# Patient Record
Sex: FEMALE | Race: WHITE | NOT HISPANIC OR LATINO | Employment: UNEMPLOYED | URBAN - METROPOLITAN AREA
[De-identification: names, ages, dates, MRNs, and addresses within clinical notes are randomized per-mention and may not be internally consistent; named-entity substitution may affect disease eponyms.]

---

## 2017-01-11 ENCOUNTER — TRANSCRIBE ORDERS (OUTPATIENT)
Dept: ADMINISTRATIVE | Facility: HOSPITAL | Age: 62
End: 2017-01-11

## 2017-01-11 ENCOUNTER — HOSPITAL ENCOUNTER (OUTPATIENT)
Dept: GENERAL RADIOLOGY | Facility: HOSPITAL | Age: 62
Discharge: HOME | End: 2017-01-11
Admitting: PHYSICIAN ASSISTANT

## 2017-01-11 DIAGNOSIS — R05.9 COUGH: Primary | ICD-10-CM

## 2017-01-11 PROCEDURE — 71020 HC CHEST PA AND LATERAL: CPT

## 2017-06-23 ENCOUNTER — TRANSCRIBE ORDERS (OUTPATIENT)
Dept: ADMINISTRATIVE | Facility: HOSPITAL | Age: 62
End: 2017-06-23

## 2017-06-23 DIAGNOSIS — Z12.31 VISIT FOR SCREENING MAMMOGRAM: Primary | ICD-10-CM

## 2017-08-07 ENCOUNTER — HOSPITAL ENCOUNTER (OUTPATIENT)
Dept: MAMMOGRAPHY | Facility: HOSPITAL | Age: 62
Discharge: HOME | End: 2017-08-07
Attending: OBSTETRICS & GYNECOLOGY | Admitting: OBSTETRICS & GYNECOLOGY

## 2017-08-07 DIAGNOSIS — Z12.31 VISIT FOR SCREENING MAMMOGRAM: ICD-10-CM

## 2017-08-07 PROCEDURE — 77067 SCR MAMMO BI INCL CAD: CPT | Performed by: RADIOLOGY

## 2017-08-07 PROCEDURE — 77063 BREAST TOMOSYNTHESIS BI: CPT | Performed by: RADIOLOGY

## 2017-08-07 PROCEDURE — 77063 BREAST TOMOSYNTHESIS BI: CPT

## 2017-08-07 PROCEDURE — G0202 SCR MAMMO BI INCL CAD: HCPCS

## 2017-09-11 ENCOUNTER — TRANSCRIBE ORDERS (OUTPATIENT)
Dept: ADMINISTRATIVE | Facility: HOSPITAL | Age: 62
End: 2017-09-11

## 2017-09-11 DIAGNOSIS — M81.0 OSTEOPOROSIS: Primary | ICD-10-CM

## 2017-09-21 ENCOUNTER — HOSPITAL ENCOUNTER (OUTPATIENT)
Dept: BONE DENSITY | Facility: HOSPITAL | Age: 62
Discharge: HOME | End: 2017-09-21
Attending: OBSTETRICS & GYNECOLOGY | Admitting: OBSTETRICS & GYNECOLOGY

## 2017-09-21 DIAGNOSIS — M81.0 OSTEOPOROSIS: ICD-10-CM

## 2017-09-21 PROCEDURE — 77080 DXA BONE DENSITY AXIAL: CPT

## 2017-11-13 ENCOUNTER — OFFICE VISIT (OUTPATIENT)
Dept: ORTHOPEDIC SURGERY | Facility: CLINIC | Age: 62
End: 2017-11-13

## 2017-11-13 VITALS
HEART RATE: 68 BPM | SYSTOLIC BLOOD PRESSURE: 127 MMHG | HEIGHT: 63 IN | WEIGHT: 147 LBS | BODY MASS INDEX: 26.05 KG/M2 | DIASTOLIC BLOOD PRESSURE: 76 MMHG

## 2017-11-13 DIAGNOSIS — M17.0 BILATERAL PRIMARY OSTEOARTHRITIS OF KNEE: Primary | ICD-10-CM

## 2017-11-13 PROCEDURE — 99213 OFFICE O/P EST LOW 20 MIN: CPT | Performed by: ORTHOPAEDIC SURGERY

## 2017-11-13 RX ORDER — PRAMIPEXOLE DIHYDROCHLORIDE 0.5 MG/1
TABLET ORAL
COMMUNITY
Start: 2017-10-30 | End: 2019-05-10 | Stop reason: SDUPTHER

## 2017-11-13 RX ORDER — ZOSTER VACCINE LIVE 19400 [PFU]/.65ML
INJECTION, POWDER, LYOPHILIZED, FOR SUSPENSION SUBCUTANEOUS
COMMUNITY
Start: 2017-10-24 | End: 2019-02-28

## 2017-11-13 RX ORDER — ESTRADIOL 0.5 MG/1
0.5 TABLET ORAL DAILY
COMMUNITY
Start: 2017-10-30

## 2017-11-13 RX ORDER — INFLUENZA A VIRUS A/MICHIGAN/45/2015 X-275 (H1N1) ANTIGEN (FORMALDEHYDE INACTIVATED), INFLUENZA A VIRUS A/SINGAPORE/INFIMH-16-0019/2016 IVR-186 (H3N2) ANTIGEN (FORMALDEHYDE INACTIVATED), INFLUENZA B VIRUS B/PHUKET/3073/2013 ANTIGEN (FORMALDEHYDE INACTIVATED), AND INFLUENZA B VIRUS B/MARYLAND/15/2016 BX-69A ANTIGEN (FORMALDEHYDE INACTIVATED) 15; 15; 15; 15 UG/.5ML; UG/.5ML; UG/.5ML; UG/.5ML
INJECTION, SUSPENSION INTRAMUSCULAR
COMMUNITY
Start: 2017-10-24 | End: 2019-02-28

## 2017-11-13 NOTE — PROGRESS NOTES
[facility] Note    Subjective     Chief Complaint   Patient presents with   • Follow-up     3 month follow up: Bilateral knee osteoarthritis        HPI    Suze Baeza is a 62 y.o. female. She presents today for evaluation of both her knees.  The pain is improved.  She has only mild pain today.  She had good relief with the Visco supplementation injections.  No giving way.  No history of injury.  Overall the pain is decreasing.      There is no problem list on file for this patient.    Past Medical History:   Diagnosis Date   • Degenerative tear of medial meniscus, right    • Low back pain    • Neuropathy, lumbosacral (radicular)    • Primary osteoarthritis of both knees    • Spondylolisthesis, lumbar region       Past Surgical History:   Procedure Laterality Date   • BLADDER SURGERY     • KNEE ARTHROSCOPY Right       Family History   Problem Relation Age of Onset   • Deep vein thrombosis Mother    • Stroke Mother    • Heart disease Mother    • Heart attack Mother    • Osteoarthritis Mother    • Heart disease Father    • Heart attack Father    • Stroke Other      Social History     Social History   • Marital status:      Spouse name: N/A   • Number of children: N/A   • Years of education: N/A     Occupational History   • Not on file.     Social History Main Topics   • Smoking status: Never Smoker   • Smokeless tobacco: Not on file   • Alcohol use No   • Drug use: No   • Sexual activity: Not on file     Other Topics Concern   • Not on file     Social History Narrative          Current Outpatient Prescriptions on File Prior to Visit   Medication Sig Dispense Refill   • atorvastatin (LIPITOR) 10 MG tablet Take 10 mg by mouth Daily.     • calcium acetate (PHOSLO) 667 MG capsule Take 1,334 mg by mouth 3 (Three) Times a Day With Meals.     • Cholecalciferol (VITAMIN D-3) 1000 units capsule Take  by mouth Take As Directed.     • Cyanocobalamin (VITAMIN B-12 ER PO) Take  by mouth  Take As Directed.     • FLUoxetine (PROzac) 10 MG capsule Take 10 mg by mouth Daily.     • glucosamine sulfate 500 MG capsule capsule Take  by mouth 3 (Three) Times a Day With Meals.     • Ibuprofen (ADVIL) 200 MG capsule Take  by mouth Take As Directed.     • Meloxicam (MOBIC PO) Take  by mouth Take As Directed.     • Omega-3 Fatty Acids (FISH OIL) 1000 MG capsule capsule Take  by mouth Daily With Breakfast.     • Red Yeast Rice Extract (RED YEAST RICE PO) Take  by mouth Take As Directed.     • vitamin C (ASCORBIC ACID) 250 MG tablet Take 250 mg by mouth Daily.     • [DISCONTINUED] Zinc Sulfate (ZINC 15 PO) Take  by mouth Take As Directed.     • Sulfamethoxazole-Trimethoprim (BACTRIM PO) Take  by mouth Take As Directed.     • [DISCONTINUED] Pramipexole Dihydrochloride (MIRAPEX PO) Take  by mouth.       No current facility-administered medications on file prior to visit.       No Known Allergies     Review of Systems   Constitutional: Negative for activity change, appetite change, chills, diaphoresis, fatigue, fever and unexpected weight change.   HENT: Negative for congestion, dental problem, drooling, ear discharge, ear pain, facial swelling, hearing loss, mouth sores, nosebleeds, postnasal drip, rhinorrhea, sinus pressure, sneezing, sore throat, tinnitus, trouble swallowing and voice change.    Eyes: Negative for photophobia, pain, discharge, redness, itching and visual disturbance.   Respiratory: Negative for apnea, cough, choking, chest tightness, shortness of breath, wheezing and stridor.    Cardiovascular: Negative for chest pain, palpitations and leg swelling.   Gastrointestinal: Negative for abdominal distention, abdominal pain, anal bleeding, blood in stool, constipation, diarrhea, nausea, rectal pain and vomiting.   Endocrine: Negative for cold intolerance, heat intolerance, polydipsia, polyphagia and polyuria.   Genitourinary: Negative for decreased urine volume, difficulty urinating, dysuria, enuresis,  "flank pain, frequency, genital sores, hematuria and urgency.   Musculoskeletal: Positive for arthralgias. Negative for back pain, gait problem, joint swelling, myalgias, neck pain and neck stiffness.   Skin: Negative for color change, pallor, rash and wound.   Allergic/Immunologic: Negative for environmental allergies, food allergies and immunocompromised state.   Neurological: Negative for dizziness, tremors, seizures, syncope, facial asymmetry, speech difficulty, weakness, light-headedness, numbness and headaches.   Hematological: Negative for adenopathy. Does not bruise/bleed easily.   Psychiatric/Behavioral: Negative for agitation, behavioral problems, confusion, decreased concentration, dysphoric mood, hallucinations, self-injury, sleep disturbance and suicidal ideas. The patient is not nervous/anxious and is not hyperactive.         Objective      Physical Exam  /76  Pulse 68  Ht 63.39\" (161 cm)  Wt 147 lb (66.7 kg)  BMI 25.72 kg/m2    Body mass index is 25.72 kg/(m^2).    General:   Mental Status:  Alert   Appearance: Cooperative, in no acute distress   Build and Nutrition: Well-nourished and well developed female   Orientation: Alert and oriented to person, place and time   Posture: Normal   Gait: Normal    Integument:   Right knee: no skin lesions, no rash, no ecchymosis   Left knee: no skin lesions, no rash, no ecchymosis    Lower Extremities:   Right Knee:    Tenderness:  No medial or lateral joint line tenderness    Effusion:  None    Swelling:  None    Crepitus:  Positive    Atrophy:  None    Range of motion:  Extension: 0°       Flexion: 120°  Instability:  No varus laxity, no valgus laxity, negative anterior drawer  Deformities:  None   Left Knee:    Tenderness:  Mild lateral tenderness    Effusion:  None    Swelling:  None    Crepitus: Positive    Atrophy: None    Range of motion:  Extension: 0°       Flexion: 120°  Instability:  No varus laxity, no valgus laxity, negative anterior " drawer  Deformities:  None      Imaging/Studies  Imaging Results (last 24 hours)     Procedure Component Value Units Date/Time    XR Knee 4+ View Bilateral [[id number]] Resulted:  11/13/17 1130     Updated:  11/13/17 1131    Narrative:       RIght Knee Radiographs  Indication: right knee pain  Views: Standing AP's and skiers of both knees, with lateral and sunrise   views of the right knee    Comparison: no prior studies available    Findings:   Advanced arthritic changes are seen, with bone-on-bone contact in the   medial compartment, with tricompartmental osteophytes.    Left Knee Radiographs  Indication: left knee pain  Views: Standing AP's and skiers of both knees, with lateral and sunrise   views of the left knee    Comparison: no prior studies available    Findings:   Arthritic changes are seen, with medial joint space narrowing, and   subchondral sclerosis medially, with patellofemoral osteophytes.            Assessment and Plan     Suze was seen today for follow-up.    Diagnoses and all orders for this visit:    Bilateral primary osteoarthritis of knee  -     XR Knee 4+ View Bilateral        I reviewed my findings with patient today.  She has arthritis in both her knees, right greater than left.  Currently her symptoms are mild.  I will see her back in 3 months, but sooner for any problems.  She may be a candidate for repeat viscous supplementation injections in the future.    Return in about 3 months (around 2/13/2018).      Medical Decision Making    Data/Risk: radiology tests and independent visualization of imaging, lab tests, or EMG/NCV      Heraclio Mari MD  11/13/17  11:39 AM

## 2017-12-01 ENCOUNTER — TELEPHONE (OUTPATIENT)
Dept: ORTHOPEDIC SURGERY | Facility: CLINIC | Age: 62
End: 2017-12-01

## 2018-08-08 ENCOUNTER — OFFICE VISIT (OUTPATIENT)
Dept: ORTHOPEDIC SURGERY | Facility: CLINIC | Age: 63
End: 2018-08-08

## 2018-08-08 VITALS — BODY MASS INDEX: 24.77 KG/M2 | WEIGHT: 139.77 LBS | OXYGEN SATURATION: 98 % | HEART RATE: 87 BPM | HEIGHT: 63 IN

## 2018-08-08 DIAGNOSIS — M17.12 PRIMARY OSTEOARTHRITIS OF LEFT KNEE: Primary | ICD-10-CM

## 2018-08-08 PROCEDURE — 99212 OFFICE O/P EST SF 10 MIN: CPT | Performed by: ORTHOPAEDIC SURGERY

## 2018-08-08 RX ORDER — NORTRIPTYLINE HYDROCHLORIDE 10 MG/1
10 CAPSULE ORAL AS NEEDED
COMMUNITY
Start: 2018-07-03 | End: 2019-12-18

## 2018-08-08 NOTE — PROGRESS NOTES
[facility]    Subjective     Chief Complaint   Patient presents with   • Left Knee - Follow-up        HPI    Suze Baeza is a 63 y.o. female.  She presents today for evaluation of her left knee.  She has a known history of arthritis in the knee, and has responded well to Visco supplementation injections in the past.  The pain is mild-to-moderate, associated with swelling.  Painful with certain motions.      There is no problem list on file for this patient.    Past Medical History:   Diagnosis Date   • Degenerative tear of medial meniscus, right    • Low back pain    • Neuropathy, lumbosacral (radicular)    • Primary osteoarthritis of both knees    • Spondylolisthesis, lumbar region       Past Surgical History:   Procedure Laterality Date   • BLADDER SURGERY     • KNEE ARTHROSCOPY Right       Family History   Problem Relation Age of Onset   • Deep vein thrombosis Mother    • Stroke Mother    • Heart disease Mother    • Heart attack Mother    • Osteoarthritis Mother    • Heart disease Father    • Heart attack Father    • Stroke Other      Social History     Social History   • Marital status:      Spouse name: N/A   • Number of children: N/A   • Years of education: N/A     Occupational History   • Not on file.     Social History Main Topics   • Smoking status: Never Smoker   • Smokeless tobacco: Not on file   • Alcohol use No   • Drug use: No   • Sexual activity: Not on file     Other Topics Concern   • Not on file     Social History Narrative          Current Outpatient Prescriptions on File Prior to Visit   Medication Sig Dispense Refill   • atorvastatin (LIPITOR) 10 MG tablet Take 10 mg by mouth Daily.     • calcium acetate (PHOSLO) 667 MG capsule Take 1,334 mg by mouth 3 (Three) Times a Day With Meals.     • Cholecalciferol (VITAMIN D-3) 1000 units capsule Take  by mouth Take As Directed.     • Cyanocobalamin (VITAMIN B-12 ER PO) Take  by mouth Take As Directed.      • estradiol (ESTRACE) 0.5 MG tablet      • FLUoxetine (PROzac) 10 MG capsule Take 10 mg by mouth Daily.     • FLUZONE QUADRIVALENT 0.5 ML suspension prefilled syringe injection      • glucosamine sulfate 500 MG capsule capsule Take  by mouth 3 (Three) Times a Day With Meals.     • Ibuprofen (ADVIL) 200 MG capsule Take  by mouth Take As Directed.     • Meloxicam (MOBIC PO) Take  by mouth Take As Directed.     • Omega-3 Fatty Acids (FISH OIL) 1000 MG capsule capsule Take  by mouth Daily With Breakfast.     • pramipexole (MIRAPEX) 0.5 MG tablet      • Red Yeast Rice Extract (RED YEAST RICE PO) Take  by mouth Take As Directed.     • Sulfamethoxazole-Trimethoprim (BACTRIM PO) Take  by mouth Take As Directed.     • vitamin C (ASCORBIC ACID) 250 MG tablet Take 250 mg by mouth Daily.     • ZOSTAVAX 58482 UNT/0.65ML reconstituted suspension        No current facility-administered medications on file prior to visit.       No Known Allergies     Review of Systems   Constitutional: Negative for activity change, appetite change, chills, diaphoresis, fatigue, fever and unexpected weight change.   HENT: Negative for congestion, dental problem, drooling, ear discharge, ear pain, facial swelling, hearing loss, mouth sores, nosebleeds, postnasal drip, rhinorrhea, sinus pressure, sneezing, sore throat, tinnitus, trouble swallowing and voice change.    Eyes: Negative for photophobia, pain, discharge, redness, itching and visual disturbance.   Respiratory: Negative for apnea, cough, choking, chest tightness, shortness of breath, wheezing and stridor.    Cardiovascular: Positive for leg swelling. Negative for chest pain and palpitations.   Gastrointestinal: Negative for abdominal distention, abdominal pain, anal bleeding, blood in stool, constipation, diarrhea, nausea, rectal pain and vomiting.   Endocrine: Negative for cold intolerance, heat intolerance, polydipsia, polyphagia and polyuria.   Genitourinary: Negative for decreased  "urine volume, difficulty urinating, dysuria, enuresis, flank pain, frequency, genital sores, hematuria and urgency.   Musculoskeletal: Positive for joint swelling. Negative for arthralgias, back pain, gait problem, myalgias, neck pain and neck stiffness.        Joint Pain    Skin: Negative for color change, pallor, rash and wound.   Allergic/Immunologic: Negative for environmental allergies, food allergies and immunocompromised state.   Neurological: Negative for dizziness, tremors, seizures, syncope, facial asymmetry, speech difficulty, weakness, light-headedness, numbness and headaches.   Hematological: Negative for adenopathy. Does not bruise/bleed easily.   Psychiatric/Behavioral: Negative for agitation, behavioral problems, confusion, decreased concentration, dysphoric mood, hallucinations, self-injury, sleep disturbance and suicidal ideas. The patient is not nervous/anxious and is not hyperactive.         Objective      Physical Exam  Pulse 87   Ht 161 cm (63.39\")   Wt 63.4 kg (139 lb 12.4 oz)   SpO2 98%   BMI 24.46 kg/m²     Body mass index is 24.46 kg/m².    General:   Mental Status:  Alert   Appearance: Cooperative, in no acute distress   Build and Nutrition: Well-nourished and well developed female   Orientation: Alert and oriented to person, place and time   Posture: Normal   Gait: Normal    Integument:   Left knee: No skin lesions, no rash, no ecchymosis    Lower Extremities:   Left Knee:    Tenderness:  Medial joint line tenderness    Effusion:  None    Swelling:  None    Crepitus: Positive    Range of motion:  Extension: 0°       Flexion: 120°  Instability: No varus laxity, no valgus laxity, negative anterior drawer  Deformities:  Varus          Assessment and Plan     Suze was seen today for follow-up.    Diagnoses and all orders for this visit:    Primary osteoarthritis of left knee        I reviewed my findings with patient today.  Left knee is bothering her, and she has responded well to " Visco supplementation injections in the past.  We will submit for approval, and I will see her back once they're ready for administration.    Return for After approval of the visco injection.      Medical Decision Making  Management Options : prescription/IM medicine      Heraclio Mari MD  08/08/18  3:09 PM

## 2018-08-21 DIAGNOSIS — M17.12 PRIMARY OSTEOARTHRITIS OF LEFT KNEE: Primary | ICD-10-CM

## 2018-09-06 ENCOUNTER — TRANSCRIBE ORDERS (OUTPATIENT)
Dept: ADMINISTRATIVE | Facility: HOSPITAL | Age: 63
End: 2018-09-06

## 2018-09-06 DIAGNOSIS — Z12.31 VISIT FOR SCREENING MAMMOGRAM: Primary | ICD-10-CM

## 2018-09-17 ENCOUNTER — HOSPITAL ENCOUNTER (OUTPATIENT)
Dept: MAMMOGRAPHY | Facility: HOSPITAL | Age: 63
Discharge: HOME | End: 2018-09-17
Attending: OBSTETRICS & GYNECOLOGY | Admitting: OBSTETRICS & GYNECOLOGY

## 2018-09-17 DIAGNOSIS — Z12.31 VISIT FOR SCREENING MAMMOGRAM: ICD-10-CM

## 2018-09-17 PROCEDURE — 77063 BREAST TOMOSYNTHESIS BI: CPT

## 2018-09-17 PROCEDURE — 77063 BREAST TOMOSYNTHESIS BI: CPT | Performed by: RADIOLOGY

## 2018-09-17 PROCEDURE — 77067 SCR MAMMO BI INCL CAD: CPT | Performed by: RADIOLOGY

## 2018-09-17 PROCEDURE — 77067 SCR MAMMO BI INCL CAD: CPT

## 2018-10-01 ENCOUNTER — CLINICAL SUPPORT (OUTPATIENT)
Dept: ORTHOPEDIC SURGERY | Facility: CLINIC | Age: 63
End: 2018-10-01

## 2018-10-01 DIAGNOSIS — M17.12 PRIMARY OSTEOARTHRITIS OF LEFT KNEE: Primary | ICD-10-CM

## 2018-10-01 PROCEDURE — 20610 DRAIN/INJ JOINT/BURSA W/O US: CPT | Performed by: ORTHOPAEDIC SURGERY

## 2018-10-01 NOTE — PROGRESS NOTES
Procedure   Large Joint Arthrocentesis  Date/Time: 10/1/2018 10:14 AM  Consent given by: patient  Site marked: site marked  Timeout: Immediately prior to procedure a time out was called to verify the correct patient, procedure, equipment, support staff and site/side marked as required   Supporting Documentation  Indications: pain   Procedure Details  Location: knee - L knee  Preparation: Patient was prepped and draped in the usual sterile fashion  Needle size: 22 G  Approach: anterolateral  Medications administered: 30 mg Hyaluronan 30 MG/2ML  Patient tolerance: patient tolerated the procedure well with no immediate complications

## 2018-10-01 NOTE — PROGRESS NOTES
[facility] [place] [facility] Note    Subjective     Chief Complaint   Patient presents with   • Left Knee - Follow-up     Orthovisc injection #1        HPI    Suze Baeza is a 63 y.o. female who presents for the first Orthovisc injection into the left knee.    There is no problem list on file for this patient.    Past Medical History:   Diagnosis Date   • Degenerative tear of medial meniscus, right    • Low back pain    • Neuropathy, lumbosacral (radicular)    • Primary osteoarthritis of both knees    • Spondylolisthesis, lumbar region       Past Surgical History:   Procedure Laterality Date   • BLADDER SURGERY     • HYSTERECTOMY  07/10/2012   • KNEE ARTHROSCOPY Right    • OOPHORECTOMY Bilateral 07/10/2012      Family History   Problem Relation Age of Onset   • Deep vein thrombosis Mother    • Stroke Mother    • Heart disease Mother    • Heart attack Mother    • Osteoarthritis Mother    • Heart disease Father    • Heart attack Father    • Stroke Other    • Breast cancer Neg Hx    • Ovarian cancer Neg Hx      Social History     Social History   • Marital status:      Spouse name: N/A   • Number of children: N/A   • Years of education: N/A     Occupational History   • Not on file.     Social History Main Topics   • Smoking status: Never Smoker   • Smokeless tobacco: Not on file   • Alcohol use No   • Drug use: No   • Sexual activity: Not on file     Other Topics Concern   • Not on file     Social History Narrative          Current Outpatient Prescriptions on File Prior to Visit   Medication Sig Dispense Refill   • atorvastatin (LIPITOR) 10 MG tablet Take 10 mg by mouth Daily.     • calcium acetate (PHOSLO) 667 MG capsule Take 1,334 mg by mouth 3 (Three) Times a Day With Meals.     • Cholecalciferol (VITAMIN D-3) 1000 units capsule Take  by mouth Take As Directed.     • Cyanocobalamin (VITAMIN B-12 ER PO) Take  by mouth Take As Directed.     • estradiol (ESTRACE) 0.5 MG tablet      • FLUoxetine  (PROzac) 10 MG capsule Take 10 mg by mouth Daily.     • FLUZONE QUADRIVALENT 0.5 ML suspension prefilled syringe injection      • glucosamine sulfate 500 MG capsule capsule Take  by mouth 3 (Three) Times a Day With Meals.     • Ibuprofen (ADVIL) 200 MG capsule Take  by mouth Take As Directed.     • Meloxicam (MOBIC PO) Take  by mouth Take As Directed.     • nortriptyline (PAMELOR) 10 MG capsule      • Omega-3 Fatty Acids (FISH OIL) 1000 MG capsule capsule Take  by mouth Daily With Breakfast.     • pramipexole (MIRAPEX) 0.5 MG tablet      • Red Yeast Rice Extract (RED YEAST RICE PO) Take  by mouth Take As Directed.     • Sulfamethoxazole-Trimethoprim (BACTRIM PO) Take  by mouth Take As Directed.     • vitamin C (ASCORBIC ACID) 250 MG tablet Take 250 mg by mouth Daily.     • ZOSTAVAX [id number] UNT/0.65ML reconstituted suspension        No current facility-administered medications on file prior to visit.       No Known Allergies     Review of Systems   Constitutional: Negative for activity change, appetite change, chills, diaphoresis, fatigue, fever and unexpected weight change.   HENT: Negative for congestion, dental problem, drooling, ear discharge, ear pain, facial swelling, hearing loss, mouth sores, nosebleeds, postnasal drip, rhinorrhea, sinus pressure, sneezing, sore throat, tinnitus, trouble swallowing and voice change.    Eyes: Negative for photophobia, pain, discharge, redness, itching and visual disturbance.   Respiratory: Negative for apnea, cough, choking, chest tightness, shortness of breath, wheezing and stridor.    Cardiovascular: Negative for chest pain, palpitations and leg swelling.   Gastrointestinal: Negative for abdominal distention, abdominal pain, anal bleeding, blood in stool, constipation, diarrhea, nausea, rectal pain and vomiting.   Endocrine: Negative for cold intolerance, heat intolerance, polydipsia, polyphagia and polyuria.   Genitourinary: Negative for decreased urine volume, difficulty  urinating, dysuria, enuresis, flank pain, frequency, genital sores, hematuria and urgency.   Musculoskeletal: Positive for arthralgias. Negative for back pain, gait problem, joint swelling, myalgias, neck pain and neck stiffness.   Skin: Negative for color change, pallor, rash and wound.   Allergic/Immunologic: Negative for environmental allergies, food allergies and immunocompromised state.   Neurological: Negative for dizziness, tremors, seizures, syncope, facial asymmetry, speech difficulty, weakness, light-headedness, numbness and headaches.   Hematological: Negative for adenopathy. Does not bruise/bleed easily.   Psychiatric/Behavioral: Negative for agitation, behavioral problems, confusion, decreased concentration, dysphoric mood, hallucinations, self-injury, sleep disturbance and suicidal ideas. The patient is not nervous/anxious and is not hyperactive.         Objective      Physical Exam  There were no vitals taken for this visit.    There is no height or weight on file to calculate BMI.    General:   Mental Status:  Alert   Appearance: Cooperative, in no acute distress   Posture: Normal    Assessment and Plan     Suze was seen today for follow-up.    Diagnoses and all orders for this visit:    Primary osteoarthritis of left knee        Administration of viscosupplementation today.  Please see my procedure note for details.    Return in about 1 week (around 10/8/2018) for Injection.    Heraclio Mari MD  10/01/18  12:30 PM

## 2018-10-08 ENCOUNTER — CLINICAL SUPPORT (OUTPATIENT)
Dept: ORTHOPEDIC SURGERY | Facility: CLINIC | Age: 63
End: 2018-10-08

## 2018-10-08 DIAGNOSIS — M17.12 PRIMARY OSTEOARTHRITIS OF LEFT KNEE: Primary | ICD-10-CM

## 2018-10-08 PROCEDURE — 20610 DRAIN/INJ JOINT/BURSA W/O US: CPT | Performed by: ORTHOPAEDIC SURGERY

## 2018-10-08 NOTE — PROGRESS NOTES
Procedure   Large Joint Arthrocentesis  Date/Time: 10/8/2018 10:14 AM  Consent given by: patient  Site marked: site marked  Timeout: Immediately prior to procedure a time out was called to verify the correct patient, procedure, equipment, support staff and site/side marked as required   Supporting Documentation  Indications: pain   Procedure Details  Location: knee - L knee  Preparation: Patient was prepped and draped in the usual sterile fashion  Needle size: 22 G  Approach: anterolateral  Medications administered: 30 mg Hyaluronan 30 MG/2ML  Patient tolerance: patient tolerated the procedure well with no immediate complications

## 2018-10-08 NOTE — PROGRESS NOTES
[facility] [place] [facility] Note    Subjective     Chief Complaint   Patient presents with   • Injections     Left Knee Orthovisc Injection #2        HPI    Suze Baeza is a 63 y.o. female who presents for the second Orthovisc injection in the left knee.  Of note, she has seen some relief so far.    There is no problem list on file for this patient.    Past Medical History:   Diagnosis Date   • Degenerative tear of medial meniscus, right    • Low back pain    • Neuropathy, lumbosacral (radicular)    • Primary osteoarthritis of both knees    • Spondylolisthesis, lumbar region       Past Surgical History:   Procedure Laterality Date   • BLADDER SURGERY     • HYSTERECTOMY  07/10/2012   • KNEE ARTHROSCOPY Right    • OOPHORECTOMY Bilateral 07/10/2012      Family History   Problem Relation Age of Onset   • Deep vein thrombosis Mother    • Stroke Mother    • Heart disease Mother    • Heart attack Mother    • Osteoarthritis Mother    • Heart disease Father    • Heart attack Father    • Stroke Other    • Breast cancer Neg Hx    • Ovarian cancer Neg Hx      Social History     Social History   • Marital status:      Spouse name: N/A   • Number of children: N/A   • Years of education: N/A     Occupational History   • Not on file.     Social History Main Topics   • Smoking status: Never Smoker   • Smokeless tobacco: Not on file   • Alcohol use No   • Drug use: No   • Sexual activity: Not on file     Other Topics Concern   • Not on file     Social History Narrative          Current Outpatient Prescriptions on File Prior to Visit   Medication Sig Dispense Refill   • atorvastatin (LIPITOR) 10 MG tablet Take 10 mg by mouth Daily.     • calcium acetate (PHOSLO) 667 MG capsule Take 1,334 mg by mouth 3 (Three) Times a Day With Meals.     • Cholecalciferol (VITAMIN D-3) 1000 units capsule Take  by mouth Take As Directed.     • Cyanocobalamin (VITAMIN B-12 ER PO) Take  by mouth Take As Directed.     • estradiol  (ESTRACE) 0.5 MG tablet      • FLUoxetine (PROzac) 10 MG capsule Take 10 mg by mouth Daily.     • FLUZONE QUADRIVALENT 0.5 ML suspension prefilled syringe injection      • glucosamine sulfate 500 MG capsule capsule Take  by mouth 3 (Three) Times a Day With Meals.     • Ibuprofen (ADVIL) 200 MG capsule Take  by mouth Take As Directed.     • Meloxicam (MOBIC PO) Take  by mouth Take As Directed.     • nortriptyline (PAMELOR) 10 MG capsule      • Omega-3 Fatty Acids (FISH OIL) 1000 MG capsule capsule Take  by mouth Daily With Breakfast.     • pramipexole (MIRAPEX) 0.5 MG tablet      • Red Yeast Rice Extract (RED YEAST RICE PO) Take  by mouth Take As Directed.     • Sulfamethoxazole-Trimethoprim (BACTRIM PO) Take  by mouth Take As Directed.     • vitamin C (ASCORBIC ACID) 250 MG tablet Take 250 mg by mouth Daily.     • ZOSTAVAX 18409 UNT/0.65ML reconstituted suspension        No current facility-administered medications on file prior to visit.       No Known Allergies     Review of Systems   Constitutional: Negative for activity change, appetite change, chills, diaphoresis, fatigue, fever and unexpected weight change.   HENT: Negative for congestion, dental problem, drooling, ear discharge, ear pain, facial swelling, hearing loss, mouth sores, nosebleeds, postnasal drip, rhinorrhea, sinus pressure, sneezing, sore throat, tinnitus, trouble swallowing and voice change.    Eyes: Negative for photophobia, pain, discharge, redness, itching and visual disturbance.   Respiratory: Negative for apnea, cough, choking, chest tightness, shortness of breath, wheezing and stridor.    Cardiovascular: Negative for chest pain, palpitations and leg swelling.   Gastrointestinal: Negative for abdominal distention, abdominal pain, anal bleeding, blood in stool, constipation, diarrhea, nausea, rectal pain and vomiting.   Endocrine: Negative for cold intolerance, heat intolerance, polydipsia, polyphagia and polyuria.   Genitourinary: Negative  for decreased urine volume, difficulty urinating, dysuria, enuresis, flank pain, frequency, genital sores, hematuria and urgency.   Musculoskeletal: Positive for joint swelling. Negative for arthralgias, back pain, gait problem, myalgias, neck pain and neck stiffness.   Skin: Negative for color change, pallor, rash and wound.   Allergic/Immunologic: Negative for environmental allergies, food allergies and immunocompromised state.   Neurological: Negative for dizziness, tremors, seizures, syncope, facial asymmetry, speech difficulty, weakness, light-headedness, numbness and headaches.   Hematological: Negative for adenopathy. Does not bruise/bleed easily.   Psychiatric/Behavioral: Negative for agitation, behavioral problems, confusion, decreased concentration, dysphoric mood, hallucinations, self-injury, sleep disturbance and suicidal ideas. The patient is not nervous/anxious and is not hyperactive.         Objective      Physical Exam  There were no vitals taken for this visit.    There is no height or weight on file to calculate BMI.    General:   Mental Status:  Alert   Appearance: Cooperative, in no acute distress   Posture: Normal    Assessment and Plan     Suze was seen today for injections.    Diagnoses and all orders for this visit:    Primary osteoarthritis of left knee  -     Large Joint Arthrocentesis        Administration of viscosupplementation today.  Please see my procedure note for details.    Return in about 1 week (around 10/15/2018) for Injection.    Heraclio Mari MD  10/08/18  10:36 AM

## 2018-10-15 ENCOUNTER — TELEPHONE (OUTPATIENT)
Dept: ORTHOPEDIC SURGERY | Facility: CLINIC | Age: 63
End: 2018-10-15

## 2018-10-15 ENCOUNTER — CLINICAL SUPPORT (OUTPATIENT)
Dept: ORTHOPEDIC SURGERY | Facility: CLINIC | Age: 63
End: 2018-10-15

## 2018-10-15 DIAGNOSIS — M17.12 PRIMARY OSTEOARTHRITIS OF LEFT KNEE: Primary | ICD-10-CM

## 2018-10-15 DIAGNOSIS — M17.11 PRIMARY OSTEOARTHRITIS OF RIGHT KNEE: Primary | ICD-10-CM

## 2018-10-15 PROCEDURE — 20610 DRAIN/INJ JOINT/BURSA W/O US: CPT | Performed by: ORTHOPAEDIC SURGERY

## 2018-10-15 NOTE — PROGRESS NOTES
Procedure   Large Joint Arthrocentesis  Date/Time: 10/15/2018 10:10 AM  Consent given by: patient  Site marked: site marked  Timeout: Immediately prior to procedure a time out was called to verify the correct patient, procedure, equipment, support staff and site/side marked as required   Supporting Documentation  Indications: pain   Procedure Details  Location: knee - L knee  Preparation: Patient was prepped and draped in the usual sterile fashion  Needle size: 22 G  Approach: anterolateral  Medications administered: 30 mg Hyaluronan 30 MG/2ML  Patient tolerance: patient tolerated the procedure well with no immediate complications

## 2018-10-15 NOTE — PROGRESS NOTES
[facility] [place] [facility] [place]    Subjective     Chief Complaint   Patient presents with   • Left Knee - Follow-up     Orthovisc injection #3        HPI    Suze Baeza is a 63 y.o. female who presents for the third and final Orthovisc injection into the knee joint.  No significant improvement up to this point.    There is no problem list on file for this patient.    Past Medical History:   Diagnosis Date   • Degenerative tear of medial meniscus, right    • Low back pain    • Neuropathy, lumbosacral (radicular)    • Primary osteoarthritis of both knees    • Spondylolisthesis, lumbar region       Past Surgical History:   Procedure Laterality Date   • BLADDER SURGERY     • HYSTERECTOMY  07/10/2012   • KNEE ARTHROSCOPY Right    • OOPHORECTOMY Bilateral 07/10/2012      Family History   Problem Relation Age of Onset   • Deep vein thrombosis Mother    • Stroke Mother    • Heart disease Mother    • Heart attack Mother    • Osteoarthritis Mother    • Heart disease Father    • Heart attack Father    • Stroke Other    • Breast cancer Neg Hx    • Ovarian cancer Neg Hx      Social History     Social History   • Marital status:      Spouse name: N/A   • Number of children: N/A   • Years of education: N/A     Occupational History   • Not on file.     Social History Main Topics   • Smoking status: Never Smoker   • Smokeless tobacco: Not on file   • Alcohol use No   • Drug use: No   • Sexual activity: Not on file     Other Topics Concern   • Not on file     Social History Narrative          Current Outpatient Prescriptions on File Prior to Visit   Medication Sig Dispense Refill   • atorvastatin (LIPITOR) 10 MG tablet Take 10 mg by mouth Daily.     • calcium acetate (PHOSLO) 667 MG capsule Take 1,334 mg by mouth 3 (Three) Times a Day With Meals.     • Cholecalciferol (VITAMIN D-3) 1000 units capsule Take  by mouth Take As Directed.     • Cyanocobalamin (VITAMIN B-12 ER PO) Take  by mouth Take As Directed.      • estradiol (ESTRACE) 0.5 MG tablet      • FLUoxetine (PROzac) 10 MG capsule Take 10 mg by mouth Daily.     • FLUZONE QUADRIVALENT 0.5 ML suspension prefilled syringe injection      • glucosamine sulfate 500 MG capsule capsule Take  by mouth 3 (Three) Times a Day With Meals.     • Ibuprofen (ADVIL) 200 MG capsule Take  by mouth Take As Directed.     • Meloxicam (MOBIC PO) Take  by mouth Take As Directed.     • nortriptyline (PAMELOR) 10 MG capsule      • Omega-3 Fatty Acids (FISH OIL) 1000 MG capsule capsule Take  by mouth Daily With Breakfast.     • pramipexole (MIRAPEX) 0.5 MG tablet      • Red Yeast Rice Extract (RED YEAST RICE PO) Take  by mouth Take As Directed.     • Sulfamethoxazole-Trimethoprim (BACTRIM PO) Take  by mouth Take As Directed.     • vitamin C (ASCORBIC ACID) 250 MG tablet Take 250 mg by mouth Daily.     • ZOSTAVAX 82252 UNT/0.65ML reconstituted suspension        No current facility-administered medications on file prior to visit.       No Known Allergies     Review of Systems     Objective      Physical Exam  There were no vitals taken for this visit.    There is no height or weight on file to calculate BMI.    General:   Mental Status:  Alert   Appearance: Cooperative, in no acute distress   Posture: Normal    Assessment and Plan     Suze was seen today for follow-up.    Diagnoses and all orders for this visit:    Primary osteoarthritis of left knee  -     Large Joint Arthrocentesis        Administration of viscosupplementation today.  Please see my procedure note for details.    Return in about 6 months (around 4/15/2019).    Heraclio Mari MD  10/15/18  10:42 AM

## 2018-10-16 PROBLEM — M17.11 PRIMARY OSTEOARTHRITIS OF RIGHT KNEE: Status: ACTIVE | Noted: 2018-10-16

## 2019-01-03 PROBLEM — F51.01 PRIMARY INSOMNIA: Status: ACTIVE | Noted: 2017-05-23

## 2019-01-14 PROBLEM — F32.0 MILD MAJOR DEPRESSION, SINGLE EPISODE (HCC): Status: ACTIVE | Noted: 2019-01-14

## 2019-01-14 PROBLEM — G25.81 RESTLESS LEG SYNDROME: Status: ACTIVE | Noted: 2019-01-14

## 2019-01-31 ENCOUNTER — LAB REQUISITION (OUTPATIENT)
Dept: LAB | Facility: HOSPITAL | Age: 64
End: 2019-01-31

## 2019-01-31 DIAGNOSIS — R22.32 LOCALIZED SWELLING, MASS AND LUMP, LEFT UPPER LIMB: ICD-10-CM

## 2019-01-31 PROCEDURE — 88305 TISSUE EXAM BY PATHOLOGIST: CPT | Performed by: PLASTIC SURGERY

## 2019-02-01 LAB
CYTO UR: NORMAL
LAB AP CASE REPORT: NORMAL
LAB AP CLINICAL INFORMATION: NORMAL
PATH REPORT.FINAL DX SPEC: NORMAL
PATH REPORT.GROSS SPEC: NORMAL

## 2019-02-04 RX ORDER — ATORVASTATIN CALCIUM 10 MG/1
TABLET, FILM COATED ORAL
Qty: 30 TABLET | Refills: 4 | Status: SHIPPED | OUTPATIENT
Start: 2019-02-04 | End: 2019-07-25 | Stop reason: SDUPTHER

## 2019-02-25 PROBLEM — M15.9 PRIMARY OSTEOARTHRITIS INVOLVING MULTIPLE JOINTS: Status: ACTIVE | Noted: 2019-02-25

## 2019-02-25 PROBLEM — M54.50 CHRONIC BILATERAL LOW BACK PAIN: Status: ACTIVE | Noted: 2019-02-25

## 2019-02-25 PROBLEM — M25.562 CHRONIC PAIN OF BOTH KNEES: Status: ACTIVE | Noted: 2019-02-25

## 2019-02-25 PROBLEM — M25.561 CHRONIC PAIN OF BOTH KNEES: Status: ACTIVE | Noted: 2019-02-25

## 2019-02-25 PROBLEM — R53.82 CHRONIC FATIGUE: Status: ACTIVE | Noted: 2019-02-25

## 2019-02-25 PROBLEM — G89.29 CHRONIC BILATERAL LOW BACK PAIN: Status: ACTIVE | Noted: 2019-02-25

## 2019-02-25 PROBLEM — M54.2 CERVICALGIA: Status: ACTIVE | Noted: 2019-02-25

## 2019-02-25 PROBLEM — B07.9 VIRAL WARTS: Status: ACTIVE | Noted: 2019-02-25

## 2019-02-25 PROBLEM — R51.9 BILATERAL HEADACHES: Status: ACTIVE | Noted: 2019-02-25

## 2019-02-25 PROBLEM — M71.21 BAKER CYST, RIGHT: Status: ACTIVE | Noted: 2019-02-25

## 2019-02-25 PROBLEM — N95.1 MENOPAUSAL SYMPTOMS: Status: ACTIVE | Noted: 2019-02-25

## 2019-02-25 PROBLEM — F43.29 STRESS AND ADJUSTMENT REACTION: Status: ACTIVE | Noted: 2019-02-25

## 2019-02-25 PROBLEM — M15.0 PRIMARY OSTEOARTHRITIS INVOLVING MULTIPLE JOINTS: Status: ACTIVE | Noted: 2019-02-25

## 2019-02-25 PROBLEM — G89.29 CHRONIC PAIN OF BOTH KNEES: Status: ACTIVE | Noted: 2019-02-25

## 2019-02-27 PROBLEM — M17.0 PRIMARY OSTEOARTHRITIS OF BOTH KNEES: Status: ACTIVE | Noted: 2019-02-27

## 2019-02-27 PROBLEM — G93.32 CHRONIC FATIGUE SYNDROME: Status: ACTIVE | Noted: 2019-02-27

## 2019-02-27 PROBLEM — M71.20 SYNOVIAL CYST OF POPLITEAL SPACE: Status: ACTIVE | Noted: 2019-02-27

## 2019-02-28 ENCOUNTER — OFFICE VISIT (OUTPATIENT)
Dept: INTERNAL MEDICINE | Facility: CLINIC | Age: 64
End: 2019-02-28

## 2019-02-28 ENCOUNTER — CLINICAL SUPPORT (OUTPATIENT)
Dept: INTERNAL MEDICINE | Facility: CLINIC | Age: 64
End: 2019-02-28

## 2019-02-28 ENCOUNTER — TELEPHONE (OUTPATIENT)
Dept: INTERNAL MEDICINE | Facility: CLINIC | Age: 64
End: 2019-02-28

## 2019-02-28 VITALS
HEIGHT: 63 IN | WEIGHT: 150 LBS | DIASTOLIC BLOOD PRESSURE: 64 MMHG | SYSTOLIC BLOOD PRESSURE: 106 MMHG | BODY MASS INDEX: 26.58 KG/M2 | HEART RATE: 64 BPM

## 2019-02-28 DIAGNOSIS — M54.50 CHRONIC BILATERAL LOW BACK PAIN WITHOUT SCIATICA: ICD-10-CM

## 2019-02-28 DIAGNOSIS — F32.0 MILD MAJOR DEPRESSION, SINGLE EPISODE (HCC): ICD-10-CM

## 2019-02-28 DIAGNOSIS — R14.0 ABDOMINAL BLOATING: ICD-10-CM

## 2019-02-28 DIAGNOSIS — E78.00 HYPERCHOLESTEROLEMIA: ICD-10-CM

## 2019-02-28 DIAGNOSIS — E55.9 VITAMIN D DEFICIENCY: ICD-10-CM

## 2019-02-28 DIAGNOSIS — Z00.00 ANNUAL PHYSICAL EXAM: Primary | ICD-10-CM

## 2019-02-28 DIAGNOSIS — F51.01 PRIMARY INSOMNIA: ICD-10-CM

## 2019-02-28 DIAGNOSIS — G89.29 CHRONIC BILATERAL LOW BACK PAIN WITHOUT SCIATICA: ICD-10-CM

## 2019-02-28 DIAGNOSIS — G25.81 RESTLESS LEG SYNDROME: ICD-10-CM

## 2019-02-28 DIAGNOSIS — M17.0 PRIMARY OSTEOARTHRITIS OF BOTH KNEES: ICD-10-CM

## 2019-02-28 DIAGNOSIS — K30 ACID INDIGESTION: ICD-10-CM

## 2019-02-28 DIAGNOSIS — R53.82 CHRONIC FATIGUE: ICD-10-CM

## 2019-02-28 PROBLEM — B07.9 VIRAL WARTS: Status: RESOLVED | Noted: 2019-02-25 | Resolved: 2019-02-28

## 2019-02-28 LAB
25(OH)D3 SERPL-MCNC: 47.5 NG/ML
ALBUMIN SERPL-MCNC: 4.33 G/DL (ref 3.2–4.8)
ALBUMIN/GLOB SERPL: 1.9 G/DL (ref 1.5–2.5)
ALP SERPL-CCNC: 75 U/L (ref 25–100)
ALT SERPL W P-5'-P-CCNC: 25 U/L (ref 7–40)
ANION GAP SERPL CALCULATED.3IONS-SCNC: 7 MMOL/L (ref 3–11)
AST SERPL-CCNC: 25 U/L (ref 0–33)
BACTERIA UR QL AUTO: ABNORMAL /HPF
BASOPHILS # BLD AUTO: 0.03 10*3/MM3 (ref 0–0.2)
BASOPHILS NFR BLD AUTO: 0.5 % (ref 0–1)
BILIRUB SERPL-MCNC: 0.6 MG/DL (ref 0.3–1.2)
BILIRUB UR QL STRIP: NEGATIVE
BUN BLD-MCNC: 18 MG/DL (ref 9–23)
BUN/CREAT SERPL: 24.7 (ref 7–25)
CALCIUM SPEC-SCNC: 9.1 MG/DL (ref 8.7–10.4)
CHLORIDE SERPL-SCNC: 104 MMOL/L (ref 99–109)
CHOLEST SERPL-MCNC: 156 MG/DL (ref 0–200)
CLARITY UR: CLEAR
CO2 SERPL-SCNC: 28 MMOL/L (ref 20–31)
COLOR UR: YELLOW
CREAT BLD-MCNC: 0.73 MG/DL (ref 0.6–1.3)
DEPRECATED RDW RBC AUTO: 42.7 FL (ref 37–54)
EOSINOPHIL # BLD AUTO: 0.08 10*3/MM3 (ref 0–0.3)
EOSINOPHIL NFR BLD AUTO: 1.3 % (ref 0–3)
ERYTHROCYTE [DISTWIDTH] IN BLOOD BY AUTOMATED COUNT: 13.9 % (ref 11.3–14.5)
GFR SERPL CREATININE-BSD FRML MDRD: 81 ML/MIN/1.73
GLOBULIN UR ELPH-MCNC: 2.3 GM/DL
GLUCOSE BLD-MCNC: 82 MG/DL (ref 70–100)
GLUCOSE UR STRIP-MCNC: NEGATIVE MG/DL
HCT VFR BLD AUTO: 44.3 % (ref 34.5–44)
HDLC SERPL-MCNC: 44 MG/DL (ref 40–60)
HGB BLD-MCNC: 14.2 G/DL (ref 11.5–15.5)
HGB UR QL STRIP.AUTO: NEGATIVE
HYALINE CASTS UR QL AUTO: ABNORMAL /LPF
IMM GRANULOCYTES # BLD AUTO: 0.01 10*3/MM3 (ref 0–0.05)
IMM GRANULOCYTES NFR BLD AUTO: 0.2 % (ref 0–0.6)
KETONES UR QL STRIP: NEGATIVE
LDLC SERPL CALC-MCNC: 89 MG/DL (ref 0–100)
LDLC/HDLC SERPL: 2.02 {RATIO}
LEUKOCYTE ESTERASE UR QL STRIP.AUTO: ABNORMAL
LYMPHOCYTES # BLD AUTO: 1.9 10*3/MM3 (ref 0.6–4.8)
LYMPHOCYTES NFR BLD AUTO: 29.8 % (ref 24–44)
MCH RBC QN AUTO: 27.3 PG (ref 27–31)
MCHC RBC AUTO-ENTMCNC: 32.1 G/DL (ref 32–36)
MCV RBC AUTO: 85 FL (ref 80–99)
MONOCYTES # BLD AUTO: 0.47 10*3/MM3 (ref 0–1)
MONOCYTES NFR BLD AUTO: 7.4 % (ref 0–12)
NEUTROPHILS # BLD AUTO: 3.89 10*3/MM3 (ref 1.5–8.3)
NEUTROPHILS NFR BLD AUTO: 61 % (ref 41–71)
NITRITE UR QL STRIP: NEGATIVE
PH UR STRIP.AUTO: 7 [PH] (ref 5–8)
PLATELET # BLD AUTO: 351 10*3/MM3 (ref 150–450)
PMV BLD AUTO: 9.4 FL (ref 6–12)
POTASSIUM BLD-SCNC: 4.4 MMOL/L (ref 3.5–5.5)
PROT SERPL-MCNC: 6.6 G/DL (ref 5.7–8.2)
PROT UR QL STRIP: NEGATIVE
RBC # BLD AUTO: 5.21 10*6/MM3 (ref 3.89–5.14)
RBC # UR: ABNORMAL /HPF
REF LAB TEST METHOD: ABNORMAL
SODIUM BLD-SCNC: 139 MMOL/L (ref 132–146)
SP GR UR STRIP: 1.02 (ref 1–1.03)
SQUAMOUS #/AREA URNS HPF: ABNORMAL /HPF
TRIGL SERPL-MCNC: 115 MG/DL (ref 0–150)
TSH SERPL DL<=0.05 MIU/L-ACNC: 1.59 MIU/ML (ref 0.35–5.35)
UROBILINOGEN UR QL STRIP: ABNORMAL
VLDLC SERPL-MCNC: 23 MG/DL
WBC NRBC COR # BLD: 6.37 10*3/MM3 (ref 3.5–10.8)
WBC UR QL AUTO: ABNORMAL /HPF

## 2019-02-28 PROCEDURE — 93000 ELECTROCARDIOGRAM COMPLETE: CPT | Performed by: INTERNAL MEDICINE

## 2019-02-28 PROCEDURE — 82306 VITAMIN D 25 HYDROXY: CPT | Performed by: INTERNAL MEDICINE

## 2019-02-28 PROCEDURE — 80053 COMPREHEN METABOLIC PANEL: CPT | Performed by: INTERNAL MEDICINE

## 2019-02-28 PROCEDURE — 85025 COMPLETE CBC W/AUTO DIFF WBC: CPT | Performed by: INTERNAL MEDICINE

## 2019-02-28 PROCEDURE — 99396 PREV VISIT EST AGE 40-64: CPT | Performed by: INTERNAL MEDICINE

## 2019-02-28 PROCEDURE — 80061 LIPID PANEL: CPT | Performed by: INTERNAL MEDICINE

## 2019-02-28 PROCEDURE — 84443 ASSAY THYROID STIM HORMONE: CPT | Performed by: INTERNAL MEDICINE

## 2019-02-28 PROCEDURE — 99213 OFFICE O/P EST LOW 20 MIN: CPT | Performed by: INTERNAL MEDICINE

## 2019-02-28 PROCEDURE — 81001 URINALYSIS AUTO W/SCOPE: CPT | Performed by: INTERNAL MEDICINE

## 2019-02-28 RX ORDER — FAMOTIDINE 20 MG/1
20 TABLET, FILM COATED ORAL 2 TIMES DAILY
Qty: 60 TABLET | Refills: 5 | Status: SHIPPED | OUTPATIENT
Start: 2019-02-28 | End: 2019-09-07

## 2019-02-28 NOTE — PROGRESS NOTES
QUICK REFERENCE INFORMATION:  The ABCs of the Annual Wellness Visit    Annual Wellness visit    HEALTH RISK ASSESSMENT    1955    Recent History  No hospitalization(s) within the last year..        Current Medical Providers:  Patient Care Team:  Parul Lomas MD as PCP - General (Internal Medicine)        Smoking Status:  Social History     Tobacco Use   Smoking Status Never Smoker   Smokeless Tobacco Never Used       Alcohol Consumption:  Social History     Substance and Sexual Activity   Alcohol Use No       Depression Screen:   PHQ-2/PHQ-9 Depression Screening 2/28/2019   Little interest or pleasure in doing things 0   Feeling down, depressed, or hopeless 0   Total Score 0       Health Habits:  Not exercising lately.  Eats a low fat diet. Healthy diet.              Recent Lab Results:  CMP:  Lab Results   Component Value Date    BUN 18 02/28/2019    CREATININE 0.73 02/28/2019    EGFRIFNONA 81 02/28/2019    BCR 24.7 02/28/2019     02/28/2019    K 4.4 02/28/2019    CO2 28.0 02/28/2019    CALCIUM 9.1 02/28/2019    ALBUMIN 4.33 02/28/2019    BILITOT 0.6 02/28/2019    ALKPHOS 75 02/28/2019    AST 25 02/28/2019    ALT 25 02/28/2019     Lipid Panel:  Lab Results   Component Value Date    CHOL 156 02/28/2019    TRIG 115 02/28/2019    HDL 44 02/28/2019    VLDL 23 02/28/2019    LDLHDL 2.02 02/28/2019     HbA1c:           Age-appropriate Screening Schedule:  Refer to the list below for future screening recommendations based on patient's age, sex and/or medical conditions. Orders for these recommended tests are listed in the plan section. The patient has been provided with a written plan.      64 y.o.  Age appropriate preventive counseling done including age appropriate vaccines,regular  Mammogram and self breast exam, pap smear, colonoscopy, regular dental visits, mental health, injury prevention such as wearing seat belt and preventing falls, healthy  nutrition, healthy weight, regular physical exercise.  Alcohol use-none.  Tobacco history-none. Drug use-none.  STD's-not at risk.      Health Maintenance   Topic Date Due   • ZOSTER VACCINE (1 of 2) 01/26/2007   • PAP SMEAR  10/25/2017   • INFLUENZA VACCINE  08/01/2018   • LIPID PANEL  01/14/2019   • MAMMOGRAM  09/17/2020   • COLONOSCOPY  06/29/2023   • TDAP/TD VACCINES (2 - Td) 06/24/2025        Subjective   History of Present Illness    Suze Baeza is a 64 y.o. female who presents for an Annual Wellness Visit and abdominal bloating and burping and chronic conditions including hypercholesterolemia, osteoarthritis of knees, vitamin D deficiency, mild depression, insomnia.    HPI  Bloating after meals,accompanied by burping and acid reflux.For a few weeks.Aggravated by eating, no certain foods. Nothing helps. No abdominal pain,nausea,fever/chills,abdominal tenderness.    CHRONIC CONDITIONS:    Eats a low fat diet and exercises regularly.  Takes atorvastatin every evening.    She sees Dr. Mari for osteoarthritis of the knees.  She knows that the left knee will need replacement at some point.  He has done injections including the Orthovisc.  The Supartz was the one that helped the most.  She uses Tylenol and ibuprofen occasionally they do help.  She tries to stay active.  She asked about getting some more limbrel.  She has taking it in the past and it did help her pain.      She takes her vitamin D supplement regularly.    Mild depression is well controlled on 10 mg of fluoxetine daily.  She gets out and exercises and sees friends and family and feels very well.    Restless leg syndrome has not been much better lately.  She has been only taking the Mirapex as needed.  It does help.    Sleep has been doing pretty well lately.  Nortriptyline does help.    She still has quite a bit of fatigue that it has not gotten any worse.  She does get at least 7 hours of sleep at night.  She states that resting in the afternoon does help her fatigue.      The following portions of  the patient's history were reviewed and updated as appropriate: allergies, current medications, past family history, past medical history, past social history, past surgical history and problem list.    Outpatient Medications Prior to Visit   Medication Sig Dispense Refill   • atorvastatin (LIPITOR) 10 MG tablet TAKE ONE TABLET BY MOUTH EVERY NIGHT AT BEDTIME 30 tablet 4   • calcium acetate (PHOSLO) 667 MG capsule Take 1,334 mg by mouth 3 (Three) Times a Day With Meals.     • Cholecalciferol (VITAMIN D-3) 1000 units capsule Take  by mouth Take As Directed.     • Cinnamon 500 MG tablet Take 1,000 mg by mouth.     • Cyanocobalamin (VITAMIN B-12 ER PO) Take  by mouth Take As Directed.     • estradiol (ESTRACE) 0.5 MG tablet      • FLUoxetine (PROzac) 10 MG capsule Take 10 mg by mouth Daily.     • glucosamine sulfate 500 MG capsule capsule Take  by mouth 3 (Three) Times a Day With Meals.     • Ibuprofen (ADVIL) 200 MG capsule Take  by mouth Take As Directed.     • nortriptyline (PAMELOR) 10 MG capsule      • Omega-3 Fatty Acids (FISH OIL) 1000 MG capsule capsule Take  by mouth Daily With Breakfast.     • pramipexole (MIRAPEX) 0.5 MG tablet      • vitamin C (ASCORBIC ACID) 250 MG tablet Take 250 mg by mouth Daily.     • Meloxicam (MOBIC PO) Take  by mouth Take As Directed.     • FLUZONE QUADRIVALENT 0.5 ML suspension prefilled syringe injection      • Red Yeast Rice Extract (RED YEAST RICE PO) Take  by mouth Take As Directed.     • Sulfamethoxazole-Trimethoprim (BACTRIM PO) Take  by mouth Take As Directed.     • ZOSTAVAX 42609 UNT/0.65ML reconstituted suspension        No facility-administered medications prior to visit.        Patient Active Problem List   Diagnosis   • Hypercholesterolemia   • Vitamin D deficiency   • Primary insomnia   • Restless leg syndrome   • Mild major depression, single episode (CMS/HCC)   • Chronic pain of both knees   • Chronic bilateral low back pain   • Bilateral headaches   • Stress and  adjustment reaction   • Primary osteoarthritis involving multiple joints   • Cervicalgia   • Baker cyst, right   • Chronic fatigue   • Menopausal symptoms   • Primary osteoarthritis of both knees   • Synovial cyst of popliteal space       Advance Care Planning:  not discussed today    Identification of Risk Factors:  Risk factors include: weight , inactivity and knee arthritis.    Review of Systems   Constitutional: Positive for fatigue. Negative for chills and fever.   HENT: Negative for congestion, ear pain, sinus pressure and sore throat.    Respiratory: Negative for cough, chest tightness, shortness of breath and wheezing.    Cardiovascular: Negative for chest pain and palpitations.   Gastrointestinal: Negative for abdominal pain, blood in stool, constipation, diarrhea and nausea.        Bloating and burping.   Endocrine: Negative for cold intolerance, heat intolerance and polydipsia.   Genitourinary: Negative for dysuria, frequency, hematuria and urgency.   Musculoskeletal: Positive for arthralgias and back pain. Negative for gait problem.   Skin: Negative for color change and rash.   Allergic/Immunologic: Negative for environmental allergies, food allergies and immunocompromised state.   Neurological: Negative for dizziness, speech difficulty and headaches.   Hematological: Negative for adenopathy. Does not bruise/bleed easily.   Psychiatric/Behavioral: Negative for confusion, dysphoric mood and sleep disturbance. The patient is not nervous/anxious.        Compared to one year ago, the patient feels her physical health is the same.  Compared to one year ago, the patient feels her mental health is the same.    Objective     Physical Exam   Constitutional: She is oriented to person, place, and time. She appears well-developed and well-nourished.   HENT:   Head: Normocephalic.   Eyes: Conjunctivae and EOM are normal. Pupils are equal, round, and reactive to light.   Neck: Normal range of motion. Neck supple. No  thyromegaly present.   Cardiovascular: Normal rate, regular rhythm, normal heart sounds and intact distal pulses.   Pulmonary/Chest: Effort normal and breath sounds normal. She has no wheezes. Right breast exhibits no inverted nipple, no mass, no nipple discharge, no skin change and no tenderness. Left breast exhibits no inverted nipple, no mass, no nipple discharge, no skin change and no tenderness.   Abdominal: Soft. Bowel sounds are normal. There is no tenderness.   Musculoskeletal: She exhibits no edema or tenderness.        Right knee: She exhibits decreased range of motion and deformity. She exhibits no swelling. No tenderness found.        Left knee: She exhibits decreased range of motion and deformity. She exhibits no effusion. No tenderness found.        Right hand: She exhibits deformity. She exhibits no tenderness.        Left hand: She exhibits deformity. She exhibits no tenderness.   Lymphadenopathy:     She has no cervical adenopathy.   Neurological: She is alert and oriented to person, place, and time. She has normal strength. No cranial nerve deficit or sensory deficit. Coordination and gait normal.   Skin: Skin is warm and dry. No rash noted.   Psychiatric: She has a normal mood and affect. Her speech is normal and behavior is normal. Judgment and thought content normal. Cognition and memory are normal.   Nursing note and vitals reviewed.      ECG 12 Lead  Date/Time: 2/28/2019 4:13 PM  Performed by: Parul Lomas MD  Authorized by: Parul Lomas MD   Comparison: compared with previous ECG from 2/28/2018  Similar to previous ECG  Rhythm: sinus rhythm  Rate: normal  BPM: 72  Conduction: conduction normal  ST Segments: ST segments normal  T Waves: T waves normal  QRS axis: normal    Clinical impression: normal ECG            Vitals:    02/28/19 1112   BP: 106/64   BP Location: Left arm   Patient Position: Sitting   Cuff Size: Adult   Pulse: 64   Weight: 68 kg (150 lb)   Height: 161 cm  "(63.39\")       Patient's Body mass index is 26.25 kg/m². BMI is above normal parameters. Recommendations include: educational material, exercise counseling and nutrition counseling.      CMP:  Lab Results   Component Value Date    BUN 18 02/28/2019    CREATININE 0.73 02/28/2019    EGFRIFNONA 81 02/28/2019    BCR 24.7 02/28/2019     02/28/2019    K 4.4 02/28/2019    CO2 28.0 02/28/2019    CALCIUM 9.1 02/28/2019    ALBUMIN 4.33 02/28/2019    BILITOT 0.6 02/28/2019    ALKPHOS 75 02/28/2019    AST 25 02/28/2019    ALT 25 02/28/2019     HbA1c:  No results found for: HGBA1C  Microalbumin:  No results found for: MICROALBUR, POCMALB, POCCREAT  Lipid Panel  Lab Results   Component Value Date    CHOL 156 02/28/2019    TRIG 115 02/28/2019    HDL 44 02/28/2019    LDL 89 02/28/2019    AST 25 02/28/2019    ALT 25 02/28/2019       Assessment/Plan   Patient Self-Management and Personalized Health Advice  The patient has been provided with information about: diet, exercise and weight management and preventive services including:   · Advance directive, Exercise counseling provided.    Visit Diagnoses:    ICD-10-CM ICD-9-CM   1. Annual physical exam Z00.00 V70.0   2. Hypercholesterolemia E78.00 272.0   3. Acid indigestion K30 536.8   4. Abdominal bloating R14.0 787.3   5. Primary osteoarthritis of both knees M17.0 715.16   6. Vitamin D deficiency E55.9 268.9   7. Mild major depression, single episode (CMS/HCC) F32.0 296.21   8. Restless leg syndrome G25.81 333.94   9. Primary insomnia F51.01 307.42   10. Chronic bilateral low back pain without sciatica M54.5 724.2    G89.29 338.29   11. Chronic fatigue R53.82 780.79       Patient Instructions   For hypercholesterolemia, continue atorvastatin every evening.  Continue regular exercise and low-fat diet.    For acid indigestion and abdominal bloating, start taking famotidine (Pepcid) 20 mg twice a day.  Take it for at least a month then it may be decreased to as needed.  Also avoid " eating close to bedtime.  Avoid large meals.  Avoid acidic foods and caffeine and chocolate.    For osteoarthritis of both knees, continue taking Tylenol or ibuprofen as needed for pain.  Continue Osteo Bi-Flex triple strength twice a day.  Continue turmeric.  Follow-up with Dr. Mari as planned.  Using the cold pack on the knees after exercise or being up on your feet all day helps decrease pain and inflammation and swelling.  Limbrel twice a day was ordered today to decrease inflammation, but the pharmacy states that they are unable to get it.    Continue current vitamin D replacement.  We are checking levels today.    For depression, continue small dose of fluoxetine daily.  Continue exercising.  Continue getting out and seeing friends and family.    For restless leg syndrome continue using the pramipexole as needed.  For insomnia, continue nortriptyline every evening.  Continue reading and relaxing before bedtime and avoiding the TV, computer, phone.    For prevention of flareups of bilateral low back pain, try to do a few back stretches every day and walk frequently.      For fatigue, continue trying to get a good night sleep and getting some physical activity every day.        Serving Sizes  A serving size is a measured amount of food or drink, such as one slice of bread, that has an associated nutrient content. Knowing the serving size of a food or drink can help you determine how much of that food you should consume.  What is the size of one serving?  The size of one healthy serving depends on the food or drink. To determine a serving size, read the food label. If the food or drink does not have a food label, try to find serving size information online. Or, use the following to estimate the size of one adult serving:  Grain  1 slice bread. ½ bagel. ½ cup pasta.  Vegetable  ½ cup cooked or canned vegetables. 1 cup raw, leafy greens.  Fruit  ½ cup canned fruit. 1 medium fruit. ¼ cup dried fruit.  Meat and Other  Protein Sources  1 oz meat, poultry, or fish. ¼ cup cooked beans. 1 egg. ¼ cup nuts or seeds. 1 Tbsp nut butter. ¼ cup tofu or tempeh. 2 Tbsp hummus.  Dairy  An individual container of yogurt (6-8 oz). 1 piece of cheese the size of your thumb (1 oz). 1 cup (8 oz) milk or milk alternative.  Fat  A piece the size of one dice. 1 tsp soft margarine. 1 Tbsp mayonnaise. 1 tsp vegetable oil. 1 Tbsp regular salad dressing. 2 Tbsp low-fat salad dressing.  How many servings should I eat from each food group each day?  The following are the suggested number of servings to try and have every day from each food group. You can also look at your eating throughout the week and aim for meeting these requirements on most days for overall healthy eating.  Grain  6-8 servings. Try to have half of your grains from whole grains, such as whole wheat bread, corn tortillas, oatmeal, brown rice, whole wheat pasta, and bulgur.  Vegetable  At least 2½-3 servings.  Fruit  2 servings.  Meat and Other Protein Foods  5-6 servings. Aim to have lean proteins, such as chicken, turkey, fish, beans, or tofu.  Dairy  3 servings. Choose low-fat or nonfat if you are trying to control your weight.  Fat  2-3 servings.  Is a serving the same thing as a portion?  No. A portion is the actual amount you eat, which may be more than one serving. Knowing the specific serving size of a food and the nutritional information that goes with it can help you make a healthy decision on what size portion to eat.  What are some tips to help me learn healthy serving sizes?  · Check food labels for serving sizes. Many foods that come as a single portion actually contain multiple servings.  · Determine the serving size of foods you commonly eat and figure out how large a portion you usually eat.  · Measure the number of servings that can be held by the bowls, glasses, cups, and plates you typically use. For example, pour your breakfast cereal into your regular bowl and then  pour it into a measuring cup.  · For 1-2 days, measure the serving sizes of all the foods you eat.  · Practice estimating serving sizes and determining how big your portions should be.  This information is not intended to replace advice given to you by your health care provider. Make sure you discuss any questions you have with your health care provider.  Document Released: 09/15/2004 Document Revised: 08/12/2017 Document Reviewed: 03/17/2015  [employer] Interactive Patient Education © 2018 [employer]    Exercising to Stay Healthy  Exercising regularly is important. It has many health benefits, such as:  · Improving your overall fitness, flexibility, and endurance.  · Increasing your bone density.  · Helping with weight control.  · Decreasing your body fat.  · Increasing your muscle strength.  · Reducing stress and tension.  · Improving your overall health.    In order to become healthy and stay healthy, it is recommended that you do moderate-intensity and vigorous-intensity exercise. You can tell that you are exercising at a moderate intensity if you have a higher heart rate and faster breathing, but you are still able to hold a conversation. You can tell that you are exercising at a vigorous intensity if you are breathing much harder and faster and cannot hold a conversation while exercising.  How often should I exercise?  Choose an activity that you enjoy and set realistic goals. Your health care provider can help you to make an activity plan that works for you. Exercise regularly as directed by your health care provider. This may include:  · Doing resistance training twice each week, such as:  ? Push-ups.  ? Sit-ups.  ? Lifting weights.  ? Using resistance bands.  · Doing a given intensity of exercise for a given amount of time. Choose from these options:  ? 150 minutes of moderate-intensity exercise every week.  ? 75 minutes of vigorous-intensity exercise every week.  ? A mix of moderate-intensity and  vigorous-intensity exercise every week.    Children, pregnant women, people who are out of shape, people who are overweight, and older adults may need to consult a health care provider for individual recommendations. If you have any sort of medical condition, be sure to consult your health care provider before starting a new exercise program.  What are some exercise ideas?  Some moderate-intensity exercise ideas include:  · Walking at a rate of 1 mile in 15 minutes.  · Biking.  · Hiking.  · Golfing.  · Dancing.    Some vigorous-intensity exercise ideas include:  · Walking at a rate of at least 4.5 miles per hour.  · Jogging or running at a rate of 5 miles per hour.  · Biking at a rate of at least 10 miles per hour.  · Lap swimming.  · Roller-skating or in-line skating.  · Cross-country skiing.  · Vigorous competitive sports, such as football, basketball, and soccer.  · Jumping rope.  · Aerobic dancing.    What are some everyday activities that can help me to get exercise?  · Yard work, such as:  ? Pushing a .  ? Raking and bagging leaves.  · Washing and waxing your car.  · Pushing a stroller.  · Shoveling snow.  · Gardening.  · Washing windows or floors.  How can I be more active in my day-to-day activities?  · Use the stairs instead of the elevator.  · Take a walk during your lunch break.  · If you drive, park your car farther away from work or school.  · If you take public transportation, get off one stop early and walk the rest of the way.  · Make all of your phone calls while standing up and walking around.  · Get up, stretch, and walk around every 30 minutes throughout the day.  What guidelines should I follow while exercising?  · Do not exercise so much that you hurt yourself, feel dizzy, or get very short of breath.  · Consult your health care provider before starting a new exercise program.  · Wear comfortable clothes and shoes with good support.  · Drink plenty of water while you exercise to  prevent dehydration or heat stroke. Body water is lost during exercise and must be replaced.  · Work out until you breathe faster and your heart beats faster.  This information is not intended to replace advice given to you by your health care provider. Make sure you discuss any questions you have with your health care provider.  Document Released: 01/20/2012 Document Revised: 05/25/2017 Document Reviewed: 05/21/2015  [employer] Interactive Patient Education © 2018 [employer]    Food Choices for Gastroesophageal Reflux Disease, Adult  When you have gastroesophageal reflux disease (GERD), the foods you eat and your eating habits are very important. Choosing the right foods can help ease the discomfort of GERD. Consider working with a diet and nutrition specialist (dietitian) to help you make healthy food choices.  What general guidelines should I follow?  Eating plan  · Choose healthy foods low in fat, such as fruits, vegetables, whole grains, low-fat dairy products, and lean meat, fish, and poultry.  · Eat frequent, small meals instead of three large meals each day. Eat your meals slowly, in a relaxed setting. Avoid bending over or lying down until 2-3 hours after eating.  · Limit high-fat foods such as fatty meats or fried foods.  · Limit your intake of oils, butter, and shortening to less than 8 teaspoons each day.  · Avoid the following:  ? Foods that cause symptoms. These may be different for different people. Keep a food diary to keep track of foods that cause symptoms.  ? Alcohol.  ? Drinking large amounts of liquid with meals.  ? Eating meals during the 2-3 hours before bed.  · Cook foods using methods other than frying. This may include baking, grilling, or broiling.  Lifestyle    · Maintain a healthy weight. Ask your health care provider what weight is healthy for you. If you need to lose weight, work with your health care provider to do so safely.  · Exercise for at least 30 minutes on 5 or more days each  week, or as told by your health care provider.  · Avoid wearing clothes that fit tightly around your waist and chest.  · Do not use any products that contain nicotine or tobacco, such as cigarettes and e-cigarettes. If you need help quitting, ask your health care provider.  · Sleep with the head of your bed raised. Use a wedge under the mattress or blocks under the bed frame to raise the head of the bed.  What foods are not recommended?  The items listed may not be a complete list. Talk with your dietitian about what dietary choices are best for you.  Grains  Pastries or quick breads with added fat. French toast.  Vegetables  Deep fried vegetables. French fries. Any vegetables prepared with added fat. Any vegetables that cause symptoms. For some people this may include tomatoes and tomato products, chili peppers, onions and garlic, and horseradish.  Fruits  Any fruits prepared with added fat. Any fruits that cause symptoms. For some people this may include citrus fruits, such as oranges, grapefruit, pineapple, and ny.  Meats and other protein foods  High-fat meats, such as fatty beef or pork, hot dogs, ribs, ham, sausage, salami and garduno. Fried meat or protein, including fried fish and fried chicken. Nuts and nut butters.  Dairy  Whole milk and chocolate milk. Sour cream. Cream. Ice cream. Cream cheese. Milk shakes.  Beverages  Coffee and tea, with or without caffeine. Carbonated beverages. Sodas. Energy drinks. Fruit juice made with acidic fruits (such as orange or grapefruit). Tomato juice. Alcoholic drinks.  Fats and oils  Butter. Margarine. Shortening. Ghee.  Sweets and desserts  Chocolate and cocoa. Donuts.  Seasoning and other foods  Pepper. Peppermint and spearmint. Any condiments, herbs, or seasonings that cause symptoms. For some people, this may include arredondo, hot sauce, or vinegar-based salad dressings.  Summary  · When you have gastroesophageal reflux disease (GERD), food and lifestyle choices are  very important to help ease the discomfort of GERD.  · Eat frequent, small meals instead of three large meals each day. Eat your meals slowly, in a relaxed setting. Avoid bending over or lying down until 2-3 hours after eating.  · Limit high-fat foods such as fatty meat or fried foods.  This information is not intended to replace advice given to you by your health care provider. Make sure you discuss any questions you have with your health care provider.  Document Released: 12/18/2006 Document Revised: 12/19/2017 Document Reviewed: 12/19/2017  [employer] Interactive Patient Education © 2018 [employer]        No orders of the defined types were placed in this encounter.      Outpatient Encounter Medications as of 2/28/2019   Medication Sig Dispense Refill   • atorvastatin (LIPITOR) 10 MG tablet TAKE ONE TABLET BY MOUTH EVERY NIGHT AT BEDTIME 30 tablet 4   • calcium acetate (PHOSLO) 667 MG capsule Take 1,334 mg by mouth 3 (Three) Times a Day With Meals.     • Cholecalciferol (VITAMIN D-3) 1000 units capsule Take  by mouth Take As Directed.     • Cinnamon 500 MG tablet Take 1,000 mg by mouth.     • Cyanocobalamin (VITAMIN B-12 ER PO) Take  by mouth Take As Directed.     • estradiol (ESTRACE) 0.5 MG tablet      • FLUoxetine (PROzac) 10 MG capsule Take 10 mg by mouth Daily.     • glucosamine sulfate 500 MG capsule capsule Take  by mouth 3 (Three) Times a Day With Meals.     • Ibuprofen (ADVIL) 200 MG capsule Take  by mouth Take As Directed.     • nortriptyline (PAMELOR) 10 MG capsule      • Omega-3 Fatty Acids (FISH OIL) 1000 MG capsule capsule Take  by mouth Daily With Breakfast.     • pramipexole (MIRAPEX) 0.5 MG tablet      • vitamin C (ASCORBIC ACID) 250 MG tablet Take 250 mg by mouth Daily.     • [DISCONTINUED] Meloxicam (MOBIC PO) Take  by mouth Take As Directed.     • famotidine (PEPCID) 20 MG tablet Take 1 tablet by mouth 2 (Two) Times a Day. 60 tablet 5   • Flavocoxid (LIMBREL) 500 MG capsule Take 1 tablet by  mouth 2 (Two) Times a Day. 60 each 5   • [DISCONTINUED] atorvastatin (LIPITOR) 10 MG tablet Take 10 mg by mouth Daily.     • [DISCONTINUED] FLUZONE QUADRIVALENT 0.5 ML suspension prefilled syringe injection      • [DISCONTINUED] Red Yeast Rice Extract (RED YEAST RICE PO) Take  by mouth Take As Directed.     • [DISCONTINUED] Sulfamethoxazole-Trimethoprim (BACTRIM PO) Take  by mouth Take As Directed.     • [DISCONTINUED] ZOSTAVAX 53234 UNT/0.65ML reconstituted suspension        No facility-administered encounter medications on file as of 2/28/2019.        Reviewed use of high risk medication in the elderly: not applicable  Reviewed for potential of harmful drug interactions in the elderly: not applicable    Follow Up:  Return for Recheck fasting, labs today.     An After Visit Summary and PPPS with all of these plans were given to the patient.           Note: Speech recognition transcription software was used to dictate portions of this document.  An attempt at proofreading has been made though minor errors in transcription may still be present.  Please do not hesitate to call our office with any questions.

## 2019-02-28 NOTE — PATIENT INSTRUCTIONS
For hypercholesterolemia, continue atorvastatin every evening.  Continue regular exercise and low-fat diet.    For acid indigestion and abdominal bloating, start taking famotidine (Pepcid) 20 mg twice a day.  Take it for at least a month then it may be decreased to as needed.  Also avoid eating close to bedtime.  Avoid large meals.  Avoid acidic foods and caffeine and chocolate.    For osteoarthritis of both knees, continue taking Tylenol or ibuprofen as needed for pain.  Continue Osteo Bi-Flex triple strength twice a day.  Continue turmeric.  Follow-up with Dr. Mari as planned.  Using the cold pack on the knees after exercise or being up on your feet all day helps decrease pain and inflammation and swelling.  Limbrel twice a day was ordered today to decrease inflammation, but the pharmacy states that they are unable to get it.    Continue current vitamin D replacement.  We are checking levels today.    For depression, continue small dose of fluoxetine daily.  Continue exercising.  Continue getting out and seeing friends and family.    For restless leg syndrome continue using the pramipexole as needed.  For insomnia, continue nortriptyline every evening.  Continue reading and relaxing before bedtime and avoiding the TV, computer, phone.    For prevention of flareups of bilateral low back pain, try to do a few back stretches every day and walk frequently.      For fatigue, continue trying to get a good night sleep and getting some physical activity every day.        Serving Sizes  A serving size is a measured amount of food or drink, such as one slice of bread, that has an associated nutrient content. Knowing the serving size of a food or drink can help you determine how much of that food you should consume.  What is the size of one serving?  The size of one healthy serving depends on the food or drink. To determine a serving size, read the food label. If the food or drink does not have a food label, try to find  serving size information online. Or, use the following to estimate the size of one adult serving:  Grain  1 slice bread. ½ bagel. ½ cup pasta.  Vegetable  ½ cup cooked or canned vegetables. 1 cup raw, leafy greens.  Fruit  ½ cup canned fruit. 1 medium fruit. ¼ cup dried fruit.  Meat and Other Protein Sources  1 oz meat, poultry, or fish. ¼ cup cooked beans. 1 egg. ¼ cup nuts or seeds. 1 Tbsp nut butter. ¼ cup tofu or tempeh. 2 Tbsp hummus.  Dairy  An individual container of yogurt (6-8 oz). 1 piece of cheese the size of your thumb (1 oz). 1 cup (8 oz) milk or milk alternative.  Fat  A piece the size of one dice. 1 tsp soft margarine. 1 Tbsp mayonnaise. 1 tsp vegetable oil. 1 Tbsp regular salad dressing. 2 Tbsp low-fat salad dressing.  How many servings should I eat from each food group each day?  The following are the suggested number of servings to try and have every day from each food group. You can also look at your eating throughout the week and aim for meeting these requirements on most days for overall healthy eating.  Grain  6-8 servings. Try to have half of your grains from whole grains, such as whole wheat bread, corn tortillas, oatmeal, brown rice, whole wheat pasta, and bulgur.  Vegetable  At least 2½-3 servings.  Fruit  2 servings.  Meat and Other Protein Foods  5-6 servings. Aim to have lean proteins, such as chicken, turkey, fish, beans, or tofu.  Dairy  3 servings. Choose low-fat or nonfat if you are trying to control your weight.  Fat  2-3 servings.  Is a serving the same thing as a portion?  No. A portion is the actual amount you eat, which may be more than one serving. Knowing the specific serving size of a food and the nutritional information that goes with it can help you make a healthy decision on what size portion to eat.  What are some tips to help me learn healthy serving sizes?  · Check food labels for serving sizes. Many foods that come as a single portion actually contain multiple  servings.  · Determine the serving size of foods you commonly eat and figure out how large a portion you usually eat.  · Measure the number of servings that can be held by the bowls, glasses, cups, and plates you typically use. For example, pour your breakfast cereal into your regular bowl and then pour it into a measuring cup.  · For 1-2 days, measure the serving sizes of all the foods you eat.  · Practice estimating serving sizes and determining how big your portions should be.  This information is not intended to replace advice given to you by your health care provider. Make sure you discuss any questions you have with your health care provider.  Document Released: 09/15/2004 Document Revised: 08/12/2017 Document Reviewed: 03/17/2015  [employer] Interactive Patient Education © 2018 [employer]    Exercising to Stay Healthy  Exercising regularly is important. It has many health benefits, such as:  · Improving your overall fitness, flexibility, and endurance.  · Increasing your bone density.  · Helping with weight control.  · Decreasing your body fat.  · Increasing your muscle strength.  · Reducing stress and tension.  · Improving your overall health.    In order to become healthy and stay healthy, it is recommended that you do moderate-intensity and vigorous-intensity exercise. You can tell that you are exercising at a moderate intensity if you have a higher heart rate and faster breathing, but you are still able to hold a conversation. You can tell that you are exercising at a vigorous intensity if you are breathing much harder and faster and cannot hold a conversation while exercising.  How often should I exercise?  Choose an activity that you enjoy and set realistic goals. Your health care provider can help you to make an activity plan that works for you. Exercise regularly as directed by your health care provider. This may include:  · Doing resistance training twice each week, such  as:  ? Push-ups.  ? Sit-ups.  ? Lifting weights.  ? Using resistance bands.  · Doing a given intensity of exercise for a given amount of time. Choose from these options:  ? 150 minutes of moderate-intensity exercise every week.  ? 75 minutes of vigorous-intensity exercise every week.  ? A mix of moderate-intensity and vigorous-intensity exercise every week.    Children, pregnant women, people who are out of shape, people who are overweight, and older adults may need to consult a health care provider for individual recommendations. If you have any sort of medical condition, be sure to consult your health care provider before starting a new exercise program.  What are some exercise ideas?  Some moderate-intensity exercise ideas include:  · Walking at a rate of 1 mile in 15 minutes.  · Biking.  · Hiking.  · Golfing.  · Dancing.    Some vigorous-intensity exercise ideas include:  · Walking at a rate of at least 4.5 miles per hour.  · Jogging or running at a rate of 5 miles per hour.  · Biking at a rate of at least 10 miles per hour.  · Lap swimming.  · Roller-skating or in-line skating.  · Cross-country skiing.  · Vigorous competitive sports, such as football, basketball, and soccer.  · Jumping rope.  · Aerobic dancing.    What are some everyday activities that can help me to get exercise?  · Yard work, such as:  ? Pushing a .  ? Raking and bagging leaves.  · Washing and waxing your car.  · Pushing a stroller.  · Shoveling snow.  · Gardening.  · Washing windows or floors.  How can I be more active in my day-to-day activities?  · Use the stairs instead of the elevator.  · Take a walk during your lunch break.  · If you drive, park your car farther away from work or school.  · If you take public transportation, get off one stop early and walk the rest of the way.  · Make all of your phone calls while standing up and walking around.  · Get up, stretch, and walk around every 30 minutes throughout the day.  What  guidelines should I follow while exercising?  · Do not exercise so much that you hurt yourself, feel dizzy, or get very short of breath.  · Consult your health care provider before starting a new exercise program.  · Wear comfortable clothes and shoes with good support.  · Drink plenty of water while you exercise to prevent dehydration or heat stroke. Body water is lost during exercise and must be replaced.  · Work out until you breathe faster and your heart beats faster.  This information is not intended to replace advice given to you by your health care provider. Make sure you discuss any questions you have with your health care provider.  Document Released: 01/20/2012 Document Revised: 05/25/2017 Document Reviewed: 05/21/2015  [employer] Interactive Patient Education © 2018 [employer]    Food Choices for Gastroesophageal Reflux Disease, Adult  When you have gastroesophageal reflux disease (GERD), the foods you eat and your eating habits are very important. Choosing the right foods can help ease the discomfort of GERD. Consider working with a diet and nutrition specialist (dietitian) to help you make healthy food choices.  What general guidelines should I follow?  Eating plan  · Choose healthy foods low in fat, such as fruits, vegetables, whole grains, low-fat dairy products, and lean meat, fish, and poultry.  · Eat frequent, small meals instead of three large meals each day. Eat your meals slowly, in a relaxed setting. Avoid bending over or lying down until 2-3 hours after eating.  · Limit high-fat foods such as fatty meats or fried foods.  · Limit your intake of oils, butter, and shortening to less than 8 teaspoons each day.  · Avoid the following:  ? Foods that cause symptoms. These may be different for different people. Keep a food diary to keep track of foods that cause symptoms.  ? Alcohol.  ? Drinking large amounts of liquid with meals.  ? Eating meals during the 2-3 hours before bed.  · Cook foods using  methods other than frying. This may include baking, grilling, or broiling.  Lifestyle    · Maintain a healthy weight. Ask your health care provider what weight is healthy for you. If you need to lose weight, work with your health care provider to do so safely.  · Exercise for at least 30 minutes on 5 or more days each week, or as told by your health care provider.  · Avoid wearing clothes that fit tightly around your waist and chest.  · Do not use any products that contain nicotine or tobacco, such as cigarettes and e-cigarettes. If you need help quitting, ask your health care provider.  · Sleep with the head of your bed raised. Use a wedge under the mattress or blocks under the bed frame to raise the head of the bed.  What foods are not recommended?  The items listed may not be a complete list. Talk with your dietitian about what dietary choices are best for you.  Grains  Pastries or quick breads with added fat. French toast.  Vegetables  Deep fried vegetables. French fries. Any vegetables prepared with added fat. Any vegetables that cause symptoms. For some people this may include tomatoes and tomato products, chili peppers, onions and garlic, and horseradish.  Fruits  Any fruits prepared with added fat. Any fruits that cause symptoms. For some people this may include citrus fruits, such as oranges, grapefruit, pineapple, and ny.  Meats and other protein foods  High-fat meats, such as fatty beef or pork, hot dogs, ribs, ham, sausage, salami and garduno. Fried meat or protein, including fried fish and fried chicken. Nuts and nut butters.  Dairy  Whole milk and chocolate milk. Sour cream. Cream. Ice cream. Cream cheese. Milk shakes.  Beverages  Coffee and tea, with or without caffeine. Carbonated beverages. Sodas. Energy drinks. Fruit juice made with acidic fruits (such as orange or grapefruit). Tomato juice. Alcoholic drinks.  Fats and oils  Butter. Margarine. Shortening. Ghee.  Sweets and desserts  Chocolate and  cocoa. Donuts.  Seasoning and other foods  Pepper. Peppermint and spearmint. Any condiments, herbs, or seasonings that cause symptoms. For some people, this may include arredondo, hot sauce, or vinegar-based salad dressings.  Summary  · When you have gastroesophageal reflux disease (GERD), food and lifestyle choices are very important to help ease the discomfort of GERD.  · Eat frequent, small meals instead of three large meals each day. Eat your meals slowly, in a relaxed setting. Avoid bending over or lying down until 2-3 hours after eating.  · Limit high-fat foods such as fatty meat or fried foods.  This information is not intended to replace advice given to you by your health care provider. Make sure you discuss any questions you have with your health care provider.  Document Released: 12/18/2006 Document Revised: 12/19/2017 Document Reviewed: 12/19/2017  [employer] Interactive Patient Education © 2018 [employer]

## 2019-02-28 NOTE — TELEPHONE ENCOUNTER
OK we will not try to replace order with anything.  We just wanted to try again since it help her pain in the past.

## 2019-02-28 NOTE — TELEPHONE ENCOUNTER
CALLED ABOUT A PRESCRIPTION THAT WAS SENT IN FOR   FLAZOCOXID  SHE STATES SHE'S NEVER HEARD OF IT AND THEY DON'T HAVE IT IN THEIR SYSTEM   WAS WANTING TO KNOW WHAT TO DO  OR IF IT WAS SENT BY [facility] AND IT NEEDED TO GO TO COMPOUNDING OR SOMETHING  PLEASE ADVISE   Parul Lomas, MDPhysicianSigned 3:12 PM               That is the generic name of limbrel.  Our system sends it with the generic name.                Spoke with pharmacy and they cannot order Limbrel and haven't been able to since last year. Please advise.

## 2019-05-13 RX ORDER — PRAMIPEXOLE DIHYDROCHLORIDE 0.5 MG/1
TABLET ORAL
Qty: 75 TABLET | Refills: 5 | Status: SHIPPED | OUTPATIENT
Start: 2019-05-13 | End: 2020-09-10 | Stop reason: SDUPTHER

## 2019-05-28 ENCOUNTER — OFFICE VISIT (OUTPATIENT)
Dept: INTERNAL MEDICINE | Facility: CLINIC | Age: 64
End: 2019-05-28

## 2019-05-28 VITALS
BODY MASS INDEX: 26.58 KG/M2 | HEART RATE: 84 BPM | SYSTOLIC BLOOD PRESSURE: 120 MMHG | DIASTOLIC BLOOD PRESSURE: 62 MMHG | HEIGHT: 63 IN | WEIGHT: 150 LBS

## 2019-05-28 DIAGNOSIS — L23.7 ALLERGIC CONTACT DERMATITIS DUE TO PLANTS, EXCEPT FOOD: Primary | ICD-10-CM

## 2019-05-28 PROCEDURE — 96372 THER/PROPH/DIAG INJ SC/IM: CPT | Performed by: PHYSICIAN ASSISTANT

## 2019-05-28 PROCEDURE — 99213 OFFICE O/P EST LOW 20 MIN: CPT | Performed by: PHYSICIAN ASSISTANT

## 2019-05-28 RX ORDER — TRIAMCINOLONE ACETONIDE 40 MG/ML
60 INJECTION, SUSPENSION INTRA-ARTICULAR; INTRAMUSCULAR ONCE
Status: DISCONTINUED | OUTPATIENT
Start: 2019-05-28 | End: 2019-05-28

## 2019-05-28 RX ORDER — TRIAMCINOLONE ACETONIDE 40 MG/ML
80 INJECTION, SUSPENSION INTRA-ARTICULAR; INTRAMUSCULAR ONCE
Status: COMPLETED | OUTPATIENT
Start: 2019-05-28 | End: 2019-05-28

## 2019-05-28 RX ORDER — METHYLPREDNISOLONE 4 MG/1
TABLET ORAL
Qty: 21 EACH | Refills: 0 | Status: SHIPPED | OUTPATIENT
Start: 2019-05-28 | End: 2019-09-04

## 2019-05-28 RX ADMIN — TRIAMCINOLONE ACETONIDE 80 MG: 40 INJECTION, SUSPENSION INTRA-ARTICULAR; INTRAMUSCULAR at 14:15

## 2019-05-28 NOTE — PROGRESS NOTES
"Patient Care Team:  Parul Lomas MD as PCP - General (Internal Medicine)    Chief Complaint::   Chief Complaint   Patient presents with   • Rash     scattered whole body  possibly posion ivy        Subjective     HPI  Pt was mulching last Thursday and developed rash on both legs and both arms.  She describes the rash of intensely itchy with red bumps.          PMH  The following portions of the patient's history were reviewed and updated as appropriate: allergies, current medications, past family history, past medical history, past social history, past surgical history and problem list.    Review of Systems:   Review of Systems   Constitutional: Negative for activity change, appetite change, chills, fatigue and fever.   HENT: Negative for congestion, ear pain and sinus pressure.    Respiratory: Negative for cough, chest tightness, shortness of breath and wheezing.    Cardiovascular: Negative for chest pain and palpitations.   Gastrointestinal: Negative for abdominal pain, blood in stool and constipation.   Endocrine: Negative for cold intolerance and heat intolerance.   Skin: Positive for rash and skin lesions. Negative for color change.   Allergic/Immunologic: Positive for environmental allergies.   Neurological: Negative for dizziness, speech difficulty and headache.   Hematological: Negative for adenopathy. Does not bruise/bleed easily.   Psychiatric/Behavioral: Negative for decreased concentration. The patient is not nervous/anxious.        Vital Signs  Vitals:    05/28/19 1355   BP: 120/62   BP Location: Left arm   Patient Position: Sitting   Cuff Size: Adult   Pulse: 84   Weight: 68 kg (150 lb)   Height: 161 cm (63.39\")       Labs  No visits with results within 3 Month(s) from this visit.   Latest known visit with results is:   Clinical Support on 02/28/2019   Component Date Value Ref Range Status   • Glucose 02/28/2019 82  70 - 100 mg/dL Final   • BUN 02/28/2019 18  9 - 23 mg/dL Final   • Creatinine " 02/28/2019 0.73  0.60 - 1.30 mg/dL Final   • Sodium 02/28/2019 139  132 - 146 mmol/L Final   • Potassium 02/28/2019 4.4  3.5 - 5.5 mmol/L Final   • Chloride 02/28/2019 104  99 - 109 mmol/L Final   • CO2 02/28/2019 28.0  20.0 - 31.0 mmol/L Final   • Calcium 02/28/2019 9.1  8.7 - 10.4 mg/dL Final   • Total Protein 02/28/2019 6.6  5.7 - 8.2 g/dL Final   • Albumin 02/28/2019 4.33  3.20 - 4.80 g/dL Final   • ALT (SGPT) 02/28/2019 25  7 - 40 U/L Final   • AST (SGOT) 02/28/2019 25  0 - 33 U/L Final   • Alkaline Phosphatase 02/28/2019 75  25 - 100 U/L Final   • Total Bilirubin 02/28/2019 0.6  0.3 - 1.2 mg/dL Final   • eGFR Non African Amer 02/28/2019 81  >60 mL/min/1.73 Final   • Globulin 02/28/2019 2.3  gm/dL Final   • A/G Ratio 02/28/2019 1.9  1.5 - 2.5 g/dL Final   • BUN/Creatinine Ratio 02/28/2019 24.7  7.0 - 25.0 Final   • Anion Gap 02/28/2019 7.0  3.0 - 11.0 mmol/L Final   • Total Cholesterol 02/28/2019 156  0 - 200 mg/dL Final   • Triglycerides 02/28/2019 115  0 - 150 mg/dL Final   • HDL Cholesterol 02/28/2019 44  40 - 60 mg/dL Final   • LDL Cholesterol  02/28/2019 89  0 - 100 mg/dL Final   • VLDL Cholesterol 02/28/2019 23  mg/dL Final   • LDL/HDL Ratio 02/28/2019 2.02   Final   • TSH 02/28/2019 1.592  0.350 - 5.350 mIU/mL Final   • Color, UA 02/28/2019 Yellow  Yellow, Straw Final   • Appearance, UA 02/28/2019 Clear  Clear Final   • pH, UA 02/28/2019 7.0  5.0 - 8.0 Final   • Specific Gravity, UA 02/28/2019 1.017  1.001 - 1.030 Final   • Glucose, UA 02/28/2019 Negative  Negative Final   • Ketones, UA 02/28/2019 Negative  Negative Final   • Bilirubin, UA 02/28/2019 Negative  Negative Final   • Blood, UA 02/28/2019 Negative  Negative Final   • Protein, UA 02/28/2019 Negative  Negative Final   • Leuk Esterase, UA 02/28/2019 Small (1+)* Negative Final   • Nitrite, UA 02/28/2019 Negative  Negative Final   • Urobilinogen, UA 02/28/2019 0.2 E.U./dL  0.2 - 1.0 E.U./dL Final   • 25 Hydroxy, Vitamin D 02/28/2019 47.5  ng/ml  Final   • WBC 02/28/2019 6.37  3.50 - 10.80 10*3/mm3 Final   • RBC 02/28/2019 5.21* 3.89 - 5.14 10*6/mm3 Final   • Hemoglobin 02/28/2019 14.2  11.5 - 15.5 g/dL Final   • Hematocrit 02/28/2019 44.3* 34.5 - 44.0 % Final   • MCV 02/28/2019 85.0  80.0 - 99.0 fL Final   • MCH 02/28/2019 27.3  27.0 - 31.0 pg Final   • MCHC 02/28/2019 32.1  32.0 - 36.0 g/dL Final   • RDW 02/28/2019 13.9  11.3 - 14.5 % Final   • RDW-SD 02/28/2019 42.7  37.0 - 54.0 fl Final   • MPV 02/28/2019 9.4  6.0 - 12.0 fL Final   • Platelets 02/28/2019 351  150 - 450 10*3/mm3 Final   • Neutrophil % 02/28/2019 61.0  41.0 - 71.0 % Final   • Lymphocyte % 02/28/2019 29.8  24.0 - 44.0 % Final   • Monocyte % 02/28/2019 7.4  0.0 - 12.0 % Final   • Eosinophil % 02/28/2019 1.3  0.0 - 3.0 % Final   • Basophil % 02/28/2019 0.5  0.0 - 1.0 % Final   • Immature Grans % 02/28/2019 0.2  0.0 - 0.6 % Final   • Neutrophils, Absolute 02/28/2019 3.89  1.50 - 8.30 10*3/mm3 Final   • Lymphocytes, Absolute 02/28/2019 1.90  0.60 - 4.80 10*3/mm3 Final   • Monocytes, Absolute 02/28/2019 0.47  0.00 - 1.00 10*3/mm3 Final   • Eosinophils, Absolute 02/28/2019 0.08  0.00 - 0.30 10*3/mm3 Final   • Basophils, Absolute 02/28/2019 0.03  0.00 - 0.20 10*3/mm3 Final   • Immature Grans, Absolute 02/28/2019 0.01  0.00 - 0.05 10*3/mm3 Final   • RBC, UA 02/28/2019 3-6* None Seen, 0-2 /HPF Final   • WBC, UA 02/28/2019 3-5* None Seen, 0-2 /HPF Final   • Bacteria,  02/28/2019 None Seen  None Seen, Trace /HPF Final   • Squamous Epithelial Cells,  02/28/2019 0-2  None Seen, 0-2 /HPF Final   • Hyaline Casts,  02/28/2019 0-6  0 - 6 /LPF Final   • Methodology 02/28/2019 Automated Microscopy   Final       Imaging  All imaging past 72 hours:   Imaging Results (last 72 hours)     ** No results found for the last 72 hours. **            Current Outpatient Medications:   •  atorvastatin (LIPITOR) 10 MG tablet, TAKE ONE TABLET BY MOUTH EVERY NIGHT AT BEDTIME, Disp: 30 tablet, Rfl: 4  •  calcium acetate  (PHOSLO) 667 MG capsule, Take 1,334 mg by mouth 3 (Three) Times a Day With Meals., Disp: , Rfl:   •  Cholecalciferol (VITAMIN D-3) 1000 units capsule, Take  by mouth Take As Directed., Disp: , Rfl:   •  Cinnamon 500 MG tablet, Take 1,000 mg by mouth., Disp: , Rfl:   •  Cyanocobalamin (VITAMIN B-12 ER PO), Take  by mouth Take As Directed., Disp: , Rfl:   •  estradiol (ESTRACE) 0.5 MG tablet, , Disp: , Rfl:   •  famotidine (PEPCID) 20 MG tablet, Take 1 tablet by mouth 2 (Two) Times a Day., Disp: 60 tablet, Rfl: 5  •  Flavocoxid (LIMBREL) 500 MG capsule, Take 1 tablet by mouth 2 (Two) Times a Day., Disp: 60 each, Rfl: 5  •  FLUoxetine (PROzac) 10 MG capsule, Take 10 mg by mouth Daily., Disp: , Rfl:   •  glucosamine sulfate 500 MG capsule capsule, Take  by mouth 3 (Three) Times a Day With Meals., Disp: , Rfl:   •  Ibuprofen (ADVIL) 200 MG capsule, Take  by mouth Take As Directed., Disp: , Rfl:   •  Omega-3 Fatty Acids (FISH OIL) 1000 MG capsule capsule, Take  by mouth Daily With Breakfast., Disp: , Rfl:   •  pramipexole (MIRAPEX) 0.5 MG tablet, TAKE TWO TABLETS BY MOUTH EVERY NIGHT AT BEDTIME AND ONE DURING THE DAY AS NEEDED, Disp: 75 tablet, Rfl: 5  •  vitamin C (ASCORBIC ACID) 250 MG tablet, Take 250 mg by mouth Daily., Disp: , Rfl:   •  methylPREDNISolone (MEDROL, YO,) 4 MG tablet, Take as directed on package instructions., Disp: 21 each, Rfl: 0  •  nortriptyline (PAMELOR) 10 MG capsule, Take 10 mg by mouth., Disp: , Rfl:   No current facility-administered medications for this visit.     Physical Exam:    Physical Exam   Constitutional: She is oriented to person, place, and time. She appears well-developed and well-nourished.   HENT:   Head: Normocephalic and atraumatic.   Nose: Nose normal.   Mouth/Throat: Oropharynx is clear and moist.   Eyes: Conjunctivae and EOM are normal. Pupils are equal, round, and reactive to light.   Neck: Normal range of motion. Neck supple.   Cardiovascular: Normal rate, regular rhythm,  normal heart sounds and intact distal pulses.   Pulmonary/Chest: Effort normal and breath sounds normal.   Neurological: She is alert and oriented to person, place, and time.   Skin: Skin is warm. Rash noted. There is erythema.   Psychiatric: She has a normal mood and affect. Her behavior is normal. Judgment and thought content normal.   Nursing note and vitals reviewed.      Procedures        Assessment/Plan   Problem List Items Addressed This Visit     None      Visit Diagnoses     Allergic contact dermatitis due to plants, except food    -  Primary    Relevant Medications    methylPREDNISolone (MEDROL, YO,) 4 MG tablet    triamcinolone acetonide (KENALOG-40) injection 80 mg (Completed) (Start on 5/28/2019  3:00 PM)        Benadryl at night to help with sleep and itch.    Return if symptoms worsen or fail to improve.    Plan of care reviewed with patient at the conclusion of today's visit. Education was provided regarding diagnosis, management, and any prescribed or recommended OTC medications.Patient verbalizes understanding of and agreement with management plan.     Carlene Cortes PA-C

## 2019-05-28 NOTE — PATIENT INSTRUCTIONS
Contact Dermatitis  Dermatitis is redness, soreness, and swelling (inflammation) of the skin. Contact dermatitis is a reaction to certain substances that touch the skin. There are two types of contact dermatitis:  · Irritant contact dermatitis. This type is caused by something that irritates your skin, such as dry hands from washing them too much. This type does not require previous exposure to the substance for a reaction to occur. This type is more common.  · Allergic contact dermatitis. This type is caused by a substance that you are allergic to, such as a nickel allergy or poison ivy. This type only occurs if you have been exposed to the substance (allergen) before. Upon a repeat exposure, your body reacts to the substance. This type is less common.    What are the causes?  Many different substances can cause contact dermatitis. Irritant contact dermatitis is most commonly caused by exposure to:  · Makeup.  · Soaps.  · Detergents.  · Bleaches.  · Acids.  · Metal salts, such as nickel.    Allergic contact dermatitis is most commonly caused by exposure to:  · Poisonous plants.  · Chemicals.  · Jewelry.  · Latex.  · Medicines.  · Preservatives in products, such as clothing.    What increases the risk?  This condition is more likely to develop in:  · People who have jobs that expose them to irritants or allergens.  · People who have certain medical conditions, such as asthma or eczema.    What are the signs or symptoms?  Symptoms of this condition may occur anywhere on your body where the irritant has touched you or is touched by you. Symptoms include:  · Dryness or flaking.  · Redness.  · Cracks.  · Itching.  · Pain or a burning feeling.  · Blisters.  · Drainage of small amounts of blood or clear fluid from skin cracks.    With allergic contact dermatitis, there may also be swelling in areas such as the eyelids, mouth, or genitals.  How is this diagnosed?  This condition is diagnosed with a medical history and  physical exam. A patch skin test may be performed to help determine the cause. If the condition is related to your job, you may need to see an occupational medicine specialist.  How is this treated?  Treatment for this condition includes figuring out what caused the reaction and protecting your skin from further contact. Treatment may also include:  · Steroid creams or ointments. Oral steroid medicines may be needed in more severe cases.  · Antibiotics or antibacterial ointments, if a skin infection is present.  · Antihistamine lotion or an antihistamine taken by mouth to ease itching.  · A bandage (dressing).    Follow these instructions at home:  Skin Care  · Moisturize your skin as needed.  · Apply cool compresses to the affected areas.  · Try taking a bath with:  ? Epsom salts. Follow the instructions on the packaging. You can get these at your local pharmacy or grocery store.  ? Baking soda. Pour a small amount into the bath as directed by your health care provider.  ? Colloidal oatmeal. Follow the instructions on the packaging. You can get this at your local pharmacy or grocery store.  · Try applying baking soda paste to your skin. Stir water into baking soda until it reaches a paste-like consistency.  · Do not scratch your skin.  · Bathe less frequently, such as every other day.  · Bathe in lukewarm water. Avoid using hot water.  Medicines  · Take or apply over-the-counter and prescription medicines only as told by your health care provider.  · If you were prescribed an antibiotic medicine, take or apply your antibiotic as told by your health care provider. Do not stop using the antibiotic even if your condition starts to improve.  General instructions  · Keep all follow-up visits as told by your health care provider. This is important.  · Avoid the substance that caused your reaction. If you do not know what caused it, keep a journal to try to track what caused it. Write down:  ? What you eat.  ? What  cosmetic products you use.  ? What you drink.  ? What you wear in the affected area. This includes jewelry.  · If you were given a dressing, take care of it as told by your health care provider. This includes when to change and remove it.  Contact a health care provider if:  · Your condition does not improve with treatment.  · Your condition gets worse.  · You have signs of infection such as swelling, tenderness, redness, soreness, or warmth in the affected area.  · You have a fever.  · You have new symptoms.  Get help right away if:  · You have a severe headache, neck pain, or neck stiffness.  · You vomit.  · You feel very sleepy.  · You notice red streaks coming from the affected area.  · Your bone or joint underneath the affected area becomes painful after the skin has healed.  · The affected area turns darker.  · You have difficulty breathing.  This information is not intended to replace advice given to you by your health care provider. Make sure you discuss any questions you have with your health care provider.  Document Released: 12/15/2001 Document Revised: 08/01/2018 Document Reviewed: 05/04/2016  [employer] © 2019 [employer]

## 2019-06-03 ENCOUNTER — TELEPHONE (OUTPATIENT)
Dept: INTERNAL MEDICINE | Facility: CLINIC | Age: 64
End: 2019-06-03

## 2019-06-03 DIAGNOSIS — L23.7 ALLERGIC CONTACT DERMATITIS DUE TO PLANTS, EXCEPT FOOD: Primary | ICD-10-CM

## 2019-06-03 RX ORDER — PREDNISONE 20 MG/1
20 TABLET ORAL 2 TIMES DAILY
Qty: 10 TABLET | Refills: 0 | Status: SHIPPED | OUTPATIENT
Start: 2019-06-03 | End: 2019-09-04

## 2019-06-03 NOTE — TELEPHONE ENCOUNTER
Has she taken Benadryl and Zantac also? This will help with itch.  We can add Prednisone twice daily for 5 days.  She will need to be seen if this does not go away after that.

## 2019-06-03 NOTE — TELEPHONE ENCOUNTER
Pt called stating that she came in last week and saw Dimple and she had a rash she was given  a steroid shot and steroid pills she states it never went away completely  and that  she was still itching with the medication    She states she is still itching and now she has the rash on stomach , back and on arms again wants to know if something else can be sent in because they don't have insurance right now and she would have to pay out of pocket for a visit

## 2019-07-02 ENCOUNTER — TELEPHONE (OUTPATIENT)
Dept: INTERNAL MEDICINE | Facility: CLINIC | Age: 64
End: 2019-07-02

## 2019-07-02 RX ORDER — AZITHROMYCIN 250 MG/1
TABLET, FILM COATED ORAL
Qty: 6 TABLET | Refills: 0 | Status: SHIPPED | OUTPATIENT
Start: 2019-07-02 | End: 2019-07-08

## 2019-07-02 NOTE — TELEPHONE ENCOUNTER
Reason for Call: SINUS    Symptoms: HEADACHE, YELLOW SPUTUM, SORE THROAT, DRAINAGE    Onset (when it began): YESTERDAY    Have they tried anything?  DAYQUIL, NYQUIL, MUCINEX    Other pertinent info:    PATIENT WOULD LIKE TO GET SOMETHING CALLED RATHER THAN HAVING AN APPT.

## 2019-07-25 RX ORDER — ATORVASTATIN CALCIUM 10 MG/1
TABLET, FILM COATED ORAL
Qty: 30 TABLET | Refills: 5 | Status: SHIPPED | OUTPATIENT
Start: 2019-07-25 | End: 2020-01-29

## 2019-08-13 ENCOUNTER — TRANSCRIBE ORDERS (OUTPATIENT)
Dept: ADMINISTRATIVE | Facility: HOSPITAL | Age: 64
End: 2019-08-13

## 2019-08-13 DIAGNOSIS — Z12.31 VISIT FOR SCREENING MAMMOGRAM: Primary | ICD-10-CM

## 2019-08-26 ENCOUNTER — TELEPHONE (OUTPATIENT)
Dept: INTERNAL MEDICINE | Facility: CLINIC | Age: 64
End: 2019-08-26

## 2019-08-26 DIAGNOSIS — E55.9 VITAMIN D DEFICIENCY: Primary | ICD-10-CM

## 2019-08-26 DIAGNOSIS — E78.00 HYPERCHOLESTEROLEMIA: ICD-10-CM

## 2019-08-26 DIAGNOSIS — R53.82 CHRONIC FATIGUE: ICD-10-CM

## 2019-08-28 ENCOUNTER — TELEPHONE (OUTPATIENT)
Dept: INTERNAL MEDICINE | Facility: CLINIC | Age: 64
End: 2019-08-28

## 2019-08-28 DIAGNOSIS — R53.82 CHRONIC FATIGUE: ICD-10-CM

## 2019-08-28 DIAGNOSIS — G25.81 RESTLESS LEG SYNDROME: Primary | ICD-10-CM

## 2019-08-30 DIAGNOSIS — R53.82 CHRONIC FATIGUE: Primary | ICD-10-CM

## 2019-09-03 ENCOUNTER — LAB (OUTPATIENT)
Dept: LAB | Facility: HOSPITAL | Age: 64
End: 2019-09-03

## 2019-09-03 DIAGNOSIS — E55.9 VITAMIN D DEFICIENCY: ICD-10-CM

## 2019-09-03 DIAGNOSIS — R53.82 CHRONIC FATIGUE: ICD-10-CM

## 2019-09-03 DIAGNOSIS — R22.32 LOCALIZED SWELLING, MASS AND LUMP, LEFT UPPER LIMB: Primary | ICD-10-CM

## 2019-09-03 DIAGNOSIS — E78.00 HYPERCHOLESTEROLEMIA: ICD-10-CM

## 2019-09-03 LAB
25(OH)D3 SERPL-MCNC: 47.8 NG/ML (ref 30–100)
ALBUMIN SERPL-MCNC: 4.3 G/DL (ref 3.5–5.2)
ALBUMIN/GLOB SERPL: 1.5 G/DL
ALP SERPL-CCNC: 67 U/L (ref 39–117)
ALT SERPL W P-5'-P-CCNC: 13 U/L (ref 1–33)
ANION GAP SERPL CALCULATED.3IONS-SCNC: 11.8 MMOL/L (ref 5–15)
AST SERPL-CCNC: 9 U/L (ref 1–32)
BASOPHILS # BLD AUTO: 0.05 10*3/MM3 (ref 0–0.2)
BASOPHILS NFR BLD AUTO: 0.8 % (ref 0–1.5)
BILIRUB SERPL-MCNC: 0.3 MG/DL (ref 0.2–1.2)
BUN BLD-MCNC: 17 MG/DL (ref 8–23)
BUN/CREAT SERPL: 21.3 (ref 7–25)
CALCIUM SPEC-SCNC: 9.9 MG/DL (ref 8.6–10.5)
CHLORIDE SERPL-SCNC: 102 MMOL/L (ref 98–107)
CHOLEST SERPL-MCNC: 167 MG/DL (ref 0–200)
CO2 SERPL-SCNC: 26.2 MMOL/L (ref 22–29)
CREAT BLD-MCNC: 0.8 MG/DL (ref 0.57–1)
DEPRECATED RDW RBC AUTO: 44.8 FL (ref 37–54)
EOSINOPHIL # BLD AUTO: 0.04 10*3/MM3 (ref 0–0.4)
EOSINOPHIL NFR BLD AUTO: 0.6 % (ref 0.3–6.2)
ERYTHROCYTE [DISTWIDTH] IN BLOOD BY AUTOMATED COUNT: 13.9 % (ref 12.3–15.4)
FOLATE SERPL-MCNC: 19.2 NG/ML (ref 4.78–24.2)
GFR SERPL CREATININE-BSD FRML MDRD: 73 ML/MIN/1.73
GLOBULIN UR ELPH-MCNC: 2.9 GM/DL
GLUCOSE BLD-MCNC: 74 MG/DL (ref 65–99)
HCT VFR BLD AUTO: 45.9 % (ref 34–46.6)
HCYS SERPL-MCNC: 7.3 UMOL/L (ref 0–15)
HDLC SERPL-MCNC: 46 MG/DL (ref 40–60)
HGB BLD-MCNC: 14.8 G/DL (ref 12–15.9)
IMM GRANULOCYTES # BLD AUTO: 0.04 10*3/MM3 (ref 0–0.05)
IMM GRANULOCYTES NFR BLD AUTO: 0.6 % (ref 0–0.5)
LDLC SERPL CALC-MCNC: 98 MG/DL (ref 0–100)
LDLC/HDLC SERPL: 2.13 {RATIO}
LYMPHOCYTES # BLD AUTO: 1.61 10*3/MM3 (ref 0.7–3.1)
LYMPHOCYTES NFR BLD AUTO: 24.2 % (ref 19.6–45.3)
MCH RBC QN AUTO: 28.1 PG (ref 26.6–33)
MCHC RBC AUTO-ENTMCNC: 32.2 G/DL (ref 31.5–35.7)
MCV RBC AUTO: 87.3 FL (ref 79–97)
MONOCYTES # BLD AUTO: 0.49 10*3/MM3 (ref 0.1–0.9)
MONOCYTES NFR BLD AUTO: 7.4 % (ref 5–12)
NEUTROPHILS # BLD AUTO: 4.41 10*3/MM3 (ref 1.7–7)
NEUTROPHILS NFR BLD AUTO: 66.4 % (ref 42.7–76)
NRBC BLD AUTO-RTO: 0 /100 WBC (ref 0–0.2)
PLATELET # BLD AUTO: 378 10*3/MM3 (ref 140–450)
PMV BLD AUTO: 9.3 FL (ref 6–12)
POTASSIUM BLD-SCNC: 4.7 MMOL/L (ref 3.5–5.2)
PROT SERPL-MCNC: 7.2 G/DL (ref 6–8.5)
RBC # BLD AUTO: 5.26 10*6/MM3 (ref 3.77–5.28)
SODIUM BLD-SCNC: 140 MMOL/L (ref 136–145)
TRIGL SERPL-MCNC: 115 MG/DL (ref 0–150)
TSH SERPL DL<=0.05 MIU/L-ACNC: 2.12 UIU/ML (ref 0.27–4.2)
VLDLC SERPL-MCNC: 23 MG/DL (ref 5–40)
WBC NRBC COR # BLD: 6.64 10*3/MM3 (ref 3.4–10.8)

## 2019-09-03 PROCEDURE — 83090 ASSAY OF HOMOCYSTEINE: CPT

## 2019-09-03 PROCEDURE — 82306 VITAMIN D 25 HYDROXY: CPT

## 2019-09-03 PROCEDURE — 82746 ASSAY OF FOLIC ACID SERUM: CPT

## 2019-09-03 PROCEDURE — 36415 COLL VENOUS BLD VENIPUNCTURE: CPT

## 2019-09-03 PROCEDURE — 80053 COMPREHEN METABOLIC PANEL: CPT

## 2019-09-03 PROCEDURE — 84443 ASSAY THYROID STIM HORMONE: CPT

## 2019-09-03 PROCEDURE — 80061 LIPID PANEL: CPT

## 2019-09-03 PROCEDURE — 85025 COMPLETE CBC W/AUTO DIFF WBC: CPT

## 2019-09-04 ENCOUNTER — OFFICE VISIT (OUTPATIENT)
Dept: INTERNAL MEDICINE | Facility: CLINIC | Age: 64
End: 2019-09-04

## 2019-09-04 VITALS
SYSTOLIC BLOOD PRESSURE: 96 MMHG | WEIGHT: 150 LBS | HEART RATE: 72 BPM | DIASTOLIC BLOOD PRESSURE: 60 MMHG | BODY MASS INDEX: 26.58 KG/M2 | HEIGHT: 63 IN

## 2019-09-04 DIAGNOSIS — M25.561 CHRONIC PAIN OF BOTH KNEES: Primary | Chronic | ICD-10-CM

## 2019-09-04 DIAGNOSIS — M25.562 CHRONIC PAIN OF BOTH KNEES: Primary | Chronic | ICD-10-CM

## 2019-09-04 DIAGNOSIS — G89.29 CHRONIC PAIN OF BOTH KNEES: Primary | Chronic | ICD-10-CM

## 2019-09-04 DIAGNOSIS — E55.9 VITAMIN D DEFICIENCY: Chronic | ICD-10-CM

## 2019-09-04 DIAGNOSIS — M15.9 PRIMARY OSTEOARTHRITIS INVOLVING MULTIPLE JOINTS: Chronic | ICD-10-CM

## 2019-09-04 DIAGNOSIS — R09.82 POST-NASAL DRAINAGE: Chronic | ICD-10-CM

## 2019-09-04 DIAGNOSIS — E78.00 HYPERCHOLESTEROLEMIA: Chronic | ICD-10-CM

## 2019-09-04 DIAGNOSIS — B07.8 OTHER VIRAL WARTS: Chronic | ICD-10-CM

## 2019-09-04 DIAGNOSIS — N95.1 HOT FLASHES DUE TO MENOPAUSE: Chronic | ICD-10-CM

## 2019-09-04 DIAGNOSIS — F32.0 MILD MAJOR DEPRESSION, SINGLE EPISODE (HCC): Chronic | ICD-10-CM

## 2019-09-04 PROCEDURE — 99214 OFFICE O/P EST MOD 30 MIN: CPT | Performed by: INTERNAL MEDICINE

## 2019-09-04 RX ORDER — AZELASTINE HYDROCHLORIDE 137 UG/1
2 SPRAY, METERED NASAL 2 TIMES DAILY
Qty: 1 BOTTLE | Refills: 11 | Status: SHIPPED | OUTPATIENT
Start: 2019-09-04 | End: 2020-03-10 | Stop reason: SDUPTHER

## 2019-09-04 NOTE — PROGRESS NOTES
[facility]     Suze Baeza  1955   [id number]      Patient Care Team:  Parul Lomas MD as PCP - General (Internal Medicine)    Chief Complaint;:   Chief Complaint   Patient presents with   • Hyperlipidemia   • Vitamin D Deficiency   • Allergies   • Skin Lesion     Legs and hands   • Knee Pain     Bilateral knee pain.  [facility].  Had injection in right knee last week            HPI  Patient is a 64 y.o. female presents with multiple warts on hands and legs.  We have treated some in the past successfully with liquid nitrogen.  These are new ones.  Onset of symptoms was gradual starting 6 months ago.  Chronicity chronic. Severity mild to moderate.  Symptoms are associated with flesh-colored, no pigmentation. Pertinent negatives no itching or redness.   Symptoms are aggravated by unknown.   Symptoms improve with-others have been treated with liquid nitrogen in the past..     CHRONIC CONDITIONS  Bilateral knee pain, had recent knee injections-getting 5 on each knee.  No swelling.  Knee pain prevents her from exercising and being as active as she would like.  Takes occasional ibuprofen with food.  Helps some.  Denies stomach upset.    Taking atorvastatin for hyperlipidemia.  Eats a low-fat diet and tries to exercise regularly.    Allergies are helped by azelastine.    Fluoxetine does help with hot flashes some.    Past Medical History:   Diagnosis Date   • Degenerative tear of medial meniscus, right    • Low back pain    • Mild major depression, single episode (CMS/HCC) 1/14/2019   • Neuropathy, lumbosacral (radicular)    • Primary osteoarthritis of both knees    • Spondylolisthesis, lumbar region    • Torn meniscus     right medial   • Viral warts 2/25/2019       Past Surgical History:   Procedure Laterality Date   • BLADDER SURGERY     • HYSTERECTOMY  07/10/2012   • KNEE ARTHROSCOPY Right    • OOPHORECTOMY Bilateral 07/10/2012       Family History   Problem  "Relation Age of Onset   • Deep vein thrombosis Mother    • Stroke Mother    • Heart disease Mother 68   • Heart attack Mother    • Osteoarthritis Mother    • Heart disease Father 51        cause of death   • Heart attack Father    • Stroke Other    • Cancer Maternal Aunt         bone   • Diabetes Maternal Grandmother    • Cancer Maternal Aunt         lymphoma   • Cancer Maternal Aunt         brain   • Breast cancer Neg Hx    • Ovarian cancer Neg Hx        Social History     Socioeconomic History   • Marital status:      Spouse name: Not on file   • Number of children: Not on file   • Years of education: Not on file   • Highest education level: Not on file   Tobacco Use   • Smoking status: Never Smoker   • Smokeless tobacco: Never Used   Substance and Sexual Activity   • Alcohol use: No   • Drug use: No   • Sexual activity: Defer   Social History Narrative           No Known Allergies    Review of Systems:     Review of Systems   Constitutional: Negative for chills, fatigue and fever.   HENT: Negative for congestion, ear pain and sinus pressure.    Respiratory: Negative for cough, chest tightness, shortness of breath and wheezing.    Cardiovascular: Negative for chest pain, palpitations and leg swelling.   Gastrointestinal: Negative for abdominal pain, blood in stool and constipation.   Musculoskeletal: Positive for arthralgias. Negative for gait problem.   Skin: Positive for skin lesions. Negative for color change.   Allergic/Immunologic: Negative for environmental allergies.   Neurological: Negative for dizziness, speech difficulty and headache.   Psychiatric/Behavioral: Negative for decreased concentration. The patient is not nervous/anxious.        Vital Signs  Vitals:    09/04/19 1146   BP: 96/60   BP Location: Right arm   Patient Position: Sitting   Cuff Size: Adult   Pulse: 72   Weight: 68 kg (150 lb)   Height: 161 cm (63.39\")   PainSc:   2   PainLoc: Knee  Comment: Bilateral     Body mass index " is 26.25 kg/m².      Current Outpatient Medications:   •  atorvastatin (LIPITOR) 10 MG tablet, TAKE ONE TABLET BY MOUTH EVERY NIGHT AT BEDTIME, Disp: 30 tablet, Rfl: 5  •  Cholecalciferol (VITAMIN D-3) 1000 units capsule, Take  by mouth Take As Directed., Disp: , Rfl:   •  Cinnamon 500 MG tablet, Take 1,000 mg by mouth., Disp: , Rfl:   •  estradiol (ESTRACE) 0.5 MG tablet, , Disp: , Rfl:   •  FLUoxetine (PROzac) 10 MG capsule, Take 10 mg by mouth Daily., Disp: , Rfl:   •  glucosamine sulfate 500 MG capsule capsule, Take  by mouth 3 (Three) Times a Day With Meals., Disp: , Rfl:   •  Ibuprofen (ADVIL) 200 MG capsule, Take  by mouth Take As Directed., Disp: , Rfl:   •  Magnesium 65 MG tablet, Take 1 tablet by mouth every night at bedtime., Disp: , Rfl:   •  nortriptyline (PAMELOR) 10 MG capsule, Take 10 mg by mouth., Disp: , Rfl:   •  Omega-3 Fatty Acids (FISH OIL) 1000 MG capsule capsule, Take  by mouth Daily With Breakfast., Disp: , Rfl:   •  pramipexole (MIRAPEX) 0.5 MG tablet, TAKE TWO TABLETS BY MOUTH EVERY NIGHT AT BEDTIME AND ONE DURING THE DAY AS NEEDED, Disp: 75 tablet, Rfl: 5  •  vitamin C (ASCORBIC ACID) 250 MG tablet, Take 250 mg by mouth Daily., Disp: , Rfl:   •  Azelastine HCl 137 MCG/SPRAY solution, 2 sprays into the nostril(s) as directed by provider 2 (Two) Times a Day., Disp: 1 bottle, Rfl: 11  •  Dietary Management Product (RHEUMATE) capsule, Take 1 capsule by mouth Daily., Disp: 90 capsule, Rfl: 3    Physical Exam:    Physical Exam   Constitutional: She is oriented to person, place, and time. She appears well-developed and well-nourished.   HENT:   Head: Normocephalic.   Eyes: Conjunctivae and EOM are normal. Pupils are equal, round, and reactive to light.   Neck: Normal range of motion. Neck supple. No thyromegaly present.   Cardiovascular: Normal rate, regular rhythm, normal heart sounds and intact distal pulses.   Pulmonary/Chest: Effort normal and breath sounds normal.   Musculoskeletal: Normal  range of motion. She exhibits no edema.        Right knee: She exhibits deformity. She exhibits normal range of motion and no swelling. Tenderness found.        Left knee: She exhibits deformity. She exhibits normal range of motion and no swelling. Tenderness found.   Lymphadenopathy:     She has no cervical adenopathy.   Neurological: She is alert and oriented to person, place, and time.   Skin: Turgor is normal. Lesion noted.   Multiple flesh-colored warts ranging from 1mm to 3 mm.  On hands and lower extremities.   Psychiatric: She has a normal mood and affect. Thought content normal.   Nursing note and vitals reviewed.       ACE III MINI        Results Review:    I reviewed the patient's new clinical results.  We reviewed her lab results in detail.  CMP:  Lab Results   Component Value Date    BUN 17 09/03/2019    CREATININE 0.80 09/03/2019    EGFRIFNONA 73 09/03/2019    BCR 21.3 09/03/2019     09/03/2019    K 4.7 09/03/2019    CO2 26.2 09/03/2019    CALCIUM 9.9 09/03/2019    ALBUMIN 4.30 09/03/2019    BILITOT 0.3 09/03/2019    ALKPHOS 67 09/03/2019    AST 9 09/03/2019    ALT 13 09/03/2019     HbA1c:  No results found for: HGBA1C  Microalbumin:  No results found for: MICROALBUR, POCMALB, POCCREAT  Lipid Panel  Lab Results   Component Value Date    CHOL 167 09/03/2019    TRIG 115 09/03/2019    HDL 46 09/03/2019    LDL 98 09/03/2019    AST 9 09/03/2019    ALT 13 09/03/2019       Medication Review: Medications reviewed and noted    Problem List Items Addressed This Visit        Cardiovascular and Mediastinum    Hypercholesterolemia    Relevant Medications    atorvastatin (LIPITOR) 10 MG tablet       Digestive    Vitamin D deficiency       Musculoskeletal and Integument    Chronic pain of both knees - Primary    Primary osteoarthritis involving multiple joints    Relevant Medications    Dietary Management Product (RHEUMATE) capsule       Genitourinary    Hot flashes due to menopause       Other    RESOLVED:  Mild major depression, single episode (CMS/HCC)    Relevant Medications    FLUoxetine (PROzac) 10 MG capsule    nortriptyline (PAMELOR) 10 MG capsule    Post-nasal drainage    Other viral warts    Overview     Hands and legs.  Multiple.                 Diagnosis Plan   1. Chronic pain of both knees      Use a cold pack daily   2. Hypercholesterolemia      Continue atorvastatin every evening.  Continue low-fat diet and regular exercise.   3. Vitamin D deficiency      Continue vitamin D replacement.   4. Primary osteoarthritis involving multiple joints  Dietary Management Product (RHEUMATE) capsule    Since Limbrel has been expensive and hard to obtain, stop it.  Start Rheumate capsule once a day.  We will order it from a special pharmacy.   5. Post-nasal drainage      Use Azelastine nasal spray twice a day.   6. Other viral warts      Hands and legs.  Multiple.  Will treat with liquid nitrogen when the summer is over so they will not be exposed to the sun while they are healing   7. Hot flashes due to menopause      Continue fluoxetine.   8. Mild major depression, single episode (CMS/HCC)      Continue fluoxetine.  Physical exercise and walking also help.  Plan fun things to do each week.       There are no Patient Instructions on file for this visit.    Plan of care reviewed with patient at the conclusion of today's visit. Education was provided regarding diagnosis, management, and any prescribed or recommended OTC medications.Patient verbalizes understanding of and agreement with management plan.         Parul Lomas MD

## 2019-09-07 PROBLEM — B07.8 OTHER VIRAL WARTS: Status: ACTIVE | Noted: 2019-02-25

## 2019-09-07 RX ORDER — ME-TETRAHYDROFOLATE/B12/HRB236 1-1-500 MG
1 CAPSULE ORAL DAILY
Qty: 90 CAPSULE | Refills: 3
Start: 2019-09-07 | End: 2019-09-09 | Stop reason: SDUPTHER

## 2019-09-09 DIAGNOSIS — M15.9 PRIMARY OSTEOARTHRITIS INVOLVING MULTIPLE JOINTS: Chronic | ICD-10-CM

## 2019-09-09 RX ORDER — ME-TETRAHYDROFOLATE/B12/HRB236 1-1-500 MG
1 CAPSULE ORAL DAILY
Qty: 90 CAPSULE | Refills: 3 | Status: SHIPPED | OUTPATIENT
Start: 2019-09-09 | End: 2020-09-10 | Stop reason: HOSPADM

## 2019-10-17 ENCOUNTER — APPOINTMENT (OUTPATIENT)
Dept: MAMMOGRAPHY | Facility: HOSPITAL | Age: 64
End: 2019-10-17

## 2019-10-23 ENCOUNTER — OFFICE VISIT (OUTPATIENT)
Dept: INTERNAL MEDICINE | Facility: CLINIC | Age: 64
End: 2019-10-23

## 2019-10-23 VITALS
WEIGHT: 151 LBS | HEIGHT: 63 IN | DIASTOLIC BLOOD PRESSURE: 68 MMHG | SYSTOLIC BLOOD PRESSURE: 112 MMHG | TEMPERATURE: 97.1 F | HEART RATE: 80 BPM | BODY MASS INDEX: 26.75 KG/M2

## 2019-10-23 DIAGNOSIS — B07.8 OTHER VIRAL WARTS: Primary | ICD-10-CM

## 2019-10-23 PROCEDURE — 99213 OFFICE O/P EST LOW 20 MIN: CPT | Performed by: PHYSICIAN ASSISTANT

## 2019-10-23 PROCEDURE — 17110 DESTRUCTION B9 LES UP TO 14: CPT | Performed by: PHYSICIAN ASSISTANT

## 2019-10-23 NOTE — PROGRESS NOTES
"Patient Care Team:  Parul Lomas MD as PCP - General (Internal Medicine)    Chief Complaint;:   Chief Complaint   Patient presents with   • wart removal     hands and legs        Subjective     HPI  64-year-old white female presents to the office today with numerous skin warts which she would like to have liquid nitrogen applied.  She had had one wart removed on her finger by Dr. Quintero however she has had numerous pop up over the last year.  Past Medical History:   Diagnosis Date   • Degenerative tear of medial meniscus, right    • Low back pain    • Mild major depression, single episode (CMS/HCC) 1/14/2019   • Neuropathy, lumbosacral (radicular)    • Primary osteoarthritis of both knees    • Spondylolisthesis, lumbar region    • Torn meniscus     right medial   • Viral warts 2/25/2019       Social History     Socioeconomic History   • Marital status:      Spouse name: Not on file   • Number of children: Not on file   • Years of education: Not on file   • Highest education level: Not on file   Tobacco Use   • Smoking status: Never Smoker   • Smokeless tobacco: Never Used   Substance and Sexual Activity   • Alcohol use: No   • Drug use: No   • Sexual activity: Defer   Social History Narrative           No Known Allergies    Review of Systems:     Review of Systems   Skin:        Numerous warts on hands and legs       Vital Signs  Vitals:    10/23/19 1344   BP: 112/68   BP Location: Left arm   Patient Position: Sitting   Cuff Size: Adult   Pulse: 80   Temp: 97.1 °F (36.2 °C)   TempSrc: Temporal   Weight: 68.5 kg (151 lb)   Height: 161 cm (63.39\")   PainSc: 0-No pain         Current Outpatient Medications:   •  atorvastatin (LIPITOR) 10 MG tablet, TAKE ONE TABLET BY MOUTH EVERY NIGHT AT BEDTIME, Disp: 30 tablet, Rfl: 5  •  Azelastine HCl 137 MCG/SPRAY solution, 2 sprays into the nostril(s) as directed by provider 2 (Two) Times a Day., Disp: 1 bottle, Rfl: 11  •  Cholecalciferol (VITAMIN D-3) " 1000 units capsule, Take  by mouth Take As Directed., Disp: , Rfl:   •  Cinnamon 500 MG tablet, Take 1,000 mg by mouth., Disp: , Rfl:   •  estradiol (ESTRACE) 0.5 MG tablet, , Disp: , Rfl:   •  FLUoxetine (PROzac) 10 MG capsule, Take 10 mg by mouth Daily., Disp: , Rfl:   •  glucosamine sulfate 500 MG capsule capsule, Take  by mouth 2 (Two) Times a Day With Meals., Disp: , Rfl:   •  Ibuprofen (ADVIL) 200 MG capsule, Take  by mouth Take As Directed., Disp: , Rfl:   •  Magnesium 65 MG tablet, Take 1 tablet by mouth every night at bedtime., Disp: , Rfl:   •  nortriptyline (PAMELOR) 10 MG capsule, Take 10 mg by mouth As Needed., Disp: , Rfl:   •  Omega-3 Fatty Acids (FISH OIL) 1000 MG capsule capsule, Take  by mouth Daily With Breakfast., Disp: , Rfl:   •  pramipexole (MIRAPEX) 0.5 MG tablet, TAKE TWO TABLETS BY MOUTH EVERY NIGHT AT BEDTIME AND ONE DURING THE DAY AS NEEDED (Patient taking differently: as needed), Disp: 75 tablet, Rfl: 5  •  vitamin C (ASCORBIC ACID) 250 MG tablet, Take 250 mg by mouth Daily., Disp: , Rfl:   •  Dietary Management Product (RHEUMATE) capsule, Take 1 capsule by mouth Daily., Disp: 90 capsule, Rfl: 3    Physical Exam:    Physical Exam   Constitutional: She is oriented to person, place, and time. She appears well-developed and well-nourished.   Neurological: She is alert and oriented to person, place, and time.   Skin:   Total of 8 warts on her hands which were treated with liquid nitrogen x2.  6 warts treated on both legs with liquid nitrogen x2.       Cryotherapy, Skin Lesion  Date/Time: 10/23/2019 2:21 PM  Performed by: Terri Roa PA-C  Authorized by: Terri Roa PA-C   Local anesthesia used: no    Anesthesia:  Local anesthesia used: no    Sedation:  Patient sedated: no    Patient tolerance: Patient tolerated the procedure well with no immediate complications  Comments: 8 lesions on bilateral hands and 6 lesions on lower extremities            Assessment/Plan   Problem List  Items Addressed This Visit        Other    Other viral warts - Primary    Overview     Hands and legs.  Multiple.         Relevant Orders    Cryotherapy, Skin Lesion        Patient Instructions   Liquid nitrogen x2 to a total of 14 warts.  If symptoms do not resolve or if they worsen she will be referred to Dr. Gallardo.      Plan of care reviewed with patient at the conclusion of today's visit. Education was provided regarding diagnosis, management, and any prescribed or recommended OTC medications.Patient verbalizes understanding of and agreement with management plan.     Terri Roa PA-C

## 2019-10-23 NOTE — PATIENT INSTRUCTIONS
Liquid nitrogen x2 to a total of 14 warts.  If symptoms do not resolve or if they worsen she will be referred to Dr. Gallardo.

## 2019-10-24 ENCOUNTER — HOSPITAL ENCOUNTER (OUTPATIENT)
Dept: MAMMOGRAPHY | Facility: HOSPITAL | Age: 64
Discharge: HOME | End: 2019-10-24
Admitting: OBSTETRICS & GYNECOLOGY

## 2019-10-24 DIAGNOSIS — Z12.31 VISIT FOR SCREENING MAMMOGRAM: ICD-10-CM

## 2019-10-24 PROCEDURE — 77067 SCR MAMMO BI INCL CAD: CPT

## 2019-10-24 PROCEDURE — 77067 SCR MAMMO BI INCL CAD: CPT | Performed by: RADIOLOGY

## 2019-10-24 PROCEDURE — 77063 BREAST TOMOSYNTHESIS BI: CPT

## 2019-10-24 PROCEDURE — 77063 BREAST TOMOSYNTHESIS BI: CPT | Performed by: RADIOLOGY

## 2019-12-05 ENCOUNTER — OFFICE VISIT (OUTPATIENT)
Dept: INTERNAL MEDICINE | Facility: CLINIC | Age: 64
End: 2019-12-05

## 2019-12-05 VITALS
SYSTOLIC BLOOD PRESSURE: 116 MMHG | HEIGHT: 63 IN | BODY MASS INDEX: 26.93 KG/M2 | DIASTOLIC BLOOD PRESSURE: 82 MMHG | TEMPERATURE: 97.1 F | HEART RATE: 72 BPM | WEIGHT: 152 LBS

## 2019-12-05 DIAGNOSIS — B07.9 VIRAL WARTS, UNSPECIFIED TYPE: Primary | ICD-10-CM

## 2019-12-05 PROCEDURE — 99213 OFFICE O/P EST LOW 20 MIN: CPT | Performed by: PHYSICIAN ASSISTANT

## 2019-12-05 PROCEDURE — 17004 DESTROY PREMAL LESIONS 15/>: CPT | Performed by: PHYSICIAN ASSISTANT

## 2019-12-05 NOTE — PATIENT INSTRUCTIONS
Keep lesions clean, call for any signs of infection.   Follow up with Dr. Gallardo if symptoms persist.

## 2019-12-05 NOTE — PROGRESS NOTES
"Patient Care Team:  Parul Lomas MD as PCP - General (Internal Medicine)    Chief Complaint;:   Chief Complaint   Patient presents with   • Verrucous Vulgaris     had them frozen off but it didn't take         Subjective     HPI  She has several areas on legs and hands she would like frozen.   She does see Dr. Gallardo and will see him if this freezing does not remove.  Past Medical History:   Diagnosis Date   • Degenerative tear of medial meniscus, right    • Low back pain    • Mild major depression, single episode (CMS/HCC) 1/14/2019   • Neuropathy, lumbosacral (radicular)    • Primary osteoarthritis of both knees    • Spondylolisthesis, lumbar region    • Torn meniscus     right medial   • Viral warts 2/25/2019       Social History     Socioeconomic History   • Marital status:      Spouse name: Not on file   • Number of children: Not on file   • Years of education: Not on file   • Highest education level: Not on file   Tobacco Use   • Smoking status: Never Smoker   • Smokeless tobacco: Never Used   Substance and Sexual Activity   • Alcohol use: No   • Drug use: No   • Sexual activity: Defer   Social History Narrative           No Known Allergies    Review of Systems:     Review of Systems   Skin: Positive for skin lesions.       Vital Signs  Vitals:    12/05/19 1514   BP: 116/82   BP Location: Left arm   Patient Position: Sitting   Cuff Size: Adult   Pulse: 72   Temp: 97.1 °F (36.2 °C)   TempSrc: Temporal   Weight: 68.9 kg (152 lb)   Height: 161 cm (63.39\")   PainSc: 0-No pain         Current Outpatient Medications:   •  atorvastatin (LIPITOR) 10 MG tablet, TAKE ONE TABLET BY MOUTH EVERY NIGHT AT BEDTIME, Disp: 30 tablet, Rfl: 5  •  Azelastine HCl 137 MCG/SPRAY solution, 2 sprays into the nostril(s) as directed by provider 2 (Two) Times a Day. (Patient taking differently: 2 sprays into the nostril(s) as directed by provider As Needed.), Disp: 1 bottle, Rfl: 11  •  Cholecalciferol (VITAMIN D-3) " 1000 units capsule, Take  by mouth Take As Directed., Disp: , Rfl:   •  Cinnamon 500 MG tablet, Take 1,000 mg by mouth., Disp: , Rfl:   •  estradiol (ESTRACE) 0.5 MG tablet, , Disp: , Rfl:   •  FLUoxetine (PROzac) 10 MG capsule, Take 10 mg by mouth Daily., Disp: , Rfl:   •  glucosamine sulfate 500 MG capsule capsule, Take  by mouth 2 (Two) Times a Day With Meals., Disp: , Rfl:   •  Ibuprofen (ADVIL) 200 MG capsule, Take  by mouth As Needed., Disp: , Rfl:   •  Magnesium 65 MG tablet, Take 1 tablet by mouth every night at bedtime., Disp: , Rfl:   •  nortriptyline (PAMELOR) 10 MG capsule, Take 10 mg by mouth As Needed., Disp: , Rfl:   •  Omega-3 Fatty Acids (FISH OIL) 1000 MG capsule capsule, Take  by mouth Daily With Breakfast., Disp: , Rfl:   •  pramipexole (MIRAPEX) 0.5 MG tablet, TAKE TWO TABLETS BY MOUTH EVERY NIGHT AT BEDTIME AND ONE DURING THE DAY AS NEEDED (Patient taking differently: as needed), Disp: 75 tablet, Rfl: 5  •  vitamin C (ASCORBIC ACID) 250 MG tablet, Take 250 mg by mouth Daily., Disp: , Rfl:   •  Dietary Management Product (RHEUMATE) capsule, Take 1 capsule by mouth Daily., Disp: 90 capsule, Rfl: 3    Physical Exam:    Physical Exam   Constitutional: She is oriented to person, place, and time. She appears well-developed and well-nourished.   Neurological: She is alert and oriented to person, place, and time.   Skin: Skin is warm and dry.   Wart growths on hands and legs   Nursing note and vitals reviewed.      Cryotherapy, Skin Lesion  Date/Time: 12/5/2019 4:04 PM  Performed by: Terri Roa PA-C  Authorized by: Terri Roa PA-C   Local anesthesia used: no    Anesthesia:  Local anesthesia used: no    Sedation:  Patient sedated: no    Patient tolerance: Patient tolerated the procedure well with no immediate complications  Comments: Two lesions on right hand, 3 on left hand, 5 on left leg and 6 on right leg.   LN x 2 to each lesion            Assessment/Plan   Problem List Items Addressed  This Visit     None      Visit Diagnoses     Viral warts, unspecified type    -  Primary    LN x 2 to several on legs, 2 on right hand and 3 on left hand    Relevant Orders    Cryotherapy, Skin Lesion        Patient Instructions   Keep lesions clean, call for any signs of infection.   Follow up with Dr. Gallardo if symptoms persist.      Plan of care reviewed with patient at the conclusion of today's visit. Education was provided regarding diagnosis, management, and any prescribed or recommended OTC medications.Patient verbalizes understanding of and agreement with management plan.     Terri Roa PA-C

## 2019-12-09 ENCOUNTER — APPOINTMENT (OUTPATIENT)
Dept: MAMMOGRAPHY | Facility: HOSPITAL | Age: 64
End: 2019-12-09

## 2019-12-18 RX ORDER — NORTRIPTYLINE HYDROCHLORIDE 10 MG/1
CAPSULE ORAL
Qty: 120 CAPSULE | Refills: 10 | Status: SHIPPED | OUTPATIENT
Start: 2019-12-18 | End: 2020-09-10 | Stop reason: SDUPTHER

## 2020-01-29 RX ORDER — ATORVASTATIN CALCIUM 10 MG/1
TABLET, FILM COATED ORAL
Qty: 30 TABLET | Refills: 5 | Status: SHIPPED | OUTPATIENT
Start: 2020-01-29 | End: 2020-08-03

## 2020-03-10 ENCOUNTER — LAB (OUTPATIENT)
Dept: LAB | Facility: HOSPITAL | Age: 65
End: 2020-03-10

## 2020-03-10 ENCOUNTER — OFFICE VISIT (OUTPATIENT)
Dept: INTERNAL MEDICINE | Facility: CLINIC | Age: 65
End: 2020-03-10

## 2020-03-10 VITALS
BODY MASS INDEX: 26.93 KG/M2 | TEMPERATURE: 97.9 F | HEART RATE: 64 BPM | WEIGHT: 152 LBS | SYSTOLIC BLOOD PRESSURE: 96 MMHG | DIASTOLIC BLOOD PRESSURE: 66 MMHG | HEIGHT: 63 IN

## 2020-03-10 DIAGNOSIS — E55.9 VITAMIN D DEFICIENCY: ICD-10-CM

## 2020-03-10 DIAGNOSIS — E78.00 HYPERCHOLESTEROLEMIA: Primary | ICD-10-CM

## 2020-03-10 DIAGNOSIS — M17.0 PRIMARY OSTEOARTHRITIS OF BOTH KNEES: ICD-10-CM

## 2020-03-10 DIAGNOSIS — F51.01 PRIMARY INSOMNIA: ICD-10-CM

## 2020-03-10 DIAGNOSIS — F43.29 STRESS AND ADJUSTMENT REACTION: ICD-10-CM

## 2020-03-10 DIAGNOSIS — M54.50 CHRONIC BILATERAL LOW BACK PAIN WITHOUT SCIATICA: ICD-10-CM

## 2020-03-10 DIAGNOSIS — M15.9 PRIMARY OSTEOARTHRITIS INVOLVING MULTIPLE JOINTS: ICD-10-CM

## 2020-03-10 DIAGNOSIS — M54.2 CERVICALGIA: ICD-10-CM

## 2020-03-10 DIAGNOSIS — G89.29 CHRONIC BILATERAL LOW BACK PAIN WITHOUT SCIATICA: ICD-10-CM

## 2020-03-10 DIAGNOSIS — E78.00 HYPERCHOLESTEROLEMIA: ICD-10-CM

## 2020-03-10 LAB
25(OH)D3 SERPL-MCNC: 40.8 NG/ML (ref 30–100)
ALBUMIN SERPL-MCNC: 4.4 G/DL (ref 3.5–5.2)
ALBUMIN/GLOB SERPL: 1.8 G/DL
ALP SERPL-CCNC: 70 U/L (ref 39–117)
ALT SERPL W P-5'-P-CCNC: 21 U/L (ref 1–33)
ANION GAP SERPL CALCULATED.3IONS-SCNC: 11.4 MMOL/L (ref 5–15)
AST SERPL-CCNC: 24 U/L (ref 1–32)
BASOPHILS # BLD AUTO: 0.06 10*3/MM3 (ref 0–0.2)
BASOPHILS NFR BLD AUTO: 1 % (ref 0–1.5)
BILIRUB SERPL-MCNC: 0.3 MG/DL (ref 0.2–1.2)
BUN BLD-MCNC: 16 MG/DL (ref 8–23)
BUN/CREAT SERPL: 21.6 (ref 7–25)
CALCIUM SPEC-SCNC: 9.3 MG/DL (ref 8.6–10.5)
CHLORIDE SERPL-SCNC: 101 MMOL/L (ref 98–107)
CHOLEST SERPL-MCNC: 151 MG/DL (ref 0–200)
CO2 SERPL-SCNC: 25.6 MMOL/L (ref 22–29)
CREAT BLD-MCNC: 0.74 MG/DL (ref 0.57–1)
DEPRECATED RDW RBC AUTO: 40.1 FL (ref 37–54)
EOSINOPHIL # BLD AUTO: 0.04 10*3/MM3 (ref 0–0.4)
EOSINOPHIL NFR BLD AUTO: 0.7 % (ref 0.3–6.2)
ERYTHROCYTE [DISTWIDTH] IN BLOOD BY AUTOMATED COUNT: 13.2 % (ref 12.3–15.4)
GFR SERPL CREATININE-BSD FRML MDRD: 79 ML/MIN/1.73
GLOBULIN UR ELPH-MCNC: 2.5 GM/DL
GLUCOSE BLD-MCNC: 90 MG/DL (ref 65–99)
HCT VFR BLD AUTO: 44.3 % (ref 34–46.6)
HDLC SERPL-MCNC: 47 MG/DL (ref 40–60)
HGB BLD-MCNC: 14.6 G/DL (ref 12–15.9)
IMM GRANULOCYTES # BLD AUTO: 0.02 10*3/MM3 (ref 0–0.05)
IMM GRANULOCYTES NFR BLD AUTO: 0.3 % (ref 0–0.5)
LDLC SERPL CALC-MCNC: 86 MG/DL (ref 0–100)
LDLC/HDLC SERPL: 1.83 {RATIO}
LYMPHOCYTES # BLD AUTO: 1.83 10*3/MM3 (ref 0.7–3.1)
LYMPHOCYTES NFR BLD AUTO: 31.6 % (ref 19.6–45.3)
MCH RBC QN AUTO: 27.4 PG (ref 26.6–33)
MCHC RBC AUTO-ENTMCNC: 33 G/DL (ref 31.5–35.7)
MCV RBC AUTO: 83.1 FL (ref 79–97)
MONOCYTES # BLD AUTO: 0.52 10*3/MM3 (ref 0.1–0.9)
MONOCYTES NFR BLD AUTO: 9 % (ref 5–12)
NEUTROPHILS # BLD AUTO: 3.32 10*3/MM3 (ref 1.7–7)
NEUTROPHILS NFR BLD AUTO: 57.4 % (ref 42.7–76)
NRBC BLD AUTO-RTO: 0 /100 WBC (ref 0–0.2)
PLATELET # BLD AUTO: 398 10*3/MM3 (ref 140–450)
PMV BLD AUTO: 9.4 FL (ref 6–12)
POTASSIUM BLD-SCNC: 4.5 MMOL/L (ref 3.5–5.2)
PROT SERPL-MCNC: 6.9 G/DL (ref 6–8.5)
RBC # BLD AUTO: 5.33 10*6/MM3 (ref 3.77–5.28)
SODIUM BLD-SCNC: 138 MMOL/L (ref 136–145)
TRIGL SERPL-MCNC: 89 MG/DL (ref 0–150)
TSH SERPL DL<=0.05 MIU/L-ACNC: 1.59 UIU/ML (ref 0.27–4.2)
VLDLC SERPL-MCNC: 17.8 MG/DL (ref 5–40)
WBC NRBC COR # BLD: 5.79 10*3/MM3 (ref 3.4–10.8)

## 2020-03-10 PROCEDURE — 80053 COMPREHEN METABOLIC PANEL: CPT

## 2020-03-10 PROCEDURE — 85025 COMPLETE CBC W/AUTO DIFF WBC: CPT

## 2020-03-10 PROCEDURE — 99214 OFFICE O/P EST MOD 30 MIN: CPT | Performed by: INTERNAL MEDICINE

## 2020-03-10 PROCEDURE — 82306 VITAMIN D 25 HYDROXY: CPT

## 2020-03-10 PROCEDURE — 84443 ASSAY THYROID STIM HORMONE: CPT

## 2020-03-10 PROCEDURE — 80061 LIPID PANEL: CPT

## 2020-03-10 RX ORDER — AZELASTINE HYDROCHLORIDE 137 UG/1
2 SPRAY, METERED NASAL 2 TIMES DAILY
Qty: 1 BOTTLE | Refills: 11 | Status: SHIPPED | OUTPATIENT
Start: 2020-03-10 | End: 2020-12-17

## 2020-03-10 RX ORDER — FLUOXETINE 10 MG/1
10 CAPSULE ORAL DAILY
Qty: 90 CAPSULE | Refills: 3 | Status: SHIPPED | OUTPATIENT
Start: 2020-03-10 | End: 2021-03-11

## 2020-03-10 NOTE — PATIENT INSTRUCTIONS
Patient Instructions   Problem List Items Addressed This Visit        Cardiovascular and Mediastinum    Hypercholesterolemia - Primary    Overview     3/10/2020 Parul Lomas MD    Continue atorvastatin every evening.    Continue low fat diet and regular exercise.          Relevant Medications    atorvastatin (LIPITOR) 10 MG tablet    Other Relevant Orders    Comprehensive Metabolic Panel (Completed)    CBC & Differential (Completed)    Lipid Panel (Completed)    TSH (Completed)       Digestive    Vitamin D deficiency    Overview     3/10/2020 Parul Lomas MD    Continue current dose vitamin D3 daily.         Relevant Orders    Vitamin D 25 Hydroxy (Completed)       Nervous and Auditory    Chronic bilateral low back pain    Overview     3/14/2020 Parul Lomas MD    Do some back stretches every morning. Use moist heat as needed to relax tight muscles.  Walk some daily.         Cervicalgia    Overview     3/14/2020 Parul Lomas MD    Do some neck stretches thru out the day. Use moist heat to relax tight muscles.            Musculoskeletal and Integument    Primary osteoarthritis involving multiple joints    Overview     3/14/2020 Parul Lomas MD    Stay active. Continue ibuprofen with food and/or tylenol as needed.         Relevant Medications    Dietary Management Product (RHEUMATE) capsule       Other    Primary insomnia    Overview     3/10/2020 Parul Lomas MD    We discussed sleep hygiene including going to bed at the same time and getting up at the same time every day, going to bed early enough to get 7 or 8 hours in bed, reading and relaxing before bedtime, and avoiding the TV, computer, phone, iPad close to bedtime.           Stress and adjustment reaction    Overview     3/10/2020 Parul Lomas MD    Continue fluoxetine daily.  Regular physical activity and exercise also help.         Relevant Medications    nortriptyline (PAMELOR) 10 MG capsule    FLUoxetine (PROzac) 10 MG  capsule    Primary osteoarthritis of both knees    Overview     3/10/2020 Parul Lomas MD    Use a cold pack on the knees to decrease pain, inflammation, and swelling.         Relevant Orders    Ambulatory Referral to Physical Therapy Evaluate and treat (Completed)           Spondylolysis Rehab  Ask your health care provider which exercises are safe for you. Do exercises exactly as told by your health care provider and adjust them as directed. It is normal to feel mild stretching, pulling, tightness, or discomfort as you do these exercises, but you should stop right away if you feel sudden pain or your pain gets worse. Do not begin these exercises until told by your health care provider.  Stretching and range of motion exercises  These exercises warm up your muscles and joints and improve the movement and flexibility of your hips and your back. These exercises may also help to relieve pain, numbness, and tingling.  Exercise A: Single knee to chest    1. Lie on your back on a firm surface with both legs straight.  2. Bend one of your knees. Use your hands to move your knee up toward your chest until you feel a gentle stretch in your lower back and buttock.  ? Hold your leg in this position by holding onto the front of your knee.  ? Keep your other leg as straight as possible.  3. Hold for __________ seconds.  4. Slowly return to the starting position.  5. Repeat this exercise with your other leg.  Repeat __________ times. Complete this exercise __________ times a day.  Exercise B: Hamstring stretch, supine    1. Lie on your back.  2. Hold both ends of a belt or towel as you loop it over the ball of one of your feet. The ball of your foot is on the walking surface, right under your toes.  3. Straighten your knee and slowly pull on the belt to raise your leg.  ? Do not let your knee bend while you do this.  ? Keep your other leg flat on the floor.  ? Raise the leg until you feel a gentle stretch in the back of  your knee or thigh.  4. Hold for __________ seconds.  5. If told by your health care provider, repeat this exercise with your other leg.  Repeat __________ times. Complete this exercise __________ times a day.  Strengthening exercises  These exercises build strength and endurance in your back. Endurance is the ability to use your muscles for a long time, even after they get tired.  Exercise C: Pelvic tilt  1. Lie on your back on a firm bed or the floor. Bend your knees and keep your feet flat.  2. Tense your abdominal muscles. Tip your pelvis up toward the ceiling and flatten your lower back into the floor.  ? To help with this exercise, you may place a small towel under your lower back and try to push your back into the towel.  3. Hold for __________ seconds.  4. Let your muscles relax completely before you repeat this exercise.  Repeat __________ times. Complete this exercise __________ times a day.  Exercise D: Abdominal crunch    1. Lie on your back on a firm surface. Bend your knees and keep your feet flat. Cross your arms over your chest.  2. Tuck your chin down toward your chest, without bending your neck.  3. Use your abdominal muscles to lift your upper body off of the ground, straight up into the air.  ? Try to lift yourself until your shoulder blades are off the ground. You may need to work up to this.  ? Keep your lower back on the ground while you crunch upward.  ? Do not hold your breath.  4. Slowly lower yourself down. Keep your abdominal muscles tense until you are back to the starting position.  Repeat __________ times. Complete this exercise __________ times a day.  Exercise E: Alternating arm and leg raises    1. Get on your hands and knees on a firm surface. If you are on a hard floor, you may want to use padding to cushion your knees, such as an exercise mat.  2. Line up your arms and legs. Your hands should be below your shoulders, and your knees should be below your hips.  3. Lift your left  leg behind you. At the same time, raise your right arm and straighten it in front of you.  ? Do not lift your leg higher than your hip.  ? Do not lift your arm higher than your shoulder.  ? Keep your abdominal and back muscles tight.  ? Keep your hips facing the ground.  ? Do not arch your back.  ? Keep your balance carefully, and do not hold your breath.  4. Hold for __________ seconds.  5. Slowly return to the starting position and repeat with your right leg and your left arm.  Repeat __________ times. Complete this exercise __________ times a day.  Posture and body mechanics  Body mechanics refers to the movements and positions of your body while you do your daily activities. Posture is part of body mechanics. Good posture and healthy body mechanics can help to relieve stress in your body's tissues and joints. Good posture means that your spine is in its natural S-curve position (your spine is neutral), your shoulders are pulled back slightly, and your head is not tipped forward. The following are general guidelines for applying improved posture and body mechanics to your everyday activities.  Standing    · When standing, keep your spine neutral and your feet about hip-width apart. Keep a slight bend in your knees. Your ears, shoulders, and hips should line up with each other.  · When you do a task in which you  one place for a long time, place one foot up on a stable object that is 2-4 inches (5-10 cm) high, such as a footstool. This helps keep your spine neutral.  Sitting    · When sitting, keep your spine neutral and keep your feet flat on the floor. Use a footrest, if necessary, and keep your thighs parallel to the floor. Avoid rounding your shoulders, and avoid tilting your head forward.  · When working at a desk or a computer, keep your desk at a height where your hands are slightly lower than your elbows. Slide your chair under your desk so you are close enough to maintain good posture.  · When  working at a computer, place your monitor at a height where you are looking straight ahead and you do not have to tilt your head forward or downward to look at the screen.  Resting  When lying down and resting, avoid positions that are most painful for you.  · If you have pain with activities such as sitting, bending, stooping, or squatting (flexion-based activities), lie in a position in which your body does not bend very much. For example, avoid curling up on your side with your arms and knees near your chest (fetal position).  · If you have pain with activities such as standing for a long time or reaching with your arms (extension-based activities), lie with your spine in a neutral position and bend your knees slightly. Try the following positions:  ? Lying on your side with a pillow between your knees.  ? Lying on your back with a pillow under your knees.    Lifting    · When lifting objects, keep your feet at least shoulder-width apart and tighten your abdominal muscles.  · Bend your knees and hips and keep your spine neutral. It is important to lift using the strength of your legs, not your back. Do not lock your knees straight out.  · Always ask for help to lift heavy or awkward objects.  This information is not intended to replace advice given to you by your health care provider. Make sure you discuss any questions you have with your health care provider.  Document Released: 12/18/2006 Document Revised: 08/24/2017 Document Reviewed: 09/27/2016  [employer] Interactive Patient Education © 2020 [employer]

## 2020-03-10 NOTE — PROGRESS NOTES
[facility]     Suze Baeza  1955   [id number]      Patient Care Team:  Parul Lomas MD as PCP - General (Internal Medicine)  Kendrick Andersen MD as Consulting Physician (Dermatology)  Heraclio Mari MD as Consulting Physician (Orthopedic Surgery)    Chief Complaint   Patient presents with   • Hyperlipidemia     6 mo follow up   fasting   • Osteoarthritis            HPI  Patient is a 65 y.o. female presents with warts on hands and legs..Had several frozen but they never went away.  One was cut off and it resolved.     HPI  Follow up hypercholesterolemia and arthritis.    CHRONIC CONDITIONS  Hypercholesterolemia--exercising and going to gym. Knees hurt if walk too much.     Has seen Dr. Mari in past and had multiple different injections.  Last summer she went to [facility] center she saw advertised in newspaper and they did series of injections that did help some.     Past Medical History:   Diagnosis Date   • Degenerative tear of medial meniscus, right    • Low back pain    • Mild major depression, single episode (CMS/HCC) 1/14/2019   • Neuropathy, lumbosacral (radicular)    • Primary osteoarthritis of both knees    • Spondylolisthesis, lumbar region    • Torn meniscus     right medial   • Viral warts 2/25/2019       Past Surgical History:   Procedure Laterality Date   • BLADDER SURGERY     • HYSTERECTOMY  07/10/2012   • KNEE ARTHROSCOPY Right    • OOPHORECTOMY Bilateral 07/10/2012       Family History   Problem Relation Age of Onset   • Deep vein thrombosis Mother    • Stroke Mother    • Heart disease Mother 68   • Heart attack Mother    • Osteoarthritis Mother    • Heart disease Father 51        cause of death   • Heart attack Father    • Stroke Other    • Cancer Maternal Aunt         bone   • Diabetes Maternal Grandmother    • Cancer Maternal Aunt         lymphoma   • Cancer Maternal Aunt         brain   • Breast cancer Neg Hx    • Ovarian cancer Neg Hx        Social  "History     Socioeconomic History   • Marital status:      Spouse name: Not on file   • Number of children: Not on file   • Years of education: Not on file   • Highest education level: Not on file   Tobacco Use   • Smoking status: Never Smoker   • Smokeless tobacco: Never Used   Substance and Sexual Activity   • Alcohol use: No   • Drug use: No   • Sexual activity: Defer   Social History Narrative           No Known Allergies    Review of Systems:     Review of Systems   Constitutional: Negative for chills, fatigue and fever.   HENT: Positive for postnasal drip. Negative for congestion, ear pain and sinus pressure.    Respiratory: Negative for cough, chest tightness, shortness of breath and wheezing.    Cardiovascular: Negative for chest pain, palpitations and leg swelling.   Gastrointestinal: Negative for abdominal pain, blood in stool and constipation.   Genitourinary: Negative for dysuria and frequency.   Musculoskeletal: Positive for arthralgias and back pain.   Skin: Negative for color change.        warts   Allergic/Immunologic: Negative for environmental allergies.   Neurological: Negative for dizziness, speech difficulty and headache.   Psychiatric/Behavioral: Negative for decreased concentration. The patient is not nervous/anxious.        Vital Signs  Vitals:    03/10/20 1014 03/10/20 1018   BP: (!) 88/70 96/66   BP Location: Left arm Right arm   Patient Position: Sitting Sitting   Cuff Size: Adult Adult   Pulse: 64    Temp: 97.9 °F (36.6 °C)    TempSrc: Temporal    Weight: 68.9 kg (152 lb)    Height: 161 cm (63.39\")    PainSc: 0-No pain      Body mass index is 26.6 kg/m².      Current Outpatient Medications:   •  atorvastatin (LIPITOR) 10 MG tablet, TAKE ONE TABLET BY MOUTH EVERY NIGHT AT BEDTIME, Disp: 30 tablet, Rfl: 5  •  Azelastine HCl 137 MCG/SPRAY solution, 2 sprays into the nostril(s) as directed by provider 2 (Two) Times a Day., Disp: 1 bottle, Rfl: 11  •  Cholecalciferol (VITAMIN " D-3) 1000 units capsule, Take  by mouth Take As Directed., Disp: , Rfl:   •  Cinnamon 500 MG tablet, Take 1,000 mg by mouth., Disp: , Rfl:   •  Cyanocobalamin (VITAMIN B-12 PO), Take  by mouth. Taking 1000 mg daily, Disp: , Rfl:   •  estradiol (ESTRACE) 0.5 MG tablet, , Disp: , Rfl:   •  FLUoxetine (PROzac) 10 MG capsule, Take 1 capsule by mouth Daily., Disp: 90 capsule, Rfl: 3  •  glucosamine sulfate 500 MG capsule capsule, Take  by mouth 2 (Two) Times a Day With Meals., Disp: , Rfl:   •  Ibuprofen (ADVIL) 200 MG capsule, Take  by mouth As Needed., Disp: , Rfl:   •  Magnesium 65 MG tablet, Take 1 tablet by mouth every night at bedtime., Disp: , Rfl:   •  nortriptyline (PAMELOR) 10 MG capsule, TAKE FOUR CAPSULES BY MOUTH EVERY EVENING, Disp: 120 capsule, Rfl: 10  •  Omega-3 Fatty Acids (FISH OIL) 1000 MG capsule capsule, Take  by mouth Daily With Breakfast., Disp: , Rfl:   •  pramipexole (MIRAPEX) 0.5 MG tablet, TAKE TWO TABLETS BY MOUTH EVERY NIGHT AT BEDTIME AND ONE DURING THE DAY AS NEEDED (Patient taking differently: as needed), Disp: 75 tablet, Rfl: 5  •  Turmeric Curcumin 500 MG capsule, Take  by mouth Daily., Disp: , Rfl:   •  vitamin C (ASCORBIC ACID) 250 MG tablet, Take 250 mg by mouth Daily., Disp: , Rfl:   •  Dietary Management Product (RHEUMATE) capsule, Take 1 capsule by mouth Daily., Disp: 90 capsule, Rfl: 3    Physical Exam:    Physical Exam   Constitutional: She is oriented to person, place, and time. She appears well-developed and well-nourished.   HENT:   Head: Normocephalic.   Eyes: Pupils are equal, round, and reactive to light. Conjunctivae and EOM are normal.   Neck: Normal range of motion. Neck supple. No thyromegaly present.   Cardiovascular: Normal rate, regular rhythm, normal heart sounds and intact distal pulses.   Pulmonary/Chest: Effort normal and breath sounds normal.   Musculoskeletal: Normal range of motion. She exhibits no edema.        Right knee: She exhibits no deformity. No  tenderness found.        Left knee: She exhibits no deformity. No tenderness found.        Right hand: She exhibits deformity. She exhibits no tenderness.        Left hand: She exhibits deformity. She exhibits no tenderness.   Lymphadenopathy:     She has no cervical adenopathy.   Neurological: She is alert and oriented to person, place, and time.   Skin: Lesion noted.   Small warts on L hand and legs. Treated with liquid nitrogen.    Psychiatric: She has a normal mood and affect. Thought content normal.   Nursing note and vitals reviewed.       ACE III MINI        Results Review:    None    CMP:  Lab Results   Component Value Date    BUN 16 03/10/2020    CREATININE 0.74 03/10/2020    EGFRIFNONA 79 03/10/2020    BCR 21.6 03/10/2020     03/10/2020    K 4.5 03/10/2020    CO2 25.6 03/10/2020    CALCIUM 9.3 03/10/2020    ALBUMIN 4.40 03/10/2020    BILITOT 0.3 03/10/2020    ALKPHOS 70 03/10/2020    AST 24 03/10/2020    ALT 21 03/10/2020     HbA1c:  No results found for: HGBA1C  Microalbumin:  No results found for: MICROALBUR, POCMALB, POCCREAT  Lipid Panel  Lab Results   Component Value Date    CHOL 151 03/10/2020    TRIG 89 03/10/2020    HDL 47 03/10/2020    LDL 86 03/10/2020    AST 24 03/10/2020    ALT 21 03/10/2020       Medication Review: Medications reviewed and noted  Patient Instructions   Problem List Items Addressed This Visit        Cardiovascular and Mediastinum    Hypercholesterolemia - Primary    Overview     3/10/2020 Parul Lomas MD    Continue atorvastatin every evening.    Continue low fat diet and regular exercise.          Relevant Medications    atorvastatin (LIPITOR) 10 MG tablet    Other Relevant Orders    Comprehensive Metabolic Panel (Completed)    CBC & Differential (Completed)    Lipid Panel (Completed)    TSH (Completed)       Digestive    Vitamin D deficiency    Overview     3/10/2020 Parul Lomas MD    Continue current dose vitamin D3 daily.         Relevant Orders    Vitamin  D 25 Hydroxy (Completed)       Nervous and Auditory    Chronic bilateral low back pain    Overview     3/14/2020 Parul Lomas MD    Do some back stretches every morning. Use moist heat as needed to relax tight muscles.  Walk some daily.         Cervicalgia    Overview     3/14/2020 Parul Lomas MD    Do some neck stretches thru out the day. Use moist heat to relax tight muscles.            Musculoskeletal and Integument    Primary osteoarthritis involving multiple joints    Overview     3/14/2020 Parul Lomas MD    Stay active. Continue ibuprofen with food and/or tylenol as needed.         Relevant Medications    Dietary Management Product (RHEUMATE) capsule       Other    Primary insomnia    Overview     3/10/2020 Parul Lomas MD    We discussed sleep hygiene including going to bed at the same time and getting up at the same time every day, going to bed early enough to get 7 or 8 hours in bed, reading and relaxing before bedtime, and avoiding the TV, computer, phone, iPad close to bedtime.           Stress and adjustment reaction    Overview     3/10/2020 Parul Lomas MD    Continue fluoxetine daily.  Regular physical activity and exercise also help.         Relevant Medications    nortriptyline (PAMELOR) 10 MG capsule    FLUoxetine (PROzac) 10 MG capsule    Primary osteoarthritis of both knees    Overview     3/10/2020 Parul Lomas MD    Use a cold pack on the knees to decrease pain, inflammation, and swelling.         Relevant Orders    Ambulatory Referral to Physical Therapy Evaluate and treat (Completed)             Diagnosis Plan   1. Hypercholesterolemia  Comprehensive Metabolic Panel    CBC & Differential    Lipid Panel    TSH   2. Cervicalgia     3. Primary osteoarthritis of both knees  Ambulatory Referral to Physical Therapy Evaluate and treat   4. Primary osteoarthritis involving multiple joints     5. Chronic bilateral low back pain without sciatica     6. Vitamin D  deficiency  Vitamin D 25 Hydroxy   7. Stress and adjustment reaction     8. Primary insomnia         Plan of care reviewed with patient at the conclusion of today's visit. Education was provided regarding diagnosis, management, and any prescribed or recommended OTC medications.Patient verbalizes understanding of and agreement with management plan.         Parul Lomas MD

## 2020-08-03 RX ORDER — ATORVASTATIN CALCIUM 10 MG/1
TABLET, FILM COATED ORAL
Qty: 90 TABLET | Refills: 1 | Status: SHIPPED | OUTPATIENT
Start: 2020-08-03 | End: 2021-02-09

## 2020-09-10 ENCOUNTER — LAB (OUTPATIENT)
Dept: LAB | Facility: HOSPITAL | Age: 65
End: 2020-09-10

## 2020-09-10 ENCOUNTER — OFFICE VISIT (OUTPATIENT)
Dept: INTERNAL MEDICINE | Facility: CLINIC | Age: 65
End: 2020-09-10

## 2020-09-10 VITALS
DIASTOLIC BLOOD PRESSURE: 68 MMHG | BODY MASS INDEX: 25.55 KG/M2 | HEIGHT: 63 IN | SYSTOLIC BLOOD PRESSURE: 102 MMHG | WEIGHT: 144.2 LBS | TEMPERATURE: 96.9 F | HEART RATE: 68 BPM

## 2020-09-10 DIAGNOSIS — G25.81 RESTLESS LEG SYNDROME: ICD-10-CM

## 2020-09-10 DIAGNOSIS — E55.9 VITAMIN D DEFICIENCY: ICD-10-CM

## 2020-09-10 DIAGNOSIS — M17.0 PRIMARY OSTEOARTHRITIS OF BOTH KNEES: ICD-10-CM

## 2020-09-10 DIAGNOSIS — Z00.00 ANNUAL PHYSICAL EXAM: Primary | ICD-10-CM

## 2020-09-10 DIAGNOSIS — M15.9 PRIMARY OSTEOARTHRITIS INVOLVING MULTIPLE JOINTS: ICD-10-CM

## 2020-09-10 DIAGNOSIS — E78.00 HYPERCHOLESTEROLEMIA: ICD-10-CM

## 2020-09-10 DIAGNOSIS — G89.29 CHRONIC BILATERAL LOW BACK PAIN WITHOUT SCIATICA: ICD-10-CM

## 2020-09-10 DIAGNOSIS — F51.01 PRIMARY INSOMNIA: ICD-10-CM

## 2020-09-10 DIAGNOSIS — F43.29 STRESS AND ADJUSTMENT REACTION: ICD-10-CM

## 2020-09-10 DIAGNOSIS — M54.50 CHRONIC BILATERAL LOW BACK PAIN WITHOUT SCIATICA: ICD-10-CM

## 2020-09-10 LAB
25(OH)D3 SERPL-MCNC: 60.3 NG/ML (ref 30–100)
ALBUMIN SERPL-MCNC: 4.1 G/DL (ref 3.5–5.2)
ALBUMIN/GLOB SERPL: 1.2 G/DL
ALP SERPL-CCNC: 97 U/L (ref 39–117)
ALT SERPL W P-5'-P-CCNC: 21 U/L (ref 1–33)
ANION GAP SERPL CALCULATED.3IONS-SCNC: 10.3 MMOL/L (ref 5–15)
AST SERPL-CCNC: 17 U/L (ref 1–32)
BACTERIA UR QL AUTO: ABNORMAL /HPF
BASOPHILS # BLD AUTO: 0.05 10*3/MM3 (ref 0–0.2)
BASOPHILS NFR BLD AUTO: 0.8 % (ref 0–1.5)
BILIRUB SERPL-MCNC: 0.3 MG/DL (ref 0–1.2)
BILIRUB UR QL STRIP: NEGATIVE
BUN SERPL-MCNC: 14 MG/DL (ref 8–23)
BUN/CREAT SERPL: 19.4 (ref 7–25)
CALCIUM SPEC-SCNC: 9.9 MG/DL (ref 8.6–10.5)
CHLORIDE SERPL-SCNC: 101 MMOL/L (ref 98–107)
CHOLEST SERPL-MCNC: 165 MG/DL (ref 0–200)
CLARITY UR: ABNORMAL
CO2 SERPL-SCNC: 27.7 MMOL/L (ref 22–29)
COLOR UR: YELLOW
CREAT SERPL-MCNC: 0.72 MG/DL (ref 0.57–1)
DEPRECATED RDW RBC AUTO: 41.4 FL (ref 37–54)
EOSINOPHIL # BLD AUTO: 0.06 10*3/MM3 (ref 0–0.4)
EOSINOPHIL NFR BLD AUTO: 0.9 % (ref 0.3–6.2)
ERYTHROCYTE [DISTWIDTH] IN BLOOD BY AUTOMATED COUNT: 13.3 % (ref 12.3–15.4)
GFR SERPL CREATININE-BSD FRML MDRD: 82 ML/MIN/1.73
GLOBULIN UR ELPH-MCNC: 3.3 GM/DL
GLUCOSE SERPL-MCNC: 85 MG/DL (ref 65–99)
GLUCOSE UR STRIP-MCNC: NEGATIVE MG/DL
HCT VFR BLD AUTO: 45.5 % (ref 34–46.6)
HDLC SERPL-MCNC: 46 MG/DL (ref 40–60)
HGB BLD-MCNC: 14.5 G/DL (ref 12–15.9)
HGB UR QL STRIP.AUTO: NEGATIVE
HYALINE CASTS UR QL AUTO: ABNORMAL /LPF
IMM GRANULOCYTES # BLD AUTO: 0.03 10*3/MM3 (ref 0–0.05)
IMM GRANULOCYTES NFR BLD AUTO: 0.5 % (ref 0–0.5)
KETONES UR QL STRIP: NEGATIVE
LDLC SERPL CALC-MCNC: 88 MG/DL (ref 0–100)
LDLC/HDLC SERPL: 1.91 {RATIO}
LEUKOCYTE ESTERASE UR QL STRIP.AUTO: ABNORMAL
LYMPHOCYTES # BLD AUTO: 1.96 10*3/MM3 (ref 0.7–3.1)
LYMPHOCYTES NFR BLD AUTO: 29.6 % (ref 19.6–45.3)
MCH RBC QN AUTO: 27 PG (ref 26.6–33)
MCHC RBC AUTO-ENTMCNC: 31.9 G/DL (ref 31.5–35.7)
MCV RBC AUTO: 84.7 FL (ref 79–97)
MONOCYTES # BLD AUTO: 0.48 10*3/MM3 (ref 0.1–0.9)
MONOCYTES NFR BLD AUTO: 7.3 % (ref 5–12)
NEUTROPHILS NFR BLD AUTO: 4.04 10*3/MM3 (ref 1.7–7)
NEUTROPHILS NFR BLD AUTO: 60.9 % (ref 42.7–76)
NITRITE UR QL STRIP: NEGATIVE
NRBC BLD AUTO-RTO: 0 /100 WBC (ref 0–0.2)
PH UR STRIP.AUTO: 8 [PH] (ref 5–8)
PLATELET # BLD AUTO: 447 10*3/MM3 (ref 140–450)
PMV BLD AUTO: 9.2 FL (ref 6–12)
POTASSIUM SERPL-SCNC: 4.5 MMOL/L (ref 3.5–5.2)
PROT SERPL-MCNC: 7.4 G/DL (ref 6–8.5)
PROT UR QL STRIP: ABNORMAL
RBC # BLD AUTO: 5.37 10*6/MM3 (ref 3.77–5.28)
RBC # UR: ABNORMAL /HPF
REF LAB TEST METHOD: ABNORMAL
SODIUM SERPL-SCNC: 139 MMOL/L (ref 136–145)
SP GR UR STRIP: 1.02 (ref 1–1.03)
SQUAMOUS #/AREA URNS HPF: ABNORMAL /HPF
TRIGL SERPL-MCNC: 156 MG/DL (ref 0–150)
TSH SERPL DL<=0.05 MIU/L-ACNC: 1.56 UIU/ML (ref 0.27–4.2)
UROBILINOGEN UR QL STRIP: ABNORMAL
VLDLC SERPL-MCNC: 31.2 MG/DL (ref 5–40)
WBC # BLD AUTO: 6.62 10*3/MM3 (ref 3.4–10.8)
WBC UR QL AUTO: ABNORMAL /HPF

## 2020-09-10 PROCEDURE — 87086 URINE CULTURE/COLONY COUNT: CPT

## 2020-09-10 PROCEDURE — 82306 VITAMIN D 25 HYDROXY: CPT

## 2020-09-10 PROCEDURE — 81001 URINALYSIS AUTO W/SCOPE: CPT

## 2020-09-10 PROCEDURE — 80061 LIPID PANEL: CPT

## 2020-09-10 PROCEDURE — 93000 ELECTROCARDIOGRAM COMPLETE: CPT | Performed by: INTERNAL MEDICINE

## 2020-09-10 PROCEDURE — 85025 COMPLETE CBC W/AUTO DIFF WBC: CPT

## 2020-09-10 PROCEDURE — 84443 ASSAY THYROID STIM HORMONE: CPT

## 2020-09-10 PROCEDURE — 80053 COMPREHEN METABOLIC PANEL: CPT

## 2020-09-10 PROCEDURE — 99396 PREV VISIT EST AGE 40-64: CPT | Performed by: INTERNAL MEDICINE

## 2020-09-10 RX ORDER — PRAMIPEXOLE DIHYDROCHLORIDE 0.5 MG/1
TABLET ORAL
Qty: 75 TABLET | Refills: 5
Start: 2020-09-10 | End: 2021-02-18

## 2020-09-10 RX ORDER — NORTRIPTYLINE HYDROCHLORIDE 10 MG/1
40 CAPSULE ORAL NIGHTLY PRN
Qty: 120 CAPSULE | Refills: 10
Start: 2020-09-10 | End: 2020-12-21 | Stop reason: SDUPTHER

## 2020-09-10 RX ORDER — CELECOXIB 200 MG/1
200 CAPSULE ORAL DAILY
Qty: 30 CAPSULE | Refills: 5 | Status: SHIPPED | OUTPATIENT
Start: 2020-09-10 | End: 2020-12-17

## 2020-09-10 NOTE — PROGRESS NOTES
QUICK REFERENCE INFORMATION:  The ABCs of the Annual Wellness Visit    Annual Wellness visit    HEALTH RISK ASSESSMENT    1955    Recent History  No hospitalization(s) within the last year..        Current Medical Providers:  Patient Care Team:  Parul Lomas MD as PCP - General (Internal Medicine)  Kendrick Andersen MD as Consulting Physician (Dermatology)  Heraclio Mari MD as Consulting Physician (Orthopedic Surgery)  Chio Dhaliwal MD as Consulting Physician (Obstetrics and Gynecology)        Smoking Status:  Social History     Tobacco Use   Smoking Status Never Smoker   Smokeless Tobacco Never Used       Alcohol Consumption:  Social History     Substance and Sexual Activity   Alcohol Use No       Depression Screen:   PHQ-2/PHQ-9 Depression Screening 9/10/2020   Little interest or pleasure in doing things 0   Feeling down, depressed, or hopeless 0   Total Score 0       Health Habits:              Recent Lab Results:  CMP:  Lab Results   Component Value Date    BUN 16 03/10/2020    CREATININE 0.74 03/10/2020    EGFRIFNONA 79 03/10/2020    BCR 21.6 03/10/2020     03/10/2020    K 4.5 03/10/2020    CO2 25.6 03/10/2020    CALCIUM 9.3 03/10/2020    ALBUMIN 4.40 03/10/2020    BILITOT 0.3 03/10/2020    ALKPHOS 70 03/10/2020    AST 24 03/10/2020    ALT 21 03/10/2020     Lipid Panel:  Lab Results   Component Value Date    CHOL 151 03/10/2020    TRIG 89 03/10/2020    HDL 47 03/10/2020    VLDL 17.8 03/10/2020    LDLHDL 1.83 03/10/2020     HbA1c:           Age-appropriate Screening Schedule:  Refer to the list below for future screening recommendations based on patient's age, sex and/or medical conditions. Orders for these recommended tests are listed in the plan section. The patient has been provided with a written plan.    Age appropriate preventive counseling done including age appropriate vaccines,regular  Mammogram and self breast exam, pap smear, colonoscopy, regular dental visits, mental  "health, injury prevention such as wearing seat belt and preventing falls, healthy  nutrition, healthy weight, regular physical exercise. Alcohol use is none.  Tobacco history-none. Drug use-none.  STD's-not at risk.          Health Maintenance   Topic Date Due   • PAP SMEAR  10/25/2017   • ZOSTER VACCINE (3 of 3) 11/11/2019   • INFLUENZA VACCINE  08/01/2020   • LIPID PANEL  03/10/2021   • MAMMOGRAM  10/24/2021   • COLONOSCOPY  06/29/2023   • TDAP/TD VACCINES (2 - Td) 06/24/2025        Subjective   History of Present Illness    Suze Baeza is a 65 y.o. female who presents for an Annual Wellness Visit and worsening left greater than right knee pain, and follow-up chronic conditions including hypercholesterolemia, insomnia, restless legs, stress and adjustment reaction, and back pain.    HPI  Bilateral knee pain getting worse. Especially L. Recently had supartz type injection but didn't help this time. Saw Dr. Mari about a year ago and she reports that he said she was \"bone-on-bone\".  She has never done PT for the knee pain.  Her joint pains have been okay lately.    CHRONIC CONDITIONS    For hypercholesterolemia, she takes atorvastatin every evening and eats a low-fat diet.  She tries to do some exercise but the knee pain is very limiting.    Is taking vitamin D and calcium.    Back pain has not been too bad lately.  She does do some stretches.    Insomnia is helped some by nortriptyline.  Restless legs are helped some by Mirapex.  She does not need these medications often.  She goes 3.'s where she sleeps very well.    Fluoxetine helps with stress symptoms.  No side effects.  She feels her current dose is adequate.    The following portions of the patient's history were reviewed and updated as appropriate: allergies, current medications, past family history, past medical history, past social history, past surgical history and problem list.    Outpatient Medications Prior to Visit   Medication Sig Dispense Refill "   • atorvastatin (LIPITOR) 10 MG tablet TAKE ONE TABLET BY MOUTH EVERY NIGHT AT BEDTIME 90 tablet 1   • Azelastine HCl 137 MCG/SPRAY solution 2 sprays into the nostril(s) as directed by provider 2 (Two) Times a Day. (Patient taking differently: 2 sprays into the nostril(s) as directed by provider 2 (Two) Times a Day. PRN) 1 bottle 11   • Calcium Polycarbophil (FIBER-CAPS PO) Take  by mouth Daily.     • Cholecalciferol (VITAMIN D-3) 1000 units capsule Take  by mouth Take As Directed.     • Cinnamon 500 MG tablet Take 1,000 mg by mouth.     • Cyanocobalamin (VITAMIN B-12 PO) Take  by mouth. Taking 1000 mg daily     • estradiol (ESTRACE) 0.5 MG tablet      • FLUoxetine (PROzac) 10 MG capsule Take 1 capsule by mouth Daily. 90 capsule 3   • glucosamine sulfate 500 MG capsule capsule Take  by mouth 2 (Two) Times a Day With Meals.     • Magnesium 65 MG tablet Take 1 tablet by mouth every night at bedtime.     • Omega-3 Fatty Acids (FISH OIL) 600 MG capsule Take  by mouth Daily With Breakfast.     • Turmeric Curcumin 500 MG capsule Take  by mouth Daily. 1 tablet twice a day     • vitamin C (ASCORBIC ACID) 250 MG tablet Take 250 mg by mouth Daily. Takes 500 mg     • Ibuprofen (ADVIL) 200 MG capsule Take  by mouth As Needed.     • nortriptyline (PAMELOR) 10 MG capsule TAKE FOUR CAPSULES BY MOUTH EVERY EVENING (Patient taking differently: PRN) 120 capsule 10   • pramipexole (MIRAPEX) 0.5 MG tablet TAKE TWO TABLETS BY MOUTH EVERY NIGHT AT BEDTIME AND ONE DURING THE DAY AS NEEDED (Patient taking differently: as needed) 75 tablet 5   • Dietary Management Product (RHEUMATE) capsule Take 1 capsule by mouth Daily. 90 capsule 3     No facility-administered medications prior to visit.        Patient Active Problem List   Diagnosis   • Hypercholesterolemia   • Vitamin D deficiency   • Primary insomnia   • Restless leg syndrome   • Chronic pain of both knees   • Chronic bilateral low back pain   • Other viral warts   • Bilateral  headaches   • Stress and adjustment reaction   • Primary osteoarthritis involving multiple joints   • Cervicalgia   • Baker cyst, right   • Chronic fatigue   • Hot flashes due to menopause   • Primary osteoarthritis of both knees   • Synovial cyst of popliteal space   • Post-nasal drainage   • Annual physical exam       Advance Care Planning:  ACP discussion was held with the patient during this visit. Patient does not have an advance directive, information provided.    Identification of Risk Factors:  Risk factors include: Cardiovascular risk  Osteoprorosis Risk.    Review of Systems   Constitutional: Negative for chills, fatigue and fever.   HENT: Negative for congestion, ear pain and sinus pressure.    Eyes: Negative for visual disturbance.   Respiratory: Negative for cough, chest tightness, shortness of breath and wheezing.    Cardiovascular: Negative for chest pain, palpitations and leg swelling.   Gastrointestinal: Negative for abdominal pain, blood in stool and constipation.   Endocrine: Negative for cold intolerance and heat intolerance.   Genitourinary: Negative for dysuria and frequency.   Musculoskeletal: Positive for arthralgias. Negative for back pain.   Skin: Negative for color change and rash.   Allergic/Immunologic: Positive for environmental allergies.   Neurological: Negative for dizziness and headaches.   Hematological: Negative for adenopathy. Does not bruise/bleed easily.   Psychiatric/Behavioral: Positive for sleep disturbance. Negative for dysphoric mood and suicidal ideas. The patient is not nervous/anxious.        Compared to one year ago, the patient feels her physical health is the same.  Compared to one year ago, the patient feels her mental health is the same.    Objective     Physical Exam   Constitutional: She is oriented to person, place, and time. She appears well-developed and well-nourished.   HENT:   Head: Normocephalic.   Eyes: Pupils are equal, round, and reactive to light.  "Conjunctivae and EOM are normal.   Neck: Normal range of motion. Neck supple. No thyromegaly present.   Cardiovascular: Normal rate, regular rhythm, normal heart sounds and intact distal pulses.   Pulmonary/Chest: Effort normal and breath sounds normal. She has no wheezes. Right breast exhibits no inverted nipple, no mass, no nipple discharge, no skin change and no tenderness. Left breast exhibits no inverted nipple, no mass, no nipple discharge, no skin change and no tenderness.   Abdominal: Soft. Bowel sounds are normal. There is no tenderness.   Musculoskeletal: Normal range of motion. She exhibits no edema.        Right knee: She exhibits deformity. She exhibits normal range of motion and no swelling.        Left knee: She exhibits deformity. She exhibits normal range of motion and no swelling.   Lymphadenopathy:     She has no cervical adenopathy.     She has no axillary adenopathy.   Neurological: She is alert and oriented to person, place, and time. She has normal strength. No cranial nerve deficit or sensory deficit. Coordination and gait normal.   Skin: Skin is warm and dry. No rash noted.   Psychiatric: She has a normal mood and affect. Her speech is normal and behavior is normal. Judgment and thought content normal. Cognition and memory are normal.   Nursing note and vitals reviewed.      ECG 12 Lead  Date/Time: 9/10/2020 2:00 PM  Performed by: Parul Lomas MD  Authorized by: Parul Lomas MD   Comparison: compared with previous ECG   Similar to previous ECG  Rhythm: sinus rhythm  Rate: normal  BPM: 64  Conduction: conduction normal  ST Segments: ST segments normal  T Waves: T waves normal  QRS axis: normal    Clinical impression: normal ECG            Vitals:    09/10/20 1003   BP: 102/68   BP Location: Right arm   Patient Position: Sitting   Cuff Size: Adult   Pulse: 68   Temp: 96.9 °F (36.1 °C)   TempSrc: Temporal   Weight: 65.4 kg (144 lb 3.2 oz)   Height: 161 cm (63.39\")   PainSc:   6 "   PainLoc: Leg  Comment: both       Patient's Body mass index is 25.23 kg/m². BMI is within normal parameters. No follow-up required..      CMP:  Lab Results   Component Value Date    BUN 16 03/10/2020    CREATININE 0.74 03/10/2020    EGFRIFNONA 79 03/10/2020    BCR 21.6 03/10/2020     03/10/2020    K 4.5 03/10/2020    CO2 25.6 03/10/2020    CALCIUM 9.3 03/10/2020    ALBUMIN 4.40 03/10/2020    BILITOT 0.3 03/10/2020    ALKPHOS 70 03/10/2020    AST 24 03/10/2020    ALT 21 03/10/2020     HbA1c:  No results found for: HGBA1C  Microalbumin:  No results found for: MICROALBUR, POCMALB, POCCREAT  Lipid Panel  Lab Results   Component Value Date    CHOL 151 03/10/2020    TRIG 89 03/10/2020    HDL 47 03/10/2020    LDL 86 03/10/2020    AST 24 03/10/2020    ALT 21 03/10/2020       Assessment/Plan   Patient Self-Management and Personalized Health Advice  The patient has been provided with information about: diet and exercise and preventive services including:   · Annual Wellness Visit (AWV).  Patient Instructions   Problem List Items Addressed This Visit        Cardiovascular and Mediastinum    Hypercholesterolemia    Overview     9/10/2020 Parul Lomas MD    Continue atorvastatin every evening.    Continue low fat diet and regular exercise.          Relevant Medications    atorvastatin (LIPITOR) 10 MG tablet    Other Relevant Orders    CBC & Differential    Comprehensive Metabolic Panel    Lipid Panel    Urinalysis With Culture If Indicated -    TSH       Digestive    Vitamin D deficiency    Overview     9/10/2020 Parul Lomas MD    Continue current dose vitamin D3 daily.         Relevant Orders    Vitamin D 25 Hydroxy       Nervous and Auditory    Chronic bilateral low back pain    Overview     9/10/2020 Parul Lomas MD    Do some back stretches every morning. Use moist heat as needed to relax tight muscles.  Walk some daily.            Musculoskeletal and Integument    Primary osteoarthritis involving  multiple joints    Overview     9/10/2020 Parul Lomas MD    Stay active.  Takes Celebrex with food and/or tylenol as needed.            Other    Primary insomnia    Overview     9/10/2020 Parul Lomas MD    Take 40 mg nortriptyline every evening as needed.    We discussed sleep hygiene including going to bed at the same time and getting up at the same time every day, going to bed early enough to get 7 or 8 hours in bed, reading and relaxing before bedtime, and avoiding the TV, computer, phone, iPad close to bedtime.           Restless leg syndrome    Overview     9/10/2020 Parul Lomas MD    Continue Mirapex every evening as needed.  Continue drinking plenty of fluids every day         Stress and adjustment reaction    Overview     9/10/2020 Parul Lomas MD    Continue fluoxetine daily.  Regular physical activity and exercise also help.         Relevant Medications    FLUoxetine (PROzac) 10 MG capsule    nortriptyline (PAMELOR) 10 MG capsule    Primary osteoarthritis of both knees    Overview     9/10/2020 Parul Lomas MD    We will trial Celebrex 200 mg tablet once a day with food instead of ibuprofen.      Use a cold pack on the knees to decrease pain, inflammation, and swelling.    Start PT at [facility] [place].    Follow-up with Dr. Kamaljit crespo.         Relevant Medications    celecoxib (CeleBREX) 200 MG capsule    Other Relevant Orders    Ambulatory Referral to Physical Therapy Evaluate and treat (Completed)    Annual physical exam - Primary    Overview     She will see Dr. Chio crespo for Pap smear and discuss DEXA bone density scan with her.    She was advised to get the flu shot about the first week of October.  She was also advised to get hepatitis a second injection and shingrix second injection at her pharmacy.                  Diagnosis Plan   1. Annual physical exam     2. Hypercholesterolemia  CBC & Differential    Comprehensive Metabolic Panel    Lipid Panel    Urinalysis  With Culture If Indicated -    TSH   3. Primary osteoarthritis of both knees  celecoxib (CeleBREX) 200 MG capsule    Ambulatory Referral to Physical Therapy Evaluate and treat   4. Primary insomnia     5. Chronic bilateral low back pain without sciatica     6. Restless leg syndrome     7. Stress and adjustment reaction     8. Primary osteoarthritis involving multiple joints     9. Vitamin D deficiency  Vitamin D 25 Hydroxy       Outpatient Encounter Medications as of 9/10/2020   Medication Sig Dispense Refill   • atorvastatin (LIPITOR) 10 MG tablet TAKE ONE TABLET BY MOUTH EVERY NIGHT AT BEDTIME 90 tablet 1   • Azelastine HCl 137 MCG/SPRAY solution 2 sprays into the nostril(s) as directed by provider 2 (Two) Times a Day. (Patient taking differently: 2 sprays into the nostril(s) as directed by provider 2 (Two) Times a Day. PRN) 1 bottle 11   • Calcium Polycarbophil (FIBER-CAPS PO) Take  by mouth Daily.     • Cholecalciferol (VITAMIN D-3) 1000 units capsule Take  by mouth Take As Directed.     • Cinnamon 500 MG tablet Take 1,000 mg by mouth.     • Cyanocobalamin (VITAMIN B-12 PO) Take  by mouth. Taking 1000 mg daily     • estradiol (ESTRACE) 0.5 MG tablet      • FLUoxetine (PROzac) 10 MG capsule Take 1 capsule by mouth Daily. 90 capsule 3   • glucosamine sulfate 500 MG capsule capsule Take  by mouth 2 (Two) Times a Day With Meals.     • Magnesium 65 MG tablet Take 1 tablet by mouth every night at bedtime.     • nortriptyline (PAMELOR) 10 MG capsule Take 4 capsules by mouth At Night As Needed for Sleep. 120 capsule 10   • Omega-3 Fatty Acids (FISH OIL) 600 MG capsule Take  by mouth Daily With Breakfast.     • pramipexole (MIRAPEX) 0.5 MG tablet Take 2 tablets in the evening and 1 tablet during the day as needed for restless legs 75 tablet 5   • Turmeric Curcumin 500 MG capsule Take  by mouth Daily. 1 tablet twice a day     • vitamin C (ASCORBIC ACID) 250 MG tablet Take 250 mg by mouth Daily. Takes 500 mg     •  [DISCONTINUED] Ibuprofen (ADVIL) 200 MG capsule Take  by mouth As Needed.     • [DISCONTINUED] nortriptyline (PAMELOR) 10 MG capsule TAKE FOUR CAPSULES BY MOUTH EVERY EVENING (Patient taking differently: PRN) 120 capsule 10   • [DISCONTINUED] pramipexole (MIRAPEX) 0.5 MG tablet TAKE TWO TABLETS BY MOUTH EVERY NIGHT AT BEDTIME AND ONE DURING THE DAY AS NEEDED (Patient taking differently: as needed) 75 tablet 5   • celecoxib (CeleBREX) 200 MG capsule Take 1 capsule by mouth Daily. 30 capsule 5   • [DISCONTINUED] Dietary Management Product (RHEUMATE) capsule Take 1 capsule by mouth Daily. 90 capsule 3     No facility-administered encounter medications on file as of 9/10/2020.        Reviewed use of high risk medication in the elderly: not applicable  Reviewed for potential of harmful drug interactions in the elderly: not applicable    Follow Up:  Return in about 6 months (around 3/10/2021) for recheck fasting.     An After Visit Summary and PPPS with all of these plans were given to the patient.           Note: Part of this note may be an electronic transcription/translation of spoken language to printed text using the Dragon Dictation System.

## 2020-09-10 NOTE — PATIENT INSTRUCTIONS
Mindfulness-Based Stress Reduction  Mindfulness-based stress reduction (MBSR) is a program that helps people learn to practice mindfulness. Mindfulness is the practice of intentionally paying attention to the present moment. It can be learned and practiced through techniques such as education, breathing exercises, meditation, and yoga. MBSR includes several mindfulness techniques in one program.  MBSR works best when you understand the treatment, are willing to try new things, and can commit to spending time practicing what you learn. MBSR training may include learning about:  · How your emotions, thoughts, and reactions affect your body.  · New ways to respond to things that cause negative thoughts to start (triggers).  · How to notice your thoughts and let go of them.  · Practicing awareness of everyday things that you normally do without thinking.  · The techniques and goals of different types of meditation.  What are the benefits of MBSR?  MBSR can have many benefits, which include helping you to:  · Develop self-awareness. This refers to knowing and understanding yourself.  · Learn skills and attitudes that help you to participate in your own health care.  · Learn new ways to care for yourself.  · Be more accepting about how things are, and let things go.  · Be less judgmental and approach things with an open mind.  · Be patient with yourself and trust yourself more.  MBSR has also been shown to:  · Reduce negative emotions, such as depression and anxiety.  · Improve memory and focus.  · Change how you sense and approach pain.  · Boost your body's ability to fight infections.  · Help you connect better with other people.  · Improve your sense of well-being.  Follow these instructions at home:    · Find a local in-person or online MBSR program.  · Set aside some time regularly for mindfulness practice.  · Find a mindfulness practice that works best for you. This may include one or more of the  following:  ? Meditation. Meditation involves focusing your mind on a certain thought or activity.  ? Breathing awareness exercises. These help you to stay present by focusing on your breath.  ? Body scan. For this practice, you lie down and pay attention to each part of your body from head to toe. You can identify tension and soreness and intentionally relax parts of your body.  ? Yoga. Yoga involves stretching and breathing, and it can improve your ability to move and be flexible. It can also provide an experience of testing your body's limits, which can help you release stress.  ? Mindful eating. This way of eating involves focusing on the taste, texture, color, and smell of each bite of food. Because this slows down eating and helps you feel full sooner, it can be an important part of a weight-loss plan.  · Find a podcast or recording that provides guidance for breathing awareness, body scan, or meditation exercises. You can listen to these any time when you have a free moment to rest without distractions.  · Follow your treatment plan as told by your health care provider. This may include taking regular medicines and making changes to your diet or lifestyle as recommended.  How to practice mindfulness  To do a basic awareness exercise:  · Find a comfortable place to sit.  · Pay attention to the present moment. Observe your thoughts, feelings, and surroundings just as they are.  · Avoid placing judgment on yourself, your feelings, or your surroundings. Make note of any judgment that comes up, and let it go.  · Your mind may wander, and that is okay. Make note of when your thoughts drift, and return your attention to the present moment.  To do basic mindfulness meditation:  · Find a comfortable place to sit. This may include a stable chair or a firm floor cushion.  ? Sit upright with your back straight. Let your arms fall next to your side with your hands resting on your legs.  ? If sitting in a chair, rest your  feet flat on the floor.  ? If sitting on a cushion, cross your legs in front of you.  · Keep your head in a neutral position with your chin dropped slightly. Relax your jaw and rest the tip of your tongue on the roof of your mouth. Drop your gaze to the floor. You can close your eyes if you like.  · Breathe normally and pay attention to your breath. Feel the air moving in and out of your nose. Feel your belly expanding and relaxing with each breath.  · Your mind may wander, and that is okay. Make note of when your thoughts drift, and return your attention to your breath.  · Avoid placing judgment on yourself, your feelings, or your surroundings. Make note of any judgment or feelings that come up, let them go, and bring your attention back to your breath.  · When you are ready, lift your gaze or open your eyes. Pay attention to how your body feels after the meditation.  Where to find more information  You can find more information about MBSR from:  · Your health care provider.  · Community-based meditation centers or programs.  · Programs offered near you.  Summary  · Mindfulness-based stress reduction (MBSR) is a program that teaches you how to intentionally pay attention to the present moment. It is used with other treatments to help you cope better with daily stress, emotions, and pain.  · MBSR focuses on developing self-awareness, which allows you to respond to life stress without judgment or negative emotions.  · MBSR programs may involve learning different mindfulness practices, such as breathing exercises, meditation, yoga, body scan, or mindful eating. Find a mindfulness practice that works best for you, and set aside time for it on a regular basis.  This information is not intended to replace advice given to you by your health care provider. Make sure you discuss any questions you have with your health care provider.  Document Released: 04/26/2018 Document Revised: 11/30/2018 Document Reviewed:  04/26/2018  [employer]vier Patient Education © 2020 [employer]

## 2020-09-11 LAB — BACTERIA SPEC AEROBE CULT: NO GROWTH

## 2020-09-15 ENCOUNTER — TRANSCRIBE ORDERS (OUTPATIENT)
Dept: ADMINISTRATIVE | Facility: HOSPITAL | Age: 65
End: 2020-09-15

## 2020-09-15 DIAGNOSIS — Z12.31 VISIT FOR SCREENING MAMMOGRAM: Primary | ICD-10-CM

## 2020-11-02 ENCOUNTER — OFFICE VISIT (OUTPATIENT)
Dept: ORTHOPEDIC SURGERY | Facility: CLINIC | Age: 65
End: 2020-11-02

## 2020-11-02 VITALS — WEIGHT: 147 LBS | OXYGEN SATURATION: 98 % | HEART RATE: 91 BPM | HEIGHT: 63 IN | BODY MASS INDEX: 26.05 KG/M2

## 2020-11-02 DIAGNOSIS — M17.0 PRIMARY OSTEOARTHRITIS OF BOTH KNEES: Primary | ICD-10-CM

## 2020-11-02 PROCEDURE — 99213 OFFICE O/P EST LOW 20 MIN: CPT | Performed by: ORTHOPAEDIC SURGERY

## 2020-11-02 RX ORDER — MELOXICAM 7.5 MG/1
15 TABLET ORAL ONCE
Status: CANCELLED | OUTPATIENT
Start: 2020-11-02 | End: 2020-11-02

## 2020-11-02 RX ORDER — ACETAMINOPHEN 325 MG/1
1000 TABLET ORAL ONCE
Status: CANCELLED | OUTPATIENT
Start: 2020-11-02 | End: 2020-11-02

## 2020-11-02 RX ORDER — PREGABALIN 150 MG/1
150 CAPSULE ORAL ONCE
Status: CANCELLED | OUTPATIENT
Start: 2020-11-02 | End: 2020-11-02

## 2020-11-02 NOTE — PROGRESS NOTES
[facility]    Subjective     Chief Complaint   Patient presents with   • Left Knee - Pain   • Right Knee - Pain        HPI    It has been 2  year(s) since Ms. Baeza's last visit.  Pain in both of her knees has been present for several years now, worsening over time. She returns to clinic today for follow-up of bilateral knee pain. She rates her pain a 4/10 on the pain scale, but is oftentimes worse than that. Previous/current treatments: NSAIDS. Current symptoms: pain, swelling, popping and stiffness. The pain is worse with walking and climbing stairs; nothing improves the pain. Overall, she is doing worse.  She would like to proceed with knee replacement surgery, and prefers to do both knees at the same time.  No history of clots or clotting disorders.    I have reviewed the following portions of the patient's history and agree with: History of Present Illness and Review of Systems    Patient Active Problem List   Diagnosis   • Hypercholesterolemia   • Vitamin D deficiency   • Primary insomnia   • Restless leg syndrome   • Chronic pain of both knees   • Chronic bilateral low back pain   • Other viral warts   • Bilateral headaches   • Stress and adjustment reaction   • Primary osteoarthritis involving multiple joints   • Cervicalgia   • Baker cyst, right   • Chronic fatigue   • Hot flashes due to menopause   • Primary osteoarthritis of both knees   • Synovial cyst of popliteal space   • Post-nasal drainage   • Annual physical exam     Past Medical History:   Diagnosis Date   • Degenerative tear of medial meniscus, right    • Low back pain    • Mild major depression, single episode (CMS/HCC) 1/14/2019   • Neuropathy, lumbosacral (radicular)    • Primary osteoarthritis of both knees    • Spondylolisthesis, lumbar region    • Torn meniscus     right medial   • Viral warts 2/25/2019      Past Surgical History:   Procedure Laterality Date   • BLADDER SURGERY     • HYSTERECTOMY  07/10/2012    • KNEE ARTHROSCOPY Right    • OOPHORECTOMY Bilateral 07/10/2012      Family History   Problem Relation Age of Onset   • Deep vein thrombosis Mother    • Stroke Mother    • Heart disease Mother 68   • Heart attack Mother    • Osteoarthritis Mother    • Heart disease Father 51        cause of death   • Heart attack Father    • Stroke Other    • Cancer Maternal Aunt         bone   • Diabetes Maternal Grandmother    • Cancer Maternal Aunt         lymphoma   • Cancer Maternal Aunt         brain   • Breast cancer Neg Hx    • Ovarian cancer Neg Hx      Social History     Socioeconomic History   • Marital status:      Spouse name: Not on file   • Number of children: Not on file   • Years of education: Not on file   • Highest education level: Not on file   Tobacco Use   • Smoking status: Never Smoker   • Smokeless tobacco: Never Used   Substance and Sexual Activity   • Alcohol use: No   • Drug use: No   • Sexual activity: Defer   Social History Narrative          Current Outpatient Medications on File Prior to Visit   Medication Sig Dispense Refill   • atorvastatin (LIPITOR) 10 MG tablet TAKE ONE TABLET BY MOUTH EVERY NIGHT AT BEDTIME 90 tablet 1   • Azelastine HCl 137 MCG/SPRAY solution 2 sprays into the nostril(s) as directed by provider 2 (Two) Times a Day. (Patient taking differently: 2 sprays into the nostril(s) as directed by provider 2 (Two) Times a Day. PRN) 1 bottle 11   • Calcium Polycarbophil (FIBER-CAPS PO) Take  by mouth Daily.     • celecoxib (CeleBREX) 200 MG capsule Take 1 capsule by mouth Daily. 30 capsule 5   • Cholecalciferol (VITAMIN D-3) 1000 units capsule Take  by mouth Take As Directed.     • Cinnamon 500 MG tablet Take 1,000 mg by mouth.     • Cyanocobalamin (VITAMIN B-12 PO) Take  by mouth. Taking 1000 mg daily     • estradiol (ESTRACE) 0.5 MG tablet      • FLUoxetine (PROzac) 10 MG capsule Take 1 capsule by mouth Daily. 90 capsule 3   • glucosamine sulfate 500 MG capsule capsule Take   by mouth 2 (Two) Times a Day With Meals.     • Magnesium 65 MG tablet Take 1 tablet by mouth every night at bedtime.     • nortriptyline (PAMELOR) 10 MG capsule Take 4 capsules by mouth At Night As Needed for Sleep. 120 capsule 10   • Omega-3 Fatty Acids (FISH OIL) 600 MG capsule Take  by mouth Daily With Breakfast.     • pramipexole (MIRAPEX) 0.5 MG tablet Take 2 tablets in the evening and 1 tablet during the day as needed for restless legs 75 tablet 5   • Turmeric Curcumin 500 MG capsule Take  by mouth Daily. 1 tablet twice a day     • vitamin C (ASCORBIC ACID) 250 MG tablet Take 250 mg by mouth Daily. Takes 500 mg       No current facility-administered medications on file prior to visit.       No Known Allergies     Review of Systems   Constitutional: Negative for activity change, appetite change, chills, diaphoresis, fatigue, fever and unexpected weight change.   HENT: Negative for congestion, dental problem, drooling, ear discharge, ear pain, facial swelling, hearing loss, mouth sores, nosebleeds, postnasal drip, rhinorrhea, sinus pressure, sneezing, sore throat, tinnitus, trouble swallowing and voice change.    Eyes: Negative for photophobia, pain, discharge, redness, itching and visual disturbance.   Respiratory: Negative for apnea, cough, choking, chest tightness, shortness of breath, wheezing and stridor.    Cardiovascular: Positive for leg swelling. Negative for chest pain and palpitations.   Gastrointestinal: Negative for abdominal distention, abdominal pain, anal bleeding, blood in stool, constipation, diarrhea, nausea, rectal pain and vomiting.   Endocrine: Negative for cold intolerance, heat intolerance, polydipsia, polyphagia and polyuria.   Genitourinary: Negative for decreased urine volume, difficulty urinating, dysuria, enuresis, flank pain, frequency, genital sores, hematuria and urgency.   Musculoskeletal: Positive for arthralgias. Negative for back pain, gait problem, joint swelling, myalgias,  "neck pain and neck stiffness.   Skin: Negative for color change, pallor, rash and wound.   Allergic/Immunologic: Negative for environmental allergies, food allergies and immunocompromised state.   Neurological: Negative for dizziness, tremors, seizures, syncope, facial asymmetry, speech difficulty, weakness, light-headedness, numbness and headaches.   Hematological: Negative for adenopathy. Does not bruise/bleed easily.   Psychiatric/Behavioral: Negative for agitation, behavioral problems, confusion, decreased concentration, dysphoric mood, hallucinations, self-injury, sleep disturbance and suicidal ideas. The patient is not nervous/anxious and is not hyperactive.         Objective      Physical Exam  Pulse 91   Ht 161 cm (63.39\")   Wt 66.7 kg (147 lb)   SpO2 98%   BMI 25.72 kg/m²     Body mass index is 25.72 kg/m².    General:   Mental Status:  Alert   Appearance: Cooperative, in no acute distress   Build and Nutrition: Well-nourished well-developed female   Orientation: Alert and oriented to person, place and time   Posture: Normal   Gait: Normal    Integument:   Right knee: no skin lesions, no rash, no ecchymosis   Left knee: no skin lesions, no rash, no ecchymosis    Lower Extremities:   Right Knee:    Tenderness:  Medial and lateral joint line tenderness    Effusion:  None    Swelling:  None    Crepitus:  Positive    Atrophy:  None    Range of motion:  Extension: 0°       Flexion: 120°  Instability:  No varus laxity, no valgus laxity, negative anterior drawer  Deformities:  Varus   Left Knee:    Tenderness:  Medial and lateral joint line tenderness    Effusion:  None    Swelling:  None    Crepitus: Positive    Atrophy:  None    Range of motion:  Extension: 0°       Flexion: 120°  Instability:  No varus laxity, no valgus laxity, negative anterior drawer  Deformities:  Varus      Imaging/Studies  Imaging Results (Last 24 Hours)     Procedure Component Value Units Date/Time    XR Knee 4+ View Bilateral " [[id number]] Resulted: 11/02/20 1136     Updated: 11/02/20 1137    Narrative:      Right Knee Radiographs  Indication: right knee pain  Views: Standing AP's and skiers of both knees, with lateral and sunrise   views of the right knee    Comparison: 11/13/2017    Findings:   Bone-on-bone contact medial compartment, tricompartmental osteophytes,   varus alignment, no acute bony abnormalities.  No unusual bony features.    Mild worsening compared to previous films.    Left Knee Radiographs  Indication: left knee pain  Views: Standing AP's and skiers of both knees, with lateral and sunrise   views of the left knee    Comparison: 11/13/2017    Findings:   Bone-on-bone contact medial compartment, tricompartmental osteophytes,   varus alignment, no acute bony abnormalities.  No unusual bony features.    Worsening compared to previous films.            Assessment and Plan     Diagnoses and all orders for this visit:    1. Primary osteoarthritis of both knees (Primary)  -     XR Knee 4+ View Bilateral  -     Case Request; Standing  -     Instructions on coughing, deep breathing, and incentive spirometry.; Future  -     CBC and Differential; Future  -     Basic metabolic panel; Future  -     Protime-INR; Future  -     APTT; Future  -     Hemoglobin A1c; Future  -     Sedimentation rate; Future  -     C-reactive protein; Future  -     Tranexamic Acid 1,000 mg in sodium chloride 0.9 % 100 mL  -     Tranexamic Acid 1,000 mg in sodium chloride 0.9 % 100 mL  -     ceFAZolin (ANCEF) 2 g in sodium chloride 0.9 % 100 mL IVPB  -     acetaminophen (TYLENOL) tablet 975 mg  -     meloxicam (MOBIC) tablet 15 mg  -     pregabalin (LYRICA) capsule 150 mg  -     mupirocin (BACTROBAN) 2 % nasal ointment 1 application  -     Case Request    Other orders  -     Inpatient Admission; Standing  -     Follow Anesthesia Guidelines / Standing Orders; Future  -     Obtain informed consent  -     Provide instructions to patient regarding NPO status  -      Chlorhexidine Skin Prep - Educate and Review With Patient; Future  -     Provide Patient With ERAS Hydration Instructions  -     Provide Patient With Enhanced Recovery Booklet(s) or Handout  -     Provide Instructions/Handout For Benzoyl Peroxide 5% Wash If Having Shoulder/Arm Surgery (If Prescribed)  -     Provide Instructions/Handout For Bactroban And Chlorhexidine Shower (If Prescribed)  -     Perform A Memory Screening On All Hip/Knee Replacement Patients >Or Equal To 65 Years Or Older  -     Complete A PROMIS And HOOS Or KOOS Survey If Having Hip Or Knee Replacement  -     Follow Anesthesia Guidelines / Standing Orders; Standing  -     Verify NPO Status; Standing  -     Verify The Time Patient Completed ERAS Hydration Drink; Standing  -     Clip operative site; Standing  -     Obtain informed consent (if not collected inpatient or PAT); Standing  -     Notify Physician - Standard; Standing  -     Provide Patient With Carbo Loading Instructions  -     Provide Patient With ERAS Booklet(s)/Handout  -     mupirocin (Bactroban Nasal) 2 % nasal ointment; into the nostril(s) as directed by provider 2 (Two) Times a Day.  Dispense: 22 g; Refill: 0  -     chlorhexidine (HIBICLENS) 4 % external liquid; Apply  topically to the appropriate area as directed Daily. Shower with hibiclens solution as directed for 5 days prior to surgery  Dispense: 236 mL; Refill: 0        1. Primary osteoarthritis of both knees        I reviewed my findings with the patient today.  Both her knees are bothering her to the point where she would like to proceed with knee replacement surgery.  She has exhausted conservative treatment options.  She would like to do both knees at the same time, and we have had an extensive discussion about the increased risk and rehabilitative requirements.  Please see my counseling note for details.  She would like to proceed in the near future.    Surgical Counseling     I have informed the patient of the  diagnosis and the prognosis.  Exhaustive conservative treatment modalities have not resulted in long term pain relief.  The symptoms have progressed to the point of daily pain and inability to perform activities of daily living without significant pain. The patient has reached the point of desiring to proceed with total knee arthroplasty after discussing the risks, benefits and alternatives to the procedure.  The surgical procedure itself was discussed in detail. Risks of the procedure were discussed, which included but are not limited to, bleeding, infection, damage to blood vessels and nerves, incomplete pain relief, loosening of the prosthesis (early or late), deep infection (early or late), need for further surgery, loss of limb, deep venous thrombosis, pulmonary embolus, death, heart attack, stroke, kidney failure, liver failure, and anesthetic complications.  In addition, the potential for deep infection developing in the future was discussed, which could require further surgery.  The knee would have to be re-opened, debrided, and potentially remove the prosthesis, which may or may not be replaced in the future.  Also, the possibility for loosening of the prosthesis has been mentioned.  If the prosthesis loosened, a revision arthroplasty could be performed, with results that are not as predictable compared to the original procedure.  The typical rehabilitative course has also been discussed, and full recovery may take up to a year to see the maximum benefit.  The importance of patient cooperation in the rehabilitative efforts has also been discussed.  No guarantees were given.  The patient understands the potential risks versus the benefits and desires to proceed with total knee arthroplasty at a mutually convenient time.    Return for surgery.    Medical Decision Making  Management Options : major surgery with risk factors  Data/Risk: radiology tests and independent visualization of imaging, lab tests, or  EMG/NCV    Heraclio Mari MD  11/02/20  11:56 ZHANG Ahmadi disclaimer:  Much of this encounter note is an electronic transcription/translation of spoken language to printed text. The electronic translation of spoken language may permit erroneous, or at times, nonsensical words or phrases to be inadvertently transcribed; Although I have reviewed the note for such errors, some may still exist.

## 2020-11-16 ENCOUNTER — HOSPITAL ENCOUNTER (OUTPATIENT)
Dept: MAMMOGRAPHY | Facility: HOSPITAL | Age: 65
Discharge: HOME | End: 2020-11-16
Admitting: OBSTETRICS & GYNECOLOGY

## 2020-11-16 DIAGNOSIS — Z12.31 VISIT FOR SCREENING MAMMOGRAM: ICD-10-CM

## 2020-11-16 PROCEDURE — 77067 SCR MAMMO BI INCL CAD: CPT | Performed by: RADIOLOGY

## 2020-11-16 PROCEDURE — 77063 BREAST TOMOSYNTHESIS BI: CPT

## 2020-11-16 PROCEDURE — 77063 BREAST TOMOSYNTHESIS BI: CPT | Performed by: RADIOLOGY

## 2020-11-16 PROCEDURE — 77067 SCR MAMMO BI INCL CAD: CPT

## 2020-12-17 ENCOUNTER — APPOINTMENT (OUTPATIENT)
Dept: PREADMISSION TESTING | Facility: HOSPITAL | Age: 65
End: 2020-12-17

## 2020-12-17 VITALS — WEIGHT: 146.8 LBS | HEIGHT: 64 IN | BODY MASS INDEX: 25.06 KG/M2

## 2020-12-17 DIAGNOSIS — M17.0 PRIMARY OSTEOARTHRITIS OF BOTH KNEES: ICD-10-CM

## 2020-12-17 LAB
ANION GAP SERPL CALCULATED.3IONS-SCNC: 7 MMOL/L (ref 5–15)
APTT PPP: 32.1 SECONDS (ref 24–37)
BASOPHILS # BLD AUTO: 0.04 10*3/MM3 (ref 0–0.2)
BASOPHILS NFR BLD AUTO: 0.7 % (ref 0–1.5)
BUN SERPL-MCNC: 14 MG/DL (ref 8–23)
BUN/CREAT SERPL: 21.2 (ref 7–25)
CALCIUM SPEC-SCNC: 9.2 MG/DL (ref 8.6–10.5)
CHLORIDE SERPL-SCNC: 104 MMOL/L (ref 98–107)
CO2 SERPL-SCNC: 28 MMOL/L (ref 22–29)
CREAT SERPL-MCNC: 0.66 MG/DL (ref 0.57–1)
CRP SERPL-MCNC: 0.31 MG/DL (ref 0–0.5)
DEPRECATED RDW RBC AUTO: 40.7 FL (ref 37–54)
EOSINOPHIL # BLD AUTO: 0.04 10*3/MM3 (ref 0–0.4)
EOSINOPHIL NFR BLD AUTO: 0.7 % (ref 0.3–6.2)
ERYTHROCYTE [DISTWIDTH] IN BLOOD BY AUTOMATED COUNT: 13.4 % (ref 12.3–15.4)
ERYTHROCYTE [SEDIMENTATION RATE] IN BLOOD: 10 MM/HR (ref 0–30)
GFR SERPL CREATININE-BSD FRML MDRD: 90 ML/MIN/1.73
GLUCOSE SERPL-MCNC: 84 MG/DL (ref 65–99)
HBA1C MFR BLD: 5.7 % (ref 4.8–5.6)
HCT VFR BLD AUTO: 44.6 % (ref 34–46.6)
HGB BLD-MCNC: 14.1 G/DL (ref 12–15.9)
IMM GRANULOCYTES # BLD AUTO: 0.01 10*3/MM3 (ref 0–0.05)
IMM GRANULOCYTES NFR BLD AUTO: 0.2 % (ref 0–0.5)
INR PPP: 1.06 (ref 0.85–1.16)
LYMPHOCYTES # BLD AUTO: 1.68 10*3/MM3 (ref 0.7–3.1)
LYMPHOCYTES NFR BLD AUTO: 27.5 % (ref 19.6–45.3)
MCH RBC QN AUTO: 26.4 PG (ref 26.6–33)
MCHC RBC AUTO-ENTMCNC: 31.6 G/DL (ref 31.5–35.7)
MCV RBC AUTO: 83.4 FL (ref 79–97)
MONOCYTES # BLD AUTO: 0.38 10*3/MM3 (ref 0.1–0.9)
MONOCYTES NFR BLD AUTO: 6.2 % (ref 5–12)
NEUTROPHILS NFR BLD AUTO: 3.97 10*3/MM3 (ref 1.7–7)
NEUTROPHILS NFR BLD AUTO: 64.7 % (ref 42.7–76)
NRBC BLD AUTO-RTO: 0 /100 WBC (ref 0–0.2)
PLATELET # BLD AUTO: 389 10*3/MM3 (ref 140–450)
PMV BLD AUTO: 8.9 FL (ref 6–12)
POTASSIUM SERPL-SCNC: 4.5 MMOL/L (ref 3.5–5.2)
PROTHROMBIN TIME: 13.5 SECONDS (ref 11.5–14)
RBC # BLD AUTO: 5.35 10*6/MM3 (ref 3.77–5.28)
SODIUM SERPL-SCNC: 139 MMOL/L (ref 136–145)
WBC # BLD AUTO: 6.12 10*3/MM3 (ref 3.4–10.8)

## 2020-12-17 PROCEDURE — 86140 C-REACTIVE PROTEIN: CPT

## 2020-12-17 PROCEDURE — 85610 PROTHROMBIN TIME: CPT

## 2020-12-17 PROCEDURE — 85652 RBC SED RATE AUTOMATED: CPT

## 2020-12-17 PROCEDURE — 85025 COMPLETE CBC W/AUTO DIFF WBC: CPT

## 2020-12-17 PROCEDURE — 80048 BASIC METABOLIC PNL TOTAL CA: CPT

## 2020-12-17 PROCEDURE — 83036 HEMOGLOBIN GLYCOSYLATED A1C: CPT

## 2020-12-17 PROCEDURE — 36415 COLL VENOUS BLD VENIPUNCTURE: CPT

## 2020-12-17 PROCEDURE — 85730 THROMBOPLASTIN TIME PARTIAL: CPT

## 2020-12-17 RX ORDER — MULTIVITAMIN WITH IRON
250 TABLET ORAL NIGHTLY
COMMUNITY
End: 2020-12-31 | Stop reason: HOSPADM

## 2020-12-17 RX ORDER — ZINC GLUCONATE 50 MG
50 TABLET ORAL DAILY
COMMUNITY
End: 2020-12-31 | Stop reason: HOSPADM

## 2020-12-17 ASSESSMENT — KOOS JR
KOOS JR SCORE: 19
KOOS JR SCORE: 39.625

## 2020-12-17 NOTE — PAT
Notified [facility] of case management that patient will be going to rehab after surgery since she is having bilateral knee replacements.    Clean catch urinalysis not indicated because patient denied recent urinary frequency, urinary urgency, burning or pain upon urination, or flank pain. No recent UTIs.    Patient instructed to drink 20 ounces (or until full) of Gatorade and it needs to be completed 1 hour before given arrival time for procedure (NO RED Gatorade)    Patient verbalized understanding.    Patient to apply Chlorhexadine wipes  to surgical area (as instructed) the night before procedure and the AM of procedure. Wipes provided.    Verified with patient that they received a prescription for Bactroban and Chlorhexidine shower from the office.  Reinforced with them to fill the prescription if they haven't already.  Verbal and written instructions given regarding proper use of the Bactroban and Chlorhexidine to patient and/or famlily during PAT visit. Patient/family also instructed to complete checklist and return it to Pre-op on the day of surgery.  Patient and/or family verbalized understanding.    EKG 9/10/20 by Dr peters on chart     Patient unable to attend in person joint replacement class today during PAT visit due to the temporary suspension of class due to COVID 19 restrictions.  Joint replacement handbook given to patient during PAT visit if they have not received a copy within the last one to two years.  Encouraged patient/family to read handbook thoroughly and to notify PAT staff with any questions or concerns.  Additionally a handout was provided directing patient to links to watch online videos related to joint replacement surgery on the [facility] website.  The handout also gives detailed instructions for joining an online joint replacement class that is offered on Tuesdays and Thursdays.  Patient agreed to join an online case and/or watch videos online therefore joint replacement  teaching not completed in Pre Admission Testing.    Patient to watch the videos.

## 2020-12-21 RX ORDER — NORTRIPTYLINE HYDROCHLORIDE 10 MG/1
40 CAPSULE ORAL NIGHTLY PRN
Qty: 120 CAPSULE | Refills: 10 | Status: SHIPPED | OUTPATIENT
Start: 2020-12-21 | End: 2021-03-11

## 2020-12-27 ENCOUNTER — APPOINTMENT (OUTPATIENT)
Dept: PREADMISSION TESTING | Facility: HOSPITAL | Age: 65
End: 2020-12-27

## 2020-12-27 PROCEDURE — C9803 HOPD COVID-19 SPEC COLLECT: HCPCS

## 2020-12-27 PROCEDURE — U0004 COV-19 TEST NON-CDC HGH THRU: HCPCS

## 2020-12-28 ENCOUNTER — ANESTHESIA EVENT (OUTPATIENT)
Dept: PERIOP | Facility: HOSPITAL | Age: 65
End: 2020-12-28

## 2020-12-28 LAB — SARS-COV-2 RNA RESP QL NAA+PROBE: NOT DETECTED

## 2020-12-28 RX ORDER — FAMOTIDINE 10 MG/ML
20 INJECTION, SOLUTION INTRAVENOUS ONCE
Status: CANCELLED | OUTPATIENT
Start: 2020-12-28 | End: 2020-12-28

## 2020-12-28 RX ORDER — SODIUM CHLORIDE 0.9 % (FLUSH) 0.9 %
10 SYRINGE (ML) INJECTION AS NEEDED
Status: CANCELLED | OUTPATIENT
Start: 2020-12-28

## 2020-12-28 RX ORDER — SODIUM CHLORIDE 0.9 % (FLUSH) 0.9 %
10 SYRINGE (ML) INJECTION EVERY 12 HOURS SCHEDULED
Status: CANCELLED | OUTPATIENT
Start: 2020-12-28

## 2020-12-29 ENCOUNTER — HOSPITAL ENCOUNTER (INPATIENT)
Facility: HOSPITAL | Age: 65
LOS: 2 days | Discharge: NURSING, REHAB OR LONG-TERM CARE | End: 2020-12-31
Attending: ORTHOPAEDIC SURGERY | Admitting: ORTHOPAEDIC SURGERY

## 2020-12-29 ENCOUNTER — ANESTHESIA (OUTPATIENT)
Dept: PERIOP | Facility: HOSPITAL | Age: 65
End: 2020-12-29

## 2020-12-29 ENCOUNTER — ANESTHESIA EVENT CONVERTED (OUTPATIENT)
Dept: ANESTHESIOLOGY | Facility: HOSPITAL | Age: 65
End: 2020-12-29

## 2020-12-29 ENCOUNTER — APPOINTMENT (OUTPATIENT)
Dept: GENERAL RADIOLOGY | Facility: HOSPITAL | Age: 65
End: 2020-12-29

## 2020-12-29 DIAGNOSIS — M17.0 PRIMARY OSTEOARTHRITIS OF BOTH KNEES: ICD-10-CM

## 2020-12-29 DIAGNOSIS — Z96.653 S/P TOTAL KNEE ARTHROPLASTY, BILATERAL: Primary | ICD-10-CM

## 2020-12-29 PROCEDURE — S0260 H&P FOR SURGERY: HCPCS | Performed by: ORTHOPAEDIC SURGERY

## 2020-12-29 PROCEDURE — 25010000002 CEFAZOLIN PER 500 MG: Performed by: ORTHOPAEDIC SURGERY

## 2020-12-29 PROCEDURE — 25010000002 ONDANSETRON PER 1 MG: Performed by: ORTHOPAEDIC SURGERY

## 2020-12-29 PROCEDURE — 25010000002 DEXAMETHASONE PER 1 MG: Performed by: NURSE ANESTHETIST, CERTIFIED REGISTERED

## 2020-12-29 PROCEDURE — 25010000002 PROPOFOL 10 MG/ML EMULSION: Performed by: NURSE ANESTHETIST, CERTIFIED REGISTERED

## 2020-12-29 PROCEDURE — 0SRC0J9 REPLACEMENT OF RIGHT KNEE JOINT WITH SYNTHETIC SUBSTITUTE, CEMENTED, OPEN APPROACH: ICD-10-PCS | Performed by: ORTHOPAEDIC SURGERY

## 2020-12-29 PROCEDURE — 25010000002 ROPIVACAINE PER 1 MG: Performed by: ORTHOPAEDIC SURGERY

## 2020-12-29 PROCEDURE — 27447 TOTAL KNEE ARTHROPLASTY: CPT | Performed by: ORTHOPAEDIC SURGERY

## 2020-12-29 PROCEDURE — 25010000002 ROPIVACAINE PER 1 MG: Performed by: NURSE ANESTHETIST, CERTIFIED REGISTERED

## 2020-12-29 PROCEDURE — C1713 ANCHOR/SCREW BN/BN,TIS/BN: HCPCS | Performed by: ORTHOPAEDIC SURGERY

## 2020-12-29 PROCEDURE — C1776 JOINT DEVICE (IMPLANTABLE): HCPCS | Performed by: ORTHOPAEDIC SURGERY

## 2020-12-29 PROCEDURE — 25010000003 CEFAZOLIN IN DEXTROSE 2-4 GM/100ML-% SOLUTION: Performed by: ORTHOPAEDIC SURGERY

## 2020-12-29 PROCEDURE — 73560 X-RAY EXAM OF KNEE 1 OR 2: CPT

## 2020-12-29 PROCEDURE — 97116 GAIT TRAINING THERAPY: CPT

## 2020-12-29 PROCEDURE — 25010000002 ONDANSETRON PER 1 MG: Performed by: NURSE ANESTHETIST, CERTIFIED REGISTERED

## 2020-12-29 PROCEDURE — 97161 PT EVAL LOW COMPLEX 20 MIN: CPT

## 2020-12-29 PROCEDURE — 0SRD0J9 REPLACEMENT OF LEFT KNEE JOINT WITH SYNTHETIC SUBSTITUTE, CEMENTED, OPEN APPROACH: ICD-10-PCS | Performed by: ORTHOPAEDIC SURGERY

## 2020-12-29 DEVICE — LEGION CRUCIATE RETAINING HIGH                                    FLEX HIGHLY CROSS LINKED                                    POLYETHYLENE SIZE 1-2 11MM
Type: IMPLANTABLE DEVICE | Site: KNEE | Status: FUNCTIONAL
Brand: LEGION

## 2020-12-29 DEVICE — LEGION NARROW CRUCIATE RETAINING                                    OXINIUM SIZE 4N RIGHT
Type: IMPLANTABLE DEVICE | Site: KNEE | Status: FUNCTIONAL
Brand: LEGION

## 2020-12-29 DEVICE — CMT BONE SIMPLEX/P FULL DOSE 10/PK: Type: IMPLANTABLE DEVICE | Site: KNEE | Status: FUNCTIONAL

## 2020-12-29 DEVICE — GENESIS II NON-POROUS TIBIAL                                    BASEPLATE SIZE 2 RIGHT
Type: IMPLANTABLE DEVICE | Site: KNEE | Status: FUNCTIONAL
Brand: GENESIS II

## 2020-12-29 DEVICE — KNOTLESS TISSUE CONTROL DEVICE, UNDYED UNIDIRECTIONAL (ANTIBACTERIAL) SYNTHETIC ABSORBABLE DEVICE
Type: IMPLANTABLE DEVICE | Site: KNEE | Status: FUNCTIONAL
Brand: STRATAFIX

## 2020-12-29 DEVICE — GENESIS II RESURFACING PATELLAR                                    PROSTHESIS  29MM
Type: IMPLANTABLE DEVICE | Site: KNEE | Status: FUNCTIONAL
Brand: GENESIS II

## 2020-12-29 DEVICE — IMPLANTABLE DEVICE: Type: IMPLANTABLE DEVICE | Site: KNEE | Status: FUNCTIONAL

## 2020-12-29 DEVICE — LEGION NARROW CRUCIATE RETAINING                                    OXINIUM SIZE 4N LEFT
Type: IMPLANTABLE DEVICE | Site: KNEE | Status: FUNCTIONAL
Brand: LEGION

## 2020-12-29 DEVICE — VIOLET ANTIBACTERIAL POLYDIOXANONE, KNOTLESS TISSUE CONTROL DEVICE
Type: IMPLANTABLE DEVICE | Site: KNEE | Status: FUNCTIONAL
Brand: STRATAFIX

## 2020-12-29 DEVICE — GENESIS II NON-POROUS TIBIAL                                    BASEPLATE SIZE 2 LEFT
Type: IMPLANTABLE DEVICE | Site: KNEE | Status: FUNCTIONAL
Brand: GENESIS II

## 2020-12-29 RX ORDER — LABETALOL HYDROCHLORIDE 5 MG/ML
10 INJECTION, SOLUTION INTRAVENOUS EVERY 4 HOURS PRN
Status: DISCONTINUED | OUTPATIENT
Start: 2020-12-29 | End: 2020-12-31 | Stop reason: HOSPADM

## 2020-12-29 RX ORDER — BUPIVACAINE HYDROCHLORIDE 2.5 MG/ML
INJECTION, SOLUTION EPIDURAL; INFILTRATION; INTRACAUDAL
Status: COMPLETED | OUTPATIENT
Start: 2020-12-29 | End: 2020-12-29

## 2020-12-29 RX ORDER — FLUOXETINE 10 MG/1
10 CAPSULE ORAL DAILY
Status: DISCONTINUED | OUTPATIENT
Start: 2020-12-29 | End: 2020-12-31 | Stop reason: HOSPADM

## 2020-12-29 RX ORDER — MAGNESIUM HYDROXIDE 1200 MG/15ML
LIQUID ORAL AS NEEDED
Status: DISCONTINUED | OUTPATIENT
Start: 2020-12-29 | End: 2020-12-29 | Stop reason: HOSPADM

## 2020-12-29 RX ORDER — FENTANYL CITRATE 50 UG/ML
50 INJECTION, SOLUTION INTRAMUSCULAR; INTRAVENOUS
Status: DISCONTINUED | OUTPATIENT
Start: 2020-12-29 | End: 2020-12-29 | Stop reason: HOSPADM

## 2020-12-29 RX ORDER — FAMOTIDINE 20 MG/1
20 TABLET, FILM COATED ORAL ONCE
Status: COMPLETED | OUTPATIENT
Start: 2020-12-29 | End: 2020-12-29

## 2020-12-29 RX ORDER — MECLIZINE HYDROCHLORIDE 25 MG/1
25 TABLET ORAL 3 TIMES DAILY PRN
Status: DISCONTINUED | OUTPATIENT
Start: 2020-12-29 | End: 2020-12-31 | Stop reason: HOSPADM

## 2020-12-29 RX ORDER — CEFAZOLIN SODIUM 2 G/100ML
2 INJECTION, SOLUTION INTRAVENOUS EVERY 8 HOURS
Status: COMPLETED | OUTPATIENT
Start: 2020-12-29 | End: 2020-12-30

## 2020-12-29 RX ORDER — NALOXONE HCL 0.4 MG/ML
0.4 VIAL (ML) INJECTION
Status: DISCONTINUED | OUTPATIENT
Start: 2020-12-29 | End: 2020-12-31 | Stop reason: HOSPADM

## 2020-12-29 RX ORDER — ROPIVACAINE HYDROCHLORIDE 2 MG/ML
INJECTION, SOLUTION EPIDURAL; INFILTRATION; PERINEURAL AS NEEDED
Status: DISCONTINUED | OUTPATIENT
Start: 2020-12-29 | End: 2020-12-29 | Stop reason: HOSPADM

## 2020-12-29 RX ORDER — NALOXONE HCL 0.4 MG/ML
0.1 VIAL (ML) INJECTION
Status: DISCONTINUED | OUTPATIENT
Start: 2020-12-29 | End: 2020-12-31 | Stop reason: HOSPADM

## 2020-12-29 RX ORDER — LIDOCAINE HYDROCHLORIDE 10 MG/ML
0.5 INJECTION, SOLUTION EPIDURAL; INFILTRATION; INTRACAUDAL; PERINEURAL ONCE AS NEEDED
Status: COMPLETED | OUTPATIENT
Start: 2020-12-29 | End: 2020-12-29

## 2020-12-29 RX ORDER — ONDANSETRON 2 MG/ML
4 INJECTION INTRAMUSCULAR; INTRAVENOUS ONCE AS NEEDED
Status: DISCONTINUED | OUTPATIENT
Start: 2020-12-29 | End: 2020-12-29 | Stop reason: HOSPADM

## 2020-12-29 RX ORDER — ACETAMINOPHEN 500 MG
1000 TABLET ORAL ONCE
Status: COMPLETED | OUTPATIENT
Start: 2020-12-29 | End: 2020-12-29

## 2020-12-29 RX ORDER — ONDANSETRON 4 MG/1
4 TABLET, FILM COATED ORAL EVERY 6 HOURS PRN
Status: DISCONTINUED | OUTPATIENT
Start: 2020-12-29 | End: 2020-12-31 | Stop reason: HOSPADM

## 2020-12-29 RX ORDER — PRAMIPEXOLE DIHYDROCHLORIDE 1 MG/1
1 TABLET ORAL NIGHTLY PRN
Status: DISCONTINUED | OUTPATIENT
Start: 2020-12-29 | End: 2020-12-31 | Stop reason: HOSPADM

## 2020-12-29 RX ORDER — SODIUM CHLORIDE 0.9 % (FLUSH) 0.9 %
1-10 SYRINGE (ML) INJECTION AS NEEDED
Status: DISCONTINUED | OUTPATIENT
Start: 2020-12-29 | End: 2020-12-31 | Stop reason: HOSPADM

## 2020-12-29 RX ORDER — ESTRADIOL 0.5 MG/1
0.5 TABLET ORAL DAILY
Status: DISCONTINUED | OUTPATIENT
Start: 2020-12-29 | End: 2020-12-31 | Stop reason: HOSPADM

## 2020-12-29 RX ORDER — MORPHINE SULFATE 4 MG/ML
4 INJECTION, SOLUTION INTRAMUSCULAR; INTRAVENOUS
Status: DISCONTINUED | OUTPATIENT
Start: 2020-12-29 | End: 2020-12-31 | Stop reason: HOSPADM

## 2020-12-29 RX ORDER — MELOXICAM 7.5 MG/1
15 TABLET ORAL DAILY
Status: DISCONTINUED | OUTPATIENT
Start: 2020-12-30 | End: 2020-12-31 | Stop reason: HOSPADM

## 2020-12-29 RX ORDER — SODIUM CHLORIDE 0.9 % (FLUSH) 0.9 %
3 SYRINGE (ML) INJECTION EVERY 12 HOURS SCHEDULED
Status: DISCONTINUED | OUTPATIENT
Start: 2020-12-29 | End: 2020-12-31 | Stop reason: HOSPADM

## 2020-12-29 RX ORDER — DEXAMETHASONE SODIUM PHOSPHATE 4 MG/ML
INJECTION, SOLUTION INTRA-ARTICULAR; INTRALESIONAL; INTRAMUSCULAR; INTRAVENOUS; SOFT TISSUE AS NEEDED
Status: DISCONTINUED | OUTPATIENT
Start: 2020-12-29 | End: 2020-12-29 | Stop reason: SURG

## 2020-12-29 RX ORDER — OXYCODONE HYDROCHLORIDE 5 MG/1
5 TABLET ORAL EVERY 4 HOURS PRN
Status: DISCONTINUED | OUTPATIENT
Start: 2020-12-29 | End: 2020-12-31 | Stop reason: HOSPADM

## 2020-12-29 RX ORDER — CEFAZOLIN SODIUM 2 G/100ML
2 INJECTION, SOLUTION INTRAVENOUS ONCE
Status: COMPLETED | OUTPATIENT
Start: 2020-12-29 | End: 2020-12-29

## 2020-12-29 RX ORDER — ROPIVACAINE IN 0.9% SOD CHL/PF 0.2% 545ML
8 ELASTOMERIC PUMP, HI VARIABLE RATE INJECTION CONTINUOUS
Status: DISCONTINUED | OUTPATIENT
Start: 2020-12-29 | End: 2020-12-29

## 2020-12-29 RX ORDER — MELOXICAM 15 MG/1
15 TABLET ORAL ONCE
Status: COMPLETED | OUTPATIENT
Start: 2020-12-29 | End: 2020-12-29

## 2020-12-29 RX ORDER — BUPIVACAINE HYDROCHLORIDE 5 MG/ML
INJECTION, SOLUTION PERINEURAL
Status: COMPLETED | OUTPATIENT
Start: 2020-12-29 | End: 2020-12-29

## 2020-12-29 RX ORDER — ONDANSETRON 2 MG/ML
4 INJECTION INTRAMUSCULAR; INTRAVENOUS EVERY 6 HOURS PRN
Status: DISCONTINUED | OUTPATIENT
Start: 2020-12-29 | End: 2020-12-31 | Stop reason: HOSPADM

## 2020-12-29 RX ORDER — NORTRIPTYLINE HYDROCHLORIDE 10 MG/1
40 CAPSULE ORAL NIGHTLY PRN
Status: DISCONTINUED | OUTPATIENT
Start: 2020-12-29 | End: 2020-12-31 | Stop reason: HOSPADM

## 2020-12-29 RX ORDER — PREGABALIN 150 MG/1
150 CAPSULE ORAL ONCE
Status: COMPLETED | OUTPATIENT
Start: 2020-12-29 | End: 2020-12-29

## 2020-12-29 RX ORDER — ATORVASTATIN CALCIUM 10 MG/1
10 TABLET, FILM COATED ORAL NIGHTLY
Status: DISCONTINUED | OUTPATIENT
Start: 2020-12-29 | End: 2020-12-31 | Stop reason: HOSPADM

## 2020-12-29 RX ORDER — SODIUM CHLORIDE, SODIUM LACTATE, POTASSIUM CHLORIDE, CALCIUM CHLORIDE 600; 310; 30; 20 MG/100ML; MG/100ML; MG/100ML; MG/100ML
9 INJECTION, SOLUTION INTRAVENOUS CONTINUOUS
Status: DISCONTINUED | OUTPATIENT
Start: 2020-12-29 | End: 2020-12-31 | Stop reason: HOSPADM

## 2020-12-29 RX ORDER — SODIUM CHLORIDE 9 MG/ML
120 INJECTION, SOLUTION INTRAVENOUS CONTINUOUS
Status: DISCONTINUED | OUTPATIENT
Start: 2020-12-29 | End: 2020-12-31

## 2020-12-29 RX ORDER — ONDANSETRON 2 MG/ML
INJECTION INTRAMUSCULAR; INTRAVENOUS AS NEEDED
Status: DISCONTINUED | OUTPATIENT
Start: 2020-12-29 | End: 2020-12-29 | Stop reason: SURG

## 2020-12-29 RX ADMIN — DEXAMETHASONE SODIUM PHOSPHATE 4 MG: 4 INJECTION, SOLUTION INTRAMUSCULAR; INTRAVENOUS at 07:54

## 2020-12-29 RX ADMIN — OXYCODONE 5 MG: 5 TABLET ORAL at 21:55

## 2020-12-29 RX ADMIN — MELOXICAM 15 MG: 15 TABLET ORAL at 07:07

## 2020-12-29 RX ADMIN — Medication 1000 MG: at 07:52

## 2020-12-29 RX ADMIN — CEFAZOLIN SODIUM 2 G: 2 INJECTION, SOLUTION INTRAVENOUS at 07:42

## 2020-12-29 RX ADMIN — SODIUM CHLORIDE, POTASSIUM CHLORIDE, SODIUM LACTATE AND CALCIUM CHLORIDE: 600; 310; 30; 20 INJECTION, SOLUTION INTRAVENOUS at 07:37

## 2020-12-29 RX ADMIN — ESTRADIOL 0.5 MG: 0.5 TABLET ORAL at 15:45

## 2020-12-29 RX ADMIN — ONDANSETRON 4 MG: 2 INJECTION INTRAMUSCULAR; INTRAVENOUS at 07:54

## 2020-12-29 RX ADMIN — PREGABALIN 150 MG: 150 CAPSULE ORAL at 07:07

## 2020-12-29 RX ADMIN — PROPOFOL 66 MCG/KG/MIN: 10 INJECTION, EMULSION INTRAVENOUS at 07:45

## 2020-12-29 RX ADMIN — OXYCODONE 5 MG: 5 TABLET ORAL at 17:51

## 2020-12-29 RX ADMIN — BUPIVACAINE HYDROCHLORIDE 2.5 ML: 5 INJECTION, SOLUTION PERINEURAL at 07:45

## 2020-12-29 RX ADMIN — SODIUM CHLORIDE, POTASSIUM CHLORIDE, SODIUM LACTATE AND CALCIUM CHLORIDE: 600; 310; 30; 20 INJECTION, SOLUTION INTRAVENOUS at 10:59

## 2020-12-29 RX ADMIN — FAMOTIDINE 20 MG: 20 TABLET, FILM COATED ORAL at 07:07

## 2020-12-29 RX ADMIN — BUPIVACAINE HYDROCHLORIDE 20 ML: 2.5 INJECTION, SOLUTION EPIDURAL; INFILTRATION; INTRACAUDAL; PERINEURAL at 11:15

## 2020-12-29 RX ADMIN — ROPIVACAINE HYDROCHLORIDE 8 ML/HR: 5 INJECTION, SOLUTION EPIDURAL; INFILTRATION; PERINEURAL at 11:13

## 2020-12-29 RX ADMIN — MECLIZINE HYDROCHLORIDE 25 MG: 25 TABLET ORAL at 15:53

## 2020-12-29 RX ADMIN — ACETAMINOPHEN 1000 MG: 500 TABLET ORAL at 07:07

## 2020-12-29 RX ADMIN — FLUOXETINE 10 MG: 10 CAPSULE ORAL at 15:45

## 2020-12-29 RX ADMIN — SODIUM CHLORIDE 120 ML/HR: 9 INJECTION, SOLUTION INTRAVENOUS at 13:24

## 2020-12-29 RX ADMIN — SODIUM CHLORIDE, POTASSIUM CHLORIDE, SODIUM LACTATE AND CALCIUM CHLORIDE: 600; 310; 30; 20 INJECTION, SOLUTION INTRAVENOUS at 08:35

## 2020-12-29 RX ADMIN — LIDOCAINE HYDROCHLORIDE 0.2 ML: 10 INJECTION, SOLUTION EPIDURAL; INFILTRATION; INTRACAUDAL; PERINEURAL at 06:45

## 2020-12-29 RX ADMIN — OXYCODONE 5 MG: 5 TABLET ORAL at 14:06

## 2020-12-29 RX ADMIN — SODIUM CHLORIDE, POTASSIUM CHLORIDE, SODIUM LACTATE AND CALCIUM CHLORIDE 9 ML/HR: 600; 310; 30; 20 INJECTION, SOLUTION INTRAVENOUS at 06:45

## 2020-12-29 RX ADMIN — CEFAZOLIN SODIUM 2 G: 10 INJECTION, POWDER, FOR SOLUTION INTRAVENOUS at 15:45

## 2020-12-29 RX ADMIN — Medication 1000 MG: at 10:28

## 2020-12-29 RX ADMIN — ATORVASTATIN CALCIUM 10 MG: 10 TABLET, FILM COATED ORAL at 20:15

## 2020-12-29 RX ADMIN — ONDANSETRON 4 MG: 2 INJECTION INTRAMUSCULAR; INTRAVENOUS at 15:46

## 2020-12-29 RX ADMIN — BUPIVACAINE HYDROCHLORIDE 20 ML: 2.5 INJECTION, SOLUTION EPIDURAL; INFILTRATION; INTRACAUDAL; PERINEURAL at 11:13

## 2020-12-29 NOTE — ANESTHESIA PROCEDURE NOTES
Spinal Block      Patient reassessed immediately prior to procedure    Patient location during procedure: OR  Indication:at surgeon's request  Performed By  CRNA: Aleyda Barton CRNA  Preanesthetic Checklist  Completed: patient identified, site marked, surgical consent, pre-op evaluation, timeout performed, IV checked, risks and benefits discussed and monitors and equipment checked  Spinal Block Prep:  Patient Position:sitting  Sterile Tech:cap, gloves, sterile barriers and mask  Prep:Chloraprep  Patient Monitoring:blood pressure monitoring, continuous pulse oximetry and EKG  Spinal Block Procedure  Approach:midline  Guidance:landmark technique and palpation technique  Location:L3-L4  Needle Type:Quincke  Needle Gauge:22 G  Placement of Spinal needle event:cerebrospinal fluid aspirated  Paresthesia: no  Fluid Appearance:clear  Medications: bupivacaine (MARCAINE) 0.5 % injection, 2.5 mL  Med Administered at 12/29/2020 7:45 AM   Post Assessment  Patient Tolerance:patient tolerated the procedure well with no apparent complications  Complications no  Additional Notes  Procedure:  Pt assisted to sitting position, with legs in position of comfort over side of bed.  Pt. instructed in optimal spine presentation, the spine was prepped/ Draped and the skin at insertion site was anesthetized with 1% Lidocaine 2 ml.  The spinal needle was then advanced until CSF flow was obtained and LA was injected:

## 2020-12-29 NOTE — ANESTHESIA POSTPROCEDURE EVALUATION
Patient: Suze Baeza    Procedure Summary     Date: 12/29/20 Room / Location:  [facility]  [facility]    Anesthesia Start: 0737 Anesthesia Stop: 1117    Procedure: TOTAL KNEE ARTHROPLASTY BILATERAL (Bilateral Knee) Diagnosis:       Primary osteoarthritis of both knees      (Primary osteoarthritis of both knees [M17.0])    Surgeon: Heraclio Mari MD Provider: Rene Kincaid Jr., MD    Anesthesia Type: spinal, regional ASA Status: 2          Anesthesia Type: spinal, regional    Vitals  Vitals Value Taken Time   /71 12/29/20 1113   Temp 97 °F (36.1 °C) 12/29/20 1113   Pulse 81 12/29/20 1115   Resp 16 12/29/20 1113   SpO2 93 % 12/29/20 1115   Vitals shown include unvalidated device data.        Post Anesthesia Care and Evaluation    Patient location during evaluation: PACU  Patient participation: complete - patient participated  Level of consciousness: awake and alert  Pain management: adequate  Airway patency: patent  Anesthetic complications: No anesthetic complications  PONV Status: none  Cardiovascular status: hemodynamically stable and acceptable  Respiratory status: nonlabored ventilation, acceptable and nasal cannula  Hydration status: acceptable

## 2020-12-29 NOTE — ANESTHESIA PREPROCEDURE EVALUATION
Anesthesia Evaluation     Patient summary reviewed and Nursing notes reviewed   NPO Solid Status: > 8 hours             Airway   Mallampati: I  TM distance: >3 FB  Neck ROM: full  No difficulty expected  Dental      Pulmonary     breath sounds clear to auscultation  Cardiovascular     ECG reviewed  Rhythm: regular  Rate: normal    (+) hyperlipidemia,       Neuro/Psych  GI/Hepatic/Renal/Endo      Musculoskeletal     (+) neck pain,   Abdominal    Substance History      OB/GYN          Other   arthritis,                      Anesthesia Plan    ASA 2     spinal and regional     intravenous induction     Anesthetic plan, all risks, benefits, and alternatives have been provided, discussed and informed consent has been obtained with: patient.    Plan discussed with CRNA.

## 2020-12-29 NOTE — ANESTHESIA PROCEDURE NOTES
Right Adductor Canal Catheter      Patient reassessed immediately prior to procedure    Patient location during procedure: post-op  Reason for block: at surgeon's request and post-op pain management  Performed by  CRNA: Florentin Miranda CRNA  Preanesthetic Checklist  Completed: patient identified, site marked, surgical consent, pre-op evaluation, timeout performed, IV checked, risks and benefits discussed and monitors and equipment checked  Prep:  Pt Position: supine  Sterile barriers:cap, gloves, mask and sterile barriers  Prep: ChloraPrep  Patient monitoring: blood pressure monitoring, continuous pulse oximetry and EKG  Procedure  Performed under: spinal  Guidance:ultrasound guided  Images:still images obtained, printed/placed on chart    Laterality:right  Block Type:adductor canal block  Injection Technique:catheter  Needle Type:Tuohy and echogenic  Needle Gauge:18 G  Resistance on Injection: none  Catheter Size:20 G (20g)  Cath Depth at skin: 9 cm    Medications Used: bupivacaine PF (MARCAINE) 0.25 % injection, 20 mL  Med admintered at 12/29/2020 11:13 AM      Post Assessment  Injection Assessment: negative aspiration for heme, incremental injection and no paresthesia on injection  Patient Tolerance:comfortable throughout block  Complications:no  Additional Notes  Procedure:             The pt was placed in the Supine position.  The Insertion site was  prepped and Draped in sterile fashion.  The pt was anesthetized with  IV Sedation( see meds).  Skin and cutaneous tissue was infiltrated and anesthetized with 1% Lidocaine 3 mls via a 25g needle.  A BBraun 4 inch 18g echogenic needle was then  inserted approximately midline, mid-thigh and advanced In-plane with Ultrasound guidance.  Normal Saline PSF was utilized for hydrodissection of tissue.  The Vastus medialis and Sartorius muscle where visualized and the needle tip was placed in the adductor canal,  lateral to the femoral artery.  LA injection spread was  visualized, injection was incremental 1-5ml, injection pressure was normal or little, no intraneural injection, no vascular injection.  LA dose was injected thru the needle(see dose above).  A BBraun 20g wire stylet catheter was placed via the needle with ultrasound visualization and confirmation with NS fluid bolus. The labeled Catheter was then secured to skin at insertion site with skin afix and steristrips to curled catheter and CHG transparent dressing.  Thank you.

## 2020-12-29 NOTE — ANESTHESIA PROCEDURE NOTES
Left Adductor Canal Catheter      Patient reassessed immediately prior to procedure    Patient location during procedure: post-op  Reason for block: at surgeon's request and post-op pain management  Performed by  CRNA: Florentin Miranda CRNA  Preanesthetic Checklist  Completed: patient identified, site marked, surgical consent, pre-op evaluation, timeout performed, IV checked, risks and benefits discussed and monitors and equipment checked  Prep:  Pt Position: supine  Sterile barriers:cap, gloves, mask and sterile barriers  Prep: ChloraPrep  Patient monitoring: blood pressure monitoring, continuous pulse oximetry and EKG  Procedure  Performed under: spinal  Guidance:ultrasound guided  Images:still images obtained, printed/placed on chart    Laterality:left  Block Type:adductor canal block  Injection Technique:catheter  Needle Type:Tuohy and echogenic  Needle Gauge:18 G  Resistance on Injection: none  Catheter Size:20 G (20g)  Cath Depth at skin: 9 cm    Medications Used: bupivacaine PF (MARCAINE) 0.25 % injection, 20 mL  Med admintered at 12/29/2020 11:15 AM      Post Assessment  Injection Assessment: negative aspiration for heme, incremental injection and no paresthesia on injection  Patient Tolerance:comfortable throughout block  Complications:no  Additional Notes  Procedure:             The pt was placed in the Supine position.  The Insertion site was  prepped and Draped in sterile fashion.  The pt was anesthetized with  IV Sedation( see meds).  Skin and cutaneous tissue was infiltrated and anesthetized with 1% Lidocaine 3 mls via a 25g needle.  A BBraun 4 inch 18g echogenic needle was then  inserted approximately midline, mid-thigh and advanced In-plane with Ultrasound guidance.  Normal Saline PSF was utilized for hydrodissection of tissue.  The Vastus medialis and Sartorius muscle where visualized and the needle tip was placed in the adductor canal,  lateral to the femoral artery.  LA injection spread was  visualized, injection was incremental 1-5ml, injection pressure was normal or little, no intraneural injection, no vascular injection.  LA dose was injected thru the needle(see dose above).  A BBraun 20g wire stylet catheter was placed via the needle with ultrasound visualization and confirmation with NS fluid bolus. The labeled Catheter was then secured to skin at insertion site with skin afix and steristrips to curled catheter and CHG transparent dressing.  Thank you.

## 2020-12-30 PROBLEM — G89.18 ACUTE POSTOPERATIVE PAIN: Status: ACTIVE | Noted: 2020-12-30

## 2020-12-30 PROBLEM — D62 ACUTE BLOOD LOSS ANEMIA: Status: ACTIVE | Noted: 2020-12-30

## 2020-12-30 LAB
DEPRECATED RDW RBC AUTO: 42.1 FL (ref 37–54)
ERYTHROCYTE [DISTWIDTH] IN BLOOD BY AUTOMATED COUNT: 13.9 % (ref 12.3–15.4)
FLUAV RNA RESP QL NAA+PROBE: NOT DETECTED
FLUBV RNA RESP QL NAA+PROBE: NOT DETECTED
HCT VFR BLD AUTO: 30.4 % (ref 34–46.6)
HGB BLD-MCNC: 9.7 G/DL (ref 12–15.9)
MCH RBC QN AUTO: 26.6 PG (ref 26.6–33)
MCHC RBC AUTO-ENTMCNC: 31.9 G/DL (ref 31.5–35.7)
MCV RBC AUTO: 83.3 FL (ref 79–97)
PLATELET # BLD AUTO: 305 10*3/MM3 (ref 140–450)
PMV BLD AUTO: 9.4 FL (ref 6–12)
RBC # BLD AUTO: 3.65 10*6/MM3 (ref 3.77–5.28)
SARS-COV-2 RNA RESP QL NAA+PROBE: NOT DETECTED
WBC # BLD AUTO: 9.33 10*3/MM3 (ref 3.4–10.8)

## 2020-12-30 PROCEDURE — 97535 SELF CARE MNGMENT TRAINING: CPT

## 2020-12-30 PROCEDURE — 97165 OT EVAL LOW COMPLEX 30 MIN: CPT

## 2020-12-30 PROCEDURE — 87636 SARSCOV2 & INF A&B AMP PRB: CPT | Performed by: NURSE PRACTITIONER

## 2020-12-30 PROCEDURE — 97110 THERAPEUTIC EXERCISES: CPT

## 2020-12-30 PROCEDURE — 99024 POSTOP FOLLOW-UP VISIT: CPT | Performed by: ORTHOPAEDIC SURGERY

## 2020-12-30 PROCEDURE — 25010000002 CEFAZOLIN PER 500 MG: Performed by: ORTHOPAEDIC SURGERY

## 2020-12-30 PROCEDURE — 97116 GAIT TRAINING THERAPY: CPT

## 2020-12-30 PROCEDURE — 85027 COMPLETE CBC AUTOMATED: CPT | Performed by: ORTHOPAEDIC SURGERY

## 2020-12-30 RX ADMIN — SODIUM CHLORIDE 500 ML: 9 INJECTION, SOLUTION INTRAVENOUS at 09:25

## 2020-12-30 RX ADMIN — CEFAZOLIN SODIUM 2 G: 10 INJECTION, POWDER, FOR SOLUTION INTRAVENOUS at 00:37

## 2020-12-30 RX ADMIN — APIXABAN 2.5 MG: 2.5 TABLET, FILM COATED ORAL at 19:52

## 2020-12-30 RX ADMIN — OXYCODONE 5 MG: 5 TABLET ORAL at 04:03

## 2020-12-30 RX ADMIN — ESTRADIOL 0.5 MG: 0.5 TABLET ORAL at 08:35

## 2020-12-30 RX ADMIN — ATORVASTATIN CALCIUM 10 MG: 10 TABLET, FILM COATED ORAL at 19:52

## 2020-12-30 RX ADMIN — OXYCODONE 5 MG: 5 TABLET ORAL at 08:35

## 2020-12-30 RX ADMIN — MELOXICAM 15 MG: 7.5 TABLET ORAL at 08:35

## 2020-12-30 RX ADMIN — OXYCODONE 5 MG: 5 TABLET ORAL at 17:35

## 2020-12-30 RX ADMIN — APIXABAN 2.5 MG: 2.5 TABLET, FILM COATED ORAL at 08:35

## 2020-12-30 RX ADMIN — FLUOXETINE 10 MG: 10 CAPSULE ORAL at 08:35

## 2020-12-31 VITALS
HEART RATE: 93 BPM | RESPIRATION RATE: 16 BRPM | TEMPERATURE: 98.3 F | DIASTOLIC BLOOD PRESSURE: 65 MMHG | SYSTOLIC BLOOD PRESSURE: 134 MMHG | OXYGEN SATURATION: 96 %

## 2020-12-31 DIAGNOSIS — Z96.653 STATUS POST TOTAL BILATERAL KNEE REPLACEMENT: Primary | ICD-10-CM

## 2020-12-31 PROBLEM — E87.6 HYPOKALEMIA: Status: ACTIVE | Noted: 2020-12-31

## 2020-12-31 LAB
ANION GAP SERPL CALCULATED.3IONS-SCNC: 6 MMOL/L (ref 5–15)
BUN SERPL-MCNC: 9 MG/DL (ref 8–23)
BUN/CREAT SERPL: 19.1 (ref 7–25)
CALCIUM SPEC-SCNC: 8 MG/DL (ref 8.6–10.5)
CHLORIDE SERPL-SCNC: 103 MMOL/L (ref 98–107)
CO2 SERPL-SCNC: 25 MMOL/L (ref 22–29)
CREAT SERPL-MCNC: 0.47 MG/DL (ref 0.57–1)
DEPRECATED RDW RBC AUTO: 43.1 FL (ref 37–54)
ERYTHROCYTE [DISTWIDTH] IN BLOOD BY AUTOMATED COUNT: 14 % (ref 12.3–15.4)
GFR SERPL CREATININE-BSD FRML MDRD: 134 ML/MIN/1.73
GLUCOSE SERPL-MCNC: 113 MG/DL (ref 65–99)
HCT VFR BLD AUTO: 29.3 % (ref 34–46.6)
HGB BLD-MCNC: 9.2 G/DL (ref 12–15.9)
MCH RBC QN AUTO: 26.5 PG (ref 26.6–33)
MCHC RBC AUTO-ENTMCNC: 31.4 G/DL (ref 31.5–35.7)
MCV RBC AUTO: 84.4 FL (ref 79–97)
PLATELET # BLD AUTO: 286 10*3/MM3 (ref 140–450)
PMV BLD AUTO: 9.3 FL (ref 6–12)
POTASSIUM SERPL-SCNC: 3.2 MMOL/L (ref 3.5–5.2)
RBC # BLD AUTO: 3.47 10*6/MM3 (ref 3.77–5.28)
SODIUM SERPL-SCNC: 134 MMOL/L (ref 136–145)
WBC # BLD AUTO: 10.75 10*3/MM3 (ref 3.4–10.8)

## 2020-12-31 PROCEDURE — 80048 BASIC METABOLIC PNL TOTAL CA: CPT | Performed by: NURSE PRACTITIONER

## 2020-12-31 PROCEDURE — 99024 POSTOP FOLLOW-UP VISIT: CPT | Performed by: ORTHOPAEDIC SURGERY

## 2020-12-31 PROCEDURE — 97116 GAIT TRAINING THERAPY: CPT | Performed by: PHYSICAL THERAPIST

## 2020-12-31 PROCEDURE — 97110 THERAPEUTIC EXERCISES: CPT | Performed by: PHYSICAL THERAPIST

## 2020-12-31 PROCEDURE — 85027 COMPLETE CBC AUTOMATED: CPT | Performed by: ORTHOPAEDIC SURGERY

## 2020-12-31 RX ORDER — CHOLECALCIFEROL (VITAMIN D3) 125 MCG
5 CAPSULE ORAL NIGHTLY PRN
Status: DISCONTINUED | OUTPATIENT
Start: 2020-12-31 | End: 2020-12-31 | Stop reason: HOSPADM

## 2020-12-31 RX ORDER — CHOLECALCIFEROL (VITAMIN D3) 125 MCG
5 CAPSULE ORAL NIGHTLY PRN
Start: 2020-12-31 | End: 2021-03-02

## 2020-12-31 RX ORDER — MELOXICAM 15 MG/1
15 TABLET ORAL DAILY
Qty: 15 TABLET | Refills: 0
Start: 2020-12-31 | End: 2021-01-01 | Stop reason: ALTCHOICE

## 2020-12-31 RX ORDER — POLYETHYLENE GLYCOL 3350 17 G/17G
17 POWDER, FOR SOLUTION ORAL DAILY
Status: DISCONTINUED | OUTPATIENT
Start: 2020-12-31 | End: 2020-12-31 | Stop reason: HOSPADM

## 2020-12-31 RX ORDER — ACETAMINOPHEN 500 MG
500 TABLET ORAL EVERY 6 HOURS
Refills: 0
Start: 2020-12-31 | End: 2021-01-07

## 2020-12-31 RX ORDER — OXYCODONE HYDROCHLORIDE 5 MG/1
5 TABLET ORAL EVERY 4 HOURS PRN
Qty: 15 TABLET | Refills: 0 | Status: SHIPPED | OUTPATIENT
Start: 2020-12-31 | End: 2021-01-06 | Stop reason: SDUPTHER

## 2020-12-31 RX ORDER — DOCUSATE SODIUM 100 MG/1
100 CAPSULE, LIQUID FILLED ORAL 2 TIMES DAILY
Qty: 30 CAPSULE | Refills: 0 | Status: SHIPPED | OUTPATIENT
Start: 2020-12-31 | End: 2021-01-15

## 2020-12-31 RX ORDER — POTASSIUM CHLORIDE 750 MG/1
40 CAPSULE, EXTENDED RELEASE ORAL ONCE
Status: COMPLETED | OUTPATIENT
Start: 2020-12-31 | End: 2020-12-31

## 2020-12-31 RX ORDER — BISACODYL 10 MG
10 SUPPOSITORY, RECTAL RECTAL DAILY PRN
Status: DISCONTINUED | OUTPATIENT
Start: 2020-12-31 | End: 2020-12-31 | Stop reason: HOSPADM

## 2020-12-31 RX ADMIN — FLUOXETINE 10 MG: 10 CAPSULE ORAL at 08:22

## 2020-12-31 RX ADMIN — OXYCODONE 5 MG: 5 TABLET ORAL at 14:01

## 2020-12-31 RX ADMIN — BISACODYL 10 MG: 10 SUPPOSITORY RECTAL at 08:22

## 2020-12-31 RX ADMIN — SODIUM CHLORIDE, PRESERVATIVE FREE 3 ML: 5 INJECTION INTRAVENOUS at 08:24

## 2020-12-31 RX ADMIN — Medication 5 MG: at 01:09

## 2020-12-31 RX ADMIN — ESTRADIOL 0.5 MG: 0.5 TABLET ORAL at 08:22

## 2020-12-31 RX ADMIN — POLYETHYLENE GLYCOL 3350 17 G: 17 POWDER, FOR SOLUTION ORAL at 08:22

## 2020-12-31 RX ADMIN — POTASSIUM CHLORIDE 40 MEQ: 10 CAPSULE, COATED, EXTENDED RELEASE ORAL at 08:22

## 2020-12-31 RX ADMIN — APIXABAN 2.5 MG: 2.5 TABLET, FILM COATED ORAL at 08:22

## 2020-12-31 RX ADMIN — MELOXICAM 15 MG: 7.5 TABLET ORAL at 08:22

## 2021-01-02 NOTE — PROGRESS NOTES
KAYLA Maxwell    Nerve Cath Post Op Call    Patient Name: Suze Baeza  :  1955  MRN:  [id number]  Date of Discharge: 2020    Nerve Cath Post Op Call:    Catheter Plan:Patient/Family member report nerve catheter previously discontinued, tip intact  Patient/Family instructed to call ON CALL anesthesia provider for any questions or problems.  Patient Follow Up:

## 2021-01-06 ENCOUNTER — TELEPHONE (OUTPATIENT)
Dept: ORTHOPEDIC SURGERY | Facility: CLINIC | Age: 66
End: 2021-01-06

## 2021-01-06 DIAGNOSIS — Z96.653 S/P TOTAL KNEE ARTHROPLASTY, BILATERAL: ICD-10-CM

## 2021-01-06 RX ORDER — OXYCODONE HYDROCHLORIDE 5 MG/1
5 TABLET ORAL EVERY 8 HOURS PRN
Qty: 30 TABLET | Refills: 0 | Status: SHIPPED | OUTPATIENT
Start: 2021-01-06 | End: 2021-01-23 | Stop reason: SDUPTHER

## 2021-01-06 NOTE — TELEPHONE ENCOUNTER
Heraclio Mari MD  You 26 minutes ago (12:26 PM)     Refill sent to the pharmacy.         Left message for patient to let her know the Rx was sent to her pharmacy.    Katharina Chi

## 2021-01-20 ENCOUNTER — OFFICE VISIT (OUTPATIENT)
Dept: ORTHOPEDIC SURGERY | Facility: CLINIC | Age: 66
End: 2021-01-20

## 2021-01-20 DIAGNOSIS — Z47.89 ORTHOPEDIC AFTERCARE: ICD-10-CM

## 2021-01-20 DIAGNOSIS — Z96.653 S/P TOTAL KNEE ARTHROPLASTY, BILATERAL: Primary | ICD-10-CM

## 2021-01-20 PROCEDURE — 99024 POSTOP FOLLOW-UP VISIT: CPT | Performed by: ORTHOPAEDIC SURGERY

## 2021-01-20 NOTE — PROGRESS NOTES
[facility] Note    Subjective     Chief Complaint   Patient presents with   • Post-op     3 weeks post Total Knee Arthroplasty Bilateral 12/29/20        HPI    It has been 3  week(s) since Ms. Baeza's last visit. She returns to clinic today for postoperative follow-up of bilateral knee arthroplasty. She rates her pain a 4/10 on the pain scale. Previous/current treatments: cane/walker and physical therapy. Current symptoms: pain and swelling. The pain is worse with at night; ice and pain medication and/or NSAID improve the pain. Overall, she is doing better.  Ambulating with the aid of a cane.    I have reviewed the following portions of the patient's history and agree with: History of Present Illness and Review of Systems    Patient Active Problem List   Diagnosis   • Hypercholesterolemia   • Vitamin D deficiency   • Primary insomnia   • Restless leg syndrome   • Chronic pain of both knees   • Chronic bilateral low back pain   • Other viral warts   • Bilateral headaches   • Stress and adjustment reaction   • Primary osteoarthritis involving multiple joints   • Cervicalgia   • Baker cyst, right   • Chronic fatigue   • Hot flashes due to menopause   • Primary osteoarthritis of both knees   • Synovial cyst of popliteal space   • Post-nasal drainage   • Annual physical exam   • S/P total knee arthroplasty, bilateral   • Acute blood loss anemia   • Acute postoperative pain   • Hypokalemia     Past Medical History:   Diagnosis Date   • Degenerative tear of medial meniscus, right    • Hot flashes     PROZAC    • Low back pain    • Neuropathy, lumbosacral (radicular)    • Primary osteoarthritis of both knees    • Spondylolisthesis, lumbar region    • Torn meniscus     right medial   • Viral warts 02/25/2019   • Wears reading eyeglasses       Past Surgical History:   Procedure Laterality Date   • BLADDER SURGERY     • COLONOSCOPY     • HYSTERECTOMY  07/10/2012   • KNEE ARTHROSCOPY Right    •  OOPHORECTOMY Bilateral 07/10/2012   • TOTAL KNEE ARTHROPLASTY Bilateral 12/29/2020    Procedure: TOTAL KNEE ARTHROPLASTY BILATERAL;  Surgeon: Heraclio Mari MD;  Location: [facility];  Service: Orthopedics;  Laterality: Bilateral;      Family History   Problem Relation Age of Onset   • Deep vein thrombosis Mother    • Stroke Mother    • Heart disease Mother 68   • Heart attack Mother    • Osteoarthritis Mother    • Heart disease Father 51        cause of death   • Heart attack Father    • Stroke Other    • Cancer Maternal Aunt         bone   • Diabetes Maternal Grandmother    • Cancer Maternal Aunt         lymphoma   • Cancer Maternal Aunt         brain   • Breast cancer Neg Hx    • Ovarian cancer Neg Hx      Social History     Socioeconomic History   • Marital status:      Spouse name: Not on file   • Number of children: Not on file   • Years of education: Not on file   • Highest education level: Not on file   Tobacco Use   • Smoking status: Never Smoker   • Smokeless tobacco: Never Used   Substance and Sexual Activity   • Alcohol use: No   • Drug use: No   • Sexual activity: Defer   Social History Narrative          Current Outpatient Medications on File Prior to Visit   Medication Sig Dispense Refill   • apixaban (ELIQUIS) 2.5 MG tablet tablet Take 1 tablet by mouth Every 12 (Twelve) Hours for 30 days. 60 tablet 0   • Ascorbic Acid (VITAMIN C PO) Take 500 mg by mouth Daily. Takes 500 mg     • atorvastatin (LIPITOR) 10 MG tablet TAKE ONE TABLET BY MOUTH EVERY NIGHT AT BEDTIME (Patient taking differently: Take 10 mg by mouth Every Night.) 90 tablet 1   • calcium carb-cholecalciferol (Calcium 600/Vitamin D3) 600-800 MG-UNIT tablet Take 1 tablet by mouth Daily.     • Calcium Polycarbophil (FIBER-CAPS PO) Take 1 tablet by mouth Daily.     • Cyanocobalamin (VITAMIN B-12 PO) Take 5,000 mcg by mouth Daily.     • estradiol (ESTRACE) 0.5 MG tablet Take 0.5 mg by mouth Daily.     • Flax Oil-Fish Oil-Borage  Oil (FISH OIL-FLAX OIL-BORAGE OIL PO) Take 1 capsule by mouth Daily.     • FLUoxetine (PROzac) 10 MG capsule Take 1 capsule by mouth Daily. 90 capsule 3   • melatonin 5 MG tablet tablet Take 1 tablet by mouth At Night As Needed (sleep).     • nortriptyline (PAMELOR) 10 MG capsule Take 4 capsules by mouth At Night As Needed for Sleep. 120 capsule 10   • oxyCODONE (ROXICODONE) 5 MG immediate release tablet Take 1 tablet by mouth Every 8 (Eight) Hours As Needed for Moderate Pain  for up to 30 doses. 30 tablet 0   • pramipexole (MIRAPEX) 0.5 MG tablet Take 2 tablets in the evening and 1 tablet during the day as needed for restless legs (Patient taking differently: Take 1 mg by mouth At Night As Needed (RESTLESS LEGS). Take 2 tablets in the evening and 1 tablet during the day as needed for restless legs) 75 tablet 5   • Ropivacine HCl-NaCl (NAROPIN) 16 mg/hr by Peripheral Nerve route Continuous. Indications: Acute Pain     • Ropivacine HCl-NaCl (NAROPIN) 16 mg/hr by Peripheral Nerve route Continuous.       No current facility-administered medications on file prior to visit.       Allergies   Allergen Reactions   • Other Itching and Rash     CHG        Review of Systems     Objective      Physical Exam  There were no vitals taken for this visit.    There is no height or weight on file to calculate BMI.    General:   Mental Status:  Alert   Appearance: Cooperative, in no acute distress   Build and Nutrition: Well-nourished well-developed female   Orientation: Alert and oriented to person, place and time   Posture: Normal   Gait: With a cane    Integument:   Right knee: Wound is well-healed with no signs of infection    Lower Extremities:   Right Knee:    Tenderness:  None    Effusion:  1+    Swelling: None    Crepitus:  None    Range of motion:  Extension: 0°       Flexion: 90°  Instability:  No varus laxity, no valgus laxity, negative anterior drawer  Deformities:  None  Integument:   Left knee: Wound is well-healed with  no signs of infection    Lower Extremities:   Left Knee:    Tenderness:  None    Effusion:  None    Swelling: None    Crepitus:  None    Range of motion:  Extension: 0°       Flexion: 90°  Instability:  No varus laxity, no valgus laxity, negative anterior drawer  Deformities:  None      Imaging/Studies  Imaging Results (Last 24 Hours)     Procedure Component Value Units Date/Time    XR Leg Length Evaluation [[id number]] Resulted: 01/20/21 1209     Updated: 01/20/21 1211    Narrative:      Limb length radiographs  Indication: Status post bilateral total knee arthroplasties  Views: Single long leg film, both lower extremities    Comparison: None    Findings:   Bilateral tibial bow is noted, with good alignment of the implants, with   no unusual features.    XR Knee 3 View Bilateral [[id number]] Resulted: 01/20/21 1016     Updated: 01/20/21 1017    Narrative:      Right Knee Radiographs  Indication: status-post right total knee arthroplasty  Views: AP, lateral, and sunrise views of the right knee    Comparison: no change compared to prior study, 12/29/2020    Findings:   The components are well aligned, with no signs of loosening or failure.    Left Knee Radiographs  Indication: status-post left total knee arthroplasty  Views: AP, lateral, and sunrise views of the left knee    Comparison: no change compared to prior study, 12/29/2020    Findings:   The components are well aligned, with no signs of loosening or failure.              Assessment and Plan     Diagnoses and all orders for this visit:    1. S/P total knee arthroplasty, bilateral (Primary)  -     XR Knee 3 View Bilateral  -     XR Leg Length Evaluation    2. Orthopedic aftercare  -     XR Leg Length Evaluation        1. S/P total knee arthroplasty, bilateral    2. Orthopedic aftercare        I reviewed my findings with patient today.  She is recovering well from her bilateral total knee arthroplasties.  She will continue with rehabilitation.  I will see her  back in 6 weeks, but sooner for any problems.    Return in about 6 weeks (around 3/3/2021).    Medical Decision Making  Management Options : physical/occupational therapy  Data/Risk: radiology tests and independent visualization of imaging, lab tests, or EMG/NCV    Heraclio Mari MD  01/20/21  12:17 ZHANG Ahmadi disclaimer:  Much of this encounter note is an electronic transcription/translation of spoken language to printed text. The electronic translation of spoken language may permit erroneous, or at times, nonsensical words or phrases to be inadvertently transcribed; Although I have reviewed the note for such errors, some may still exist.

## 2021-01-22 ENCOUNTER — TELEPHONE (OUTPATIENT)
Dept: ORTHOPEDIC SURGERY | Facility: CLINIC | Age: 66
End: 2021-01-22

## 2021-01-22 DIAGNOSIS — Z96.653 S/P TOTAL KNEE ARTHROPLASTY, BILATERAL: ICD-10-CM

## 2021-01-22 NOTE — TELEPHONE ENCOUNTER
Caller:HERBERTH DANIEL     Relationship:     Best call back number:     Medication needed: OXYCODONE 5MG THE ONE WITHOUT TYLENOL      When do you need the refill by: ASAP    What details did the patient provide when requesting the medication: HAS 2 PILLS    Does the patient have less than a 3 day supply:  [x] Yes  [] No

## 2021-01-23 RX ORDER — OXYCODONE HYDROCHLORIDE 5 MG/1
5 TABLET ORAL EVERY 8 HOURS PRN
Qty: 30 TABLET | Refills: 0 | Status: SHIPPED | OUTPATIENT
Start: 2021-01-23 | End: 2021-03-02

## 2021-01-25 NOTE — TELEPHONE ENCOUNTER
[facility]  [place] [place] [facility] [address] [facility] on 1/23/21.

## 2021-02-08 ENCOUNTER — TELEPHONE (OUTPATIENT)
Dept: PEDIATRICS | Facility: OTHER | Age: 66
End: 2021-02-08

## 2021-02-08 DIAGNOSIS — F51.01 PRIMARY INSOMNIA: Primary | ICD-10-CM

## 2021-02-08 RX ORDER — HYDROXYZINE PAMOATE 100 MG/1
100 CAPSULE ORAL NIGHTLY
Qty: 30 CAPSULE | Refills: 5 | Status: SHIPPED | OUTPATIENT
Start: 2021-02-08 | End: 2021-03-02

## 2021-02-08 NOTE — TELEPHONE ENCOUNTER
Patient attempted to return call but was unsuccessful in reaching the clinical staff. Please advise.     Patient call back [phone]

## 2021-02-08 NOTE — TELEPHONE ENCOUNTER
PATIENT STATES SHE IS TAKING   rtriptyline (PAMELOR) 10 MG capsule  AND IT IS NOT WORKING FOR HER   PATIENT JUST HAD A DOUBLE KNEE REPLACEMENT AND IS NOT GETTING ANY SLEEP AT NIGHT      THE PHARMACY GAVE HER TWO NAMES OF MEDICATION THAT MIGHT WORK FOR HER   AMITRIPTYLENE AND   VISTARIL      PLEASE ADVISE     PATIENT CALL BACK  [phone]    MELBA WALTER CONFIRMED

## 2021-02-08 NOTE — TELEPHONE ENCOUNTER
I sent a prescription for hydroxy zine (Vistaril) to her pharmacy. Tell her to take it every evening. If she has been taking nortriptyline every evening, then she will need to taper off of it. I think she was taking four of the 10 mg tablets every evening, in which case she should go down to three for a few days then down to two for a few days then down to one etc. she does not need to wait to start the hydroxyzine. She may take it even while she is tapering off nortriptyline    She does need a hospital follow-up visit. It may be in person or video. Please make it with [facility] since my schedule is full this week

## 2021-02-09 RX ORDER — ATORVASTATIN CALCIUM 10 MG/1
10 TABLET, FILM COATED ORAL NIGHTLY
Qty: 90 TABLET | Refills: 1 | Status: SHIPPED | OUTPATIENT
Start: 2021-02-09 | End: 2021-08-16

## 2021-02-10 ENCOUNTER — TELEMEDICINE (OUTPATIENT)
Dept: INTERNAL MEDICINE | Facility: CLINIC | Age: 66
End: 2021-02-10

## 2021-02-10 VITALS — BODY MASS INDEX: 33.56 KG/M2 | HEIGHT: 55 IN | WEIGHT: 145 LBS

## 2021-02-10 DIAGNOSIS — F51.01 PRIMARY INSOMNIA: ICD-10-CM

## 2021-02-10 DIAGNOSIS — Z96.653 S/P TOTAL KNEE ARTHROPLASTY, BILATERAL: Primary | ICD-10-CM

## 2021-02-10 DIAGNOSIS — G25.81 RESTLESS LEG SYNDROME: ICD-10-CM

## 2021-02-10 DIAGNOSIS — D62 ACUTE BLOOD LOSS ANEMIA: ICD-10-CM

## 2021-02-10 PROCEDURE — 99214 OFFICE O/P EST MOD 30 MIN: CPT | Performed by: NURSE PRACTITIONER

## 2021-02-10 NOTE — PROGRESS NOTES
"Transitional Care Follow Up Visit    You have chosen to receive care through a telehealth visit.  Do you consent to use a video/audio connection for your medical care today?       YES      Subjective     Suze Baeza is a 66 y.o. female who presents for a transitional care management visit.    Within 48 business hours after discharge our office contacted her via telephone to coordinate her care and needs.      I reviewed and discussed the details of that call along with the discharge summary, hospital problems, inpatient lab results, inpatient diagnostic studies, and consultation reports with Suze.     Current outpatient and discharge medications have been reconciled for for patient.  Reviewed by: TAQUERIA Dugan      No flowsheet data found.  Risk for Readmission (LACE) No data recorded    History of Present Illness   Course During Hospital Stay: Patient was admitted to [facility] on 12/29/20 for planned bilateral knee arthroplasty per Dr. Mari.  She tolerated surgery well without any major complications.  Pain was well controlled with canal nerve block infusion.  She was noted to have some acute blood loss anemia postoperatively and developed some hypotension requiring IV fluids.  At discharge, plan was to transfer to inpatient rehab facility.  Patient notes that \"all 7\" of her top choices of facilities were completely booked.  She was sent to rehab facility in [facility] where she left quite soon after arrival as she reports that the conditions were unclean and felt that she could have better care from her spouse and family at home.    Upon returning home, she started outpatient physical therapy with [facility] 3 times weekly.  Per Flor progress note, the patient has made great progress and plan is to drop visits to twice weekly starting next week.    At this time, the patient reports that she is feeling well and feel that her postoperative progress is as expected.  She is able to ambulate " "without assistive devices, back to routine of grocery shopping and walking up/down stairs without difficulty.      Pain control is \"good\" at this time.  Admits she has not required prescription pain medication in over 3 weeks, using occasional OTC Tylenol as needed.  Pain rated 3/10 at this time.  Still with some stiffness in both knees (Left > Right).  Goal is to reach bend of 120 degrees (she is at 110 right, left 105 degrees currently).      Denies any swelling of extremities, denies any sensation changes, numbness or tingling of the extremities.  Feels she is getting her muscle strength back gradually.     Hypotension, anemia postoperatively- denies checking BP since returning home.  Denies any SOA, chest pain, dizziness, headache, palpitations, fatigue, generalized weakness or vision changes. Noted HGB of 9.2 at time of discharge.    Chronic conditions :    Insomnia- ongoing for years but worsening symptoms since surgery in 12/2020.  Sleeping on average of about 3 hours/night.  States she awakes and cant fall back asleep.  She is continuing to use nortriptyline and recently started hydroxyzine 100mg as per Dr. Lomas's recommendations.  States she feels it is too early to say if this regimen will work (only taking x 2-3 nights thus far).    Restless legs- chronic and ongoing for years.  States she feels symptoms are stable/controlled at this time.  Continuing to use Mirapex PRN.       The following portions of the patient's history were reviewed and updated as appropriate: allergies, current medications, past family history, past medical history, past social history, past surgical history and problem list.    Review of Systems   Constitutional: Negative for fatigue and fever.   HENT: Negative for trouble swallowing.    Eyes: Negative for visual disturbance.   Respiratory: Negative for cough, chest tightness and shortness of breath.    Cardiovascular: Negative for chest pain, palpitations and leg swelling. "   Gastrointestinal: Negative for abdominal pain and constipation.   Musculoskeletal:        + for joint stiffness- bilateral LE   Skin: Negative for color change.   Neurological: Negative for dizziness, weakness, light-headedness, numbness and headaches.   Psychiatric/Behavioral: Positive for sleep disturbance.       Objective   Physical Exam  With patient's consent, physical exam was conducted via visual telemedicine encounter due to patient's current isolation requirements in the interest of PPE conservation.    Constitutional: No acute distress, awake, alert, nontoxic, normal body habitus, patient sitting in car  HENT: NCAT, no conjunctival injection  Respiratory: Good effort, nonlabored respirations   Psychiatric: Appropriate affect, good insight and judgement, cooperative  Neurologic: Oriented x 3, speech clear and fluent  Skin: No visible rashes on areas of face, neck, no jaundice seen on exposed skin through video window    Assessment/Plan   Problems Addressed this Visit        Hematology and Neoplasia    Acute blood loss anemia       Musculoskeletal and Injuries    S/P total knee arthroplasty, bilateral - Primary       Neuro    Restless leg syndrome       Sleep    Primary insomnia      Diagnoses       Codes Comments    S/P total knee arthroplasty, bilateral    -  Primary ICD-10-CM: Z96.653  ICD-9-CM: V43.65     Primary insomnia     ICD-10-CM: F51.01  ICD-9-CM: 307.42     Acute blood loss anemia     ICD-10-CM: D62  ICD-9-CM: 285.1     Restless leg syndrome     ICD-10-CM: G25.81  ICD-9-CM: 333.94         Bilateral Osteoarthritis of bilateral Knees  S/P Bilateral Arthroplasty 12/2020  - Per patient and [facility] progress note, appears to have recovery process as expected (patient ambulating independently, walking up/down stairs, ADLS, grocery shopping).  - Continue PT with [facility] 2 days weekly as recommended  - Pain well controlled, no longer requiring prescription medications (Tylenol being using PRN)  - Keep follow  up with Dr. Mari in March 2021    Hypotension  Acute Blood Loss Anemia  - Noted to have occurred post-operatively  - HGB at discharge stable at 9.2, patient is not symptomatic at this time  - No recommendations for labs at this time.  Per patient, still requiring a  and difficult to get to lab.  Plan to await recheck until follow up on 3/11/21  - Advised patient to follow up if symptoms develop (worsening fatigue, SOA, dizziness, etc).    Insomnia  - Per patient report, worse since recent surgery  - Plan to continue with prescribed therapies of nortriptyline up to 40mg nightly, Hydroxyzine 100mg nightly  - Watchful waiting as may return to her baseline with time and full recovery from her surgery    Restless leg syndrome  - Stable at this time per patient  - Continue Mirapex 1mg PRN nightly         Keep scheduled follow up on 3/11/21, prior if any symptoms as discussed above develop.

## 2021-02-18 RX ORDER — PRAMIPEXOLE DIHYDROCHLORIDE 0.5 MG/1
TABLET ORAL
Qty: 75 TABLET | Refills: 4 | Status: SHIPPED | OUTPATIENT
Start: 2021-02-18 | End: 2021-09-23 | Stop reason: SDUPTHER

## 2021-03-01 ENCOUNTER — TELEPHONE (OUTPATIENT)
Dept: INTERNAL MEDICINE | Facility: CLINIC | Age: 66
End: 2021-03-01

## 2021-03-01 NOTE — TELEPHONE ENCOUNTER
"Pt states she had a double knee replacement in December so she has been laid up for a while. Now that she is up and moving around more she states she is becoming shaky, weak and has general \"not well\" feeling. She is concerned that something may be wrong with her blood counts.   "

## 2021-03-01 NOTE — TELEPHONE ENCOUNTER
PATIENT IS STATING THAT SHE GETS SHAKY AND WEAK WHEN SHE IS UP AND MOVING AROUND FOR ABOUT 45 MINS. PATIENT IS ASKING IF PCP THINKS SHE SHOULD HAVE BLOOD WORK DONE. PLEASE RETURN CALL [phone]  TO ADVISE. PLEASE LEAVE MESSAGE IF THERE IS NO ANSWER.

## 2021-03-01 NOTE — TELEPHONE ENCOUNTER
The fact that she can move around for 45 minutes is reassuring, however because of the weakness and feeling shaky I would recommend to come in for a visit and we can do some blood work at the same time.  I have availability tomorrow if she wants to see me.

## 2021-03-02 ENCOUNTER — OFFICE VISIT (OUTPATIENT)
Dept: INTERNAL MEDICINE | Facility: CLINIC | Age: 66
End: 2021-03-02

## 2021-03-02 VITALS — DIASTOLIC BLOOD PRESSURE: 72 MMHG | SYSTOLIC BLOOD PRESSURE: 126 MMHG | BODY MASS INDEX: 148.39 KG/M2 | WEIGHT: 134 LBS

## 2021-03-02 DIAGNOSIS — E55.9 VITAMIN D DEFICIENCY: Primary | ICD-10-CM

## 2021-03-02 DIAGNOSIS — F51.01 PRIMARY INSOMNIA: ICD-10-CM

## 2021-03-02 DIAGNOSIS — R53.83 FATIGUE, UNSPECIFIED TYPE: ICD-10-CM

## 2021-03-02 PROCEDURE — 99443 PR PHYS/QHP TELEPHONE EVALUATION 21-30 MIN: CPT | Performed by: PHYSICIAN ASSISTANT

## 2021-03-02 RX ORDER — TEMAZEPAM 15 MG/1
15 CAPSULE ORAL NIGHTLY PRN
Qty: 20 CAPSULE | Refills: 0 | Status: SHIPPED | OUTPATIENT
Start: 2021-03-02 | End: 2021-03-11

## 2021-03-02 NOTE — PROGRESS NOTES
Patient Care Team:  Parul Lomas MD as PCP - General (Internal Medicine)  Kendrick Andersen MD as Consulting Physician (Dermatology)  Heraclio Mari MD as Consulting Physician (Orthopedic Surgery)  Chio Dhaliwal MD as Consulting Physician (Obstetrics and Gynecology)    Chief Complaint::   Chief Complaint   Patient presents with   • Dizziness   • Fatigue    You have chosen to receive care through a telephone visit. Do you consent to use a telephone visit for your medical care today? Yes    Subjective     HPI  Suze is a 66 year old female who presents for telephone visit to discuss ongoing weakness and dizziness for the past week. She had double knee replacement in December.  An hour after walking and moving around, she feels weak and dizzy.   The feeling overcomes her an she has to sit down and rest for the remainder of the day. Only new medication is hydroxyzine 100mg and it has not helped for sleeping issues.  She has not been sleeping for the past several weeks. She denies chest pain, shortness of breath, or cough. She denies edema.  She is currently in physical therapy three times a week, and now has went to twice daily.         The following portions of the patient's history were reviewed and updated as appropriate: active problem list, medication list, allergies, family history, social history    Review of Systems:   Review of Systems   Constitutional: Positive for fatigue and unexpected weight loss. Negative for activity change, appetite change, chills, diaphoresis, fever and unexpected weight gain.   HENT: Negative for hearing loss.    Eyes: Negative for blurred vision, double vision and visual disturbance.   Respiratory: Negative for chest tightness and shortness of breath.    Cardiovascular: Negative for chest pain, palpitations and leg swelling.   Gastrointestinal: Negative for abdominal pain, blood in stool, constipation, diarrhea, nausea, GERD and indigestion.   Endocrine: Negative  for cold intolerance and heat intolerance.   Genitourinary: Negative for dysuria and hematuria.   Musculoskeletal: Negative for arthralgias and myalgias.   Skin: Negative for skin lesions.   Neurological: Positive for dizziness and weakness. Negative for tremors, seizures, syncope, speech difficulty, headache, memory problem and confusion.   Hematological: Negative for adenopathy. Does not bruise/bleed easily.   Psychiatric/Behavioral: Positive for sleep disturbance. Negative for depressed mood and stress. The patient is not nervous/anxious.        Vital Signs  Vitals:    03/02/21 1647   BP: 126/72   Weight: 60.8 kg (134 lb)     Body mass index is 148.39 kg/m².    Labs  Admission on 12/29/2020, Discharged on 12/31/2020   Component Date Value Ref Range Status   • WBC 12/30/2020 9.33  3.40 - 10.80 10*3/mm3 Final   • RBC 12/30/2020 3.65* 3.77 - 5.28 10*6/mm3 Final   • Hemoglobin 12/30/2020 9.7* 12.0 - 15.9 g/dL Final   • Hematocrit 12/30/2020 30.4* 34.0 - 46.6 % Final   • MCV 12/30/2020 83.3  79.0 - 97.0 fL Final   • MCH 12/30/2020 26.6  26.6 - 33.0 pg Final   • MCHC 12/30/2020 31.9  31.5 - 35.7 g/dL Final   • RDW 12/30/2020 13.9  12.3 - 15.4 % Final   • RDW-SD 12/30/2020 42.1  37.0 - 54.0 fl Final   • MPV 12/30/2020 9.4  6.0 - 12.0 fL Final   • Platelets 12/30/2020 305  140 - 450 10*3/mm3 Final   • COVID19 12/30/2020 Not Detected  Not Detected - Ref. Range Final   • Influenza A PCR 12/30/2020 Not Detected  Not Detected Final   • Influenza B PCR 12/30/2020 Not Detected  Not Detected Final   • WBC 12/31/2020 10.75  3.40 - 10.80 10*3/mm3 Final   • RBC 12/31/2020 3.47* 3.77 - 5.28 10*6/mm3 Final   • Hemoglobin 12/31/2020 9.2* 12.0 - 15.9 g/dL Final   • Hematocrit 12/31/2020 29.3* 34.0 - 46.6 % Final   • MCV 12/31/2020 84.4  79.0 - 97.0 fL Final   • MCH 12/31/2020 26.5* 26.6 - 33.0 pg Final   • MCHC 12/31/2020 31.4* 31.5 - 35.7 g/dL Final   • RDW 12/31/2020 14.0  12.3 - 15.4 % Final   • RDW-SD 12/31/2020 43.1  37.0 - 54.0  fl Final   • MPV 12/31/2020 9.3  6.0 - 12.0 fL Final   • Platelets 12/31/2020 286  140 - 450 10*3/mm3 Final   • Glucose 12/31/2020 113* 65 - 99 mg/dL Final   • BUN 12/31/2020 9  8 - 23 mg/dL Final   • Creatinine 12/31/2020 0.47* 0.57 - 1.00 mg/dL Final   • Sodium 12/31/2020 134* 136 - 145 mmol/L Final   • Potassium 12/31/2020 3.2* 3.5 - 5.2 mmol/L Final   • Chloride 12/31/2020 103  98 - 107 mmol/L Final   • CO2 12/31/2020 25.0  22.0 - 29.0 mmol/L Final   • Calcium 12/31/2020 8.0* 8.6 - 10.5 mg/dL Final   • eGFR Non African Amer 12/31/2020 134  >60 mL/min/1.73 Final   • BUN/Creatinine Ratio 12/31/2020 19.1  7.0 - 25.0 Final   • Anion Gap 12/31/2020 6.0  5.0 - 15.0 mmol/L Final   Appointment on 12/27/2020   Component Date Value Ref Range Status   • SARS-CoV-2 NEETU 12/27/2020 Not Detected  Not Detected Final   Appointment on 12/17/2020   Component Date Value Ref Range Status   • Glucose 12/17/2020 84  65 - 99 mg/dL Final   • BUN 12/17/2020 14  8 - 23 mg/dL Final   • Creatinine 12/17/2020 0.66  0.57 - 1.00 mg/dL Final   • Sodium 12/17/2020 139  136 - 145 mmol/L Final   • Potassium 12/17/2020 4.5  3.5 - 5.2 mmol/L Final   • Chloride 12/17/2020 104  98 - 107 mmol/L Final   • CO2 12/17/2020 28.0  22.0 - 29.0 mmol/L Final   • Calcium 12/17/2020 9.2  8.6 - 10.5 mg/dL Final   • eGFR Non African Amer 12/17/2020 90  >60 mL/min/1.73 Final   • BUN/Creatinine Ratio 12/17/2020 21.2  7.0 - 25.0 Final   • Anion Gap 12/17/2020 7.0  5.0 - 15.0 mmol/L Final   • Protime 12/17/2020 13.5  11.5 - 14.0 Seconds Final   • INR 12/17/2020 1.06  0.85 - 1.16 Final   • PTT 12/17/2020 32.1  24.0 - 37.0 seconds Final   • Hemoglobin A1C 12/17/2020 5.70* 4.80 - 5.60 % Final   • Sed Rate 12/17/2020 10  0 - 30 mm/hr Final   • C-Reactive Protein 12/17/2020 0.31  0.00 - 0.50 mg/dL Final   • WBC 12/17/2020 6.12  3.40 - 10.80 10*3/mm3 Final   • RBC 12/17/2020 5.35* 3.77 - 5.28 10*6/mm3 Final   • Hemoglobin 12/17/2020 14.1  12.0 - 15.9 g/dL Final   •  Hematocrit 12/17/2020 44.6  34.0 - 46.6 % Final   • MCV 12/17/2020 83.4  79.0 - 97.0 fL Final   • MCH 12/17/2020 26.4* 26.6 - 33.0 pg Final   • MCHC 12/17/2020 31.6  31.5 - 35.7 g/dL Final   • RDW 12/17/2020 13.4  12.3 - 15.4 % Final   • RDW-SD 12/17/2020 40.7  37.0 - 54.0 fl Final   • MPV 12/17/2020 8.9  6.0 - 12.0 fL Final   • Platelets 12/17/2020 389  140 - 450 10*3/mm3 Final   • Neutrophil % 12/17/2020 64.7  42.7 - 76.0 % Final   • Lymphocyte % 12/17/2020 27.5  19.6 - 45.3 % Final   • Monocyte % 12/17/2020 6.2  5.0 - 12.0 % Final   • Eosinophil % 12/17/2020 0.7  0.3 - 6.2 % Final   • Basophil % 12/17/2020 0.7  0.0 - 1.5 % Final   • Immature Grans % 12/17/2020 0.2  0.0 - 0.5 % Final   • Neutrophils, Absolute 12/17/2020 3.97  1.70 - 7.00 10*3/mm3 Final   • Lymphocytes, Absolute 12/17/2020 1.68  0.70 - 3.10 10*3/mm3 Final   • Monocytes, Absolute 12/17/2020 0.38  0.10 - 0.90 10*3/mm3 Final   • Eosinophils, Absolute 12/17/2020 0.04  0.00 - 0.40 10*3/mm3 Final   • Basophils, Absolute 12/17/2020 0.04  0.00 - 0.20 10*3/mm3 Final   • Immature Grans, Absolute 12/17/2020 0.01  0.00 - 0.05 10*3/mm3 Final   • nRBC 12/17/2020 0.0  0.0 - 0.2 /100 WBC Final       Imaging  No radiology results for the last 30 days.      Current Outpatient Medications:   •  Ascorbic Acid (VITAMIN C PO), Take 500 mg by mouth Daily. Takes 500 mg, Disp: , Rfl:   •  atorvastatin (LIPITOR) 10 MG tablet, Take 1 tablet by mouth Every Night., Disp: 90 tablet, Rfl: 1  •  calcium carb-cholecalciferol (Calcium 600/Vitamin D3) 600-800 MG-UNIT tablet, Take 1 tablet by mouth Daily., Disp: , Rfl:   •  Calcium Polycarbophil (FIBER-CAPS PO), Take 1 tablet by mouth Daily., Disp: , Rfl:   •  Cyanocobalamin (VITAMIN B-12 PO), Take 5,000 mcg by mouth Daily., Disp: , Rfl:   •  estradiol (ESTRACE) 0.5 MG tablet, Take 0.5 mg by mouth Daily., Disp: , Rfl:   •  Flax Oil-Fish Oil-Borage Oil (FISH OIL-FLAX OIL-BORAGE OIL PO), Take 1 capsule by mouth Daily., Disp: , Rfl:   •   FLUoxetine (PROzac) 10 MG capsule, Take 1 capsule by mouth Daily., Disp: 90 capsule, Rfl: 3  •  nortriptyline (PAMELOR) 10 MG capsule, Take 4 capsules by mouth At Night As Needed for Sleep., Disp: 120 capsule, Rfl: 10  •  pramipexole (MIRAPEX) 0.5 MG tablet, TAKE TWO TABLETS BY MOUTH EVERY NIGHT AT BEDTIME AND TAKE ONE TABLET BY MOUTH DURING THE DAY AS NEEDED, Disp: 75 tablet, Rfl: 4  •  temazepam (RESTORIL) 15 MG capsule, Take 1 capsule by mouth At Night As Needed for Sleep., Disp: 20 capsule, Rfl: 0    Physical Exam:    Physical Exam  Vitals signs reviewed.   Neurological:      Mental Status: She is alert.   Psychiatric:         Mood and Affect: Mood normal.         Behavior: Behavior normal.         Thought Content: Thought content normal.         Judgment: Judgment normal.         Procedures        Assessment/Plan   Problem List Items Addressed This Visit        Endocrine and Metabolic    Vitamin D deficiency - Primary    Overview     Continue current dose vitamin D3 daily.            Sleep    Primary insomnia    Overview     She has not slept more than 2-3 hours in the past several weeks.   Short term trial of temazepam.    Take 40 mg nortriptyline every evening as needed.    We discussed sleep hygiene including going to bed at the same time and getting up at the same time every day, going to bed early enough to get 7 or 8 hours in bed, reading and relaxing before bedtime, and avoiding the TV, computer, phone, iPad close to bedtime.           Relevant Medications    temazepam (RESTORIL) 15 MG capsule       Symptoms and Signs    Fatigue    Overview     Increase in weakness and dizziness.  History of anemia.  Repeat CBC. Pt encouraged to continue to drink plenty of water.  She reports an 11 pound weight loss since surgery.         Relevant Orders    CBC & Differential    Comprehensive Metabolic Panel    T4, Free    TSH    Vitamin B12    Vitamin D 25 Hydroxy        This visit has been rescheduled as a phone visit  to comply with patient safety concerns in accordance with CDC recommendations. Total time of discussion was 25 minutes.    Return for Next scheduled follow up.    Plan of care reviewed with patient at the conclusion of today's visit. Education was provided regarding diagnosis, management, and any prescribed or recommended OTC medications.Patient verbalizes understanding of and agreement with management plan.       Carlene Cortes PA-C    Please note that portions of this note were completed with a voice recognition program. Efforts were made to edit the dictations, but occasionally words are mistranscribed.

## 2021-03-03 ENCOUNTER — OFFICE VISIT (OUTPATIENT)
Dept: ORTHOPEDIC SURGERY | Facility: CLINIC | Age: 66
End: 2021-03-03

## 2021-03-03 ENCOUNTER — LAB (OUTPATIENT)
Dept: LAB | Facility: HOSPITAL | Age: 66
End: 2021-03-03

## 2021-03-03 VITALS — BODY MASS INDEX: 23 KG/M2 | HEIGHT: 64 IN

## 2021-03-03 DIAGNOSIS — R53.83 FATIGUE, UNSPECIFIED TYPE: ICD-10-CM

## 2021-03-03 DIAGNOSIS — Z96.653 S/P TOTAL KNEE ARTHROPLASTY, BILATERAL: Primary | ICD-10-CM

## 2021-03-03 DIAGNOSIS — Z47.89 ORTHOPEDIC AFTERCARE: ICD-10-CM

## 2021-03-03 LAB
ALBUMIN SERPL-MCNC: 4.1 G/DL (ref 3.5–5.2)
ALBUMIN/GLOB SERPL: 1.5 G/DL
ALP SERPL-CCNC: 90 U/L (ref 39–117)
ALT SERPL W P-5'-P-CCNC: 14 U/L (ref 1–33)
ANION GAP SERPL CALCULATED.3IONS-SCNC: 7.6 MMOL/L (ref 5–15)
AST SERPL-CCNC: 17 U/L (ref 1–32)
BASOPHILS # BLD AUTO: 0.07 10*3/MM3 (ref 0–0.2)
BASOPHILS NFR BLD AUTO: 0.9 % (ref 0–1.5)
BILIRUB SERPL-MCNC: 0.2 MG/DL (ref 0–1.2)
BUN SERPL-MCNC: 12 MG/DL (ref 8–23)
BUN/CREAT SERPL: 16.7 (ref 7–25)
CALCIUM SPEC-SCNC: 9.4 MG/DL (ref 8.6–10.5)
CHLORIDE SERPL-SCNC: 101 MMOL/L (ref 98–107)
CO2 SERPL-SCNC: 27.4 MMOL/L (ref 22–29)
CREAT SERPL-MCNC: 0.72 MG/DL (ref 0.57–1)
DEPRECATED RDW RBC AUTO: 38.3 FL (ref 37–54)
EOSINOPHIL # BLD AUTO: 0.03 10*3/MM3 (ref 0–0.4)
EOSINOPHIL NFR BLD AUTO: 0.4 % (ref 0.3–6.2)
ERYTHROCYTE [DISTWIDTH] IN BLOOD BY AUTOMATED COUNT: 13.3 % (ref 12.3–15.4)
GFR SERPL CREATININE-BSD FRML MDRD: 82 ML/MIN/1.73
GLOBULIN UR ELPH-MCNC: 2.7 GM/DL
GLUCOSE SERPL-MCNC: 88 MG/DL (ref 65–99)
HCT VFR BLD AUTO: 40.9 % (ref 34–46.6)
HGB BLD-MCNC: 13.1 G/DL (ref 12–15.9)
IMM GRANULOCYTES # BLD AUTO: 0.04 10*3/MM3 (ref 0–0.05)
IMM GRANULOCYTES NFR BLD AUTO: 0.5 % (ref 0–0.5)
LYMPHOCYTES # BLD AUTO: 1.81 10*3/MM3 (ref 0.7–3.1)
LYMPHOCYTES NFR BLD AUTO: 24.1 % (ref 19.6–45.3)
MCH RBC QN AUTO: 25.5 PG (ref 26.6–33)
MCHC RBC AUTO-ENTMCNC: 32 G/DL (ref 31.5–35.7)
MCV RBC AUTO: 79.6 FL (ref 79–97)
MONOCYTES # BLD AUTO: 0.43 10*3/MM3 (ref 0.1–0.9)
MONOCYTES NFR BLD AUTO: 5.7 % (ref 5–12)
NEUTROPHILS NFR BLD AUTO: 5.14 10*3/MM3 (ref 1.7–7)
NEUTROPHILS NFR BLD AUTO: 68.4 % (ref 42.7–76)
NRBC BLD AUTO-RTO: 0 /100 WBC (ref 0–0.2)
PLATELET # BLD AUTO: 449 10*3/MM3 (ref 140–450)
PMV BLD AUTO: 9.3 FL (ref 6–12)
POTASSIUM SERPL-SCNC: 4.4 MMOL/L (ref 3.5–5.2)
PROT SERPL-MCNC: 6.8 G/DL (ref 6–8.5)
RBC # BLD AUTO: 5.14 10*6/MM3 (ref 3.77–5.28)
SODIUM SERPL-SCNC: 136 MMOL/L (ref 136–145)
T4 FREE SERPL-MCNC: 1.03 NG/DL (ref 0.93–1.7)
TSH SERPL DL<=0.05 MIU/L-ACNC: 2.18 UIU/ML (ref 0.27–4.2)
WBC # BLD AUTO: 7.52 10*3/MM3 (ref 3.4–10.8)

## 2021-03-03 PROCEDURE — 82306 VITAMIN D 25 HYDROXY: CPT

## 2021-03-03 PROCEDURE — 82607 VITAMIN B-12: CPT

## 2021-03-03 PROCEDURE — 84443 ASSAY THYROID STIM HORMONE: CPT

## 2021-03-03 PROCEDURE — 99024 POSTOP FOLLOW-UP VISIT: CPT | Performed by: ORTHOPAEDIC SURGERY

## 2021-03-03 PROCEDURE — 80053 COMPREHEN METABOLIC PANEL: CPT

## 2021-03-03 PROCEDURE — 84439 ASSAY OF FREE THYROXINE: CPT

## 2021-03-03 PROCEDURE — 85025 COMPLETE CBC W/AUTO DIFF WBC: CPT

## 2021-03-03 NOTE — PROGRESS NOTES
[facility] Note    Subjective     Chief Complaint   Patient presents with   • Post-op Follow-up     9 weeks S/P Total Knee Arthroplasty Bilateral 12/29/20        HPI    Suze Baeza is a 66 y.o. female who follows up status post bilateral total knee arthroplasties performed 12/29/2020. She states that when compared to before surgery, her pain is 80 percent or more improved with only mild bilateral knee numbness. She brings in therapy notes for my review, which reveal that her motion has improved.    I have reviewed the following portions of the patient's history and agree with: History of Present Illness and Review of Systems    Patient Active Problem List   Diagnosis   • Hypercholesterolemia   • Vitamin D deficiency   • Primary insomnia   • Restless leg syndrome   • Chronic pain of both knees   • Chronic bilateral low back pain   • Other viral warts   • Bilateral headaches   • Stress and adjustment reaction   • Primary osteoarthritis involving multiple joints   • Cervicalgia   • Baker cyst, right   • Chronic fatigue   • Hot flashes due to menopause   • Primary osteoarthritis of both knees   • Synovial cyst of popliteal space   • Post-nasal drainage   • Annual physical exam   • S/P total knee arthroplasty, bilateral   • Acute blood loss anemia   • Acute postoperative pain   • Hypokalemia     Past Medical History:   Diagnosis Date   • Degenerative tear of medial meniscus, right    • Hot flashes     PROZAC    • Low back pain    • Neuropathy, lumbosacral (radicular)    • Primary osteoarthritis of both knees    • Spondylolisthesis, lumbar region    • Torn meniscus     right medial   • Viral warts 02/25/2019   • Wears reading eyeglasses       Past Surgical History:   Procedure Laterality Date   • BLADDER SURGERY     • COLONOSCOPY     • HYSTERECTOMY  07/10/2012   • KNEE ARTHROSCOPY Right    • OOPHORECTOMY Bilateral 07/10/2012   • TOTAL KNEE ARTHROPLASTY Bilateral 12/29/2020    Procedure: TOTAL  KNEE ARTHROPLASTY BILATERAL;  Surgeon: Heraclio Mari MD;  Location: [facility];  Service: Orthopedics;  Laterality: Bilateral;      Family History   Problem Relation Age of Onset   • Deep vein thrombosis Mother    • Stroke Mother    • Heart disease Mother 68   • Heart attack Mother    • Osteoarthritis Mother    • Heart disease Father 51        cause of death   • Heart attack Father    • Stroke Other    • Cancer Maternal Aunt         bone   • Diabetes Maternal Grandmother    • Cancer Maternal Aunt         lymphoma   • Cancer Maternal Aunt         brain   • Breast cancer Neg Hx    • Ovarian cancer Neg Hx      Social History     Socioeconomic History   • Marital status:      Spouse name: Not on file   • Number of children: Not on file   • Years of education: Not on file   • Highest education level: Not on file   Tobacco Use   • Smoking status: Never Smoker   • Smokeless tobacco: Never Used   Substance and Sexual Activity   • Alcohol use: No   • Drug use: No   • Sexual activity: Defer   Social History Narrative          Current Outpatient Medications on File Prior to Visit   Medication Sig Dispense Refill   • Ascorbic Acid (VITAMIN C PO) Take 500 mg by mouth Daily. Takes 500 mg     • atorvastatin (LIPITOR) 10 MG tablet Take 1 tablet by mouth Every Night. 90 tablet 1   • calcium carb-cholecalciferol (Calcium 600/Vitamin D3) 600-800 MG-UNIT tablet Take 1 tablet by mouth Daily.     • Calcium Polycarbophil (FIBER-CAPS PO) Take 1 tablet by mouth Daily.     • Cyanocobalamin (VITAMIN B-12 PO) Take 5,000 mcg by mouth Daily.     • estradiol (ESTRACE) 0.5 MG tablet Take 0.5 mg by mouth Daily.     • Flax Oil-Fish Oil-Borage Oil (FISH OIL-FLAX OIL-BORAGE OIL PO) Take 1 capsule by mouth Daily.     • FLUoxetine (PROzac) 10 MG capsule Take 1 capsule by mouth Daily. 90 capsule 3   • nortriptyline (PAMELOR) 10 MG capsule Take 4 capsules by mouth At Night As Needed for Sleep. 120 capsule 10   • pramipexole (MIRAPEX) 0.5  "MG tablet TAKE TWO TABLETS BY MOUTH EVERY NIGHT AT BEDTIME AND TAKE ONE TABLET BY MOUTH DURING THE DAY AS NEEDED 75 tablet 4   • temazepam (RESTORIL) 15 MG capsule Take 1 capsule by mouth At Night As Needed for Sleep. 20 capsule 0     No current facility-administered medications on file prior to visit.       Allergies   Allergen Reactions   • Other Itching and Rash     CHG        Review of Systems   Constitutional: Negative.    HENT: Negative.    Eyes: Negative.    Respiratory: Negative.    Cardiovascular: Negative.    Gastrointestinal: Negative.    Endocrine: Negative.    Genitourinary: Negative.    Musculoskeletal: Positive for arthralgias.   Skin: Negative.    Allergic/Immunologic: Negative.    Neurological: Negative.    Hematological: Negative.    Psychiatric/Behavioral: Negative.         Objective      Physical Exam  Ht 162.6 cm (64\")   BMI 23.00 kg/m²     Body mass index is 23 kg/m².    General:   Mental Status:  Alert   Appearance: Cooperative, in no acute distress   Build and Nutrition: Well-nourished well-developed female   Orientation: Alert and oriented to person, place and time   Posture: Normal   Gait: Normal    Integument       • Right knee: Wound is well-healed with no signs of infection        • Left knee: Wound is well-healed with no signs of infection     Lower Extremities  • Right Knee:        • Tenderness: None       • Swelling: None        • Effusion: None       • Crepitus: None       • Atrophy: None       • Range of motion:             • Extension: 0°             • Flexion: 125°        • Instability: No varus or valgus laxity. Negative anterior drawer.       • Deformities: None    • Left Knee:        • Tenderness: None       • Swelling: None        • Effusion: None       • Crepitus: None       • Atrophy: None       • Range of motion:             • Extension: 0°             • Flexion: 120°        • Instability: No varus or valgus laxity. Negative anterior drawer.       • Deformities: " None    Imaging/Studies  Imaging Results (Last 24 Hours)     ** No results found for the last 24 hours. **            Assessment and Plan     Diagnoses and all orders for this visit:    1. S/P total knee arthroplasty, bilateral (Primary)    2. Orthopedic aftercare        1. S/P total knee arthroplasty, bilateral    2. Orthopedic aftercare        Ms. Baeza presented today for follow-up status post bilateral total knee arthroplasties performed 12/29/2020. She is doing well postop. She has been attending physical therapy, and is making good progress there. She asked if she should continue aspirin, and was told from my standpoint she no longer requires being on aspirin. She wishes to drive, and I informed her that she is clear to drive. Regarding her knee swelling, I explained that her knee swelling, which is not painful, may be present for 6 months to 1 year after surgery.    Return in about 10 months (around 1/3/2022) for Recheck with X-Rays.      Scribed for Heraclio Mari MD by PREETI SELLERS.  03/04/21   09:57 EST    I have personally performed the services described in this document as scribed by the above individual, and it is both accurate and complete.  Heraclio Mari MD  3/4/2021  13:00 EST

## 2021-03-04 LAB
25(OH)D3 SERPL-MCNC: 48.5 NG/ML
VIT B12 BLD-MCNC: 1276 PG/ML (ref 211–946)

## 2021-03-05 ENCOUNTER — TELEPHONE (OUTPATIENT)
Dept: INTERNAL MEDICINE | Facility: CLINIC | Age: 66
End: 2021-03-05

## 2021-03-05 NOTE — TELEPHONE ENCOUNTER
I have reviewed the blood tests done 3/3/2021.  Vitamin B12 is high, which is good.  All the other labs look good, there is nothing concerning to see.  Please let patient know.  Thanks

## 2021-03-05 NOTE — TELEPHONE ENCOUNTER
Caller: Suze Baeza    Relationship: Self    Best call back number: [phone]    Caller requesting test results: suze    What test was performed: blood work     When was the test performed: 03/03/21    Where was the test performed: [facility]    Additional notes:

## 2021-03-05 NOTE — TELEPHONE ENCOUNTER
PATIENT CALLED BACK AGAIN AND DOES NOT WANT TO WAIT TO REVIEW HER LABS AT HER VISIT.  SHE WANTS SOMEONE FROM [facility] OFFICE TO CALL HER TODAY [phone] BEFORE THE END OF DAY.

## 2021-03-11 ENCOUNTER — OFFICE VISIT (OUTPATIENT)
Dept: INTERNAL MEDICINE | Facility: CLINIC | Age: 66
End: 2021-03-11

## 2021-03-11 VITALS
HEIGHT: 64 IN | DIASTOLIC BLOOD PRESSURE: 66 MMHG | SYSTOLIC BLOOD PRESSURE: 104 MMHG | WEIGHT: 135 LBS | HEART RATE: 68 BPM | BODY MASS INDEX: 23.05 KG/M2 | TEMPERATURE: 97.7 F

## 2021-03-11 DIAGNOSIS — G25.81 RESTLESS LEG SYNDROME: ICD-10-CM

## 2021-03-11 DIAGNOSIS — F51.01 PRIMARY INSOMNIA: Primary | ICD-10-CM

## 2021-03-11 DIAGNOSIS — M17.0 PRIMARY OSTEOARTHRITIS OF BOTH KNEES: ICD-10-CM

## 2021-03-11 DIAGNOSIS — E78.00 HYPERCHOLESTEROLEMIA: ICD-10-CM

## 2021-03-11 PROBLEM — G89.18 ACUTE POSTOPERATIVE PAIN: Status: RESOLVED | Noted: 2020-12-30 | Resolved: 2021-03-11

## 2021-03-11 PROBLEM — G89.29 CHRONIC PAIN OF BOTH KNEES: Status: RESOLVED | Noted: 2019-02-25 | Resolved: 2021-03-11

## 2021-03-11 PROBLEM — M25.562 CHRONIC PAIN OF BOTH KNEES: Status: RESOLVED | Noted: 2019-02-25 | Resolved: 2021-03-11

## 2021-03-11 PROBLEM — M25.561 CHRONIC PAIN OF BOTH KNEES: Status: RESOLVED | Noted: 2019-02-25 | Resolved: 2021-03-11

## 2021-03-11 PROCEDURE — 99214 OFFICE O/P EST MOD 30 MIN: CPT | Performed by: INTERNAL MEDICINE

## 2021-03-11 RX ORDER — MIRTAZAPINE 15 MG/1
15 TABLET, FILM COATED ORAL NIGHTLY
Qty: 30 TABLET | Refills: 5 | Status: SHIPPED | OUTPATIENT
Start: 2021-03-11 | End: 2021-09-23

## 2021-03-11 NOTE — PROGRESS NOTES
[facility]     Suze Baeza  1955   [id number]      Patient Care Team:  Parul Lomas MD as PCP - General (Internal Medicine)  Kendrick Andersen MD as Consulting Physician (Dermatology)  Heraclio Mari MD as Consulting Physician (Orthopedic Surgery)  Chio Dhaliwal MD as Consulting Physician (Obstetrics and Gynecology)    Chief Complaint   Patient presents with   • Vitamin D Deficiency   • Hyperlipidemia            HPI  Patient is a 66 y.o. female presents with chronic insomnia is now better.  Trouble falling asleep and staying asleep.  Recently tried temazepam with no improvement.  No side effects either.  We have also tried nortriptyline which did not help even at 40 mg nightly.  She might have had a little shaking and weak feeling due to the nortriptyline as well.  We have also tried trazodone in the past without improvement.    She is currently on fluoxetine and has been for some time for hot flashes with menopause.  It has worked well for the menopause symptoms.  She has never missed a dose so she does not know if the hot flashes would return without it.    CHRONIC CONDITIONS  Status post bilateral knee replacements    She has lost 10 pounds since knee replacements, but she states that her appetite is good and she is eating very well.  She has also been a lot more active.    Restless leg symptoms occur on and off, not every night.  Pramipexole works very well to resolve those symptoms.  It has not helped her sleep on a regular basis.    For hyperlipidemia, she takes atorvastatin every evening and eats a low-fat diet.    Past Medical History:   Diagnosis Date   • Degenerative tear of medial meniscus, right    • Hot flashes     PROZAC    • Low back pain    • Neuropathy, lumbosacral (radicular)    • Primary osteoarthritis of both knees    • Spondylolisthesis, lumbar region    • Torn meniscus     right medial   • Viral warts 02/25/2019   • Wears reading eyeglasses         Past Surgical History:   Procedure Laterality Date   • BLADDER SURGERY     • COLONOSCOPY     • HYSTERECTOMY  07/10/2012   • KNEE ARTHROSCOPY Right    • OOPHORECTOMY Bilateral 07/10/2012   • TOTAL KNEE ARTHROPLASTY Bilateral 12/29/2020    Procedure: TOTAL KNEE ARTHROPLASTY BILATERAL;  Surgeon: Heraclio Mari MD;  Location: On license of [facility];  Service: Orthopedics;  Laterality: Bilateral;       Family History   Problem Relation Age of Onset   • Deep vein thrombosis Mother    • Stroke Mother    • Heart disease Mother 68   • Heart attack Mother    • Osteoarthritis Mother    • Heart disease Father 51        cause of death   • Heart attack Father    • Stroke Other    • Cancer Maternal Aunt         bone   • Diabetes Maternal Grandmother    • Cancer Maternal Aunt         lymphoma   • Cancer Maternal Aunt         brain   • Breast cancer Neg Hx    • Ovarian cancer Neg Hx        Social History     Socioeconomic History   • Marital status:      Spouse name: Not on file   • Number of children: Not on file   • Years of education: Not on file   • Highest education level: Not on file   Tobacco Use   • Smoking status: Never Smoker   • Smokeless tobacco: Never Used   Vaping Use   • Vaping Use: Never used   Substance and Sexual Activity   • Alcohol use: No   • Drug use: No   • Sexual activity: Defer       Allergies   Allergen Reactions   • Other Itching and Rash     CHG       Review of Systems:     Review of Systems   Constitutional: Positive for fatigue. Negative for chills and fever.   HENT: Negative for congestion, ear pain and sinus pressure.    Respiratory: Negative for cough, chest tightness, shortness of breath and wheezing.    Cardiovascular: Negative for chest pain, palpitations and leg swelling.   Gastrointestinal: Negative for abdominal pain, blood in stool and constipation.   Musculoskeletal: Negative for arthralgias, back pain and gait problem.   Skin: Negative for color change.   Allergic/Immunologic: Negative for  "environmental allergies.   Neurological: Negative for dizziness and headache.   Psychiatric/Behavioral: Positive for sleep disturbance. Negative for decreased concentration and depressed mood. The patient is not nervous/anxious.        Vital Signs  Vitals:    03/11/21 1010   BP: 104/66   BP Location: Left arm   Patient Position: Sitting   Cuff Size: Adult   Pulse: 68   Temp: 97.7 °F (36.5 °C)   TempSrc: Temporal   Weight: 61.2 kg (135 lb)   Height: 162.6 cm (64.02\")   PainSc: 0-No pain     Body mass index is 23.16 kg/m².      Current Outpatient Medications:   •  Ascorbic Acid (VITAMIN C PO), Take 500 mg by mouth Daily., Disp: , Rfl:   •  atorvastatin (LIPITOR) 10 MG tablet, Take 1 tablet by mouth Every Night., Disp: 90 tablet, Rfl: 1  •  calcium carb-cholecalciferol (Calcium 600/Vitamin D3) 600-800 MG-UNIT tablet, Take 1 tablet by mouth Daily., Disp: , Rfl:   •  Calcium Polycarbophil (FIBER-CAPS PO), Take 1 tablet by mouth Daily., Disp: , Rfl:   •  Cyanocobalamin (VITAMIN B-12 PO), Take 5,000 mcg by mouth Daily., Disp: , Rfl:   •  estradiol (ESTRACE) 0.5 MG tablet, Take 0.5 mg by mouth Daily., Disp: , Rfl:   •  Flax Oil-Fish Oil-Borage Oil (FISH OIL-FLAX OIL-BORAGE OIL PO), Take 1 capsule by mouth Daily., Disp: , Rfl:   •  pramipexole (MIRAPEX) 0.5 MG tablet, TAKE TWO TABLETS BY MOUTH EVERY NIGHT AT BEDTIME AND TAKE ONE TABLET BY MOUTH DURING THE DAY AS NEEDED, Disp: 75 tablet, Rfl: 4  •  mirtazapine (Remeron) 15 MG tablet, Take 1 tablet by mouth Every Night., Disp: 30 tablet, Rfl: 5    Physical Exam:    Physical Exam  Vitals and nursing note reviewed.   Constitutional:       Appearance: She is well-developed.   HENT:      Head: Normocephalic.   Eyes:      Conjunctiva/sclera: Conjunctivae normal.      Pupils: Pupils are equal, round, and reactive to light.   Neck:      Thyroid: No thyromegaly.   Cardiovascular:      Rate and Rhythm: Normal rate and regular rhythm.      Heart sounds: Normal heart sounds.   Pulmonary: "      Effort: Pulmonary effort is normal.      Breath sounds: Normal breath sounds. No wheezing.   Musculoskeletal:         General: Normal range of motion.      Cervical back: Normal range of motion and neck supple.      Right knee: No swelling or deformity. No tenderness.      Left knee: No swelling or deformity. No tenderness.   Lymphadenopathy:      Cervical: No cervical adenopathy.   Skin:     General: Skin is warm and dry.   Neurological:      Mental Status: She is alert and oriented to person, place, and time.   Psychiatric:         Thought Content: Thought content normal.          ACE III MINI        Results Review:    We reviewed her recent labs in detail.    CMP:  Lab Results   Component Value Date    BUN 12 03/03/2021    CREATININE 0.72 03/03/2021    EGFRIFNONA 82 03/03/2021    BCR 16.7 03/03/2021     03/03/2021    K 4.4 03/03/2021    CO2 27.4 03/03/2021    CALCIUM 9.4 03/03/2021    ALBUMIN 4.10 03/03/2021    BILITOT 0.2 03/03/2021    ALKPHOS 90 03/03/2021    AST 17 03/03/2021    ALT 14 03/03/2021     HbA1c:  Lab Results   Component Value Date    HGBA1C 5.70 (H) 12/17/2020     Microalbumin:  No results found for: MICROALBUR, POCMALB, POCCREAT  Lipid Panel  Lab Results   Component Value Date    CHOL 165 09/10/2020    TRIG 156 (H) 09/10/2020    HDL 46 09/10/2020    LDL 88 09/10/2020    AST 17 03/03/2021    ALT 14 03/03/2021       Medication Review: Medications reviewed and noted  Patient Instructions   Problem List Items Addressed This Visit        Cardiac and Vasculature    Hypercholesterolemia    Overview     3/11/2021 Parul Lomas MD    Continue atorvastatin every evening.    Continue low fat diet and regular exercise.          Relevant Medications    atorvastatin (LIPITOR) 10 MG tablet       Musculoskeletal and Injuries    Primary osteoarthritis of both knees    Overview     3/11/2021 Parul Lomas MD    Status post bilateral knee replacements 12/2020.  Doing very well.    Continue  physical therapy and daily exercises at home.      Follow-up with Dr. Mari as planned.            Neuro    Restless leg syndrome    Overview     3/11/2021 Parul Lomas MD    Continue Mirapex every evening as needed.  Continue drinking plenty of fluids every day            Sleep    Primary insomnia - Primary    Overview     3/11/2021 Parul Lomas MD    She has not slept more than 2-3 hours in the past several weeks.   Short term trial of temazepam did not help.  Nortriptyline nor trazodone have helped in the past.    We will try stopping fluoxetine for hot flashes and replacing it with mirtazapine 15 mg every evening to see if we can improve sleep.  She will let us know how this works.  She will let us know if hot flashes returned with a vengeance.    We discussed sleep hygiene including going to bed at the same time and getting up at the same time every day, going to bed early enough to get 7 or 8 hours in bed, reading and relaxing before bedtime, and avoiding the TV, computer, phone, iPad close to bedtime.    New medication added today. Benefits and possible side effects discussed. Patient verbalized understanding.             Relevant Medications    mirtazapine (Remeron) 15 MG tablet             Diagnosis Plan   1. Primary insomnia  mirtazapine (Remeron) 15 MG tablet   2. Hypercholesterolemia     3. Primary osteoarthritis of both knees     4. Restless leg syndrome             Plan of care reviewed with patient at the conclusion of today's visit. Education was provided regarding diagnosis, management, and any prescribed or recommended OTC medications.Patient verbalizes understanding of and agreement with management plan.         Parul Lomas MD

## 2021-03-11 NOTE — PATIENT INSTRUCTIONS
Patient Instructions   Problem List Items Addressed This Visit        Cardiac and Vasculature    Hypercholesterolemia    Overview     3/11/2021 Parul Lomas MD    Continue atorvastatin every evening.    Continue low fat diet and regular exercise.          Relevant Medications    atorvastatin (LIPITOR) 10 MG tablet       Musculoskeletal and Injuries    Primary osteoarthritis of both knees    Overview     3/11/2021 Parul Lomas MD    Status post bilateral knee replacements 12/2020.  Doing very well.    Continue physical therapy and daily exercises at home.      Follow-up with Dr. Mari as planned.            Neuro    Restless leg syndrome    Overview     3/11/2021 Parul Lomas MD    Continue Mirapex every evening as needed.  Continue drinking plenty of fluids every day            Sleep    Primary insomnia - Primary    Overview     3/11/2021 Parul Lomas MD    She has not slept more than 2-3 hours in the past several weeks.   Short term trial of temazepam did not help.  Nortriptyline nor trazodone have helped in the past.    We will try stopping fluoxetine for hot flashes and replacing it with mirtazapine 15 mg every evening to see if we can improve sleep.  She will let us know how this works.  She will let us know if hot flashes returned with a vengeance.    We discussed sleep hygiene including going to bed at the same time and getting up at the same time every day, going to bed early enough to get 7 or 8 hours in bed, reading and relaxing before bedtime, and avoiding the TV, computer, phone, iPad close to bedtime.    New medication added today. Benefits and possible side effects discussed. Patient verbalized understanding.             Relevant Medications    mirtazapine (Remeron) 15 MG tablet             Insomnia  Insomnia is a sleep disorder that makes it difficult to fall asleep or stay asleep. Insomnia can cause fatigue, low energy, difficulty concentrating, mood swings, and poor performance at  work or school.  There are three different ways to classify insomnia:  · Difficulty falling asleep.  · Difficulty staying asleep.  · Waking up too early in the morning.  Any type of insomnia can be long-term (chronic) or short-term (acute). Both are common. Short-term insomnia usually lasts for three months or less. Chronic insomnia occurs at least three times a week for longer than three months.  What are the causes?  Insomnia may be caused by another condition, situation, or substance, such as:  · Anxiety.  · Certain medicines.  · Gastroesophageal reflux disease (GERD) or other gastrointestinal conditions.  · Asthma or other breathing conditions.  · Restless legs syndrome, sleep apnea, or other sleep disorders.  · Chronic pain.  · Menopause.  · Stroke.  · Abuse of alcohol, tobacco, or illegal drugs.  · Mental health conditions, such as depression.  · Caffeine.  · Neurological disorders, such as Alzheimer's disease.  · An overactive thyroid (hyperthyroidism).  Sometimes, the cause of insomnia may not be known.  What increases the risk?  Risk factors for insomnia include:  · Gender. Women are affected more often than men.  · Age. Insomnia is more common as you get older.  · Stress.  · Lack of exercise.  · Irregular work schedule or working night shifts.  · Traveling between different time zones.  · Certain medical and mental health conditions.  What are the signs or symptoms?  If you have insomnia, the main symptom is having trouble falling asleep or having trouble staying asleep. This may lead to other symptoms, such as:  · Feeling fatigued or having low energy.  · Feeling nervous about going to sleep.  · Not feeling rested in the morning.  · Having trouble concentrating.  · Feeling irritable, anxious, or depressed.  How is this diagnosed?  This condition may be diagnosed based on:  · Your symptoms and medical history. Your health care provider may ask about:  ? Your sleep habits.  ? Any medical conditions you  have.  ? Your mental health.  · A physical exam.  How is this treated?  Treatment for insomnia depends on the cause. Treatment may focus on treating an underlying condition that is causing insomnia. Treatment may also include:  · Medicines to help you sleep.  · Counseling or therapy.  · Lifestyle adjustments to help you sleep better.  Follow these instructions at home:  Eating and drinking    · Limit or avoid alcohol, caffeinated beverages, and cigarettes, especially close to bedtime. These can disrupt your sleep.  · Do not eat a large meal or eat spicy foods right before bedtime. This can lead to digestive discomfort that can make it hard for you to sleep.  Sleep habits    · Keep a sleep diary to help you and your health care provider figure out what could be causing your insomnia. Write down:  ? When you sleep.  ? When you wake up during the night.  ? How well you sleep.  ? How rested you feel the next day.  ? Any side effects of medicines you are taking.  ? What you eat and drink.  · Make your bedroom a dark, comfortable place where it is easy to fall asleep.  ? Put up shades or blackout curtains to block light from outside.  ? Use a white noise machine to block noise.  ? Keep the temperature cool.  · Limit screen use before bedtime. This includes:  ? Watching TV.  ? Using your smartphone, tablet, or computer.  · Stick to a routine that includes going to bed and waking up at the same times every day and night. This can help you fall asleep faster. Consider making a quiet activity, such as reading, part of your nighttime routine.  · Try to avoid taking naps during the day so that you sleep better at night.  · Get out of bed if you are still awake after 15 minutes of trying to sleep. Keep the lights down, but try reading or doing a quiet activity. When you feel sleepy, go back to bed.  General instructions  · Take over-the-counter and prescription medicines only as told by your health care provider.  · Exercise  regularly, as told by your health care provider. Avoid exercise starting several hours before bedtime.  · Use relaxation techniques to manage stress. Ask your health care provider to suggest some techniques that may work well for you. These may include:  ? Breathing exercises.  ? Routines to release muscle tension.  ? Visualizing peaceful scenes.  · Make sure that you drive carefully. Avoid driving if you feel very sleepy.  · Keep all follow-up visits as told by your health care provider. This is important.  Contact a health care provider if:  · You are tired throughout the day.  · You have trouble in your daily routine due to sleepiness.  · You continue to have sleep problems, or your sleep problems get worse.  Get help right away if:  · You have serious thoughts about hurting yourself or someone else.  If you ever feel like you may hurt yourself or others, or have thoughts about taking your own life, get help right away. You can go to your nearest emergency department or call:  · Your local emergency services (911 in the [place]  · A suicide crisis helpline, such as the National Suicide Prevention Lifeline at 1-148.820.5762. This is open 24 hours a day.  Summary  · Insomnia is a sleep disorder that makes it difficult to fall asleep or stay asleep.  · Insomnia can be long-term (chronic) or short-term (acute).  · Treatment for insomnia depends on the cause. Treatment may focus on treating an underlying condition that is causing insomnia.  · Keep a sleep diary to help you and your health care provider figure out what could be causing your insomnia.  This information is not intended to replace advice given to you by your health care provider. Make sure you discuss any questions you have with your health care provider.  Document Revised: 11/30/2018 Document Reviewed: 09/27/2018  Elsevier Patient Education © 2020 [employer]

## 2021-04-27 ENCOUNTER — TELEPHONE (OUTPATIENT)
Dept: INTERNAL MEDICINE | Facility: CLINIC | Age: 66
End: 2021-04-27

## 2021-04-27 RX ORDER — HYDROXYZINE HYDROCHLORIDE 25 MG/1
50 TABLET, FILM COATED ORAL NIGHTLY
Qty: 60 TABLET | Refills: 5 | Status: SHIPPED | OUTPATIENT
Start: 2021-04-27 | End: 2021-09-23 | Stop reason: SDUPTHER

## 2021-04-27 NOTE — TELEPHONE ENCOUNTER
Tell her to continue mirtazapine every evening for now.  Add 2 tablets of hydroxyzine every evening.  I sent her prescription to the pharmacy.  Tell her to let us know in about 2 weeks if sleep is improving.

## 2021-04-27 NOTE — TELEPHONE ENCOUNTER
"Spoke with pt and she states that the mirtazapine makes her feel \"hyped up\". She wants to know if she should continue it.  "

## 2021-04-27 NOTE — TELEPHONE ENCOUNTER
PATIENT STATED THAT SHE SEEN DR BEDOLLA ON 3-11-21 AND WAS PRESCRIBED SLEEPING MEDICATION(NOT SURE OF NAME) AND WAS INFORMED IF IT WASN'T HELPING TO CONTACT OUR OFFICE. PATIENT STATED THAT IT IS NOT HELPING AND WOULD LIKE TO TRY SOMETHING ELSE.      CALL BACK:[phone]      PHARMACY:[facility] [address] - [phone] [facility] [phone]   [phone]

## 2021-08-16 RX ORDER — ATORVASTATIN CALCIUM 10 MG/1
TABLET, FILM COATED ORAL
Qty: 90 TABLET | Refills: 1 | Status: SHIPPED | OUTPATIENT
Start: 2021-08-16 | End: 2022-02-21

## 2021-09-22 ENCOUNTER — TELEPHONE (OUTPATIENT)
Dept: INTERNAL MEDICINE | Facility: CLINIC | Age: 66
End: 2021-09-22

## 2021-09-22 NOTE — TELEPHONE ENCOUNTER
Patient stopped by to  lab orders was informed that Dr. Lomas is in a room with patient and it may be a al ittle bit before she can get them put in

## 2021-09-22 NOTE — TELEPHONE ENCOUNTER
Caller: Suze Baeza    Relationship: Self    Best call back number: [phone]    What orders are you requesting (i.e. lab or imaging): BLOOD WORK    In what timeframe would the patient need to come in: ASAP    Where will you receive your lab/imaging services: N/A    Additional notes: PATIENT WILL BE COMING BY TO  HER ORDER TO GET THIS BLOOD WORK DONE

## 2021-09-23 ENCOUNTER — OFFICE VISIT (OUTPATIENT)
Dept: INTERNAL MEDICINE | Facility: CLINIC | Age: 66
End: 2021-09-23

## 2021-09-23 ENCOUNTER — LAB (OUTPATIENT)
Dept: LAB | Facility: HOSPITAL | Age: 66
End: 2021-09-23

## 2021-09-23 VITALS
SYSTOLIC BLOOD PRESSURE: 122 MMHG | RESPIRATION RATE: 16 BRPM | WEIGHT: 148.2 LBS | HEART RATE: 62 BPM | HEIGHT: 64 IN | TEMPERATURE: 97.7 F | OXYGEN SATURATION: 96 % | BODY MASS INDEX: 25.3 KG/M2 | DIASTOLIC BLOOD PRESSURE: 70 MMHG

## 2021-09-23 DIAGNOSIS — F43.29 STRESS AND ADJUSTMENT REACTION: ICD-10-CM

## 2021-09-23 DIAGNOSIS — E78.00 HYPERCHOLESTEROLEMIA: ICD-10-CM

## 2021-09-23 DIAGNOSIS — G25.81 RESTLESS LEG SYNDROME: ICD-10-CM

## 2021-09-23 DIAGNOSIS — R09.82 POST-NASAL DRAINAGE: ICD-10-CM

## 2021-09-23 DIAGNOSIS — F51.01 PRIMARY INSOMNIA: ICD-10-CM

## 2021-09-23 DIAGNOSIS — E55.9 VITAMIN D DEFICIENCY: ICD-10-CM

## 2021-09-23 DIAGNOSIS — M54.2 CERVICALGIA: ICD-10-CM

## 2021-09-23 DIAGNOSIS — Z00.00 ANNUAL PHYSICAL EXAM: Primary | ICD-10-CM

## 2021-09-23 LAB
25(OH)D3 SERPL-MCNC: 54.1 NG/ML
ALBUMIN SERPL-MCNC: 4.4 G/DL (ref 3.5–5.2)
ALBUMIN/GLOB SERPL: 1.6 G/DL
ALP SERPL-CCNC: 92 U/L (ref 39–117)
ALT SERPL W P-5'-P-CCNC: 20 U/L (ref 1–33)
ANION GAP SERPL CALCULATED.3IONS-SCNC: 9.7 MMOL/L (ref 5–15)
AST SERPL-CCNC: 23 U/L (ref 1–32)
BACTERIA UR QL AUTO: ABNORMAL /HPF
BASOPHILS # BLD AUTO: 0.04 10*3/MM3 (ref 0–0.2)
BASOPHILS NFR BLD AUTO: 0.6 % (ref 0–1.5)
BILIRUB SERPL-MCNC: 0.4 MG/DL (ref 0–1.2)
BILIRUB UR QL STRIP: NEGATIVE
BUN SERPL-MCNC: 15 MG/DL (ref 8–23)
BUN/CREAT SERPL: 22.1 (ref 7–25)
CALCIUM SPEC-SCNC: 9.4 MG/DL (ref 8.6–10.5)
CHLORIDE SERPL-SCNC: 98 MMOL/L (ref 98–107)
CHOLEST SERPL-MCNC: 149 MG/DL (ref 0–200)
CLARITY UR: CLEAR
CO2 SERPL-SCNC: 26.3 MMOL/L (ref 22–29)
COLOR UR: YELLOW
CREAT SERPL-MCNC: 0.68 MG/DL (ref 0.57–1)
DEPRECATED RDW RBC AUTO: 39.4 FL (ref 37–54)
EOSINOPHIL # BLD AUTO: 0.07 10*3/MM3 (ref 0–0.4)
EOSINOPHIL NFR BLD AUTO: 1.1 % (ref 0.3–6.2)
ERYTHROCYTE [DISTWIDTH] IN BLOOD BY AUTOMATED COUNT: 13.8 % (ref 12.3–15.4)
GFR SERPL CREATININE-BSD FRML MDRD: 87 ML/MIN/1.73
GLOBULIN UR ELPH-MCNC: 2.7 GM/DL
GLUCOSE SERPL-MCNC: 92 MG/DL (ref 65–99)
GLUCOSE UR STRIP-MCNC: NEGATIVE MG/DL
HCT VFR BLD AUTO: 43.3 % (ref 34–46.6)
HDLC SERPL-MCNC: 54 MG/DL (ref 40–60)
HGB BLD-MCNC: 14.2 G/DL (ref 12–15.9)
HGB UR QL STRIP.AUTO: NEGATIVE
HYALINE CASTS UR QL AUTO: ABNORMAL /LPF
IMM GRANULOCYTES # BLD AUTO: 0.01 10*3/MM3 (ref 0–0.05)
IMM GRANULOCYTES NFR BLD AUTO: 0.2 % (ref 0–0.5)
KETONES UR QL STRIP: NEGATIVE
LDLC SERPL CALC-MCNC: 79 MG/DL (ref 0–100)
LDLC/HDLC SERPL: 1.45 {RATIO}
LEUKOCYTE ESTERASE UR QL STRIP.AUTO: ABNORMAL
LYMPHOCYTES # BLD AUTO: 2.23 10*3/MM3 (ref 0.7–3.1)
LYMPHOCYTES NFR BLD AUTO: 34.3 % (ref 19.6–45.3)
MCH RBC QN AUTO: 26.5 PG (ref 26.6–33)
MCHC RBC AUTO-ENTMCNC: 32.8 G/DL (ref 31.5–35.7)
MCV RBC AUTO: 80.8 FL (ref 79–97)
MONOCYTES # BLD AUTO: 0.47 10*3/MM3 (ref 0.1–0.9)
MONOCYTES NFR BLD AUTO: 7.2 % (ref 5–12)
NEUTROPHILS NFR BLD AUTO: 3.68 10*3/MM3 (ref 1.7–7)
NEUTROPHILS NFR BLD AUTO: 56.6 % (ref 42.7–76)
NITRITE UR QL STRIP: NEGATIVE
NRBC BLD AUTO-RTO: 0 /100 WBC (ref 0–0.2)
PH UR STRIP.AUTO: 7 [PH] (ref 5–8)
PLATELET # BLD AUTO: 409 10*3/MM3 (ref 140–450)
PMV BLD AUTO: 9.5 FL (ref 6–12)
POTASSIUM SERPL-SCNC: 4.1 MMOL/L (ref 3.5–5.2)
PROT SERPL-MCNC: 7.1 G/DL (ref 6–8.5)
PROT UR QL STRIP: NEGATIVE
RBC # BLD AUTO: 5.36 10*6/MM3 (ref 3.77–5.28)
RBC # UR: ABNORMAL /HPF
REF LAB TEST METHOD: ABNORMAL
SODIUM SERPL-SCNC: 134 MMOL/L (ref 136–145)
SP GR UR STRIP: 1.02 (ref 1–1.03)
SQUAMOUS #/AREA URNS HPF: ABNORMAL /HPF
TRIGL SERPL-MCNC: 84 MG/DL (ref 0–150)
TSH SERPL DL<=0.05 MIU/L-ACNC: 1.66 UIU/ML (ref 0.27–4.2)
UROBILINOGEN UR QL STRIP: ABNORMAL
VLDLC SERPL-MCNC: 16 MG/DL (ref 5–40)
WBC # BLD AUTO: 6.5 10*3/MM3 (ref 3.4–10.8)
WBC UR QL AUTO: ABNORMAL /HPF

## 2021-09-23 PROCEDURE — 93000 ELECTROCARDIOGRAM COMPLETE: CPT | Performed by: INTERNAL MEDICINE

## 2021-09-23 PROCEDURE — 84443 ASSAY THYROID STIM HORMONE: CPT

## 2021-09-23 PROCEDURE — 82306 VITAMIN D 25 HYDROXY: CPT

## 2021-09-23 PROCEDURE — 80053 COMPREHEN METABOLIC PANEL: CPT

## 2021-09-23 PROCEDURE — 99396 PREV VISIT EST AGE 40-64: CPT | Performed by: INTERNAL MEDICINE

## 2021-09-23 PROCEDURE — 99213 OFFICE O/P EST LOW 20 MIN: CPT | Performed by: INTERNAL MEDICINE

## 2021-09-23 PROCEDURE — 81001 URINALYSIS AUTO W/SCOPE: CPT

## 2021-09-23 PROCEDURE — 85025 COMPLETE CBC W/AUTO DIFF WBC: CPT

## 2021-09-23 PROCEDURE — 80061 LIPID PANEL: CPT

## 2021-09-23 RX ORDER — FLUTICASONE PROPIONATE 50 MCG
1 SPRAY, SUSPENSION (ML) NASAL 2 TIMES DAILY
Qty: 16 G | Refills: 5 | Status: SHIPPED | OUTPATIENT
Start: 2021-09-23 | End: 2021-10-25

## 2021-09-23 RX ORDER — HYDROXYZINE HYDROCHLORIDE 25 MG/1
50 TABLET, FILM COATED ORAL NIGHTLY
Qty: 180 TABLET | Refills: 1 | Status: SHIPPED | OUTPATIENT
Start: 2021-09-23 | End: 2022-03-24 | Stop reason: SDUPTHER

## 2021-09-23 RX ORDER — FLUOXETINE HYDROCHLORIDE 20 MG/1
20 CAPSULE ORAL DAILY
Qty: 90 CAPSULE | Refills: 1 | Status: SHIPPED | OUTPATIENT
Start: 2021-09-23 | End: 2022-04-04

## 2021-09-23 RX ORDER — PRAMIPEXOLE DIHYDROCHLORIDE 0.5 MG/1
TABLET ORAL
Qty: 225 TABLET | Refills: 1 | Status: SHIPPED | OUTPATIENT
Start: 2021-09-23 | End: 2022-03-24 | Stop reason: SDUPTHER

## 2021-09-23 NOTE — PROGRESS NOTES
"Preventative Annual Visit    Suze Baeza  1955   [id number]    Patient Care Team:  Parul Lomas MD as PCP - General (Internal Medicine)  Kendrick Andersen MD as Consulting Physician (Dermatology)  Heraclio Mari MD as Consulting Physician (Orthopedic Surgery)  Chio Dhaliwal MD as Consulting Physician (Obstetrics and Gynecology)    Chief Complaint::   Chief Complaint   Patient presents with   • Annual Exam   • Hyperlipidemia     f/u        Subjective   History of Present Illness    Suze Baeza is a 66 y.o. female who presents for an Annual Wellness Visit.    HPI  Very stressed.  Mother with dementia at 93 is living with her. \"Stress eating\". Still sleeping ok.  Her brother does not help with the mother's care at all.  She does get some help from her daughter and her .  She is taking her 10 mg fluoxetine every day.  She feels she needs something more to help at this point.    HPI  Lots of sinus drainage.  COngestion on and off-worse at night. Clearinbg thrat a lot. No ear pain/sore throat /fever/chills /myalgias.  She does not feel ill, just has a lot of bothersome drainage.  Mucinex has not helped.  She did try an over-the-counter nasal spray which has not helped.  She does not think it is Flonase or saline.  It could be Afrin (oxime metolazone)    CHRONIC CONDITIONS    Sleeping better with mirtazepine and hydroxyzine.     Hyperlipidemia-taking atorvastatin and eats low fat diet. Not getting to walk much.         Patient Active Problem List   Diagnosis   • Hypercholesterolemia   • Vitamin D deficiency   • Primary insomnia   • Restless leg syndrome   • Chronic bilateral low back pain   • Other viral warts   • Bilateral headaches   • Stress and adjustment reaction   • Primary osteoarthritis involving multiple joints   • Cervicalgia   • Baker cyst, right   • Chronic fatigue   • Hot flashes due to menopause   • Primary osteoarthritis of both knees   • Synovial cyst of popliteal " space   • Post-nasal drainage   • Annual physical exam   • S/P total knee arthroplasty, bilateral   • Acute blood loss anemia   • Hypokalemia   • Fatigue        Past Medical History:   Diagnosis Date   • Degenerative tear of medial meniscus, right    • Hot flashes     PROZAC    • Low back pain    • Neuropathy, lumbosacral (radicular)    • Primary osteoarthritis of both knees    • Spondylolisthesis, lumbar region    • Torn meniscus     right medial   • Viral warts 02/25/2019   • Wears reading eyeglasses        Past Surgical History:   Procedure Laterality Date   • BLADDER SURGERY     • COLONOSCOPY     • HYSTERECTOMY  07/10/2012   • KNEE ARTHROSCOPY Right    • OOPHORECTOMY Bilateral 07/10/2012   • TOTAL KNEE ARTHROPLASTY Bilateral 12/29/2020    Procedure: TOTAL KNEE ARTHROPLASTY BILATERAL;  Surgeon: Heraclio Mari MD;  Location: [facility];  Service: Orthopedics;  Laterality: Bilateral;       Family History   Problem Relation Age of Onset   • Deep vein thrombosis Mother    • Stroke Mother    • Heart disease Mother 68   • Heart attack Mother    • Osteoarthritis Mother    • Heart disease Father 51        cause of death   • Heart attack Father    • Stroke Other    • Cancer Maternal Aunt         bone   • Diabetes Maternal Grandmother    • Cancer Maternal Aunt         lymphoma   • Cancer Maternal Aunt         brain   • Breast cancer Neg Hx    • Ovarian cancer Neg Hx        Social History     Socioeconomic History   • Marital status:      Spouse name: Not on file   • Number of children: Not on file   • Years of education: Not on file   • Highest education level: Not on file   Tobacco Use   • Smoking status: Never Smoker   • Smokeless tobacco: Never Used   Vaping Use   • Vaping Use: Never used   Substance and Sexual Activity   • Alcohol use: No   • Drug use: No   • Sexual activity: Defer       Allergies   Allergen Reactions   • Other Itching and Rash     CHG         Current Outpatient Medications:   •  Ascorbic Acid  (VITAMIN C PO), Take 500 mg by mouth Daily., Disp: , Rfl:   •  atorvastatin (LIPITOR) 10 MG tablet, TAKE ONE TABLET BY MOUTH ONCE NIGHTLY, Disp: 90 tablet, Rfl: 1  •  calcium carb-cholecalciferol (Calcium 600/Vitamin D3) 600-800 MG-UNIT tablet, Take 1 tablet by mouth Daily., Disp: , Rfl:   •  Calcium Polycarbophil (FIBER-CAPS PO), Take 1 tablet by mouth Daily., Disp: , Rfl:   •  Cyanocobalamin (VITAMIN B-12 PO), Take 5,000 mcg by mouth Daily., Disp: , Rfl:   •  estradiol (ESTRACE) 0.5 MG tablet, Take 0.5 mg by mouth Daily., Disp: , Rfl:   •  Flax Oil-Fish Oil-Borage Oil (FISH OIL-FLAX OIL-BORAGE OIL PO), Take 1 capsule by mouth Daily., Disp: , Rfl:   •  hydrOXYzine (ATARAX) 25 MG tablet, Take 2 tablets by mouth Every Night., Disp: 180 tablet, Rfl: 1  •  pramipexole (MIRAPEX) 0.5 MG tablet, TAKE TWO TABLETS BY MOUTH EVERY NIGHT AT BEDTIME AND TAKE ONE TABLET BY MOUTH DURING THE DAY AS NEEDED, Disp: 225 tablet, Rfl: 1  •  FLUoxetine (PROzac) 20 MG capsule, Take 1 capsule by mouth Daily., Disp: 90 capsule, Rfl: 1  •  fluticasone (FLONASE) 50 MCG/ACT nasal spray, 1 spray into the nostril(s) as directed by provider 2 (Two) Times a Day., Disp: 16 g, Rfl: 5    Immunization History   Administered Date(s) Administered   • COVID-19 (PFIZER) 03/12/2021, 04/07/2021   • Flu Vaccine Quad PF >18YRS 10/26/2016   • Flu Vaccine Quad PF >36MO 10/31/2017   • Fluad Quad 65+ 10/02/2019   • Flublock Quad =>18yrs 10/10/2020   • Fluzone High Dose =>65 Years ([facility] ONLY) 11/01/2015   • Hepatitis A 07/19/2019   • Influenza TIV (IM) 10/31/2014   • Shingrix 09/16/2019   • Tdap 06/24/2015   • Zostavax 11/13/2017        Health Maintenance Due   Topic Date Due   • HEPATITIS C SCREENING  Never done   • PAP SMEAR  Never done   • ZOSTER VACCINE (3 of 3) 11/11/2019   • LIPID PANEL  09/10/2021   • ANNUAL PHYSICAL  09/11/2021        Review of Systems   Constitutional: Negative for chills, fatigue, fever, unexpected weight gain and unexpected weight  "loss.   HENT: Positive for congestion, postnasal drip and sinus pressure. Negative for ear pain, sore throat, swollen glands and trouble swallowing.    Eyes: Negative for visual disturbance.   Respiratory: Negative for cough, shortness of breath and wheezing.    Cardiovascular: Negative for chest pain, palpitations and leg swelling.   Gastrointestinal: Negative for abdominal pain, blood in stool, constipation and diarrhea.   Endocrine: Negative for cold intolerance and heat intolerance.   Genitourinary: Negative for dysuria, frequency and urinary incontinence.   Musculoskeletal: Negative for back pain, gait problem and joint swelling.   Skin: Negative for rash and skin lesions.   Allergic/Immunologic: Positive for environmental allergies.   Neurological: Negative for dizziness and headache.   Hematological: Negative for adenopathy. Does not bruise/bleed easily.   Psychiatric/Behavioral: Positive for dysphoric mood and stress. Negative for sleep disturbance, suicidal ideas and depressed mood. The patient is nervous/anxious.         Vital Signs  Vitals:    09/23/21 1013   BP: 122/70   BP Location: Left arm   Patient Position: Sitting   Cuff Size: Adult   Pulse: 62   Resp: 16   Temp: 97.7 °F (36.5 °C)   TempSrc: Infrared   SpO2: 96%   Weight: 67.2 kg (148 lb 3.2 oz)   Height: 162.6 cm (64\")   PainSc: 0-No pain     Patient's Body mass index is 25.44 kg/m². indicating that she is within normal range (BMI 18.5-24.9). No BMI management plan needed..    Physical Exam  Vitals and nursing note reviewed.   Constitutional:       General: She is not in acute distress.     Appearance: She is well-developed. She is not ill-appearing.   HENT:      Head: Normocephalic.      Right Ear: Tympanic membrane, ear canal and external ear normal.      Left Ear: Ear canal and external ear normal. Tympanic membrane is bulging.      Nose: Mucosal edema and congestion present.      Right Turbinates: Enlarged.      Left Turbinates: Enlarged.      " Right Sinus: No maxillary sinus tenderness or frontal sinus tenderness.      Left Sinus: No maxillary sinus tenderness or frontal sinus tenderness.      Mouth/Throat:      Pharynx: Oropharynx is clear.   Eyes:      Conjunctiva/sclera: Conjunctivae normal.      Pupils: Pupils are equal, round, and reactive to light.   Neck:      Thyroid: No thyromegaly.   Cardiovascular:      Rate and Rhythm: Normal rate and regular rhythm.      Heart sounds: Normal heart sounds.   Pulmonary:      Effort: Pulmonary effort is normal.      Breath sounds: Normal breath sounds. No wheezing.   Chest:      Breasts:         Right: No inverted nipple, mass, nipple discharge, skin change or tenderness.         Left: No inverted nipple, mass, nipple discharge, skin change or tenderness.   Abdominal:      General: Bowel sounds are normal.      Palpations: Abdomen is soft.      Tenderness: There is no abdominal tenderness.   Musculoskeletal:         General: No tenderness. Normal range of motion.      Cervical back: Normal range of motion and neck supple.   Lymphadenopathy:      Cervical: No cervical adenopathy.   Skin:     General: Skin is warm and dry.      Findings: No rash.   Neurological:      Mental Status: She is alert and oriented to person, place, and time.      Cranial Nerves: No cranial nerve deficit.      Sensory: No sensory deficit.      Coordination: Coordination normal.      Gait: Gait normal.   Psychiatric:         Speech: Speech normal.         Behavior: Behavior normal.         Thought Content: Thought content normal.         Judgment: Judgment normal.            Comparison: compared with previous ECG   Rhythm: sinus rhythm  Rate: normal  Conduction: conduction normal  ST Segments: ST segments normal  T Waves: T waves normal  QRS axis: normal    Clinical impression: normal ECG        ECG 12 Lead    Date/Time: 9/23/2021 6:18 PM  Performed by: Parul Lomas MD  Authorized by: Parul Lomas MD   Comparison: compared with  previous ECG   Similar to previous ECG  Rhythm: sinus rhythm  Rate: normal  Conduction: conduction normal  ST Segments: ST segments normal  T Waves: T waves normal  QRS axis: normal    Clinical impression: normal ECG             Fall Risk Screen:  CHRIS Fall Risk Assessment has not been completed.    Health Habits and Functional and Cognitive Screening:  No flowsheet data found.    Smoking Status:  Social History     Tobacco Use   Smoking Status Never Smoker   Smokeless Tobacco Never Used       Alcohol Consumption:  Social History     Substance and Sexual Activity   Alcohol Use No       Depression [place]  PHQ-9:    PHQ-2/PHQ-9 Depression Screening 3/11/2021   Little interest or pleasure in doing things 0   Feeling down, depressed, or hopeless 0   Total Score 0      ACE III MINI        Labs  Results for orders placed or performed in visit on 03/03/21   Comprehensive Metabolic Panel    Specimen: Blood   Result Value Ref Range    Glucose 88 65 - 99 mg/dL    BUN 12 8 - 23 mg/dL    Creatinine 0.72 0.57 - 1.00 mg/dL    Sodium 136 136 - 145 mmol/L    Potassium 4.4 3.5 - 5.2 mmol/L    Chloride 101 98 - 107 mmol/L    CO2 27.4 22.0 - 29.0 mmol/L    Calcium 9.4 8.6 - 10.5 mg/dL    Total Protein 6.8 6.0 - 8.5 g/dL    Albumin 4.10 3.50 - 5.20 g/dL    ALT (SGPT) 14 1 - 33 U/L    AST (SGOT) 17 1 - 32 U/L    Alkaline Phosphatase 90 39 - 117 U/L    Total Bilirubin 0.2 0.0 - 1.2 mg/dL    eGFR Non African Amer 82 >60 mL/min/1.73    Globulin 2.7 gm/dL    A/G Ratio 1.5 g/dL    BUN/Creatinine Ratio 16.7 7.0 - 25.0    Anion Gap 7.6 5.0 - 15.0 mmol/L   T4, Free    Specimen: Blood   Result Value Ref Range    Free T4 1.03 0.93 - 1.70 ng/dL   TSH    Specimen: Blood   Result Value Ref Range    TSH 2.180 0.270 - 4.200 uIU/mL   Vitamin B12    Specimen: Blood   Result Value Ref Range    Vitamin B-12 1,276 (H) 211 - 946 pg/mL   Vitamin D 25 Hydroxy    Specimen: Blood   Result Value Ref Range    25 Hydroxy, Vitamin D 48.5 ng/ml   CBC Auto  Differential    Specimen: Blood   Result Value Ref Range    WBC 7.52 3.40 - 10.80 10*3/mm3    RBC 5.14 3.77 - 5.28 10*6/mm3    Hemoglobin 13.1 12.0 - 15.9 g/dL    Hematocrit 40.9 34.0 - 46.6 %    MCV 79.6 79.0 - 97.0 fL    MCH 25.5 (L) 26.6 - 33.0 pg    MCHC 32.0 31.5 - 35.7 g/dL    RDW 13.3 12.3 - 15.4 %    RDW-SD 38.3 37.0 - 54.0 fl    MPV 9.3 6.0 - 12.0 fL    Platelets 449 140 - 450 10*3/mm3    Neutrophil % 68.4 42.7 - 76.0 %    Lymphocyte % 24.1 19.6 - 45.3 %    Monocyte % 5.7 5.0 - 12.0 %    Eosinophil % 0.4 0.3 - 6.2 %    Basophil % 0.9 0.0 - 1.5 %    Immature Grans % 0.5 0.0 - 0.5 %    Neutrophils, Absolute 5.14 1.70 - 7.00 10*3/mm3    Lymphocytes, Absolute 1.81 0.70 - 3.10 10*3/mm3    Monocytes, Absolute 0.43 0.10 - 0.90 10*3/mm3    Eosinophils, Absolute 0.03 0.00 - 0.40 10*3/mm3    Basophils, Absolute 0.07 0.00 - 0.20 10*3/mm3    Immature Grans, Absolute 0.04 0.00 - 0.05 10*3/mm3    nRBC 0.0 0.0 - 0.2 /100 WBC        Assessment/Plan     Patient Self-Management and Personalized Health Advice    The patient has been provided counseling and guidance about: diet, exercise and weight management and preventive services including:   · Annual Wellness Visit (AWV).  Patient Instructions   Problem List Items Addressed This Visit        Cardiac and Vasculature    Hypercholesterolemia    Overview     9/23/2021 Parul Lomas MD    Continue atorvastatin every evening.    Continue low fat diet and regular exercise.          Relevant Medications    atorvastatin (LIPITOR) 10 MG tablet    Other Relevant Orders    CBC & Differential    Comprehensive Metabolic Panel    Lipid Panel    TSH    Urinalysis With Microscopic - Urine, Clean Catch       ENT    Post-nasal drainage    Overview     9/23/2021 Parul Lomas MD    Use fluticasone nasal spray 1 spray in each nostril twice a day.  She will look at the nasal spray she has at home to make sure it is not Afrin (oxymetolazone).  If it is, she will dispose of it.    She  will continue Mucinex as needed for thick drainage.            Endocrine and Metabolic    Vitamin D deficiency    Overview     9/23/2021 Parul Lomas MD    Continue current dose vitamin D3 daily.         Relevant Orders    Vitamin D 25 Hydroxy       Health Encounters    Annual physical exam - Primary    Overview       She was advised to get the flu shot about the first week of October.  She was also advised to get hepatitis a second injection and shingrix second injection at her pharmacy.            Mental Health    Stress and adjustment reaction    Overview     9/23/2021 Parul Lomas MD    Increase Prozac (fluoxetine) to 20 mg daily.      If symptoms are not improving, she will let us know.  We will consider adding buspirone 5 mg 2-3 times a day.      Regular physical activity and exercise also help symptoms of stress, anxiety, and mood.         Relevant Medications    hydrOXYzine (ATARAX) 25 MG tablet    FLUoxetine (PROzac) 20 MG capsule       Musculoskeletal and Injuries    Cervicalgia    Overview     9/23/2021 Parul Lomas MD    Continue some neck stretches thru out the day. Use moist heat to relax tight muscles.            Neuro    Restless leg syndrome    Overview     9/23/2021 Parul Lomas MD    Continue Mirapex every evening.  Continue drinking plenty of fluids every day            Sleep    Primary insomnia    Overview     9/23/2021 Parul Lomas MD    Short term trial of temazepam did not help.  Nortriptyline nor trazodone have helped in the past.    Continue hydroxyzine 50 mg every evening.    We discussed sleep hygiene including going to bed at the same time and getting up at the same time every day, going to bed early enough to get 7 or 8 hours in bed, reading and relaxing before bedtime, and avoiding the TV, computer, phone, iPad close to bedtime.                        Diagnosis Plan   1. Annual physical exam     2. Stress and adjustment reaction     3. Post-nasal drainage      4. Hypercholesterolemia  CBC & Differential    Comprehensive Metabolic Panel    Lipid Panel    TSH    Urinalysis With Microscopic - Urine, Clean Catch   5. Restless leg syndrome     6. Primary insomnia     7. Cervicalgia     8. Vitamin D deficiency  Vitamin D 25 Hydroxy       Outpatient Encounter Medications as of 9/23/2021   Medication Sig Dispense Refill   • Ascorbic Acid (VITAMIN C PO) Take 500 mg by mouth Daily.     • atorvastatin (LIPITOR) 10 MG tablet TAKE ONE TABLET BY MOUTH ONCE NIGHTLY 90 tablet 1   • calcium carb-cholecalciferol (Calcium 600/Vitamin D3) 600-800 MG-UNIT tablet Take 1 tablet by mouth Daily.     • Calcium Polycarbophil (FIBER-CAPS PO) Take 1 tablet by mouth Daily.     • Cyanocobalamin (VITAMIN B-12 PO) Take 5,000 mcg by mouth Daily.     • estradiol (ESTRACE) 0.5 MG tablet Take 0.5 mg by mouth Daily.     • Flax Oil-Fish Oil-Borage Oil (FISH OIL-FLAX OIL-BORAGE OIL PO) Take 1 capsule by mouth Daily.     • hydrOXYzine (ATARAX) 25 MG tablet Take 2 tablets by mouth Every Night. 180 tablet 1   • pramipexole (MIRAPEX) 0.5 MG tablet TAKE TWO TABLETS BY MOUTH EVERY NIGHT AT BEDTIME AND TAKE ONE TABLET BY MOUTH DURING THE DAY AS NEEDED 225 tablet 1   • [DISCONTINUED] hydrOXYzine (ATARAX) 25 MG tablet Take 2 tablets by mouth Every Night. 60 tablet 5   • [DISCONTINUED] pramipexole (MIRAPEX) 0.5 MG tablet TAKE TWO TABLETS BY MOUTH EVERY NIGHT AT BEDTIME AND TAKE ONE TABLET BY MOUTH DURING THE DAY AS NEEDED 75 tablet 4   • FLUoxetine (PROzac) 20 MG capsule Take 1 capsule by mouth Daily. 90 capsule 1   • fluticasone (FLONASE) 50 MCG/ACT nasal spray 1 spray into the nostril(s) as directed by provider 2 (Two) Times a Day. 16 g 5   • [DISCONTINUED] mirtazapine (Remeron) 15 MG tablet Take 1 tablet by mouth Every Night. 30 tablet 5     No facility-administered encounter medications on file as of 9/23/2021.       Age appropriate preventive counseling done including age appropriate vaccines,regular  Mammogram  and self breast exam, pap smear, colonoscopy, regular dental visits, mental health, injury prevention such as wearing seat belt and preventing falls, healthy  nutrition, healthy weight, regular physical exercise. Alcohol use is moderate.  Tobacco history-none. Drug use-none.  STD's-not at risk.    Follow Up:  Return in about 6 months (around 3/23/2022) for recheck fasting.         An After Visit Summary and PPPS with all of these plans were given to the patient.      Note: Part of this note may be an electronic transcription/translation of spoken language to printed text using the Dragon Dictation System.     Parul Lomas MD

## 2021-09-23 NOTE — PATIENT INSTRUCTIONS
Patient Instructions   Problem List Items Addressed This Visit        Cardiac and Vasculature    Hypercholesterolemia    Overview     9/23/2021 Parul Lomas MD    Continue atorvastatin every evening.    Continue low fat diet and regular exercise.          Relevant Medications    atorvastatin (LIPITOR) 10 MG tablet    Other Relevant Orders    CBC & Differential    Comprehensive Metabolic Panel    Lipid Panel    TSH    Urinalysis With Microscopic - Urine, Clean Catch       ENT    Post-nasal drainage    Overview     9/23/2021 Parul Lomas MD    Use fluticasone nasal spray 1 spray in each nostril twice a day.  She will look at the nasal spray she has at home to make sure it is not Afrin (oxymetolazone).  If it is, she will dispose of it.    She will continue Mucinex as needed for thick drainage.            Endocrine and Metabolic    Vitamin D deficiency    Overview     9/23/2021 Parul Lomas MD    Continue current dose vitamin D3 daily.         Relevant Orders    Vitamin D 25 Hydroxy       Health Encounters    Annual physical exam - Primary    Overview       She was advised to get the flu shot about the first week of October.  She was also advised to get hepatitis a second injection and shingrix second injection at her pharmacy.            Mental Health    Stress and adjustment reaction    Overview     9/23/2021 Parul Lomas MD    Increase Prozac (fluoxetine) to 20 mg daily.      If symptoms are not improving, she will let us know.  We will consider adding buspirone 5 mg 2-3 times a day.      Regular physical activity and exercise also help symptoms of stress, anxiety, and mood.         Relevant Medications    hydrOXYzine (ATARAX) 25 MG tablet    FLUoxetine (PROzac) 20 MG capsule       Musculoskeletal and Injuries    Cervicalgia    Overview     9/23/2021 Parul Lomas MD    Continue some neck stretches thru out the day. Use moist heat to relax tight muscles.            Neuro    Restless leg  syndrome    Overview     9/23/2021 Parul Lomas MD    Continue Mirapex every evening.  Continue drinking plenty of fluids every day            Sleep    Primary insomnia    Overview     9/23/2021 Parul Lomas MD    Short term trial of temazepam did not help.  Nortriptyline nor trazodone have helped in the past.    Continue hydroxyzine 50 mg every evening.    We discussed sleep hygiene including going to bed at the same time and getting up at the same time every day, going to bed early enough to get 7 or 8 hours in bed, reading and relaxing before bedtime, and avoiding the TV, computer, phone, iPad close to bedtime.                        Mindfulness-Based Stress Reduction  Mindfulness-based stress reduction (MBSR) is a program that helps people learn to practice mindfulness. Mindfulness is the practice of intentionally paying attention to the present moment. It can be learned and practiced through techniques such as education, breathing exercises, meditation, and yoga. MBSR includes several mindfulness techniques in one program.  MBSR works best when you understand the treatment, are willing to try new things, and can commit to spending time practicing what you learn. MBSR training may include learning about:  · How your emotions, thoughts, and reactions affect your body.  · New ways to respond to things that cause negative thoughts to start (triggers).  · How to notice your thoughts and let go of them.  · Practicing awareness of everyday things that you normally do without thinking.  · The techniques and goals of different types of meditation.  What are the benefits of MBSR?  MBSR can have many benefits, which include helping you to:  · Develop self-awareness. This refers to knowing and understanding yourself.  · Learn skills and attitudes that help you to participate in your own health care.  · Learn new ways to care for yourself.  · Be more accepting about how things are, and let things go.  · Be less  judgmental and approach things with an open mind.  · Be patient with yourself and trust yourself more.  MBSR has also been shown to:  · Reduce negative emotions, such as depression and anxiety.  · Improve memory and focus.  · Change how you sense and approach pain.  · Boost your body's ability to fight infections.  · Help you connect better with other people.  · Improve your sense of well-being.  Follow these instructions at home:    · Find a local in-person or online MBSR program.  · Set aside some time regularly for mindfulness practice.  · Find a mindfulness practice that works best for you. This may include one or more of the following:  ? Meditation. Meditation involves focusing your mind on a certain thought or activity.  ? Breathing awareness exercises. These help you to stay present by focusing on your breath.  ? Body scan. For this practice, you lie down and pay attention to each part of your body from head to toe. You can identify tension and soreness and intentionally relax parts of your body.  ? Yoga. Yoga involves stretching and breathing, and it can improve your ability to move and be flexible. It can also provide an experience of testing your body's limits, which can help you release stress.  ? Mindful eating. This way of eating involves focusing on the taste, texture, color, and smell of each bite of food. Because this slows down eating and helps you feel full sooner, it can be an important part of a weight-loss plan.  · Find a podcast or recording that provides guidance for breathing awareness, body scan, or meditation exercises. You can listen to these any time when you have a free moment to rest without distractions.  · Follow your treatment plan as told by your health care provider. This may include taking regular medicines and making changes to your diet or lifestyle as recommended.  How to practice mindfulness  To do a basic awareness exercise:  · Find a comfortable place to sit.  · Pay  attention to the present moment. Observe your thoughts, feelings, and surroundings just as they are.  · Avoid placing judgment on yourself, your feelings, or your surroundings. Make note of any judgment that comes up, and let it go.  · Your mind may wander, and that is okay. Make note of when your thoughts drift, and return your attention to the present moment.  To do basic mindfulness meditation:  · Find a comfortable place to sit. This may include a stable chair or a firm floor cushion.  ? Sit upright with your back straight. Let your arms fall next to your side with your hands resting on your legs.  ? If sitting in a chair, rest your feet flat on the floor.  ? If sitting on a cushion, cross your legs in front of you.  · Keep your head in a neutral position with your chin dropped slightly. Relax your jaw and rest the tip of your tongue on the roof of your mouth. Drop your gaze to the floor. You can close your eyes if you like.  · Breathe normally and pay attention to your breath. Feel the air moving in and out of your nose. Feel your belly expanding and relaxing with each breath.  · Your mind may wander, and that is okay. Make note of when your thoughts drift, and return your attention to your breath.  · Avoid placing judgment on yourself, your feelings, or your surroundings. Make note of any judgment or feelings that come up, let them go, and bring your attention back to your breath.  · When you are ready, lift your gaze or open your eyes. Pay attention to how your body feels after the meditation.  Where to find more information  You can find more information about MBSR from:  · Your health care provider.  · Community-based meditation centers or programs.  · Programs offered near you.  Summary  · Mindfulness-based stress reduction (MBSR) is a program that teaches you how to intentionally pay attention to the present moment. It is used with other treatments to help you cope better with daily stress, emotions, and  pain.  · MBSR focuses on developing self-awareness, which allows you to respond to life stress without judgment or negative emotions.  · MBSR programs may involve learning different mindfulness practices, such as breathing exercises, meditation, yoga, body scan, or mindful eating. Find a mindfulness practice that works best for you, and set aside time for it on a regular basis.  This information is not intended to replace advice given to you by your health care provider. Make sure you discuss any questions you have with your health care provider.  Document Revised: 02/22/2021 Document Reviewed: 04/26/2018  Elsevier Patient Education © 2021 [employer]

## 2021-10-09 ENCOUNTER — FLU SHOT (OUTPATIENT)
Dept: INTERNAL MEDICINE | Facility: CLINIC | Age: 66
End: 2021-10-09

## 2021-10-09 DIAGNOSIS — Z23 NEED FOR INFLUENZA VACCINATION: Primary | ICD-10-CM

## 2021-10-09 PROCEDURE — 90471 IMMUNIZATION ADMIN: CPT | Performed by: INTERNAL MEDICINE

## 2021-10-09 PROCEDURE — 90686 IIV4 VACC NO PRSV 0.5 ML IM: CPT | Performed by: INTERNAL MEDICINE

## 2021-10-25 ENCOUNTER — OFFICE VISIT (OUTPATIENT)
Dept: INTERNAL MEDICINE | Facility: CLINIC | Age: 66
End: 2021-10-25

## 2021-10-25 VITALS
WEIGHT: 149.9 LBS | DIASTOLIC BLOOD PRESSURE: 75 MMHG | TEMPERATURE: 98 F | OXYGEN SATURATION: 99 % | HEIGHT: 64 IN | BODY MASS INDEX: 25.59 KG/M2 | HEART RATE: 57 BPM | SYSTOLIC BLOOD PRESSURE: 105 MMHG

## 2021-10-25 DIAGNOSIS — J01.00 ACUTE NON-RECURRENT MAXILLARY SINUSITIS: Primary | ICD-10-CM

## 2021-10-25 PROCEDURE — 99213 OFFICE O/P EST LOW 20 MIN: CPT | Performed by: PHYSICIAN ASSISTANT

## 2021-10-25 RX ORDER — FLUTICASONE PROPIONATE 50 MCG
1 SPRAY, SUSPENSION (ML) NASAL 2 TIMES DAILY
Qty: 16 G | Refills: 5 | Status: SHIPPED | OUTPATIENT
Start: 2021-10-25

## 2021-10-25 RX ORDER — AMOXICILLIN AND CLAVULANATE POTASSIUM 875; 125 MG/1; MG/1
1 TABLET, FILM COATED ORAL 2 TIMES DAILY
Qty: 20 TABLET | Refills: 0 | Status: SHIPPED | OUTPATIENT
Start: 2021-10-25 | End: 2021-11-04

## 2021-10-25 NOTE — PROGRESS NOTES
"Patient Care Team:  Parul Lomas MD as PCP - General (Internal Medicine)  Kendrick Andersen MD as Consulting Physician (Dermatology)  Heraclio Mari MD as Consulting Physician (Orthopedic Surgery)  Chio Dhaliwal MD as Consulting Physician (Obstetrics and Gynecology)    Chief Complaint:: No chief complaint on file.       Raffi Arciniega is a 66 year old female who presents for evaluation of coughing, congestion, headache, and fatigue.  She reports symptoms started last Thursday.  She has had both vaccinations for Covid.  She had a negative rapid Covid test at [facility] on Friday.  History of sinus infections, reports getting 1 every spring and fall.  Does use peppermint schnapps with peppermint for nighttime cough.  She reports this works well.  She denies chest pain, shortness of breath, palpitations, body aches, or fever.        The following portions of the patient's history were reviewed and updated as appropriate: active problem list, medication list, allergies, family history, social history    Review of Systems:   Review of Systems   Constitutional: Negative for chills, fatigue and fever.   HENT: Positive for congestion, ear pain, postnasal drip, rhinorrhea and sinus pressure. Negative for sore throat.    Eyes: Positive for pain.   Respiratory: Positive for cough. Negative for shortness of breath.    Neurological: Positive for headache. Negative for dizziness.   Hematological: Negative for adenopathy.       Vital Signs  Vitals:    10/25/21 1040   BP: 105/75   BP Location: Left arm   Patient Position: Sitting   Cuff Size: Adult   Pulse: 57   Temp: 98 °F (36.7 °C)   TempSrc: Temporal   SpO2: 99%   Weight: 68 kg (149 lb 14.4 oz)   Height: 162.6 cm (64.02\")   PainSc: 0-No pain     Body mass index is 25.72 kg/m².    Labs  Lab on 09/23/2021   Component Date Value Ref Range Status   • Glucose 09/23/2021 92  65 - 99 mg/dL Final   • BUN 09/23/2021 15  8 - 23 mg/dL Final   • Creatinine " 09/23/2021 0.68  0.57 - 1.00 mg/dL Final   • Sodium 09/23/2021 134* 136 - 145 mmol/L Final   • Potassium 09/23/2021 4.1  3.5 - 5.2 mmol/L Final   • Chloride 09/23/2021 98  98 - 107 mmol/L Final   • CO2 09/23/2021 26.3  22.0 - 29.0 mmol/L Final   • Calcium 09/23/2021 9.4  8.6 - 10.5 mg/dL Final   • Total Protein 09/23/2021 7.1  6.0 - 8.5 g/dL Final   • Albumin 09/23/2021 4.40  3.50 - 5.20 g/dL Final   • ALT (SGPT) 09/23/2021 20  1 - 33 U/L Final   • AST (SGOT) 09/23/2021 23  1 - 32 U/L Final   • Alkaline Phosphatase 09/23/2021 92  39 - 117 U/L Final   • Total Bilirubin 09/23/2021 0.4  0.0 - 1.2 mg/dL Final   • eGFR Non  Amer 09/23/2021 87  >60 mL/min/1.73 Final   • Globulin 09/23/2021 2.7  gm/dL Final   • A/G Ratio 09/23/2021 1.6  g/dL Final   • BUN/Creatinine Ratio 09/23/2021 22.1  7.0 - 25.0 Final   • Anion Gap 09/23/2021 9.7  5.0 - 15.0 mmol/L Final   • Total Cholesterol 09/23/2021 149  0 - 200 mg/dL Final   • Triglycerides 09/23/2021 84  0 - 150 mg/dL Final   • HDL Cholesterol 09/23/2021 54  40 - 60 mg/dL Final   • LDL Cholesterol  09/23/2021 79  0 - 100 mg/dL Final   • VLDL Cholesterol 09/23/2021 16  5 - 40 mg/dL Final   • LDL/HDL Ratio 09/23/2021 1.45   Final   • TSH 09/23/2021 1.660  0.270 - 4.200 uIU/mL Final   • 25 Hydroxy, Vitamin D 09/23/2021 54.1  ng/ml Final   • WBC 09/23/2021 6.50  3.40 - 10.80 10*3/mm3 Final   • RBC 09/23/2021 5.36* 3.77 - 5.28 10*6/mm3 Final   • Hemoglobin 09/23/2021 14.2  12.0 - 15.9 g/dL Final   • Hematocrit 09/23/2021 43.3  34.0 - 46.6 % Final   • MCV 09/23/2021 80.8  79.0 - 97.0 fL Final   • MCH 09/23/2021 26.5* 26.6 - 33.0 pg Final   • MCHC 09/23/2021 32.8  31.5 - 35.7 g/dL Final   • RDW 09/23/2021 13.8  12.3 - 15.4 % Final   • RDW-SD 09/23/2021 39.4  37.0 - 54.0 fl Final   • MPV 09/23/2021 9.5  6.0 - 12.0 fL Final   • Platelets 09/23/2021 409  140 - 450 10*3/mm3 Final   • Neutrophil % 09/23/2021 56.6  42.7 - 76.0 % Final   • Lymphocyte % 09/23/2021 34.3  19.6 - 45.3 %  Final   • Monocyte % 09/23/2021 7.2  5.0 - 12.0 % Final   • Eosinophil % 09/23/2021 1.1  0.3 - 6.2 % Final   • Basophil % 09/23/2021 0.6  0.0 - 1.5 % Final   • Immature Grans % 09/23/2021 0.2  0.0 - 0.5 % Final   • Neutrophils, Absolute 09/23/2021 3.68  1.70 - 7.00 10*3/mm3 Final   • Lymphocytes, Absolute 09/23/2021 2.23  0.70 - 3.10 10*3/mm3 Final   • Monocytes, Absolute 09/23/2021 0.47  0.10 - 0.90 10*3/mm3 Final   • Eosinophils, Absolute 09/23/2021 0.07  0.00 - 0.40 10*3/mm3 Final   • Basophils, Absolute 09/23/2021 0.04  0.00 - 0.20 10*3/mm3 Final   • Immature Grans, Absolute 09/23/2021 0.01  0.00 - 0.05 10*3/mm3 Final   • nRBC 09/23/2021 0.0  0.0 - 0.2 /100 WBC Final   • Color, UA 09/23/2021 Yellow  Yellow, Straw Final   • Appearance, UA 09/23/2021 Clear  Clear Final   • pH, UA 09/23/2021 7.0  5.0 - 8.0 Final   • Specific Gravity, UA 09/23/2021 1.018  1.005 - 1.030 Final   • Glucose, UA 09/23/2021 Negative  Negative Final   • Ketones, UA 09/23/2021 Negative  Negative Final   • Bilirubin, UA 09/23/2021 Negative  Negative Final   • Blood, UA 09/23/2021 Negative  Negative Final   • Protein, UA 09/23/2021 Negative  Negative Final   • Leuk Esterase, UA 09/23/2021 Trace* Negative Final   • Nitrite, UA 09/23/2021 Negative  Negative Final   • Urobilinogen, UA 09/23/2021 0.2 E.U./dL  0.2 - 1.0 E.U./dL Final   • RBC, UA 09/23/2021 0-2  None Seen, 0-2 /HPF Final   • WBC, UA 09/23/2021 0-2  None Seen, 0-2 /HPF Final   • Bacteria, UA 09/23/2021 None Seen  None Seen /HPF Final   • Squamous Epithelial Cells, UA 09/23/2021 3-6* None Seen, 0-2 /HPF Final   • Hyaline Casts, UA 09/23/2021 0-2  None Seen /LPF Final   • Methodology 09/23/2021 Automated Microscopy   Final       Imaging  No radiology results for the last 30 days.      Current Outpatient Medications:   •  Ascorbic Acid (VITAMIN C PO), Take 500 mg by mouth Daily., Disp: , Rfl:   •  atorvastatin (LIPITOR) 10 MG tablet, TAKE ONE TABLET BY MOUTH ONCE NIGHTLY, Disp: 90  tablet, Rfl: 1  •  calcium carb-cholecalciferol (Calcium 600/Vitamin D3) 600-800 MG-UNIT tablet, Take 1 tablet by mouth Daily., Disp: , Rfl:   •  Calcium Polycarbophil (FIBER-CAPS PO), Take 1 tablet by mouth Daily., Disp: , Rfl:   •  Cyanocobalamin (VITAMIN B-12 PO), Take 5,000 mcg by mouth Daily., Disp: , Rfl:   •  estradiol (ESTRACE) 0.5 MG tablet, Take 0.5 mg by mouth Daily., Disp: , Rfl:   •  Flax Oil-Fish Oil-Borage Oil (FISH OIL-FLAX OIL-BORAGE OIL PO), Take 1 capsule by mouth Daily., Disp: , Rfl:   •  FLUoxetine (PROzac) 20 MG capsule, Take 1 capsule by mouth Daily., Disp: 90 capsule, Rfl: 1  •  hydrOXYzine (ATARAX) 25 MG tablet, Take 2 tablets by mouth Every Night., Disp: 180 tablet, Rfl: 1  •  pramipexole (MIRAPEX) 0.5 MG tablet, TAKE TWO TABLETS BY MOUTH EVERY NIGHT AT BEDTIME AND TAKE ONE TABLET BY MOUTH DURING THE DAY AS NEEDED, Disp: 225 tablet, Rfl: 1  •  amoxicillin-clavulanate (Augmentin) 875-125 MG per tablet, Take 1 tablet by mouth 2 (Two) Times a Day for 10 days., Disp: 20 tablet, Rfl: 0  •  fluticasone (FLONASE) 50 MCG/ACT nasal spray, 1 spray into the nostril(s) as directed by provider 2 (Two) Times a Day., Disp: 16 g, Rfl: 5    Physical Exam:    Physical Exam  Vitals and nursing note reviewed.   Constitutional:       Appearance: She is well-developed.   HENT:      Head: Normocephalic and atraumatic.      Right Ear: Tympanic membrane and ear canal normal.      Left Ear: Tympanic membrane and ear canal normal.      Nose: Mucosal edema and rhinorrhea present.      Right Sinus: Maxillary sinus tenderness and frontal sinus tenderness present.      Left Sinus: Maxillary sinus tenderness and frontal sinus tenderness present.      Mouth/Throat:      Mouth: Mucous membranes are moist.      Pharynx: Oropharynx is clear. Posterior oropharyngeal erythema present. No oropharyngeal exudate.   Eyes:      Pupils: Pupils are equal, round, and reactive to light.   Cardiovascular:      Rate and Rhythm: Normal  rate and regular rhythm.   Pulmonary:      Effort: Pulmonary effort is normal.      Breath sounds: Normal breath sounds.         Procedures        Assessment/Plan   Problem List Items Addressed This Visit        ENT    Acute non-recurrent maxillary sinusitis - Primary    Overview     Increase water intake.  Recommend Delsym for cough.  Augmentin twice daily for 10 days.  Fluticasone nasal spray 2 squirts in each nostril once daily.         Relevant Medications    amoxicillin-clavulanate (Augmentin) 875-125 MG per tablet          Return if symptoms worsen or fail to improve.    Plan of care reviewed with patient at the conclusion of today's visit. Education was provided regarding diagnosis, management, and any prescribed or recommended OTC medications.Patient verbalizes understanding of and agreement with management plan.     I spent 15 minutes caring for Suze on this date of service. This time includes time spent by me in the following activities:preparing for the visit, reviewing tests, obtaining and/or reviewing a separately obtained history, performing a medically appropriate examination and/or evaluation , counseling and educating the patient/family/caregiver, ordering medications, tests, or procedures, referring and communicating with other health care professionals  and documenting information in the medical record    Carlene Cortes PA-C    Please note that portions of this note were completed with a voice recognition program. Efforts were made to edit the dictations, but occasionally words are mistranscribed.

## 2021-10-26 ENCOUNTER — TRANSCRIBE ORDERS (OUTPATIENT)
Dept: ADMINISTRATIVE | Facility: HOSPITAL | Age: 66
End: 2021-10-26

## 2021-10-26 DIAGNOSIS — Z12.31 VISIT FOR SCREENING MAMMOGRAM: Primary | ICD-10-CM

## 2021-11-09 ENCOUNTER — TELEPHONE (OUTPATIENT)
Dept: INTERNAL MEDICINE | Facility: CLINIC | Age: 66
End: 2021-11-09

## 2021-11-09 NOTE — TELEPHONE ENCOUNTER
Message noted, Dr. Lomas is out of the office at this time and I would like for her to address the potential medication changes.  Please inform patient

## 2021-11-09 NOTE — TELEPHONE ENCOUNTER
Caller: Suze Baeza    Relationship: Self    Best call back number:[phone]     What medications are you currently taking:   Current Outpatient Medications on File Prior to Visit   Medication Sig Dispense Refill   • Ascorbic Acid (VITAMIN C PO) Take 500 mg by mouth Daily.     • atorvastatin (LIPITOR) 10 MG tablet TAKE ONE TABLET BY MOUTH ONCE NIGHTLY 90 tablet 1   • calcium carb-cholecalciferol (Calcium 600/Vitamin D3) 600-800 MG-UNIT tablet Take 1 tablet by mouth Daily.     • Calcium Polycarbophil (FIBER-CAPS PO) Take 1 tablet by mouth Daily.     • Cyanocobalamin (VITAMIN B-12 PO) Take 5,000 mcg by mouth Daily.     • estradiol (ESTRACE) 0.5 MG tablet Take 0.5 mg by mouth Daily.     • Flax Oil-Fish Oil-Borage Oil (FISH OIL-FLAX OIL-BORAGE OIL PO) Take 1 capsule by mouth Daily.     • FLUoxetine (PROzac) 20 MG capsule Take 1 capsule by mouth Daily. 90 capsule 1   • fluticasone (FLONASE) 50 MCG/ACT nasal spray 1 spray into the nostril(s) as directed by provider 2 (Two) Times a Day. 16 g 5   • hydrOXYzine (ATARAX) 25 MG tablet Take 2 tablets by mouth Every Night. 180 tablet 1   • pramipexole (MIRAPEX) 0.5 MG tablet TAKE TWO TABLETS BY MOUTH EVERY NIGHT AT BEDTIME AND TAKE ONE TABLET BY MOUTH DURING THE DAY AS NEEDED 225 tablet 1     No current facility-administered medications on file prior to visit.          When did you start taking these medications:    Which medication are you concerned about: FLUoxetine (PROzac) 20 MG capsule    Who prescribed you this medication: PCP    What are your concerns: PATIENT STATES SHE WAS TOLD IF PRESCRIPTION DID NOT WORK FOR HER TO CONTACT PCP AND AN ALTERNATIVE COULD BE PRESCRIBED. PATIENT IS REQUESTING A CALL TO BE NOTIFIED IF PRESCRIPTION WILL OR WILL NOT BE CHANGED.     How long have you had these concerns:     PHARMACY:   [facility] [address] [place] [address] AT [facility] - [phone]  - [phone]   [phone]

## 2021-11-10 NOTE — TELEPHONE ENCOUNTER
Our plan was to add buspirone 5 mg tid prn in addition to 20mg fluoxetine daily. If she is agreeable let me know.  If not, we will need to have OV to discuss.

## 2021-11-11 NOTE — TELEPHONE ENCOUNTER
Caller: Suze Baeza    Relationship: Self    Best call back number: [phone]    Who are you requesting to speak with (clinical staff, provider,  specific staff member): ROCKY    What was the call regarding: PATIENT STATED SHE WAS EXPECTING MEDICATION TO BE CALLED IN FOR HER BUT THE PHARMACY INFORMED HER THEY HAD NOT RECEIVED ANY PRESCRIPTIONS FOR HER.    Do you require a callback: YES

## 2021-11-14 DIAGNOSIS — F43.29 STRESS AND ADJUSTMENT REACTION: Primary | ICD-10-CM

## 2021-11-14 RX ORDER — BUSPIRONE HYDROCHLORIDE 5 MG/1
5 TABLET ORAL 3 TIMES DAILY
Qty: 90 TABLET | Refills: 1 | Status: SHIPPED | OUTPATIENT
Start: 2021-11-14 | End: 2022-03-07

## 2021-11-14 NOTE — TELEPHONE ENCOUNTER
I sent RX. Let pt know to continue same mo fluoxetine. Add buspirone 3 times a day with meals.  If doing well, she may decrease to twice a day with meals and see if that is enough

## 2021-11-23 ENCOUNTER — TELEPHONE (OUTPATIENT)
Dept: INTERNAL MEDICINE | Facility: CLINIC | Age: 66
End: 2021-11-23

## 2021-11-23 ENCOUNTER — HOSPITAL ENCOUNTER (OUTPATIENT)
Dept: MAMMOGRAPHY | Facility: HOSPITAL | Age: 66
Discharge: HOME | End: 2021-11-23
Admitting: OBSTETRICS & GYNECOLOGY

## 2021-11-23 DIAGNOSIS — Z12.31 VISIT FOR SCREENING MAMMOGRAM: ICD-10-CM

## 2021-11-23 PROCEDURE — 77063 BREAST TOMOSYNTHESIS BI: CPT

## 2021-11-23 PROCEDURE — 77067 SCR MAMMO BI INCL CAD: CPT

## 2021-11-23 PROCEDURE — 77063 BREAST TOMOSYNTHESIS BI: CPT | Performed by: RADIOLOGY

## 2021-11-23 PROCEDURE — 77067 SCR MAMMO BI INCL CAD: CPT | Performed by: RADIOLOGY

## 2021-11-23 RX ORDER — AMOXICILLIN AND CLAVULANATE POTASSIUM 875; 125 MG/1; MG/1
1 TABLET, FILM COATED ORAL 2 TIMES DAILY
Qty: 20 TABLET | Refills: 0 | Status: SHIPPED | OUTPATIENT
Start: 2021-11-23 | End: 2021-12-03

## 2021-11-23 RX ORDER — AZITHROMYCIN 250 MG/1
TABLET, FILM COATED ORAL
Qty: 6 TABLET | Refills: 0 | Status: SHIPPED | OUTPATIENT
Start: 2021-11-23 | End: 2021-12-22

## 2021-11-23 NOTE — TELEPHONE ENCOUNTER
Patient was informed of augmentin script as directed by provider and advised to f/u appropriately - pt vu

## 2021-11-23 NOTE — TELEPHONE ENCOUNTER
I sent a Z-Broderick to her pharmacy.  Continue treating her symptoms with Mucinex, nasal sprays, throat gargles, Robitussin or Delsym cough syrup as needed.  If she develops fever chills, shortness of breath, or loss of sense of taste or smell, she needs to do a Covid test.  She should stay away from other people until she is feeling better just in case.

## 2021-11-23 NOTE — TELEPHONE ENCOUNTER
PATIENT HAS ANOTHER SINUS INFECTION.  WAS IN ABOUT A MONTH AGO WITH ONE.  WOULD LIKE MEDICATION FOR THIS.     PHARMACY:  [facility]    PLEASE CALL [phone]  OKAY TO LEAVE MESSAGE IF NEEDED

## 2021-11-23 NOTE — TELEPHONE ENCOUNTER
Patient called back. I let her know what Dr. Lomas advised.    Patient stated the Z pack has not helped much in the past and was wondering if something else could be sent in.

## 2021-11-23 NOTE — TELEPHONE ENCOUNTER
Patient complains of drainage turning yellowish green, sinus pressure in head, starting to have a cough and nasal congestion. Symptoms started 2 days ago. She has been taking dayquil day and night. Her symptoms are getting worse. Please advise.

## 2021-12-22 ENCOUNTER — OFFICE VISIT (OUTPATIENT)
Dept: INTERNAL MEDICINE | Facility: CLINIC | Age: 66
End: 2021-12-22

## 2021-12-22 VITALS
WEIGHT: 150.2 LBS | TEMPERATURE: 98 F | SYSTOLIC BLOOD PRESSURE: 112 MMHG | OXYGEN SATURATION: 64 % | HEIGHT: 64 IN | BODY MASS INDEX: 25.64 KG/M2 | HEART RATE: 71 BPM | DIASTOLIC BLOOD PRESSURE: 76 MMHG

## 2021-12-22 DIAGNOSIS — R09.82 POST-NASAL DRIP: ICD-10-CM

## 2021-12-22 DIAGNOSIS — R11.0 NAUSEA: Primary | ICD-10-CM

## 2021-12-22 PROCEDURE — 99213 OFFICE O/P EST LOW 20 MIN: CPT | Performed by: NURSE PRACTITIONER

## 2021-12-22 RX ORDER — PREDNISONE 10 MG/1
TABLET ORAL
Qty: 21 TABLET | Refills: 0 | Status: SHIPPED | OUTPATIENT
Start: 2021-12-22 | End: 2022-03-24

## 2021-12-22 RX ORDER — DOXYCYCLINE HYCLATE 100 MG
100 TABLET ORAL 2 TIMES DAILY
Qty: 14 TABLET | Refills: 0 | Status: SHIPPED | OUTPATIENT
Start: 2021-12-22 | End: 2022-02-02

## 2021-12-22 RX ORDER — ONDANSETRON 8 MG/1
8 TABLET, ORALLY DISINTEGRATING ORAL EVERY 8 HOURS PRN
Qty: 15 TABLET | Refills: 0 | Status: SHIPPED | OUTPATIENT
Start: 2021-12-22 | End: 2022-01-02

## 2021-12-22 RX ORDER — BENZONATATE 100 MG/1
100 CAPSULE ORAL 3 TIMES DAILY PRN
Qty: 30 CAPSULE | Refills: 0 | Status: SHIPPED | OUTPATIENT
Start: 2021-12-22 | End: 2022-01-02

## 2021-12-22 NOTE — PATIENT INSTRUCTIONS
Sinusitis, Adult  Sinusitis is inflammation of your sinuses. Sinuses are hollow spaces in the bones around your face. Your sinuses are located:  · Around your eyes.  · In the middle of your forehead.  · Behind your nose.  · In your cheekbones.  Mucus normally drains out of your sinuses. When your nasal tissues become inflamed or swollen, mucus can become trapped or blocked. This allows bacteria, viruses, and fungi to grow, which leads to infection. Most infections of the sinuses are caused by a virus.  Sinusitis can develop quickly. It can last for up to 4 weeks (acute) or for more than 12 weeks (chronic). Sinusitis often develops after a cold.  What are the causes?  This condition is caused by anything that creates swelling in the sinuses or stops mucus from draining. This includes:  · Allergies.  · Asthma.  · Infection from bacteria or viruses.  · Deformities or blockages in your nose or sinuses.  · Abnormal growths in the nose (nasal polyps).  · Pollutants, such as chemicals or irritants in the air.  · Infection from fungi (rare).  What increases the risk?  You are more likely to develop this condition if you:  · Have a weak body defense system (immune system).  · Do a lot of swimming or diving.  · Overuse nasal sprays.  · Smoke.  What are the signs or symptoms?  The main symptoms of this condition are pain and a feeling of pressure around the affected sinuses. Other symptoms include:  · Stuffy nose or congestion.  · Thick drainage from your nose.  · Swelling and warmth over the affected sinuses.  · Headache.  · Upper toothache.  · A cough that may get worse at night.  · Extra mucus that collects in the throat or the back of the nose (postnasal drip).  · Decreased sense of smell and taste.  · Fatigue.  · A fever.  · Sore throat.  · Bad breath.  How is this diagnosed?  This condition is diagnosed based on:  · Your symptoms.  · Your medical history.  · A physical exam.  · Tests to find out if your condition is  acute or chronic. This may include:  ? Checking your nose for nasal polyps.  ? Viewing your sinuses using a device that has a light (endoscope).  ? Testing for allergies or bacteria.  ? Imaging tests, such as an MRI or CT scan.  In rare cases, a bone biopsy may be done to rule out more serious types of fungal sinus disease.  How is this treated?  Treatment for sinusitis depends on the cause and whether your condition is chronic or acute.  · If caused by a virus, your symptoms should go away on their own within 10 days. You may be given medicines to relieve symptoms. They include:  ? Medicines that shrink swollen nasal passages (topical intranasal decongestants).  ? Medicines that treat allergies (antihistamines).  ? A spray that eases inflammation of the nostrils (topical intranasal corticosteroids).  ? Rinses that help get rid of thick mucus in your nose (nasal saline washes).  · If caused by bacteria, your health care provider may recommend waiting to see if your symptoms improve. Most bacterial infections will get better without antibiotic medicine. You may be given antibiotics if you have:  ? A severe infection.  ? A weak immune system.  · If caused by narrow nasal passages or nasal polyps, you may need to have surgery.  Follow these instructions at home:  Medicines  · Take, use, or apply over-the-counter and prescription medicines only as told by your health care provider. These may include nasal sprays.  · If you were prescribed an antibiotic medicine, take it as told by your health care provider. Do not stop taking the antibiotic even if you start to feel better.  Hydrate and humidify    · Drink enough fluid to keep your urine pale yellow. Staying hydrated will help to thin your mucus.  · Use a cool mist humidifier to keep the humidity level in your home above 50%.  · Inhale steam for 10-15 minutes, 3-4 times a day, or as told by your health care provider. You can do this in the bathroom while a hot shower is  running.  · Limit your exposure to cool or dry air.    Rest  · Rest as much as possible.  · Sleep with your head raised (elevated).  · Make sure you get enough sleep each night.  General instructions    · Apply a warm, moist washcloth to your face 3-4 times a day or as told by your health care provider. This will help with discomfort.  · Wash your hands often with soap and water to reduce your exposure to germs. If soap and water are not available, use hand .  · Do not smoke. Avoid being around people who are smoking (secondhand smoke).  · Keep all follow-up visits as told by your health care provider. This is important.    Contact a health care provider if:  · You have a fever.  · Your symptoms get worse.  · Your symptoms do not improve within 10 days.  Get help right away if:  · You have a severe headache.  · You have persistent vomiting.  · You have severe pain or swelling around your face or eyes.  · You have vision problems.  · You develop confusion.  · Your neck is stiff.  · You have trouble breathing.  Summary  · Sinusitis is soreness and inflammation of your sinuses. Sinuses are hollow spaces in the bones around your face.  · This condition is caused by nasal tissues that become inflamed or swollen. The swelling traps or blocks the flow of mucus. This allows bacteria, viruses, and fungi to grow, which leads to infection.  · If you were prescribed an antibiotic medicine, take it as told by your health care provider. Do not stop taking the antibiotic even if you start to feel better.  · Keep all follow-up visits as told by your health care provider. This is important.  This information is not intended to replace advice given to you by your health care provider. Make sure you discuss any questions you have with your health care provider.  Document Revised: 05/20/2019 Document Reviewed: 05/20/2019  [employer] Patient Education © 2021 [employer]

## 2021-12-22 NOTE — PROGRESS NOTES
Follow Up Office Visit      Patient Name: Suze Baeza  : 1955   MRN: [id number]   Care Team: Patient Care Team:  Parul Lomas MD as PCP - General (Internal Medicine)  Kendrick Andersen MD as Consulting Physician (Dermatology)  Heraclio Mari MD as Consulting Physician (Orthopedic Surgery)  Chio Dhaliwal MD as Consulting Physician (Obstetrics and Gynecology)    Chief Complaint:    Chief Complaint   Patient presents with   • Vomiting     started last night  , postnasal drip , coughed some when she laid down       History of Present Illness: Suze Baeza is a 66 y.o. female with pertinent history of seasonal allergies, recurrent sinusitis, anemia, osteoarthritis, vitamin D deficiency, hypercholesterolemia.    Presents today with issue of nausea with episode of vomiting x1 last p.m. reports appetite has been at baseline.    Reports symptoms started on Monday with nasal congestion and drainage.  She took Covid test which was negative.  However, symptoms have persisted.  She does admit to some cough but mainly at night while lying down.  Denies any associated fever, chills, sore throat, headache, loss of taste or smell, chest pain, shortness of breath or diarrhea.    She has an elderly mother with dementia who lives with her.  She is concerned she may be contagious.    Subjective      Review of Systems:   Review of Systems   Constitutional: Negative for chills, fatigue and fever.   HENT: Positive for congestion, postnasal drip and rhinorrhea. Negative for sinus pressure and sore throat.    Respiratory: Negative for shortness of breath.    Cardiovascular: Negative for chest pain.   Gastrointestinal: Positive for nausea and vomiting (X1 episode). Negative for abdominal pain.       I have reviewed and the following portions of the patient's history were updated as appropriate: past family history, past medical history, past social history, past surgical history and problem  list.    Medications:     Current Outpatient Medications:   •  Ascorbic Acid (VITAMIN C PO), Take 500 mg by mouth Daily., Disp: , Rfl:   •  atorvastatin (LIPITOR) 10 MG tablet, TAKE ONE TABLET BY MOUTH ONCE NIGHTLY, Disp: 90 tablet, Rfl: 1  •  busPIRone (BUSPAR) 5 MG tablet, Take 1 tablet by mouth 3 (Three) Times a Day., Disp: 90 tablet, Rfl: 1  •  calcium carb-cholecalciferol (Calcium 600/Vitamin D3) 600-800 MG-UNIT tablet, Take 1 tablet by mouth Daily., Disp: , Rfl:   •  Calcium Polycarbophil (FIBER-CAPS PO), Take 1 tablet by mouth Daily., Disp: , Rfl:   •  Cyanocobalamin (VITAMIN B-12 PO), Take 5,000 mcg by mouth Daily., Disp: , Rfl:   •  estradiol (ESTRACE) 0.5 MG tablet, Take 0.5 mg by mouth Daily., Disp: , Rfl:   •  Flax Oil-Fish Oil-Borage Oil (FISH OIL-FLAX OIL-BORAGE OIL PO), Take 1 capsule by mouth Daily., Disp: , Rfl:   •  FLUoxetine (PROzac) 20 MG capsule, Take 1 capsule by mouth Daily., Disp: 90 capsule, Rfl: 1  •  fluticasone (FLONASE) 50 MCG/ACT nasal spray, 1 spray into the nostril(s) as directed by provider 2 (Two) Times a Day. (Patient taking differently: 1 spray into the nostril(s) as directed by provider 2 (Two) Times a Day. PRN), Disp: 16 g, Rfl: 5  •  hydrOXYzine (ATARAX) 25 MG tablet, Take 2 tablets by mouth Every Night., Disp: 180 tablet, Rfl: 1  •  pramipexole (MIRAPEX) 0.5 MG tablet, TAKE TWO TABLETS BY MOUTH EVERY NIGHT AT BEDTIME AND TAKE ONE TABLET BY MOUTH DURING THE DAY AS NEEDED, Disp: 225 tablet, Rfl: 1  •  benzonatate (Tessalon Perles) 100 MG capsule, Take 1 capsule by mouth 3 (Three) Times a Day As Needed for Cough., Disp: 30 capsule, Rfl: 0  •  doxycycline (VIBRAMYICN) 100 MG tablet, Take 1 tablet by mouth 2 (Two) Times a Day., Disp: 14 tablet, Rfl: 0  •  ondansetron ODT (Zofran ODT) 8 MG disintegrating tablet, Place 1 tablet on the tongue Every 8 (Eight) Hours As Needed for Nausea or Vomiting., Disp: 15 tablet, Rfl: 0  •  predniSONE (DELTASONE) 10 MG tablet, Take 6 tablets on day  "1, 5 tablets day 2, 4 tablets day 3, 3 tablets day 4, 2 tablets day 5, and 1 tablet day 6, Disp: 21 tablet, Rfl: 0    Allergies:   Allergies   Allergen Reactions   • Other Itching and Rash     CHG       Objective     Physical Exam:  Vital Signs:   Vitals:    12/22/21 1140   BP: 112/76   BP Location: Left arm   Patient Position: Sitting   Cuff Size: Adult   Pulse: 71   Temp: 98 °F (36.7 °C)   TempSrc: Temporal   SpO2: (!) 64%   Weight: 68.1 kg (150 lb 3.2 oz)   Height: 162.6 cm (64.02\")   PainSc: 0-No pain     Body mass index is 25.77 kg/m².     Physical Exam  Vitals and nursing note reviewed.   Constitutional:       General: She is not in acute distress.  HENT:      Head: Normocephalic and atraumatic.      Right Ear: Tympanic membrane, ear canal and external ear normal.      Left Ear: Tympanic membrane, ear canal and external ear normal.      Nose: Rhinorrhea present.      Mouth/Throat:      Mouth: Mucous membranes are moist.      Pharynx: Oropharynx is clear.      Comments: Signs of PND on posterior pharynx  Eyes:      General: No scleral icterus.  Cardiovascular:      Rate and Rhythm: Normal rate and regular rhythm.   Pulmonary:      Effort: Pulmonary effort is normal. No respiratory distress.   Abdominal:      General: Bowel sounds are normal. There is no distension.      Tenderness: There is no abdominal tenderness. There is no guarding.   Musculoskeletal:      Cervical back: No tenderness.   Lymphadenopathy:      Cervical: No cervical adenopathy.   Neurological:      Mental Status: She is alert.   Psychiatric:         Mood and Affect: Mood normal.         Thought Content: Thought content normal.         Judgment: Judgment normal.         Assessment / Plan      Assessment/Plan:   Problems Addressed This Visit    ICD-10-CM ICD-9-CM   1. Nausea  R11.0 787.02   2. Post-nasal drip  R09.82 784.91     Symptoms of nausea with one episode of vomiting  -Thought likely due to poorly controlled allergy symptoms and " postnasal drip  -Recommend restarting Flonase nasal spray daily  -Zofran 8 mg ODT as needed every 8 hours  -Benzonatate 100 mg 3 times daily as needed  -Prednisone 6-day taper  -Provided prescription for doxycycline 100 mg twice daily x7 days.  Advised patient to hold this prescription as not thought likely she has a true sinus infection at this time, only poorly controlled allergic rhinitis.  Advised that she should only start this prescription if symptoms of fever start  -Noted negative Covid screen this week at outside facility      Plan of care reviewed with patient at the conclusion of today's visit. Education was provided regarding diagnosis and management.  Patient verbalizes understanding of and agreement with management plan.    Patient Instructions   Sinusitis, Adult  Sinusitis is inflammation of your sinuses. Sinuses are hollow spaces in the bones around your face. Your sinuses are located:  · Around your eyes.  · In the middle of your forehead.  · Behind your nose.  · In your cheekbones.  Mucus normally drains out of your sinuses. When your nasal tissues become inflamed or swollen, mucus can become trapped or blocked. This allows bacteria, viruses, and fungi to grow, which leads to infection. Most infections of the sinuses are caused by a virus.  Sinusitis can develop quickly. It can last for up to 4 weeks (acute) or for more than 12 weeks (chronic). Sinusitis often develops after a cold.  What are the causes?  This condition is caused by anything that creates swelling in the sinuses or stops mucus from draining. This includes:  · Allergies.  · Asthma.  · Infection from bacteria or viruses.  · Deformities or blockages in your nose or sinuses.  · Abnormal growths in the nose (nasal polyps).  · Pollutants, such as chemicals or irritants in the air.  · Infection from fungi (rare).  What increases the risk?  You are more likely to develop this condition if you:  · Have a weak body defense system (immune  system).  · Do a lot of swimming or diving.  · Overuse nasal sprays.  · Smoke.  What are the signs or symptoms?  The main symptoms of this condition are pain and a feeling of pressure around the affected sinuses. Other symptoms include:  · Stuffy nose or congestion.  · Thick drainage from your nose.  · Swelling and warmth over the affected sinuses.  · Headache.  · Upper toothache.  · A cough that may get worse at night.  · Extra mucus that collects in the throat or the back of the nose (postnasal drip).  · Decreased sense of smell and taste.  · Fatigue.  · A fever.  · Sore throat.  · Bad breath.  How is this diagnosed?  This condition is diagnosed based on:  · Your symptoms.  · Your medical history.  · A physical exam.  · Tests to find out if your condition is acute or chronic. This may include:  ? Checking your nose for nasal polyps.  ? Viewing your sinuses using a device that has a light (endoscope).  ? Testing for allergies or bacteria.  ? Imaging tests, such as an MRI or CT scan.  In rare cases, a bone biopsy may be done to rule out more serious types of fungal sinus disease.  How is this treated?  Treatment for sinusitis depends on the cause and whether your condition is chronic or acute.  · If caused by a virus, your symptoms should go away on their own within 10 days. You may be given medicines to relieve symptoms. They include:  ? Medicines that shrink swollen nasal passages (topical intranasal decongestants).  ? Medicines that treat allergies (antihistamines).  ? A spray that eases inflammation of the nostrils (topical intranasal corticosteroids).  ? Rinses that help get rid of thick mucus in your nose (nasal saline washes).  · If caused by bacteria, your health care provider may recommend waiting to see if your symptoms improve. Most bacterial infections will get better without antibiotic medicine. You may be given antibiotics if you have:  ? A severe infection.  ? A weak immune system.  · If caused by  narrow nasal passages or nasal polyps, you may need to have surgery.  Follow these instructions at home:  Medicines  · Take, use, or apply over-the-counter and prescription medicines only as told by your health care provider. These may include nasal sprays.  · If you were prescribed an antibiotic medicine, take it as told by your health care provider. Do not stop taking the antibiotic even if you start to feel better.  Hydrate and humidify    · Drink enough fluid to keep your urine pale yellow. Staying hydrated will help to thin your mucus.  · Use a cool mist humidifier to keep the humidity level in your home above 50%.  · Inhale steam for 10-15 minutes, 3-4 times a day, or as told by your health care provider. You can do this in the bathroom while a hot shower is running.  · Limit your exposure to cool or dry air.    Rest  · Rest as much as possible.  · Sleep with your head raised (elevated).  · Make sure you get enough sleep each night.  General instructions    · Apply a warm, moist washcloth to your face 3-4 times a day or as told by your health care provider. This will help with discomfort.  · Wash your hands often with soap and water to reduce your exposure to germs. If soap and water are not available, use hand .  · Do not smoke. Avoid being around people who are smoking (secondhand smoke).  · Keep all follow-up visits as told by your health care provider. This is important.    Contact a health care provider if:  · You have a fever.  · Your symptoms get worse.  · Your symptoms do not improve within 10 days.  Get help right away if:  · You have a severe headache.  · You have persistent vomiting.  · You have severe pain or swelling around your face or eyes.  · You have vision problems.  · You develop confusion.  · Your neck is stiff.  · You have trouble breathing.  Summary  · Sinusitis is soreness and inflammation of your sinuses. Sinuses are hollow spaces in the bones around your face.  · This  condition is caused by nasal tissues that become inflamed or swollen. The swelling traps or blocks the flow of mucus. This allows bacteria, viruses, and fungi to grow, which leads to infection.  · If you were prescribed an antibiotic medicine, take it as told by your health care provider. Do not stop taking the antibiotic even if you start to feel better.  · Keep all follow-up visits as told by your health care provider. This is important.  This information is not intended to replace advice given to you by your health care provider. Make sure you discuss any questions you have with your health care provider.  Document Revised: 05/20/2019 Document Reviewed: 05/20/2019  [employer] Patient Education © 2021 [employer]        Follow Up:   Return if symptoms worsen or fail to improve, for Next scheduled follow up.        TAQUERIA Romero  [facility] [address]    Please note that portions of this note were completed with a voice recognition program.

## 2022-01-02 PROCEDURE — 87070 CULTURE OTHR SPECIMN AEROBIC: CPT | Performed by: NURSE PRACTITIONER

## 2022-01-02 PROCEDURE — 87186 SC STD MICRODIL/AGAR DIL: CPT | Performed by: NURSE PRACTITIONER

## 2022-01-02 PROCEDURE — 87205 SMEAR GRAM STAIN: CPT | Performed by: NURSE PRACTITIONER

## 2022-01-02 PROCEDURE — 87147 CULTURE TYPE IMMUNOLOGIC: CPT | Performed by: NURSE PRACTITIONER

## 2022-01-05 ENCOUNTER — TELEPHONE (OUTPATIENT)
Dept: INTERNAL MEDICINE | Facility: CLINIC | Age: 67
End: 2022-01-05

## 2022-01-05 ENCOUNTER — TELEMEDICINE (OUTPATIENT)
Dept: INTERNAL MEDICINE | Facility: CLINIC | Age: 67
End: 2022-01-05

## 2022-01-05 VITALS — TEMPERATURE: 97.4 F

## 2022-01-05 DIAGNOSIS — M79.644 PAIN OF FINGER OF RIGHT HAND: Primary | ICD-10-CM

## 2022-01-05 DIAGNOSIS — U07.1 COVID-19: ICD-10-CM

## 2022-01-05 DIAGNOSIS — R05.9 COUGH: ICD-10-CM

## 2022-01-05 PROCEDURE — 99213 OFFICE O/P EST LOW 20 MIN: CPT | Performed by: NURSE PRACTITIONER

## 2022-01-05 RX ORDER — BENZONATATE 100 MG/1
100 CAPSULE ORAL 3 TIMES DAILY PRN
Qty: 30 CAPSULE | Refills: 0 | Status: SHIPPED | OUTPATIENT
Start: 2022-01-05 | End: 2022-03-24

## 2022-01-05 RX ORDER — HYDROCODONE BITARTRATE AND ACETAMINOPHEN 5; 325 MG/1; MG/1
1 TABLET ORAL EVERY 8 HOURS PRN
Qty: 15 TABLET | Refills: 0 | Status: SHIPPED | OUTPATIENT
Start: 2022-01-05 | End: 2022-02-02

## 2022-01-05 RX ORDER — CEPHALEXIN 500 MG/1
500 CAPSULE ORAL 4 TIMES DAILY
Qty: 28 CAPSULE | Refills: 0 | Status: SHIPPED | OUTPATIENT
Start: 2022-01-05 | End: 2022-01-12

## 2022-01-05 NOTE — TELEPHONE ENCOUNTER
Per ER note from yesterday, patient was prescribed Keflex 500 mg.  We discussed that this was the antibiotic that she needed to start.    If they did not send in, we will do.

## 2022-01-05 NOTE — TELEPHONE ENCOUNTER
Patient was seen in office today. She is wondering why her antibiotics were not called in but cough medicine and pain medicine were. She is requesting a call back

## 2022-01-05 NOTE — PROGRESS NOTES
Follow Up Office Visit      Patient Name: Suze Baeza  : 1955   MRN: [id number]   Care Team: Patient Care Team:  Parul Lomas MD as PCP - General (Internal Medicine)  Kendrick Andersen MD as Consulting Physician (Dermatology)  Heraclio Mari MD as Consulting Physician (Orthopedic Surgery)  Chio Dhaliwal MD as Consulting Physician (Obstetrics and Gynecology)    Chief Complaint:    Chief Complaint   Patient presents with   • Cough   • Covid-19 Home Monitoring Video Visit   • Abscess     right finger     You have chosen to receive care through a telehealth visit.  Do you consent to use a video/audio connection for your medical care today?     YES      History of Present Illness: Suze Baeza is a 66 y.o. female with pertinent medical history significant for vitamin D deficiency, chronic allergic rhinitis, hypercholesterolemia, chronic low back pain.    She presents today via video visit for new acute issue of cough with noted positive Covid test yesterday and right hand third digit abscess.    Cough/COVID-19-reports that approximately 1 week ago, she had gradual onset of head congestion.  Reports that symptoms have persisted ever since, now with cough with thick mucus.  She denies ever having fever, chills, GI symptoms, loss of taste or smell, shortness of breath, chest pain or headache.  She has been using over-the-counter therapies of Mucinex and DayQuil/NyQuil formula.  States these therapies do not seem to help much.  She took a Covid 19 test yesterday, reports it was positive.     She admits that she was exposed to her daughter who had Covid last week, who is now out of quarantine today.  Her spouse also tested positive for Covid yesterday.    Right hand third digit abscess-was seen at urgent care center on 2022 for noted acute onset of finger redness and swelling.  She was given a course of doxycycline.  On 2022, patient went to [facility] ED as symptoms of swelling  and redness worsened.  She was seen by Dr. Parikh with plastics.  I&D was performed and there are wound was packed.  She was given a prescription for Keflex 500 mg 4 times daily.  Reports that she was told to pack and redress the wound twice daily with plan to return on Friday for evaluation.    Patient admits today that she has not yet started her antibiotic therapy.  She plans to send her daughter to get this as she is in quarantine for now for Covid.  Denies any pain currently in the hand, but states that she had intense pain with packing the wound.  She is inquiring about something for pain.      Subjective      Review of Systems:   Review of Systems   Constitutional: Negative for chills, fatigue and fever.   HENT: Positive for congestion.    Respiratory: Positive for cough. Negative for shortness of breath and wheezing.    Cardiovascular: Negative for chest pain, palpitations and leg swelling.   Gastrointestinal: Negative for diarrhea, nausea and vomiting.   Musculoskeletal:        Pain in the right hand/third finger.   Neurological: Negative for headache.       I have reviewed and the following portions of the patient's history were updated as appropriate: past family history, past medical history, past social history, past surgical history and problem list.    Medications:     Current Outpatient Medications:   •  Ascorbic Acid (VITAMIN C PO), Take 500 mg by mouth Daily., Disp: , Rfl:   •  atorvastatin (LIPITOR) 10 MG tablet, TAKE ONE TABLET BY MOUTH ONCE NIGHTLY, Disp: 90 tablet, Rfl: 1  •  busPIRone (BUSPAR) 5 MG tablet, Take 1 tablet by mouth 3 (Three) Times a Day., Disp: 90 tablet, Rfl: 1  •  calcium carb-cholecalciferol (Calcium 600/Vitamin D3) 600-800 MG-UNIT tablet, Take 1 tablet by mouth Daily., Disp: , Rfl:   •  Calcium Polycarbophil (FIBER-CAPS PO), Take 1 tablet by mouth Daily., Disp: , Rfl:   •  Cyanocobalamin (VITAMIN B-12 PO), Take 5,000 mcg by mouth Daily., Disp: , Rfl:   •  estradiol (ESTRACE)  0.5 MG tablet, Take 0.5 mg by mouth Daily., Disp: , Rfl:   •  Flax Oil-Fish Oil-Borage Oil (FISH OIL-FLAX OIL-BORAGE OIL PO), Take 1 capsule by mouth Daily., Disp: , Rfl:   •  FLUoxetine (PROzac) 20 MG capsule, Take 1 capsule by mouth Daily., Disp: 90 capsule, Rfl: 1  •  fluticasone (FLONASE) 50 MCG/ACT nasal spray, 1 spray into the nostril(s) as directed by provider 2 (Two) Times a Day. (Patient taking differently: 1 spray into the nostril(s) as directed by provider 2 (Two) Times a Day. PRN), Disp: 16 g, Rfl: 5  •  hydrOXYzine (ATARAX) 25 MG tablet, Take 2 tablets by mouth Every Night., Disp: 180 tablet, Rfl: 1  •  pramipexole (MIRAPEX) 0.5 MG tablet, TAKE TWO TABLETS BY MOUTH EVERY NIGHT AT BEDTIME AND TAKE ONE TABLET BY MOUTH DURING THE DAY AS NEEDED, Disp: 225 tablet, Rfl: 1  •  benzonatate (Tessalon Perles) 100 MG capsule, Take 1 capsule by mouth 3 (Three) Times a Day As Needed for Cough., Disp: 30 capsule, Rfl: 0  •  doxycycline (MONODOX) 100 MG capsule, Take 1 capsule by mouth 2 (Two) Times a Day for 7 days., Disp: 14 capsule, Rfl: 0  •  doxycycline (VIBRAMYICN) 100 MG tablet, Take 1 tablet by mouth 2 (Two) Times a Day., Disp: 14 tablet, Rfl: 0  •  HYDROcodone-acetaminophen (NORCO) 5-325 MG per tablet, Take 1 tablet by mouth Every 8 (Eight) Hours As Needed for Moderate Pain ., Disp: 15 tablet, Rfl: 0  •  mupirocin (BACTROBAN) 2 % ointment, Apply 1 application topically to the appropriate area as directed 3 (Three) Times a Day for 7 days., Disp: 22 g, Rfl: 0  •  predniSONE (DELTASONE) 10 MG tablet, Take 6 tablets on day 1, 5 tablets day 2, 4 tablets day 3, 3 tablets day 4, 2 tablets day 5, and 1 tablet day 6, Disp: 21 tablet, Rfl: 0    Allergies:   Allergies   Allergen Reactions   • Other Itching and Rash     CHG       Objective     Physical Exam:  Vital Signs:   Vitals:    01/05/22 1032   Temp: 97.4 °F (36.3 °C)   PainSc:   1   PainLoc: Finger     There is no height or weight on file to calculate BMI.      Physical Exam  With patient's consent, physical exam was conducted via visual telemedicine encounter due to patient's current isolation requirements in the interest of PPE conservation.    Constitutional: No acute distress, awake, alert, nontoxic, normal body habitus  HENT: NCAT, MMM, no conjunctival injection  Respiratory: Good effort, nonlabored respirations     Musculoskeletal: right hand noted to be wrapped in gauze dressing.  Psychiatric: Appropriate affect, good insight and judgement, cooperative  Neurologic: Oriented x 3, speech clear and fluent  Skin: No visible rashes, no jaundice seen on exposed skin through video window    Assessment / Plan      Assessment/Plan:   Problems Addressed This Visit    ICD-10-CM ICD-9-CM   1. Pain of finger of right hand  M79.644 729.5   2. COVID-19  U07.1 079.89   3. Cough  R05.9 786.2     Cough  COVID-19 infection  -Advised to quarantine for 5 days.  If asymptomatic thereafter, may end quarantine but must wear masks x5 more days.  -Benzonatate 100 mg 3 times daily as needed for cough suppression  -Follow-up if symptoms worsen    ED visit follow-up  Right hand third digit abscess/cellulitis  -Advised patient to start course of prescribed Keflex as soon as possible  -Prescribed hydrocodone 5/325 every 8 hours as needed #15 0 refills for management of pain with packing of the wound at home  -Patient reports supposed to follow-up at ED on Friday, if ED does not contact her she will need to follow-up with us next week.    Plan of care reviewed with patient at the conclusion of today's visit. Education was provided regarding diagnosis and management.  Patient verbalizes understanding of and agreement with management plan.    Patient Instructions   Advised patient to wait at least for 2 weeks after recovery of COVID-19 prior to receiving her booster vaccine.    Seek attention at the emergency room if she develops shortness of breath or high fever (>102).        Follow Up:    Return if symptoms worsen or fail to improve.        TAQUERIA Romero  [facility] [address]    Please note that portions of this note were completed with a voice recognition program.

## 2022-01-05 NOTE — PATIENT INSTRUCTIONS
Advised patient to wait at least for 2 weeks after recovery of COVID-19 prior to receiving her booster vaccine.    Seek attention at the emergency room if she develops shortness of breath or high fever (>102).

## 2022-02-02 ENCOUNTER — OFFICE VISIT (OUTPATIENT)
Dept: ORTHOPEDIC SURGERY | Facility: CLINIC | Age: 67
End: 2022-02-02

## 2022-02-02 VITALS
HEIGHT: 64 IN | DIASTOLIC BLOOD PRESSURE: 84 MMHG | WEIGHT: 142 LBS | BODY MASS INDEX: 24.24 KG/M2 | SYSTOLIC BLOOD PRESSURE: 122 MMHG

## 2022-02-02 DIAGNOSIS — Z96.653 S/P TOTAL KNEE ARTHROPLASTY, BILATERAL: Primary | ICD-10-CM

## 2022-02-02 DIAGNOSIS — Z09 POSTOPERATIVE EXAMINATION: ICD-10-CM

## 2022-02-02 PROCEDURE — 99212 OFFICE O/P EST SF 10 MIN: CPT | Performed by: ORTHOPAEDIC SURGERY

## 2022-02-02 ASSESSMENT — KOOS JR
KOOS JR SCORE: 0
KOOS JR SCORE: 100

## 2022-02-02 NOTE — PROGRESS NOTES
[facility] Orthopaedic Surgery Clinic Note    Subjective     Chief Complaint   Patient presents with   • Follow-up     11 month follow up; 13 months S/P Total Knee Arthroplasty Bilateral 12/29/20        HPI    It has been 11  month(s) since Ms. Baeza's last visit. She returns to clinic today for follow-up of bilateral knee arthroplasty. The issue has been ongoing for 13 month(s). She rates her pain a 0/10 on the pain scale. Previous/current treatments: physical therapy. Current symptoms: stiffness. Overall, she is doing better.  100% improvement compared to her preoperative symptoms.  Fully ambulatory without external aids.    I have reviewed the following portions of the patient's history and agree with: History of Present Illness and Review of Systems    Patient Active Problem List   Diagnosis   • Hypercholesterolemia   • Vitamin D deficiency   • Primary insomnia   • Restless leg syndrome   • Chronic bilateral low back pain   • Other viral warts   • Bilateral headaches   • Stress and adjustment reaction   • Primary osteoarthritis involving multiple joints   • Cervicalgia   • Baker cyst, right   • Chronic fatigue   • Hot flashes due to menopause   • Primary osteoarthritis of both knees   • Synovial cyst of popliteal space   • Post-nasal drainage   • Annual physical exam   • S/P total knee arthroplasty, bilateral   • Acute blood loss anemia   • Hypokalemia   • Fatigue   • Acute non-recurrent maxillary sinusitis     Past Medical History:   Diagnosis Date   • Degenerative tear of medial meniscus, right    • Hot flashes     PROZAC    • Low back pain    • Neuropathy, lumbosacral (radicular)    • Primary osteoarthritis of both knees    • Spondylolisthesis, lumbar region    • Torn meniscus     right medial   • Viral warts 02/25/2019   • Wears reading eyeglasses       Past Surgical History:   Procedure Laterality Date   • BLADDER SURGERY     • COLONOSCOPY     • HYSTERECTOMY  07/10/2012   • KNEE ARTHROSCOPY Right    •  OOPHORECTOMY Bilateral 07/10/2012   • TOTAL KNEE ARTHROPLASTY Bilateral 12/29/2020    Procedure: TOTAL KNEE ARTHROPLASTY BILATERAL;  Surgeon: Heraclio Mari MD;  Location: [facility];  Service: Orthopedics;  Laterality: Bilateral;      Family History   Problem Relation Age of Onset   • Deep vein thrombosis Mother    • Stroke Mother    • Heart disease Mother 68   • Heart attack Mother    • Osteoarthritis Mother    • Heart disease Father 51        cause of death   • Heart attack Father    • Stroke Other    • Cancer Maternal Aunt         bone   • Diabetes Maternal Grandmother    • Cancer Maternal Aunt         lymphoma   • Cancer Maternal Aunt         brain   • Breast cancer Neg Hx    • Ovarian cancer Neg Hx      Social History     Socioeconomic History   • Marital status:    Tobacco Use   • Smoking status: Never Smoker   • Smokeless tobacco: Never Used   Vaping Use   • Vaping Use: Never used   Substance and Sexual Activity   • Alcohol use: No   • Drug use: No   • Sexual activity: Defer      Current Outpatient Medications on File Prior to Visit   Medication Sig Dispense Refill   • Ascorbic Acid (VITAMIN C PO) Take 500 mg by mouth Daily.     • atorvastatin (LIPITOR) 10 MG tablet TAKE ONE TABLET BY MOUTH ONCE NIGHTLY 90 tablet 1   • benzonatate (Tessalon Perles) 100 MG capsule Take 1 capsule by mouth 3 (Three) Times a Day As Needed for Cough. 30 capsule 0   • busPIRone (BUSPAR) 5 MG tablet Take 1 tablet by mouth 3 (Three) Times a Day. 90 tablet 1   • calcium carb-cholecalciferol (Calcium 600/Vitamin D3) 600-800 MG-UNIT tablet Take 1 tablet by mouth Daily.     • Calcium Polycarbophil (FIBER-CAPS PO) Take 1 tablet by mouth Daily.     • Cyanocobalamin (VITAMIN B-12 PO) Take 5,000 mcg by mouth Daily.     • estradiol (ESTRACE) 0.5 MG tablet Take 0.5 mg by mouth Daily.     • Flax Oil-Fish Oil-Borage Oil (FISH OIL-FLAX OIL-BORAGE OIL PO) Take 1 capsule by mouth Daily.     • FLUoxetine (PROzac) 20 MG capsule Take 1  capsule by mouth Daily. 90 capsule 1   • fluticasone (FLONASE) 50 MCG/ACT nasal spray 1 spray into the nostril(s) as directed by provider 2 (Two) Times a Day. (Patient taking differently: 1 spray into the nostril(s) as directed by provider 2 (Two) Times a Day. PRN) 16 g 5   • hydrOXYzine (ATARAX) 25 MG tablet Take 2 tablets by mouth Every Night. 180 tablet 1   • pramipexole (MIRAPEX) 0.5 MG tablet TAKE TWO TABLETS BY MOUTH EVERY NIGHT AT BEDTIME AND TAKE ONE TABLET BY MOUTH DURING THE DAY AS NEEDED 225 tablet 1   • predniSONE (DELTASONE) 10 MG tablet Take 6 tablets on day 1, 5 tablets day 2, 4 tablets day 3, 3 tablets day 4, 2 tablets day 5, and 1 tablet day 6 21 tablet 0   • [DISCONTINUED] doxycycline (VIBRAMYICN) 100 MG tablet Take 1 tablet by mouth 2 (Two) Times a Day. 14 tablet 0   • [DISCONTINUED] HYDROcodone-acetaminophen (NORCO) 5-325 MG per tablet Take 1 tablet by mouth Every 8 (Eight) Hours As Needed for Moderate Pain . 15 tablet 0     No current facility-administered medications on file prior to visit.      Allergies   Allergen Reactions   • Other Itching and Rash     CHG        Review of Systems   Constitutional: Negative for activity change, appetite change, chills, diaphoresis, fatigue, fever and unexpected weight change.   HENT: Negative for congestion, dental problem, drooling, ear discharge, ear pain, facial swelling, hearing loss, mouth sores, nosebleeds, postnasal drip, rhinorrhea, sinus pressure, sneezing, sore throat, tinnitus, trouble swallowing and voice change.    Eyes: Negative for photophobia, pain, discharge, redness, itching and visual disturbance.   Respiratory: Negative for apnea, cough, choking, chest tightness, shortness of breath, wheezing and stridor.    Cardiovascular: Negative for chest pain, palpitations and leg swelling.   Gastrointestinal: Negative for abdominal distention, abdominal pain, anal bleeding, blood in stool, constipation, diarrhea, nausea, rectal pain and vomiting.  "  Endocrine: Negative for cold intolerance, heat intolerance, polydipsia, polyphagia and polyuria.   Genitourinary: Negative for decreased urine volume, difficulty urinating, dysuria, enuresis, flank pain, frequency, genital sores, hematuria and urgency.   Musculoskeletal: Positive for arthralgias. Negative for back pain, gait problem, joint swelling, myalgias, neck pain and neck stiffness.   Skin: Negative for color change, pallor, rash and wound.   Allergic/Immunologic: Negative for environmental allergies, food allergies and immunocompromised state.   Neurological: Negative for dizziness, tremors, seizures, syncope, facial asymmetry, speech difficulty, weakness, light-headedness, numbness and headaches.   Hematological: Negative for adenopathy. Does not bruise/bleed easily.   Psychiatric/Behavioral: Negative for agitation, behavioral problems, confusion, decreased concentration, dysphoric mood, hallucinations, self-injury, sleep disturbance and suicidal ideas. The patient is not nervous/anxious and is not hyperactive.         Objective      Physical Exam  /84   Ht 162.6 cm (64.02\")   Wt 64.4 kg (142 lb)   BMI 24.36 kg/m²     Body mass index is 24.36 kg/m².    General:   Mental Status:  Alert   Appearance: Cooperative, in no acute distress   Build and Nutrition: Well-nourished well-developed female   Orientation: Alert and oriented to person, place and time   Posture: Normal   Gait: Nonantalgic    Integument:   Right knee: Wound well-healed   Left knee: Wound well-healed    Lower Extremities:   Right Knee:    Tenderness:  None    Effusion:  None    Swelling:  None    Crepitus:  None    Atrophy:  None    Range of motion:  Extension: 0°       Flexion: 130°  Instability:  No varus laxity, no valgus laxity, negative anterior drawer  Deformities:  None   Left Knee:    Tenderness:  None    Effusion:  None    Swelling:  None    Crepitus: None    Atrophy:  None    Range of motion: "  Extension: 0°       Flexion: 130°  Instability:  No varus laxity, no valgus laxity, negative anterior drawer  Deformities:  None      Imaging/Studies  Imaging Results (Last 24 Hours)     Procedure Component Value Units Date/Time    XR Knee 3 View Bilateral [[id number]] Resulted: 02/02/22 1155     Updated: 02/02/22 1200    Narrative:      Right Knee Radiographs  Indication: status-post right total knee arthroplasty  Views: AP, lateral, and sunrise views of the right knee    Comparison: no change compared to prior study, 1/20/2021    Findings:   No signs of loosening or failure.  No change in alignment.    Left Knee Radiographs  Indication: status-post left total knee arthroplasty  Views: AP, lateral, and sunrise views of the left knee    Comparison: no change compared to prior study, 1/20/2021    Findings:   No signs of loosening or failure.  No change in alignment.              Assessment and Plan     Diagnoses and all orders for this visit:    1. S/P total knee arthroplasty, bilateral (Primary)  -     XR Knee 3 View Bilateral    2. Postoperative examination        1. S/P total knee arthroplasty, bilateral    2. Postoperative examination        I reviewed my findings with the patient.  Both of her knees are functioning well, she is pleased with results.  Externally she has good alignment, and long-leg radiographs from 1/20/2021 demonstrated the same.  I will see her back in 4 years with repeat x-rays, but sooner for any problems.    Return in about 4 years (around 2/2/2026) for Recheck with X-Rays.      Heraclio Mari MD  02/02/22  12:05 EST

## 2022-02-21 RX ORDER — ATORVASTATIN CALCIUM 10 MG/1
TABLET, FILM COATED ORAL
Qty: 30 TABLET | Refills: 5 | Status: SHIPPED | OUTPATIENT
Start: 2022-02-21 | End: 2022-08-29

## 2022-03-07 DIAGNOSIS — F43.29 STRESS AND ADJUSTMENT REACTION: ICD-10-CM

## 2022-03-07 RX ORDER — BUSPIRONE HYDROCHLORIDE 5 MG/1
TABLET ORAL
Qty: 90 TABLET | Refills: 1 | Status: SHIPPED | OUTPATIENT
Start: 2022-03-07 | End: 2022-08-18

## 2022-03-07 NOTE — TELEPHONE ENCOUNTER
Rx Refill Note  Requested Prescriptions     Pending Prescriptions Disp Refills   • busPIRone (BUSPAR) 5 MG tablet [Pharmacy Med Name: busPIRone HCL 5 MG TABLET] 90 tablet 1     Sig: TAKE ONE TABLET BY MOUTH THREE TIMES A DAY      Last office visit with prescribing clinician: 12/22/21  Next office visit with prescribing clinician: 3/24/2022          {TIP  Is Refill Pharmacy correct?:23}  Hillary Amador LPN  03/07/22, 09:59 EST

## 2022-03-24 ENCOUNTER — LAB (OUTPATIENT)
Dept: LAB | Facility: HOSPITAL | Age: 67
End: 2022-03-24

## 2022-03-24 ENCOUNTER — OFFICE VISIT (OUTPATIENT)
Dept: INTERNAL MEDICINE | Facility: CLINIC | Age: 67
End: 2022-03-24

## 2022-03-24 VITALS
SYSTOLIC BLOOD PRESSURE: 118 MMHG | HEIGHT: 64 IN | HEART RATE: 59 BPM | TEMPERATURE: 98 F | WEIGHT: 153.4 LBS | OXYGEN SATURATION: 96 % | BODY MASS INDEX: 26.19 KG/M2 | DIASTOLIC BLOOD PRESSURE: 72 MMHG

## 2022-03-24 DIAGNOSIS — G25.81 RESTLESS LEG SYNDROME: ICD-10-CM

## 2022-03-24 DIAGNOSIS — F43.29 STRESS AND ADJUSTMENT REACTION: ICD-10-CM

## 2022-03-24 DIAGNOSIS — E78.00 HYPERCHOLESTEROLEMIA: ICD-10-CM

## 2022-03-24 DIAGNOSIS — Z13.820 OSTEOPOROSIS SCREENING: ICD-10-CM

## 2022-03-24 DIAGNOSIS — E55.9 VITAMIN D DEFICIENCY: ICD-10-CM

## 2022-03-24 DIAGNOSIS — F51.01 PRIMARY INSOMNIA: ICD-10-CM

## 2022-03-24 DIAGNOSIS — E78.00 HYPERCHOLESTEROLEMIA: Primary | ICD-10-CM

## 2022-03-24 DIAGNOSIS — J30.2 PERENNIAL ALLERGIC RHINITIS WITH SEASONAL VARIATION: Chronic | ICD-10-CM

## 2022-03-24 DIAGNOSIS — J30.89 PERENNIAL ALLERGIC RHINITIS WITH SEASONAL VARIATION: Chronic | ICD-10-CM

## 2022-03-24 PROBLEM — D62 ACUTE BLOOD LOSS ANEMIA: Status: RESOLVED | Noted: 2020-12-30 | Resolved: 2022-03-24

## 2022-03-24 LAB
ALBUMIN SERPL-MCNC: 4.5 G/DL (ref 3.5–5.2)
ALBUMIN/GLOB SERPL: 1.9 G/DL
ALP SERPL-CCNC: 91 U/L (ref 39–117)
ALT SERPL W P-5'-P-CCNC: 19 U/L (ref 1–33)
ANION GAP SERPL CALCULATED.3IONS-SCNC: 7.9 MMOL/L (ref 5–15)
AST SERPL-CCNC: 20 U/L (ref 1–32)
BILIRUB SERPL-MCNC: 0.3 MG/DL (ref 0–1.2)
BUN SERPL-MCNC: 17 MG/DL (ref 8–23)
BUN/CREAT SERPL: 25.4 (ref 7–25)
CALCIUM SPEC-SCNC: 9.8 MG/DL (ref 8.6–10.5)
CHLORIDE SERPL-SCNC: 105 MMOL/L (ref 98–107)
CHOLEST SERPL-MCNC: 163 MG/DL (ref 0–200)
CO2 SERPL-SCNC: 27.1 MMOL/L (ref 22–29)
CREAT SERPL-MCNC: 0.67 MG/DL (ref 0.57–1)
EGFRCR SERPLBLD CKD-EPI 2021: 97.1 ML/MIN/1.73
GLOBULIN UR ELPH-MCNC: 2.4 GM/DL
GLUCOSE SERPL-MCNC: 92 MG/DL (ref 65–99)
HDLC SERPL-MCNC: 55 MG/DL (ref 40–60)
LDLC SERPL CALC-MCNC: 94 MG/DL (ref 0–100)
LDLC/HDLC SERPL: 1.7 {RATIO}
POTASSIUM SERPL-SCNC: 4.7 MMOL/L (ref 3.5–5.2)
PROT SERPL-MCNC: 6.9 G/DL (ref 6–8.5)
SODIUM SERPL-SCNC: 140 MMOL/L (ref 136–145)
TRIGL SERPL-MCNC: 72 MG/DL (ref 0–150)
VLDLC SERPL-MCNC: 14 MG/DL (ref 5–40)

## 2022-03-24 PROCEDURE — 80061 LIPID PANEL: CPT

## 2022-03-24 PROCEDURE — 84443 ASSAY THYROID STIM HORMONE: CPT

## 2022-03-24 PROCEDURE — 99214 OFFICE O/P EST MOD 30 MIN: CPT | Performed by: INTERNAL MEDICINE

## 2022-03-24 PROCEDURE — 82306 VITAMIN D 25 HYDROXY: CPT

## 2022-03-24 PROCEDURE — 80053 COMPREHEN METABOLIC PANEL: CPT

## 2022-03-24 RX ORDER — HYDROXYZINE 50 MG/1
75 TABLET, FILM COATED ORAL NIGHTLY
Qty: 146 TABLET | Refills: 1 | Status: SHIPPED | OUTPATIENT
Start: 2022-03-24 | End: 2022-10-11

## 2022-03-24 RX ORDER — PRAMIPEXOLE DIHYDROCHLORIDE 0.5 MG/1
TABLET ORAL
Qty: 270 TABLET | Refills: 1 | Status: SHIPPED | OUTPATIENT
Start: 2022-03-24 | End: 2022-10-06

## 2022-03-24 RX ORDER — AZELASTINE HYDROCHLORIDE 137 UG/1
2 SPRAY, METERED NASAL 2 TIMES DAILY
Qty: 30 ML | Refills: 11 | Status: SHIPPED | OUTPATIENT
Start: 2022-03-24 | End: 2022-11-30

## 2022-03-24 NOTE — ASSESSMENT & PLAN NOTE
Increase the Mirapex to 3 tablets every evening and 1 tablet during the day as needed. Continue drinking plenty of fluids every day.

## 2022-03-24 NOTE — PROGRESS NOTES
"[facility]     Suze Baeza  1955   [id number]      Patient Care Team:  Parul Lomas MD as PCP - General (Internal Medicine)  Kendrick Andersen MD as Consulting Physician (Dermatology)  Heraclio Mari MD as Consulting Physician (Orthopedic Surgery)  Chio Dhaliwal MD as Consulting Physician (Obstetrics and Gynecology)    Chief Complaint   Patient presents with   • Hyperlipidemia     Follow up   • Insomnia     Follow up            HPI  Patient is a 67 y.o. female presents for a follow-up visit.    Hypercholesterolemia:  The patient takes atorvastatin every evening. She reports that she tries to eat a low fat diet. She states she cannot exercise because she takes care of her mother, who is 93 and has dementia. The patient's  is able to help with her mother for short periods of time occasionally.     Vitamin D deficiency:  The patient takes a calcium tablet with Vitamin D. She also takes a separate Vitamin D tablet, either 500 or 1,000 International Units, she does not recall.    Restless leg syndrome and insomnia:  She reports: \"I'm still not sleeping.\" She has tried hydroxyzine 25 mg 2 tablets before bedtime. She states that she thinks it used to work, but does not anymore. The patient denies feeling hungover the next morning after taking hydroxyzine. She states that she sometimes has difficulty sleeping because of her restless legs, sometimes because her mind will not turn off, and sometimes it is both. The patient takes her Mirapex about an hour before going to bed, and thinks that it does help \"a little bit.\" She denies side effects with the Mirapex. She states that she takes two at nighttime, and occasionally one during the day. The patient states she turns off all screens at least one half hour before bedtime.    Stress and adjustment reaction:  She is taking Prozac and buspirone. She asked if her buspirone might be keeping her from sleeping.    Preventative:  The " patient reports that she sees her gynecologist in late summer 2021. She states that she had her bladder tacked in conjunction with her hysterectomy, and her gynecologist checks on that for her.    Her last bone density scan was in 2017, and was normal.    Allergic rhinitis:  She reports her allergies have been worse lately, especially the drainage. She uses Flonase spray, and reports that it does not seem to be helping. The patient believes she has not tried an antihistamine spray in the past.  She reports taking Mucinex daily.    CHRONIC CONDITIONS      Past Medical History:   Diagnosis Date   • Degenerative tear of medial meniscus, right    • Hot flashes     PROZAC    • Low back pain    • Neuropathy, lumbosacral (radicular)    • Primary osteoarthritis of both knees    • Spondylolisthesis, lumbar region    • Torn meniscus     right medial   • Viral warts 02/25/2019   • Wears reading eyeglasses        Past Surgical History:   Procedure Laterality Date   • BLADDER SURGERY     • COLONOSCOPY     • HYSTERECTOMY  07/10/2012   • KNEE ARTHROSCOPY Right    • OOPHORECTOMY Bilateral 07/10/2012   • TOTAL KNEE ARTHROPLASTY Bilateral 12/29/2020    Procedure: TOTAL KNEE ARTHROPLASTY BILATERAL;  Surgeon: Heraclio Mari MD;  Location: [facility];  Service: Orthopedics;  Laterality: Bilateral;       Family History   Problem Relation Age of Onset   • Deep vein thrombosis Mother    • Stroke Mother    • Heart disease Mother 68   • Heart attack Mother    • Osteoarthritis Mother    • Heart disease Father 51        cause of death   • Heart attack Father    • Stroke Other    • Cancer Maternal Aunt         bone   • Diabetes Maternal Grandmother    • Cancer Maternal Aunt         lymphoma   • Cancer Maternal Aunt         brain   • Breast cancer Neg Hx    • Ovarian cancer Neg Hx        Social History     Socioeconomic History   • Marital status:    Tobacco Use   • Smoking status: Never Smoker   • Smokeless tobacco: Never Used   Vaping  "Use   • Vaping Use: Never used   Substance and Sexual Activity   • Alcohol use: No   • Drug use: No   • Sexual activity: Defer       Allergies   Allergen Reactions   • Other Itching and Rash     CHG       Review of Systems:     Review of Systems   Constitutional: Negative for chills, fatigue and fever.   HENT: Negative for congestion, ear pain and sinus pressure.    Respiratory: Negative for cough, chest tightness, shortness of breath and wheezing.    Cardiovascular: Negative for chest pain and palpitations.   Gastrointestinal: Negative for abdominal pain, blood in stool and constipation.   Skin: Negative for color change.   Allergic/Immunologic: Negative for environmental allergies.   Neurological: Negative for dizziness, speech difficulty and headache.   Psychiatric/Behavioral: Negative for decreased concentration. The patient is not nervous/anxious.        Vital Signs  Vitals:    03/24/22 1007   BP: 118/72   BP Location: Left arm   Patient Position: Sitting   Cuff Size: Adult   Pulse: 59   Temp: 98 °F (36.7 °C)   TempSrc: Infrared   SpO2: 96%   Weight: 69.6 kg (153 lb 6.4 oz)   Height: 162.6 cm (64.02\")   PainSc: 0-No pain     Body mass index is 26.31 kg/m².  Patient's Body mass index is 26.31 kg/m². indicating that she is overweight (BMI 25-29.9). Patient's (Body mass index is 26.31 kg/m².) indicates that they are overweight with health conditions that include dyslipidemias . Weight is improving with lifestyle modifications. BMI is is above average; BMI management plan is completed. We discussed low calorie, low carb based diet program, portion control and increasing exercise. .        Current Outpatient Medications:   •  Ascorbic Acid (VITAMIN C PO), Take 500 mg by mouth Daily., Disp: , Rfl:   •  atorvastatin (LIPITOR) 10 MG tablet, TAKE ONE TABLET BY MOUTH ONCE NIGHTLY, Disp: 30 tablet, Rfl: 5  •  busPIRone (BUSPAR) 5 MG tablet, TAKE ONE TABLET BY MOUTH THREE TIMES A DAY, Disp: 90 tablet, Rfl: 1  •  calcium " carb-cholecalciferol 600-800 MG-UNIT tablet, Take 1 tablet by mouth Daily., Disp: , Rfl:   •  Calcium Polycarbophil (FIBER-CAPS PO), Take 1 tablet by mouth Daily., Disp: , Rfl:   •  Cyanocobalamin (VITAMIN B-12 PO), Take 5,000 mcg by mouth Daily., Disp: , Rfl:   •  estradiol (ESTRACE) 0.5 MG tablet, Take 0.5 mg by mouth Daily., Disp: , Rfl:   •  Flax Oil-Fish Oil-Borage Oil (FISH OIL-FLAX OIL-BORAGE OIL PO), Take 1 capsule by mouth Daily., Disp: , Rfl:   •  FLUoxetine (PROzac) 20 MG capsule, Take 1 capsule by mouth Daily., Disp: 90 capsule, Rfl: 1  •  fluticasone (FLONASE) 50 MCG/ACT nasal spray, 1 spray into the nostril(s) as directed by provider 2 (Two) Times a Day. (Patient taking differently: 1 spray into the nostril(s) as directed by provider 2 (Two) Times a Day. PRN), Disp: 16 g, Rfl: 5  •  hydrOXYzine (ATARAX) 50 MG tablet, Take 1.5 tablets by mouth Every Night., Disp: 146 tablet, Rfl: 1  •  pramipexole (MIRAPEX) 0.5 MG tablet, TAKE 3 TABLETS BY MOUTH EVERY NIGHT AT BEDTIME AND TAKE ONE TABLET BY MOUTH DURING THE DAY AS NEEDED, Disp: 270 tablet, Rfl: 1  •  Azelastine HCl 137 MCG/SPRAY solution, 2 sprays into the nostril(s) as directed by provider 2 (Two) Times a Day., Disp: 30 mL, Rfl: 11    Physical Exam:    Physical Exam  Vitals and nursing note reviewed.   Constitutional:       Appearance: She is well-developed.   HENT:      Head: Normocephalic.   Eyes:      Conjunctiva/sclera: Conjunctivae normal.      Pupils: Pupils are equal, round, and reactive to light.   Neck:      Thyroid: No thyromegaly.   Cardiovascular:      Rate and Rhythm: Normal rate and regular rhythm.      Heart sounds: Normal heart sounds.   Pulmonary:      Effort: Pulmonary effort is normal.      Breath sounds: Normal breath sounds. No wheezing.   Musculoskeletal:         General: Normal range of motion.      Cervical back: Normal range of motion and neck supple.   Lymphadenopathy:      Cervical: No cervical adenopathy.   Skin:      General: Skin is warm and dry.   Neurological:      Mental Status: She is alert and oriented to person, place, and time.   Psychiatric:         Thought Content: Thought content normal.          ACE III MINI        Results Review:    None    CMP:  Lab Results   Component Value Date    BUN 17 03/24/2022    CREATININE 0.67 03/24/2022    EGFRIFNONA 87 09/23/2021    BCR 25.4 (H) 03/24/2022     03/24/2022    K 4.7 03/24/2022    CO2 27.1 03/24/2022    CALCIUM 9.8 03/24/2022    ALBUMIN 4.50 03/24/2022    BILITOT 0.3 03/24/2022    ALKPHOS 91 03/24/2022    AST 20 03/24/2022    ALT 19 03/24/2022     HbA1c:  Lab Results   Component Value Date    HGBA1C 5.70 (H) 12/17/2020     Microalbumin:  No results found for: MICROALBUR, POCMALB, POCCREAT  Lipid Panel  Lab Results   Component Value Date    CHOL 163 03/24/2022    TRIG 72 03/24/2022    HDL 55 03/24/2022    LDL 94 03/24/2022    AST 20 03/24/2022    ALT 19 03/24/2022       Medication Review: Medications reviewed and noted  Patient Instructions   Problem List Items Addressed This Visit        Allergies and Adverse Reactions    Perennial allergic rhinitis with seasonal variation (Chronic)    Current Assessment & Plan     Use azelastine nasal spray, 2 sprays in each nostril twice a day, along with fluticasone nasal spray twice a day. If things are going well, she may drop the fluticasone nasal spray later. Continue using Mucinex twice a day as needed to thin thick postnasal drainage.              Cardiac and Vasculature    Hypercholesterolemia - Primary    Overview     Taking atorvastatin every evening.             Current Assessment & Plan     Continue atorvastatin every evening. Continue low fat, low sugar diet. Try to increase exercise as able.            Relevant Medications    atorvastatin (LIPITOR) 10 MG tablet    Other Relevant Orders    Comprehensive Metabolic Panel (Completed)    Lipid Panel (Completed)    TSH (Completed)       Endocrine and Metabolic    Vitamin D  deficiency    Current Assessment & Plan     Recheck level today.            Relevant Orders    Vitamin D 25 Hydroxy (Completed)       Mental Health    Stress and adjustment reaction    Overview     Taking fluoxetine and buspirone.           Current Assessment & Plan     Continue fluoxetine every morning. Take buspirone in the morning, midday, and about mid-afternoon to early evening before dinner. Exercise and walking also help. And we discussed trying to get a little break from her caregiving responsibilities.    We discussed sleep hygiene including going to bed at the same time and getting up at the same time every day, going to bed early enough to get 7 or 8 hours in bed, reading and relaxing before bedtime, and avoiding the TV, computer, phone, iPad close to bedtime.             Relevant Medications    FLUoxetine (PROzac) 20 MG capsule    busPIRone (BUSPAR) 5 MG tablet    hydrOXYzine (ATARAX) 50 MG tablet       Neuro    Restless leg syndrome    Overview      Mirapex every evening.  Continue drinking plenty of fluids every day           Current Assessment & Plan     Increase the Mirapex to 3 tablets every evening and 1 tablet during the day as needed. Continue drinking plenty of fluids every day.               Sleep    Primary insomnia    Overview                  Current Assessment & Plan     Increase hydroxyzine to 1-1/2 of the 50 mg hydroxyzine tablet every evening.             Other Visit Diagnoses     Osteoporosis screening        Relevant Orders    DEXA Bone Density Axial             Diagnosis Plan   1. Hypercholesterolemia  Comprehensive Metabolic Panel    Lipid Panel    TSH   2. Primary insomnia     3. Restless leg syndrome     4. Stress and adjustment reaction     5. Perennial allergic rhinitis with seasonal variation     6. Vitamin D deficiency  Vitamin D 25 Hydroxy   7. Osteoporosis screening  DEXA Bone Density Axial           Plan of care reviewed with patient at the conclusion of today's visit.  Education was provided regarding diagnosis, management, and any prescribed or recommended OTC medications.Patient verbalizes understanding of and agreement with management plan.         Parul Lomas MD      Transcribed from ambient dictation for Parul Lomas MD by Anna Red.  03/24/22   18:00 EDT    Patient verbalized consent to the visit recording.  I have personally performed the services described in this document as transcribed by the above individual, and it is both accurate and complete.  Parul Lomas MD  3/27/2022  14:09 EDT

## 2022-03-24 NOTE — ASSESSMENT & PLAN NOTE
Continue fluoxetine every morning. Take buspirone in the morning, midday, and about mid-afternoon to early evening before dinner. Exercise and walking also help. And we discussed trying to get a little break from her caregiving responsibilities.    We discussed sleep hygiene including going to bed at the same time and getting up at the same time every day, going to bed early enough to get 7 or 8 hours in bed, reading and relaxing before bedtime, and avoiding the TV, computer, phone, iPad close to bedtime.

## 2022-03-24 NOTE — ASSESSMENT & PLAN NOTE
Continue atorvastatin every evening. Continue low fat, low sugar diet. Try to increase exercise as able.

## 2022-03-24 NOTE — ASSESSMENT & PLAN NOTE
Use azelastine nasal spray, 2 sprays in each nostril twice a day, along with fluticasone nasal spray twice a day. If things are going well, she may drop the fluticasone nasal spray later. Continue using Mucinex twice a day as needed to thin thick postnasal drainage.

## 2022-03-25 LAB
25(OH)D3 SERPL-MCNC: 50.2 NG/ML (ref 30–100)
TSH SERPL DL<=0.05 MIU/L-ACNC: 1.39 UIU/ML (ref 0.27–4.2)

## 2022-03-27 NOTE — PATIENT INSTRUCTIONS
Patient Instructions   Problem List Items Addressed This Visit          Allergies and Adverse Reactions    Perennial allergic rhinitis with seasonal variation (Chronic)    Current Assessment & Plan     Use azelastine nasal spray, 2 sprays in each nostril twice a day, along with fluticasone nasal spray twice a day. If things are going well, she may drop the fluticasone nasal spray later. Continue using Mucinex twice a day as needed to thin thick postnasal drainage.              Cardiac and Vasculature    Hypercholesterolemia - Primary    Overview     Taking atorvastatin every evening.             Current Assessment & Plan     Continue atorvastatin every evening. Continue low fat, low sugar diet. Try to increase exercise as able.            Relevant Medications    atorvastatin (LIPITOR) 10 MG tablet    Other Relevant Orders    Comprehensive Metabolic Panel (Completed)    Lipid Panel (Completed)    TSH (Completed)       Endocrine and Metabolic    Vitamin D deficiency    Current Assessment & Plan     Recheck level today.            Relevant Orders    Vitamin D 25 Hydroxy (Completed)       Mental Health    Stress and adjustment reaction    Overview     Taking fluoxetine and buspirone.           Current Assessment & Plan     Continue fluoxetine every morning. Take buspirone in the morning, midday, and about mid-afternoon to early evening before dinner. Exercise and walking also help. And we discussed trying to get a little break from her caregiving responsibilities.    We discussed sleep hygiene including going to bed at the same time and getting up at the same time every day, going to bed early enough to get 7 or 8 hours in bed, reading and relaxing before bedtime, and avoiding the TV, computer, phone, iPad close to bedtime.             Relevant Medications    FLUoxetine (PROzac) 20 MG capsule    busPIRone (BUSPAR) 5 MG tablet    hydrOXYzine (ATARAX) 50 MG tablet       Neuro    Restless leg syndrome    Overview       Mirapex every evening.  Continue drinking plenty of fluids every day           Current Assessment & Plan     Increase the Mirapex to 3 tablets every evening and 1 tablet during the day as needed. Continue drinking plenty of fluids every day.               Sleep    Primary insomnia    Overview                  Current Assessment & Plan     Increase hydroxyzine to 1-1/2 of the 50 mg hydroxyzine tablet every evening.                 Other Visit Diagnoses       Osteoporosis screening        Relevant Orders    DEXA Bone Density Axial

## 2022-03-29 ENCOUNTER — CLINICAL SUPPORT (OUTPATIENT)
Dept: INTERNAL MEDICINE | Facility: CLINIC | Age: 67
End: 2022-03-29

## 2022-03-29 ENCOUNTER — TELEPHONE (OUTPATIENT)
Dept: INTERNAL MEDICINE | Facility: CLINIC | Age: 67
End: 2022-03-29

## 2022-03-29 DIAGNOSIS — R35.0 FREQUENCY OF URINATION: Primary | ICD-10-CM

## 2022-03-29 DIAGNOSIS — N30.01 ACUTE CYSTITIS WITH HEMATURIA: Primary | ICD-10-CM

## 2022-03-29 LAB
BILIRUB BLD-MCNC: ABNORMAL MG/DL
CLARITY, POC: ABNORMAL
COLOR UR: ABNORMAL
EXPIRATION DATE: ABNORMAL
GLUCOSE UR STRIP-MCNC: ABNORMAL MG/DL
KETONES UR QL: ABNORMAL
LEUKOCYTE EST, POC: ABNORMAL
Lab: ABNORMAL
NITRITE UR-MCNC: POSITIVE MG/ML
PH UR: 8.5 [PH] (ref 5–8)
PROT UR STRIP-MCNC: ABNORMAL MG/DL
RBC # UR STRIP: ABNORMAL /UL
SP GR UR: 1 (ref 1–1.03)
UROBILINOGEN UR QL: ABNORMAL

## 2022-03-29 PROCEDURE — 87086 URINE CULTURE/COLONY COUNT: CPT | Performed by: INTERNAL MEDICINE

## 2022-03-29 PROCEDURE — 81003 URINALYSIS AUTO W/O SCOPE: CPT | Performed by: INTERNAL MEDICINE

## 2022-03-29 RX ORDER — SULFAMETHOXAZOLE AND TRIMETHOPRIM 800; 160 MG/1; MG/1
1 TABLET ORAL 2 TIMES DAILY
Qty: 16 TABLET | Refills: 0 | Status: SHIPPED | OUTPATIENT
Start: 2022-03-29 | End: 2022-09-29
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
## 2022-03-29 NOTE — TELEPHONE ENCOUNTER
Symptoms noted but since the patient is on antibiotic she needs to come into the office and have a POC urine analysis please inform patient

## 2022-03-30 LAB — BACTERIA SPEC AEROBE CULT: NO GROWTH

## 2022-03-31 ENCOUNTER — TELEPHONE (OUTPATIENT)
Dept: INTERNAL MEDICINE | Facility: CLINIC | Age: 67
End: 2022-03-31

## 2022-03-31 NOTE — TELEPHONE ENCOUNTER
Pt called back after being told that she didn't have a UTI. I explained that her UA was very abnormal but her culture didn't grow anything. Advised her to finish antibiotics and then come back about 1 week later for a UA again so we can make sure things have gone back to normal. She verbalized understanding.

## 2022-04-04 RX ORDER — FLUOXETINE HYDROCHLORIDE 20 MG/1
CAPSULE ORAL
Qty: 90 CAPSULE | Refills: 1 | Status: SHIPPED | OUTPATIENT
Start: 2022-04-04 | End: 2022-07-01 | Stop reason: SDUPTHER

## 2022-04-12 ENCOUNTER — CLINICAL SUPPORT (OUTPATIENT)
Dept: INTERNAL MEDICINE | Facility: CLINIC | Age: 67
End: 2022-04-12

## 2022-04-12 DIAGNOSIS — R35.0 FREQUENCY OF URINATION: Primary | ICD-10-CM

## 2022-04-12 LAB
BILIRUB BLD-MCNC: NEGATIVE MG/DL
CLARITY, POC: CLEAR
COLOR UR: YELLOW
EXPIRATION DATE: ABNORMAL
GLUCOSE UR STRIP-MCNC: NEGATIVE MG/DL
KETONES UR QL: NEGATIVE
LEUKOCYTE EST, POC: NEGATIVE
Lab: ABNORMAL
NITRITE UR-MCNC: NEGATIVE MG/ML
PH UR: 6 [PH] (ref 5–8)
PROT UR STRIP-MCNC: NEGATIVE MG/DL
RBC # UR STRIP: ABNORMAL /UL
SP GR UR: 1.01 (ref 1–1.03)
UROBILINOGEN UR QL: NORMAL

## 2022-04-12 PROCEDURE — 99211 OFF/OP EST MAY X REQ PHY/QHP: CPT | Performed by: INTERNAL MEDICINE

## 2022-04-12 PROCEDURE — 81003 URINALYSIS AUTO W/O SCOPE: CPT | Performed by: INTERNAL MEDICINE

## 2022-06-28 ENCOUNTER — HOSPITAL ENCOUNTER (OUTPATIENT)
Dept: BONE DENSITY | Facility: HOSPITAL | Age: 67
Discharge: HOME | End: 2022-06-28
Admitting: INTERNAL MEDICINE

## 2022-06-28 DIAGNOSIS — Z13.820 OSTEOPOROSIS SCREENING: ICD-10-CM

## 2022-06-28 PROCEDURE — 77080 DXA BONE DENSITY AXIAL: CPT

## 2022-06-30 ENCOUNTER — TELEPHONE (OUTPATIENT)
Dept: INTERNAL MEDICINE | Facility: CLINIC | Age: 67
End: 2022-06-30

## 2022-07-01 RX ORDER — BUPROPION HYDROCHLORIDE 150 MG/1
150 TABLET ORAL DAILY
Qty: 90 TABLET | Refills: 1 | Status: SHIPPED | OUTPATIENT
Start: 2022-07-01 | End: 2023-01-09

## 2022-07-01 RX ORDER — FLUOXETINE HYDROCHLORIDE 40 MG/1
40 CAPSULE ORAL DAILY
Qty: 90 CAPSULE | Refills: 1 | Status: SHIPPED | OUTPATIENT
Start: 2022-07-01 | End: 2022-09-29

## 2022-07-01 NOTE — TELEPHONE ENCOUNTER
Please let patient know I sent in a prescription for a higher dose of her fluoxetine.  Take 40 mg tablet daily instead of the 20 mg daily.  I also sent bupropion to take every morning which will also help with the anxiety and decreased appetite in most people.    She may also continue the buspirone that she has 3 times a day as needed    If this is not starting to help in 2 or 3 weeks, she should make an appointment to come in

## 2022-07-01 NOTE — TELEPHONE ENCOUNTER
Patient states her mom is living with her and she has dementia. This has taken a toll on the patient. She is requesting a medication to help calm her down due to increase anxiety and stress. Along with stress patient has been overeating. She is requesting a medication to help with weight loss/ decreased appetite.

## 2022-08-18 DIAGNOSIS — F43.29 STRESS AND ADJUSTMENT REACTION: ICD-10-CM

## 2022-08-18 RX ORDER — BUSPIRONE HYDROCHLORIDE 5 MG/1
TABLET ORAL
Qty: 90 TABLET | Refills: 1 | Status: SHIPPED | OUTPATIENT
Start: 2022-08-18 | End: 2022-09-29 | Stop reason: SDUPTHER

## 2022-08-18 NOTE — TELEPHONE ENCOUNTER
Rx Refill Note  Requested Prescriptions     Pending Prescriptions Disp Refills   • busPIRone (BUSPAR) 5 MG tablet [Pharmacy Med Name: busPIRone HCL 5 MG TABLET] 90 tablet 1     Sig: TAKE ONE TABLET BY MOUTH THREE TIMES A DAY      Last office visit with prescribing clinician: 3/24/2022      Next office visit with prescribing clinician: 9/29/2022            Hillary Amador LPN  08/18/22, 16:08 EDT

## 2022-08-23 ENCOUNTER — TRANSCRIBE ORDERS (OUTPATIENT)
Dept: LAB | Facility: HOSPITAL | Age: 67
End: 2022-08-23

## 2022-08-23 ENCOUNTER — LAB (OUTPATIENT)
Dept: LAB | Facility: HOSPITAL | Age: 67
End: 2022-08-23

## 2022-08-23 DIAGNOSIS — L08.0 PYODERMA: ICD-10-CM

## 2022-08-23 DIAGNOSIS — L08.0 PYODERMA: Primary | ICD-10-CM

## 2022-08-23 PROCEDURE — 87147 CULTURE TYPE IMMUNOLOGIC: CPT

## 2022-08-23 PROCEDURE — 87205 SMEAR GRAM STAIN: CPT

## 2022-08-23 PROCEDURE — 87070 CULTURE OTHR SPECIMN AEROBIC: CPT

## 2022-08-23 PROCEDURE — 87186 SC STD MICRODIL/AGAR DIL: CPT

## 2022-08-26 LAB
BACTERIA SPEC AEROBE CULT: ABNORMAL
BACTERIA SPEC AEROBE CULT: ABNORMAL
GRAM STN SPEC: ABNORMAL
GRAM STN SPEC: ABNORMAL

## 2022-08-29 RX ORDER — ATORVASTATIN CALCIUM 10 MG/1
TABLET, FILM COATED ORAL
Qty: 30 TABLET | Refills: 5 | Status: SHIPPED | OUTPATIENT
Start: 2022-08-29 | End: 2022-09-29 | Stop reason: SDUPTHER

## 2022-09-29 ENCOUNTER — OFFICE VISIT (OUTPATIENT)
Dept: INTERNAL MEDICINE | Facility: CLINIC | Age: 67
End: 2022-09-29

## 2022-09-29 ENCOUNTER — LAB (OUTPATIENT)
Dept: LAB | Facility: HOSPITAL | Age: 67
End: 2022-09-29

## 2022-09-29 VITALS
OXYGEN SATURATION: 100 % | DIASTOLIC BLOOD PRESSURE: 78 MMHG | HEIGHT: 64 IN | SYSTOLIC BLOOD PRESSURE: 120 MMHG | HEART RATE: 75 BPM | WEIGHT: 157 LBS | BODY MASS INDEX: 26.8 KG/M2 | TEMPERATURE: 97.5 F

## 2022-09-29 DIAGNOSIS — E78.00 HYPERCHOLESTEROLEMIA: ICD-10-CM

## 2022-09-29 DIAGNOSIS — F43.29 STRESS AND ADJUSTMENT REACTION: ICD-10-CM

## 2022-09-29 DIAGNOSIS — E55.9 VITAMIN D DEFICIENCY: ICD-10-CM

## 2022-09-29 DIAGNOSIS — Z00.00 ANNUAL PHYSICAL EXAM: Primary | ICD-10-CM

## 2022-09-29 DIAGNOSIS — E66.3 OVERWEIGHT WITH BODY MASS INDEX (BMI) OF 26 TO 26.9 IN ADULT: Chronic | ICD-10-CM

## 2022-09-29 DIAGNOSIS — E87.6 HYPOKALEMIA: ICD-10-CM

## 2022-09-29 LAB
25(OH)D3 SERPL-MCNC: 73.1 NG/ML (ref 30–100)
ALBUMIN SERPL-MCNC: 4.5 G/DL (ref 3.5–5.2)
ALBUMIN/GLOB SERPL: 2.1 G/DL
ALP SERPL-CCNC: 99 U/L (ref 39–117)
ALT SERPL W P-5'-P-CCNC: 19 U/L (ref 1–33)
ANION GAP SERPL CALCULATED.3IONS-SCNC: 13.8 MMOL/L (ref 5–15)
AST SERPL-CCNC: 22 U/L (ref 1–32)
BACTERIA UR QL AUTO: ABNORMAL /HPF
BASOPHILS # BLD AUTO: 0.07 10*3/MM3 (ref 0–0.2)
BASOPHILS NFR BLD AUTO: 0.9 % (ref 0–1.5)
BILIRUB SERPL-MCNC: 0.6 MG/DL (ref 0–1.2)
BILIRUB UR QL STRIP: NEGATIVE
BUN SERPL-MCNC: 22 MG/DL (ref 8–23)
BUN/CREAT SERPL: 30.6 (ref 7–25)
CALCIUM SPEC-SCNC: 9.7 MG/DL (ref 8.6–10.5)
CHLORIDE SERPL-SCNC: 100 MMOL/L (ref 98–107)
CHOLEST SERPL-MCNC: 163 MG/DL (ref 0–200)
CLARITY UR: CLEAR
CO2 SERPL-SCNC: 24.2 MMOL/L (ref 22–29)
COD CRY URNS QL: ABNORMAL /HPF
COLOR UR: YELLOW
CREAT SERPL-MCNC: 0.72 MG/DL (ref 0.57–1)
DEPRECATED RDW RBC AUTO: 43.1 FL (ref 37–54)
EGFRCR SERPLBLD CKD-EPI 2021: 92.9 ML/MIN/1.73
EOSINOPHIL # BLD AUTO: 0.09 10*3/MM3 (ref 0–0.4)
EOSINOPHIL NFR BLD AUTO: 1.2 % (ref 0.3–6.2)
ERYTHROCYTE [DISTWIDTH] IN BLOOD BY AUTOMATED COUNT: 13.8 % (ref 12.3–15.4)
GLOBULIN UR ELPH-MCNC: 2.1 GM/DL
GLUCOSE SERPL-MCNC: 95 MG/DL (ref 65–99)
GLUCOSE UR STRIP-MCNC: NEGATIVE MG/DL
HCT VFR BLD AUTO: 43.8 % (ref 34–46.6)
HCYS SERPL-MCNC: 6.4 UMOL/L (ref 0–15)
HDLC SERPL-MCNC: 59 MG/DL (ref 40–60)
HGB BLD-MCNC: 14 G/DL (ref 12–15.9)
HGB UR QL STRIP.AUTO: NEGATIVE
HYALINE CASTS UR QL AUTO: ABNORMAL /LPF
IMM GRANULOCYTES # BLD AUTO: 0.02 10*3/MM3 (ref 0–0.05)
IMM GRANULOCYTES NFR BLD AUTO: 0.3 % (ref 0–0.5)
KETONES UR QL STRIP: NEGATIVE
LDLC SERPL CALC-MCNC: 93 MG/DL (ref 0–100)
LDLC/HDLC SERPL: 1.57 {RATIO}
LEUKOCYTE ESTERASE UR QL STRIP.AUTO: ABNORMAL
LYMPHOCYTES # BLD AUTO: 1.88 10*3/MM3 (ref 0.7–3.1)
LYMPHOCYTES NFR BLD AUTO: 25.2 % (ref 19.6–45.3)
MCH RBC QN AUTO: 26.9 PG (ref 26.6–33)
MCHC RBC AUTO-ENTMCNC: 32 G/DL (ref 31.5–35.7)
MCV RBC AUTO: 84.2 FL (ref 79–97)
MONOCYTES # BLD AUTO: 0.59 10*3/MM3 (ref 0.1–0.9)
MONOCYTES NFR BLD AUTO: 7.9 % (ref 5–12)
NEUTROPHILS NFR BLD AUTO: 4.81 10*3/MM3 (ref 1.7–7)
NEUTROPHILS NFR BLD AUTO: 64.5 % (ref 42.7–76)
NITRITE UR QL STRIP: NEGATIVE
NRBC BLD AUTO-RTO: 0 /100 WBC (ref 0–0.2)
PH UR STRIP.AUTO: 6.5 [PH] (ref 5–8)
PLATELET # BLD AUTO: 414 10*3/MM3 (ref 140–450)
PMV BLD AUTO: 9 FL (ref 6–12)
POTASSIUM SERPL-SCNC: 3.8 MMOL/L (ref 3.5–5.2)
PROT SERPL-MCNC: 6.6 G/DL (ref 6–8.5)
PROT UR QL STRIP: ABNORMAL
RBC # BLD AUTO: 5.2 10*6/MM3 (ref 3.77–5.28)
RBC # UR STRIP: ABNORMAL /HPF
REF LAB TEST METHOD: ABNORMAL
SODIUM SERPL-SCNC: 138 MMOL/L (ref 136–145)
SP GR UR STRIP: 1.03 (ref 1–1.03)
SQUAMOUS #/AREA URNS HPF: ABNORMAL /HPF
TRIGL SERPL-MCNC: 56 MG/DL (ref 0–150)
TSH SERPL DL<=0.05 MIU/L-ACNC: 1.22 UIU/ML (ref 0.27–4.2)
UROBILINOGEN UR QL STRIP: ABNORMAL
VIT B12 BLD-MCNC: 1023 PG/ML (ref 211–946)
VLDLC SERPL-MCNC: 11 MG/DL (ref 5–40)
WBC # UR STRIP: ABNORMAL /HPF
WBC NRBC COR # BLD: 7.46 10*3/MM3 (ref 3.4–10.8)

## 2022-09-29 PROCEDURE — 81001 URINALYSIS AUTO W/SCOPE: CPT

## 2022-09-29 PROCEDURE — 80061 LIPID PANEL: CPT

## 2022-09-29 PROCEDURE — 99397 PER PM REEVAL EST PAT 65+ YR: CPT | Performed by: INTERNAL MEDICINE

## 2022-09-29 PROCEDURE — 85025 COMPLETE CBC W/AUTO DIFF WBC: CPT

## 2022-09-29 PROCEDURE — 82607 VITAMIN B-12: CPT

## 2022-09-29 PROCEDURE — 80053 COMPREHEN METABOLIC PANEL: CPT

## 2022-09-29 PROCEDURE — 83090 ASSAY OF HOMOCYSTEINE: CPT

## 2022-09-29 PROCEDURE — 84443 ASSAY THYROID STIM HORMONE: CPT

## 2022-09-29 PROCEDURE — 82306 VITAMIN D 25 HYDROXY: CPT

## 2022-09-29 RX ORDER — BUSPIRONE HYDROCHLORIDE 5 MG/1
10 TABLET ORAL 3 TIMES DAILY
Qty: 180 TABLET | Refills: 5 | Status: SHIPPED | OUTPATIENT
Start: 2022-09-29

## 2022-09-29 RX ORDER — PHENTERMINE HYDROCHLORIDE 37.5 MG/1
37.5 CAPSULE ORAL EVERY MORNING
Qty: 30 CAPSULE | Refills: 2 | Status: SHIPPED | OUTPATIENT
Start: 2022-09-29 | End: 2022-12-20

## 2022-09-29 RX ORDER — DIAPER,BRIEF,INFANT-TODD,DISP
1 EACH MISCELLANEOUS 2 TIMES DAILY
Qty: 56 G | Refills: 0 | Status: SHIPPED | OUTPATIENT
Start: 2022-09-29 | End: 2022-11-30

## 2022-09-29 RX ORDER — ATORVASTATIN CALCIUM 10 MG/1
10 TABLET, FILM COATED ORAL NIGHTLY
Qty: 90 TABLET | Refills: 1 | Status: SHIPPED | OUTPATIENT
Start: 2022-09-29 | End: 2023-03-20 | Stop reason: SDUPTHER

## 2022-09-29 RX ORDER — FLUOXETINE HYDROCHLORIDE 20 MG/1
20 CAPSULE ORAL DAILY
Qty: 90 CAPSULE | Refills: 1 | Status: SHIPPED | OUTPATIENT
Start: 2022-09-29 | End: 2023-03-20

## 2022-09-29 NOTE — ASSESSMENT & PLAN NOTE
She will continue atorvastatin every evening. She will try to decrease fats and sugars in the diet. She will try to increase walking and exercise.

## 2022-09-29 NOTE — ASSESSMENT & PLAN NOTE
We will decrease the fluoxetine from 40 mg a day back down to 20 mg a day since it could possibly be increasing her appetite. We will increase the buspirone to 2 of the 5-mg tablets 3 times per day. Increasing exercise and walking more also tends to help symptoms of stress, anxiety, and mood. We will continue her current dose of bupropion 150 mg daily.

## 2022-09-29 NOTE — ASSESSMENT & PLAN NOTE
She has gained 30 pounds over the last year or so. We will add phentermine every morning to decrease appetite. She will continue trying to follow Weight Watchers diet and decrease snack foods and sugars. We will decrease the fluoxetine from 40 mg per day to 20 mg per day in case that has increased her appetite. She was encouraged to walk more and go to the gym and try some exercise classes since that will help a lot with her weight loss and also the stress symptoms. She was encouraged to do some calorie counting and try to cut her calories down to about 1200 per day.

## 2022-09-29 NOTE — PROGRESS NOTES
Preventative Annual Visit    Suze Baeza  1955   [id number]    Patient Care Team:  Parul Lomas MD as PCP - General (Internal Medicine)  Kendrick Andersen MD as Consulting Physician (Dermatology)  Heraclio Mari MD as Consulting Physician (Orthopedic Surgery)  Ciho Dhaliwal MD as Consulting Physician (Obstetrics and Gynecology)    Chief Complaint::   Chief Complaint   Patient presents with   • Annual Exam   • Hyperlipidemia        Subjective   History of Present Illness    Suze Baeza is a 67 y.o. female who presents for an Annual Wellness Visit.     Hypertension  Her blood pressure today is 120/78 mmHg.    Weight gain  The patient has gained weight since 02/2022. She attributes her weight gain to stress. Her daughter is getting  in 7 months, and she would like to lose weight. She has been trying to lose weight, but she is unable to. The patient is taking bupropion daily. She admits to snacking more due to stress and has tried to quit snacking. The patient does not drink soft drinks or caffeine. She is watching her sweets intake. The patient walks a little bit for exercise, but it does not help her lose weight. She has tried Weight Watchers in the past. The patient drinks a protein shake in the morning and 1 at lunch. She has not removed the snacks and sugars from the house. The patient reports she has gained 30 pounds in the past year or so. She has not noticed an increase in her appetite. The patient will reduce the fluoxetine to 20 mg but will call if her stress symptoms increase. She will try phentermine 1 every morning to help with weight loss and will report side effects if she has them. The patient will cut her calories back to 1200 per day.     Anxiety  She is taking fluoxetine 40 mg daily and buspirone 3 times daily. She feels that the combination is helping a little with her stress symptoms. The patient reports she is not as bad as she was before. She reports she is  sleeping better, but has not slept well in the last 2 nights. The patient reports her mother lived with her for over 1 year and has dementia. She is now in a facility and she and her brother have been to court over that 3 times. The patient believes the increase in her fluoxetine dose to 40 mg in 07/2022 was helpful. She will increase the buspirone to 2 tablets 3 times per day to help with her stress a few days after starting the phentermine.     Left arm and neck pain  The patient complains of left arm pain, which started 2 weeks ago. She has tightness in the neck, especially on the left. The patient is shown neck stretches to do and will also try moist heat.    Skin lesion  The patient has a skin lesion on her right leg, which is itchy. She has tried hydrocortisone cream, which helped very little. The patient occasionally scratches the area. The itching is worse after she showers. She applies Jergens lotion immediately after she showers. The patient has tried hydrocortisone lotion with very little relief. She denies that certain fabrics in her pants make it worse.    Health maintenance  - She has not received her influenza vaccine yet and will get it at [facility].   - The patient has not received her COVID-19 bivalent vaccine and will get that at [facility] as well.   - She is due for a pneumonia vaccine and will get that at [facility].   - The patient is up to date on her mammogram.   - Her last bone density scan was in 06/2022, which was normal.    CHRONIC CONDITIONS    Patient Active Problem List   Diagnosis   • Hypercholesterolemia   • Vitamin D deficiency   • Primary insomnia   • Restless leg syndrome   • Chronic bilateral low back pain   • Other viral warts   • Bilateral headaches   • Stress and adjustment reaction   • Primary osteoarthritis involving multiple joints   • Cervicalgia   • Baker cyst, right   • Chronic fatigue   • Hot flashes due to menopause   • Primary osteoarthritis of both knees   • Synovial cyst of  popliteal space   • Post-nasal drainage   • Annual physical exam   • S/P total knee arthroplasty, bilateral   • Hypokalemia   • Acute non-recurrent maxillary sinusitis   • Perennial allergic rhinitis with seasonal variation   • Overweight with body mass index (BMI) of 26 to 26.9 in adult        Past Medical History:   Diagnosis Date   • Degenerative tear of medial meniscus, right    • Hot flashes     PROZAC    • Low back pain    • Neuropathy, lumbosacral (radicular)    • Primary osteoarthritis of both knees    • Spondylolisthesis, lumbar region    • Torn meniscus     right medial   • Viral warts 02/25/2019   • Wears reading eyeglasses        Past Surgical History:   Procedure Laterality Date   • BLADDER SURGERY     • COLONOSCOPY     • HYSTERECTOMY  07/10/2012   • KNEE ARTHROSCOPY Right    • OOPHORECTOMY Bilateral 07/10/2012   • TOTAL KNEE ARTHROPLASTY Bilateral 12/29/2020    Procedure: TOTAL KNEE ARTHROPLASTY BILATERAL;  Surgeon: Heraclio Mari MD;  Location: [facility];  Service: Orthopedics;  Laterality: Bilateral;       Family History   Problem Relation Age of Onset   • Deep vein thrombosis Mother    • Stroke Mother    • Heart disease Mother 68   • Heart attack Mother    • Osteoarthritis Mother    • Heart disease Father 51        cause of death   • Heart attack Father    • Stroke Other    • Cancer Maternal Aunt         bone   • Diabetes Maternal Grandmother    • Cancer Maternal Aunt         lymphoma   • Cancer Maternal Aunt         brain   • Breast cancer Neg Hx    • Ovarian cancer Neg Hx        Social History     Socioeconomic History   • Marital status:    Tobacco Use   • Smoking status: Never Smoker   • Smokeless tobacco: Never Used   Vaping Use   • Vaping Use: Never used   Substance and Sexual Activity   • Alcohol use: No   • Drug use: No   • Sexual activity: Defer       Allergies   Allergen Reactions   • Other Itching and Rash     CHG         Current Outpatient Medications:   •  Ascorbic Acid  (VITAMIN C PO), Take 500 mg by mouth Daily., Disp: , Rfl:   •  atorvastatin (LIPITOR) 10 MG tablet, Take 1 tablet by mouth Every Night., Disp: 90 tablet, Rfl: 1  •  Azelastine HCl 137 MCG/SPRAY solution, 2 sprays into the nostril(s) as directed by provider 2 (Two) Times a Day., Disp: 30 mL, Rfl: 11  •  buPROPion XL (WELLBUTRIN XL) 150 MG 24 hr tablet, Take 1 tablet by mouth Daily., Disp: 90 tablet, Rfl: 1  •  busPIRone (BUSPAR) 5 MG tablet, Take 2 tablets by mouth 3 (Three) Times a Day., Disp: 180 tablet, Rfl: 5  •  calcium carb-cholecalciferol 600-800 MG-UNIT tablet, Take 1 tablet by mouth Daily., Disp: , Rfl:   •  Calcium Polycarbophil (FIBER-CAPS PO), Take 1 tablet by mouth Daily., Disp: , Rfl:   •  Cyanocobalamin (VITAMIN B-12 PO), Take 5,000 mcg by mouth Daily., Disp: , Rfl:   •  estradiol (ESTRACE) 0.5 MG tablet, Take 0.5 mg by mouth Daily., Disp: , Rfl:   •  Flax Oil-Fish Oil-Borage Oil (FISH OIL-FLAX OIL-BORAGE OIL PO), Take 1 capsule by mouth Daily., Disp: , Rfl:   •  fluticasone (FLONASE) 50 MCG/ACT nasal spray, 1 spray into the nostril(s) as directed by provider 2 (Two) Times a Day. (Patient taking differently: 1 spray into the nostril(s) as directed by provider 2 (Two) Times a Day. PRN), Disp: 16 g, Rfl: 5  •  hydrOXYzine (ATARAX) 50 MG tablet, Take 1.5 tablets by mouth Every Night., Disp: 146 tablet, Rfl: 1  •  pramipexole (MIRAPEX) 0.5 MG tablet, TAKE 3 TABLETS BY MOUTH EVERY NIGHT AT BEDTIME AND TAKE ONE TABLET BY MOUTH DURING THE DAY AS NEEDED, Disp: 270 tablet, Rfl: 1  •  FLUoxetine (PROzac) 20 MG capsule, Take 1 capsule by mouth Daily., Disp: 90 capsule, Rfl: 1  •  hydrocortisone 1 % ointment, Apply 1 application topically to the appropriate area as directed 2 (Two) Times a Day., Disp: 56 g, Rfl: 0  •  phentermine 37.5 MG capsule, Take 1 capsule by mouth Every Morning., Disp: 30 capsule, Rfl: 2    Immunization History   Administered Date(s) Administered   • COVID-19 (PFIZER) PURPLE CAP 03/12/2021,  "04/07/2021   • Covid-19 (Pfizer) Gray Cap 03/25/2022   • Flu Vaccine Quad PF >36MO 10/31/2017   • FluLaval/Fluzone >6mos 10/09/2021   • Fluad Quad 65+ 10/02/2019   • Flublock Quad =>18yrs 10/10/2020   • Fluzone High Dose =>65 Years ([employer] ONLY) 11/01/2015   • Fluzone Quad >6mos (Multi-dose) 10/26/2016   • Hepatitis A 07/19/2019   • Influenza TIV (IM) 10/31/2014   • Shingrix 09/16/2019   • Tdap 06/24/2015   • Zostavax 11/13/2017        Health Maintenance Due   Topic Date Due   • HEPATITIS C SCREENING  Never done   • ZOSTER VACCINE (3 of 3) 11/11/2019   • Pneumococcal Vaccine 65+ (1 - PCV) Never done   • COVID-19 Vaccine (4 - Booster for Pfizer series) 05/20/2022   • INFLUENZA VACCINE  08/01/2022   • ANNUAL PHYSICAL  09/24/2022        Review of Systems     Vital Signs  Vitals:    09/29/22 1031   BP: 120/78   BP Location: Right arm   Patient Position: Sitting   Cuff Size: Adult   Pulse: 75   Temp: 97.5 °F (36.4 °C)   TempSrc: Infrared   SpO2: 100%   Weight: 71.2 kg (157 lb)   Height: 163.2 cm (64.25\")   PainSc: 0-No pain     BMI is >= 25 and <30. (Overweight) The following options were offered after discussion;: weight loss educational material (shared in after visit summary), exercise counseling/recommendations and nutrition counseling/recommendations    Physical Exam  Vitals and nursing note reviewed.   Constitutional:       Appearance: She is well-developed.      Comments: Overweight.    Eyes:      Conjunctiva/sclera: Conjunctivae normal.      Pupils: Pupils are equal, round, and reactive to light.   Neck:      Thyroid: No thyromegaly.   Cardiovascular:      Rate and Rhythm: Normal rate and regular rhythm.      Heart sounds: Normal heart sounds. No murmur heard.  Pulmonary:      Effort: Pulmonary effort is normal.      Breath sounds: Normal breath sounds. No wheezing.   Chest:   Breasts:      Right: No inverted nipple, mass, nipple discharge, skin change or tenderness.      Left: No inverted nipple, mass, nipple " discharge, skin change or tenderness.       Abdominal:      General: Bowel sounds are normal. There is no distension.      Palpations: Abdomen is soft. There is no mass.      Tenderness: There is no abdominal tenderness.   Musculoskeletal:         General: No tenderness. Normal range of motion.      Cervical back: Normal range of motion and neck supple.   Lymphadenopathy:      Cervical: No cervical adenopathy.   Skin:     General: Skin is warm and dry.      Findings: No rash.      Comments:  Area about 4 x 8 inches on the right lower lateral thigh with a very fine sandpapery rash that is pruritic.   Neurological:      Mental Status: She is alert and oriented to person, place, and time.      Cranial Nerves: No cranial nerve deficit.      Sensory: No sensory deficit.      Coordination: Coordination normal.      Gait: Gait normal.   Psychiatric:         Speech: Speech normal.         Behavior: Behavior normal.         Thought Content: Thought content normal.         Judgment: Judgment normal.          Procedures     Fall Risk Screen:  [facility], and patient is at LOW risk for falls.Assessment completed on:3/24/2022    Health Habits and Functional and Cognitive Screening:  No flowsheet data found.    Smoking Status:  Social History     Tobacco Use   Smoking Status Never Smoker   Smokeless Tobacco Never Used       Alcohol Consumption:  Social History     Substance and Sexual Activity   Alcohol Use No       Depression Sreening  PHQ-9:    PHQ-2/PHQ-9 Depression Screening 9/29/2022   Retired Total Score -   Little Interest or Pleasure in Doing Things 0-->not at all   Feeling Down, Depressed or Hopeless 0-->not at all   PHQ-9: Brief Depression Severity Measure Score 0      Patient's depression is single episode and is mild without psychosis. Their depression is currently in partial remission and the condition is improving with treatment. This will be reassessed at the next regular appointment.  F/U as described:She will continue bupropion daily.  Decrease fluoxetine as directed.  Increase buspirone as directed.  Increase physical activity and walking..     ACE III MINI        Labs  Results for orders placed or performed in visit on 08/23/22   Wound Culture - Wound, Lip    Specimen: Lip; Wound   Result Value Ref Range    Wound Culture Scant growth (1+) Staphylococcus aureus (A)     Wound Culture Rare Normal Skin Stefani     Gram Stain Rare (1+) WBCs seen     Gram Stain No organisms seen        Susceptibility    Staphylococcus aureus - FELIX*     Clindamycin  Susceptible ug/ml     Inducible Clindamycin Resistance  Negative ug/ml     Erythromycin  Susceptible ug/ml     Oxacillin  Susceptible ug/ml     Rifampin  Susceptible ug/ml     Tetracycline  Susceptible ug/ml     Trimethoprim + Sulfamethoxazole  Resistant ug/ml     Vancomycin  Susceptible ug/ml     * This isolate does not demonstrate inducible clindamycin resistance in vitro.          Assessment & Plan     Patient Self-Management and Personalized Health Advice    The patient has been provided counseling and guidance about: diet, exercise, weight management and prevention of cardiac or vascular disease and preventive services including:   · Annual Wellness Visit (AWV).  Patient Instructions   Problem List Items Addressed This Visit        Cardiac and Vasculature    Hypercholesterolemia    Overview     Taking atorvastatin every evening.           Current Assessment & Plan     She will continue atorvastatin every evening. She will try to decrease fats and sugars in the diet. She will try to increase walking and exercise.         Relevant Medications    atorvastatin (LIPITOR) 10 MG tablet    Other Relevant Orders    CBC & Differential (Completed)    Comprehensive Metabolic Panel (Completed)    Homocysteine (Completed)    Lipid Panel (Completed)    TSH (Completed)    Vitamin B12 (Completed)    Urinalysis With Microscopic - Urine, Clean Catch (Completed)        Endocrine and Metabolic    Vitamin D deficiency    Current Assessment & Plan     She will continue calcium and vitamin D3. Recheck levels today.         Relevant Orders    Vitamin D 25 Hydroxy (Completed)       Genitourinary and Reproductive     Hypokalemia    Current Assessment & Plan     Recheck level today.            Health Encounters    Annual physical exam - Primary    Overview       She was advised to get the flu shot about the first week of October.  She was also advised to get hepatitis a second injection and shingrix second injection at her pharmacy.            Mental Health    Stress and adjustment reaction    Overview     Taking fluoxetine and buspirone and bupropion XL.         Current Assessment & Plan     We will decrease the fluoxetine from 40 mg a day back down to 20 mg a day since it could possibly be increasing her appetite. We will increase the buspirone to 2 of the 5-mg tablets 3 times per day. Increasing exercise and walking more also tends to help symptoms of stress, anxiety, and mood. We will continue her current dose of bupropion 150 mg daily.         Relevant Medications    hydrOXYzine (ATARAX) 50 MG tablet    buPROPion XL (WELLBUTRIN XL) 150 MG 24 hr tablet    FLUoxetine (PROzac) 20 MG capsule    phentermine 37.5 MG capsule    busPIRone (BUSPAR) 5 MG tablet       Other    Overweight with body mass index (BMI) of 26 to 26.9 in adult (Chronic)    Current Assessment & Plan     She has gained 30 pounds over the last year or so. We will add phentermine every morning to decrease appetite. She will continue trying to follow Weight Watchers diet and decrease snack foods and sugars. We will decrease the fluoxetine from 40 mg per day to 20 mg per day in case that has increased her appetite. She was encouraged to walk more and go to the gym and try some exercise classes since that will help a lot with her weight loss and also the stress symptoms. She was encouraged to do some calorie counting and try  to cut her calories down to about 1200 per day.         Relevant Medications    phentermine 37.5 MG capsule             Diagnosis Plan   1. Annual physical exam     2. Stress and adjustment reaction  busPIRone (BUSPAR) 5 MG tablet   3. Hypercholesterolemia  CBC & Differential    Comprehensive Metabolic Panel    Homocysteine    Lipid Panel    TSH    Vitamin B12    Urinalysis With Microscopic - Urine, Clean Catch   4. Overweight with body mass index (BMI) of 26 to 26.9 in adult  phentermine 37.5 MG capsule   5. Hypokalemia     6. Vitamin D deficiency  Vitamin D 25 Hydroxy       Outpatient Encounter Medications as of 9/29/2022   Medication Sig Dispense Refill   • Ascorbic Acid (VITAMIN C PO) Take 500 mg by mouth Daily.     • atorvastatin (LIPITOR) 10 MG tablet Take 1 tablet by mouth Every Night. 90 tablet 1   • Azelastine HCl 137 MCG/SPRAY solution 2 sprays into the nostril(s) as directed by provider 2 (Two) Times a Day. 30 mL 11   • buPROPion XL (WELLBUTRIN XL) 150 MG 24 hr tablet Take 1 tablet by mouth Daily. 90 tablet 1   • busPIRone (BUSPAR) 5 MG tablet Take 2 tablets by mouth 3 (Three) Times a Day. 180 tablet 5   • calcium carb-cholecalciferol 600-800 MG-UNIT tablet Take 1 tablet by mouth Daily.     • Calcium Polycarbophil (FIBER-CAPS PO) Take 1 tablet by mouth Daily.     • Cyanocobalamin (VITAMIN B-12 PO) Take 5,000 mcg by mouth Daily.     • estradiol (ESTRACE) 0.5 MG tablet Take 0.5 mg by mouth Daily.     • Flax Oil-Fish Oil-Borage Oil (FISH OIL-FLAX OIL-BORAGE OIL PO) Take 1 capsule by mouth Daily.     • fluticasone (FLONASE) 50 MCG/ACT nasal spray 1 spray into the nostril(s) as directed by provider 2 (Two) Times a Day. (Patient taking differently: 1 spray into the nostril(s) as directed by provider 2 (Two) Times a Day. PRN) 16 g 5   • hydrOXYzine (ATARAX) 50 MG tablet Take 1.5 tablets by mouth Every Night. 146 tablet 1   • pramipexole (MIRAPEX) 0.5 MG tablet TAKE 3 TABLETS BY MOUTH EVERY NIGHT AT BEDTIME AND  TAKE ONE TABLET BY MOUTH DURING THE DAY AS NEEDED 270 tablet 1   • [DISCONTINUED] atorvastatin (LIPITOR) 10 MG tablet TAKE ONE TABLET BY MOUTH ONCE NIGHTLY 30 tablet 5   • [DISCONTINUED] busPIRone (BUSPAR) 5 MG tablet TAKE ONE TABLET BY MOUTH THREE TIMES A DAY 90 tablet 1   • [DISCONTINUED] FLUoxetine (PROzac) 40 MG capsule Take 1 capsule by mouth Daily. 90 capsule 1   • FLUoxetine (PROzac) 20 MG capsule Take 1 capsule by mouth Daily. 90 capsule 1   • hydrocortisone 1 % ointment Apply 1 application topically to the appropriate area as directed 2 (Two) Times a Day. 56 g 0   • phentermine 37.5 MG capsule Take 1 capsule by mouth Every Morning. 30 capsule 2   • [DISCONTINUED] sulfamethoxazole-trimethoprim (Bactrim DS) 800-160 MG per tablet Take 1 tablet by mouth 2 (Two) Times a Day. 16 tablet 0     No facility-administered encounter medications on file as of 9/29/2022.       Reviewed use of high risk medication in the elderly: yes  Reviewed for potential of harmful drug interactions in the elderly: yes    Age appropriate preventive counseling done including age appropriate vaccines,regular  Mammogram and self breast exam, pap smear, colonoscopy, regular dental visits, mental health, injury prevention such as wearing seat belt and preventing falls, healthy  nutrition, healthy weight, regular physical exercise. Alcohol use is moderate.  Tobacco history-none. Drug use-none.  STD's-not at risk.    Follow Up:  Return in about 3 months (around 12/29/2022) for labs today, Recheck.         An After Visit Summary and PPPS with all of these plans were given to the patient.      Note: Part of this note may be an electronic transcription/translation of spoken language to printed text using the Dragon Dictation System.     Parul Lomas MD       Transcribed from ambient dictation for Parul Lomas MD by Elana Diallo.  09/29/22   12:36 EDT    Patient verbalized consent to the visit recording.  I have personally performed  the services described in this document as transcribed by the above individual, and it is both accurate and complete.

## 2022-09-30 NOTE — PATIENT INSTRUCTIONS
Patient Instructions   Problem List Items Addressed This Visit          Cardiac and Vasculature    Hypercholesterolemia    Overview     Taking atorvastatin every evening.           Current Assessment & Plan     She will continue atorvastatin every evening. She will try to decrease fats and sugars in the diet. She will try to increase walking and exercise.         Relevant Medications    atorvastatin (LIPITOR) 10 MG tablet    Other Relevant Orders    CBC & Differential (Completed)    Comprehensive Metabolic Panel (Completed)    Homocysteine (Completed)    Lipid Panel (Completed)    TSH (Completed)    Vitamin B12 (Completed)    Urinalysis With Microscopic - Urine, Clean Catch (Completed)       Endocrine and Metabolic    Vitamin D deficiency    Current Assessment & Plan     She will continue calcium and vitamin D3. Recheck levels today.         Relevant Orders    Vitamin D 25 Hydroxy (Completed)       Genitourinary and Reproductive     Hypokalemia    Current Assessment & Plan     Recheck level today.            Health Encounters    Annual physical exam - Primary    Overview       She was advised to get the flu shot about the first week of October.  She was also advised to get hepatitis a second injection and shingrix second injection at her pharmacy.            Mental Health    Stress and adjustment reaction    Overview     Taking fluoxetine and buspirone and bupropion XL.         Current Assessment & Plan     We will decrease the fluoxetine from 40 mg a day back down to 20 mg a day since it could possibly be increasing her appetite. We will increase the buspirone to 2 of the 5-mg tablets 3 times per day. Increasing exercise and walking more also tends to help symptoms of stress, anxiety, and mood. We will continue her current dose of bupropion 150 mg daily.         Relevant Medications    hydrOXYzine (ATARAX) 50 MG tablet    buPROPion XL (WELLBUTRIN XL) 150 MG 24 hr tablet    FLUoxetine (PROzac) 20 MG capsule     phentermine 37.5 MG capsule    busPIRone (BUSPAR) 5 MG tablet       Other    Overweight with body mass index (BMI) of 26 to 26.9 in adult (Chronic)    Current Assessment & Plan     She has gained 30 pounds over the last year or so. We will add phentermine every morning to decrease appetite. She will continue trying to follow Weight Watchers diet and decrease snack foods and sugars. We will decrease the fluoxetine from 40 mg per day to 20 mg per day in case that has increased her appetite. She was encouraged to walk more and go to the gym and try some exercise classes since that will help a lot with her weight loss and also the stress symptoms. She was encouraged to do some calorie counting and try to cut her calories down to about 1200 per day.         Relevant Medications    phentermine 37.5 MG capsule          BMI for Adults  What is BMI?  Body mass index (BMI) is a number that is calculated from a person's weight and height. BMI can help estimate how much of a person's weight is composed of fat. BMI does not measure body fat directly. Rather, it is an alternative to procedures that directly measure body fat, which can be difficult and expensive.  BMI can help identify people who may be at higher risk for certain medical problems.  What are BMI measurements used for?  BMI is used as a screening tool to identify possible weight problems. It helps determine whether a person is obese, overweight, a healthy weight, or underweight.  BMI is useful for:  Identifying a weight problem that may be related to a medical condition or may increase the risk for medical problems.  Promoting changes, such as changes in diet and exercise, to help reach a healthy weight. BMI screening can be repeated to see if these changes are working.  How is BMI calculated?  BMI involves measuring your weight in relation to your height. Both height and weight are measured, and the BMI is calculated from those numbers. This can be done either in  "English ([place] or metric measurements. Note that charts and online BMI calculators are available to help you find your BMI quickly and easily without having to do these calculations yourself.  To calculate your BMI in English ([place] measurements:    Measure your weight in pounds (lb).  Multiply the number of pounds by 703.  For example, for a person who weighs 180 lb, multiply that number by 703, which equals 126,540.  Measure your height in inches. Then multiply that number by itself to get a measurement called \"inches squared.\"  For example, for a person who is 70 inches tall, the \"inches squared\" measurement is 70 inches x 70 inches, which equals 4,900 inches squared.  Divide the total from step 2 (number of lb x 703) by the total from step 3 (inches squared): 126,540 ÷ 4,900 = 25.8. This is your BMI.  To calculate your BMI in metric measurements:  Measure your weight in kilograms (kg).  Measure your height in meters (m). Then multiply that number by itself to get a measurement called \"meters squared.\"  For example, for a person who is 1.75 m tall, the \"meters squared\" measurement is 1.75 m x 1.75 m, which is equal to 3.1 meters squared.  Divide the number of kilograms (your weight) by the meters squared number. In this example: 70 ÷ 3.1 = 22.6. This is your BMI.  What do the results mean?  BMI charts are used to identify whether you are underweight, normal weight, overweight, or obese. The following guidelines will be used:  Underweight: BMI less than 18.5.  Normal weight: BMI between 18.5 and 24.9.  Overweight: BMI between 25 and 29.9.  Obese: BMI of 30 or above.  Keep these notes in mind:  Weight includes both fat and muscle, so someone with a muscular build, such as an athlete, may have a BMI that is higher than 24.9. In cases like these, BMI is not an accurate measure of body fat.  To determine if excess body fat is the cause of a BMI of 25 or higher, further assessments may need to be done by a health care " provider.  BMI is usually interpreted in the same way for men and women.  Where to find more information  For more information about BMI, including tools to quickly calculate your BMI, go to these websites:  Centers for Disease Control and Prevention: www.cdc.gov  American Heart Association: www.heart.org  National Heart, Lung, and Blood [place]: www.nhlbi.nih.gov  Summary  Body mass index (BMI) is a number that is calculated from a person's weight and height.  BMI may help estimate how much of a person's weight is composed of fat. BMI can help identify those who may be at higher risk for certain medical problems.  BMI can be measured using English measurements or metric measurements.  BMI charts are used to identify whether you are underweight, normal weight, overweight, or obese.  This information is not intended to replace advice given to you by your health care provider. Make sure you discuss any questions you have with your health care provider.  Document Revised: 09/09/2020 Document Reviewed: 07/17/2020  [employer] Patient Education © 2022 [employer]

## 2022-10-06 ENCOUNTER — TELEPHONE (OUTPATIENT)
Dept: INTERNAL MEDICINE | Facility: CLINIC | Age: 67
End: 2022-10-06

## 2022-10-06 RX ORDER — PRAMIPEXOLE DIHYDROCHLORIDE 0.5 MG/1
TABLET ORAL
Qty: 120 TABLET | Refills: 1 | Status: SHIPPED | OUTPATIENT
Start: 2022-10-06 | End: 2022-12-12

## 2022-10-10 ENCOUNTER — CLINICAL SUPPORT (OUTPATIENT)
Dept: INTERNAL MEDICINE | Facility: CLINIC | Age: 67
End: 2022-10-10

## 2022-10-10 ENCOUNTER — TELEPHONE (OUTPATIENT)
Dept: INTERNAL MEDICINE | Facility: CLINIC | Age: 67
End: 2022-10-10

## 2022-10-10 DIAGNOSIS — R30.0 DYSURIA: Primary | ICD-10-CM

## 2022-10-10 DIAGNOSIS — N30.01 ACUTE CYSTITIS WITH HEMATURIA: Primary | ICD-10-CM

## 2022-10-10 LAB
BILIRUB BLD-MCNC: NEGATIVE MG/DL
CLARITY, POC: ABNORMAL
COLOR UR: ABNORMAL
EXPIRATION DATE: ABNORMAL
GLUCOSE UR STRIP-MCNC: NEGATIVE MG/DL
KETONES UR QL: ABNORMAL
LEUKOCYTE EST, POC: ABNORMAL
Lab: ABNORMAL
NITRITE UR-MCNC: POSITIVE MG/ML
PH UR: 5.5 [PH] (ref 5–8)
PROT UR STRIP-MCNC: ABNORMAL MG/DL
RBC # UR STRIP: ABNORMAL /UL
SP GR UR: 1.03 (ref 1–1.03)
UROBILINOGEN UR QL: NORMAL

## 2022-10-10 PROCEDURE — 87077 CULTURE AEROBIC IDENTIFY: CPT | Performed by: STUDENT IN AN ORGANIZED HEALTH CARE EDUCATION/TRAINING PROGRAM

## 2022-10-10 PROCEDURE — 87186 SC STD MICRODIL/AGAR DIL: CPT | Performed by: STUDENT IN AN ORGANIZED HEALTH CARE EDUCATION/TRAINING PROGRAM

## 2022-10-10 PROCEDURE — 81003 URINALYSIS AUTO W/O SCOPE: CPT | Performed by: STUDENT IN AN ORGANIZED HEALTH CARE EDUCATION/TRAINING PROGRAM

## 2022-10-10 PROCEDURE — 87086 URINE CULTURE/COLONY COUNT: CPT | Performed by: STUDENT IN AN ORGANIZED HEALTH CARE EDUCATION/TRAINING PROGRAM

## 2022-10-10 RX ORDER — NITROFURANTOIN 25; 75 MG/1; MG/1
100 CAPSULE ORAL 2 TIMES DAILY
Qty: 10 CAPSULE | Refills: 0 | Status: SHIPPED | OUTPATIENT
Start: 2022-10-10 | End: 2022-10-15

## 2022-10-10 NOTE — TELEPHONE ENCOUNTER
Caller: Suze Baeza    Relationship: Self    Best call back number: [phone]    What medication are you requesting: MEDICATION REQUEST    What are your current symptoms: UTI     How long have you been experiencing symptoms: THIS MORNING    Have you had these symptoms before:    [x] Yes  [] No    Have you been treated for these symptoms before:   [x] Yes  [] No    If a prescription is needed, what is your preferred pharmacy and phone number: [facility]      Additional notes:PATIENT STATED THAT SHE WOKE UP WITH UTI THIS MORNING AND WOULD LIKE TO SEE IF PROVIDER WOULD SEND SOMETHING INTO PHARMACY FOR THIS    PLEASE ADVISE

## 2022-10-10 NOTE — TELEPHONE ENCOUNTER
[place] message sent to patient, medication sent to pharmacy.    1. Acute cystitis with hematuria  - nitrofurantoin, macrocrystal-monohydrate, (Macrobid) 100 MG capsule; Take 1 capsule by mouth 2 (Two) Times a Day for 5 days.  Dispense: 10 capsule; Refill: 0    Norman Bauer MD

## 2022-10-11 RX ORDER — HYDROXYZINE 50 MG/1
TABLET, FILM COATED ORAL
Qty: 45 TABLET | Refills: 0 | Status: SHIPPED | OUTPATIENT
Start: 2022-10-11 | End: 2022-11-29 | Stop reason: SDUPTHER

## 2022-10-11 NOTE — TELEPHONE ENCOUNTER
Rx Refill Note  Requested Prescriptions     Pending Prescriptions Disp Refills   • hydrOXYzine (ATARAX) 50 MG tablet [Pharmacy Med Name: hydrOXYzine HCL 50 MG TABLET] 45 tablet      Sig: TAKE 1 AND 1/2 TABLET BY MOUTH EVERY NIGHT      Last office visit with prescribing clinician: 9/29/2022      Next office visit with prescribing clinician: 12/20/2022            Hillary Amador LPN  10/11/22, 15:03 EDT

## 2022-10-12 LAB — BACTERIA SPEC AEROBE CULT: ABNORMAL

## 2022-11-07 ENCOUNTER — TRANSCRIBE ORDERS (OUTPATIENT)
Dept: LAB | Facility: HOSPITAL | Age: 67
End: 2022-11-07

## 2022-11-07 ENCOUNTER — LAB (OUTPATIENT)
Dept: LAB | Facility: HOSPITAL | Age: 67
End: 2022-11-07

## 2022-11-07 DIAGNOSIS — R30.0 DYSURIA: Primary | ICD-10-CM

## 2022-11-07 LAB
BILIRUB UR QL STRIP: NEGATIVE
CLARITY UR: CLEAR
COLOR UR: ABNORMAL
GLUCOSE UR STRIP-MCNC: NEGATIVE MG/DL
HGB UR QL STRIP.AUTO: ABNORMAL
KETONES UR QL STRIP: NEGATIVE
LEUKOCYTE ESTERASE UR QL STRIP.AUTO: ABNORMAL
NITRITE UR QL STRIP: POSITIVE
PH UR STRIP.AUTO: 6.5 [PH] (ref 5–8)
PROT UR QL STRIP: NEGATIVE
SP GR UR STRIP: 1.01 (ref 1–1.03)
UROBILINOGEN UR QL STRIP: ABNORMAL

## 2022-11-07 PROCEDURE — 87088 URINE BACTERIA CULTURE: CPT | Performed by: OBSTETRICS & GYNECOLOGY

## 2022-11-07 PROCEDURE — 87186 SC STD MICRODIL/AGAR DIL: CPT | Performed by: OBSTETRICS & GYNECOLOGY

## 2022-11-07 PROCEDURE — 81001 URINALYSIS AUTO W/SCOPE: CPT | Performed by: OBSTETRICS & GYNECOLOGY

## 2022-11-07 PROCEDURE — 87086 URINE CULTURE/COLONY COUNT: CPT | Performed by: OBSTETRICS & GYNECOLOGY

## 2022-11-08 LAB
BACTERIA UR QL AUTO: ABNORMAL /HPF
HYALINE CASTS UR QL AUTO: ABNORMAL /LPF
RBC # UR STRIP: ABNORMAL /HPF
REF LAB TEST METHOD: ABNORMAL
SQUAMOUS #/AREA URNS HPF: ABNORMAL /HPF
WBC # UR STRIP: ABNORMAL /HPF

## 2022-11-09 ENCOUNTER — TRANSCRIBE ORDERS (OUTPATIENT)
Dept: ADMINISTRATIVE | Facility: HOSPITAL | Age: 67
End: 2022-11-09

## 2022-11-09 DIAGNOSIS — Z12.31 VISIT FOR SCREENING MAMMOGRAM: Primary | ICD-10-CM

## 2022-11-10 LAB — BACTERIA SPEC AEROBE CULT: ABNORMAL

## 2022-11-29 ENCOUNTER — HOSPITAL ENCOUNTER (EMERGENCY)
Facility: HOSPITAL | Age: 67
Discharge: LEFT BEFORE DISCHARGE | End: 2022-11-29

## 2022-11-29 VITALS
RESPIRATION RATE: 18 BRPM | DIASTOLIC BLOOD PRESSURE: 80 MMHG | OXYGEN SATURATION: 97 % | SYSTOLIC BLOOD PRESSURE: 146 MMHG | BODY MASS INDEX: 24.49 KG/M2 | WEIGHT: 147 LBS | HEIGHT: 65 IN | HEART RATE: 70 BPM | TEMPERATURE: 98.1 F

## 2022-11-29 PROCEDURE — 99211 OFF/OP EST MAY X REQ PHY/QHP: CPT

## 2022-11-29 RX ORDER — HYDROXYZINE 50 MG/1
50 TABLET, FILM COATED ORAL NIGHTLY
Qty: 45 TABLET | Refills: 5 | Status: SHIPPED | OUTPATIENT
Start: 2022-11-29

## 2022-11-29 NOTE — TELEPHONE ENCOUNTER
Rx Refill Note  Requested Prescriptions     Pending Prescriptions Disp Refills   • hydrOXYzine (ATARAX) 50 MG tablet 45 tablet 5     Sig: Take 1 tablet by mouth Every Night.      Last office visit with prescribing clinician: 9/29/2022   Last telemedicine visit with prescribing clinician: 11/30/2022   Next office visit with prescribing clinician: 12/20/2022                         Would you like a call back once the refill request has been completed: [] Yes [] No    If the office needs to give you a call back, can they leave a voicemail: [] Yes [] No    Kary Brown RN  11/29/22, 15:55 EST

## 2022-11-30 ENCOUNTER — OFFICE VISIT (OUTPATIENT)
Dept: INTERNAL MEDICINE | Facility: CLINIC | Age: 67
End: 2022-11-30

## 2022-11-30 ENCOUNTER — LAB (OUTPATIENT)
Dept: LAB | Facility: HOSPITAL | Age: 67
End: 2022-11-30

## 2022-11-30 VITALS
WEIGHT: 148 LBS | BODY MASS INDEX: 24.66 KG/M2 | HEIGHT: 65 IN | HEART RATE: 68 BPM | SYSTOLIC BLOOD PRESSURE: 122 MMHG | DIASTOLIC BLOOD PRESSURE: 84 MMHG | TEMPERATURE: 98.6 F

## 2022-11-30 DIAGNOSIS — J30.2 PERENNIAL ALLERGIC RHINITIS WITH SEASONAL VARIATION: Chronic | ICD-10-CM

## 2022-11-30 DIAGNOSIS — M79.10 MYALGIA: Primary | ICD-10-CM

## 2022-11-30 DIAGNOSIS — M79.10 MYALGIA: ICD-10-CM

## 2022-11-30 DIAGNOSIS — E66.3 OVERWEIGHT WITH BODY MASS INDEX (BMI) OF 26 TO 26.9 IN ADULT: Chronic | ICD-10-CM

## 2022-11-30 DIAGNOSIS — J30.89 PERENNIAL ALLERGIC RHINITIS WITH SEASONAL VARIATION: Chronic | ICD-10-CM

## 2022-11-30 LAB
ALBUMIN SERPL-MCNC: 4.2 G/DL (ref 3.5–5.2)
ALBUMIN/GLOB SERPL: 1.6 G/DL
ALP SERPL-CCNC: 94 U/L (ref 39–117)
ALT SERPL W P-5'-P-CCNC: 17 U/L (ref 1–33)
ANION GAP SERPL CALCULATED.3IONS-SCNC: 10 MMOL/L (ref 5–15)
AST SERPL-CCNC: 19 U/L (ref 1–32)
BILIRUB SERPL-MCNC: 0.3 MG/DL (ref 0–1.2)
BUN SERPL-MCNC: 16 MG/DL (ref 8–23)
BUN/CREAT SERPL: 20.8 (ref 7–25)
CALCIUM SPEC-SCNC: 9.5 MG/DL (ref 8.6–10.5)
CHLORIDE SERPL-SCNC: 100 MMOL/L (ref 98–107)
CO2 SERPL-SCNC: 27 MMOL/L (ref 22–29)
CREAT SERPL-MCNC: 0.77 MG/DL (ref 0.57–1)
EGFRCR SERPLBLD CKD-EPI 2021: 85.7 ML/MIN/1.73
GLOBULIN UR ELPH-MCNC: 2.6 GM/DL
GLUCOSE SERPL-MCNC: 92 MG/DL (ref 65–99)
MAGNESIUM SERPL-MCNC: 2.1 MG/DL (ref 1.6–2.4)
POTASSIUM SERPL-SCNC: 4.5 MMOL/L (ref 3.5–5.2)
PROT SERPL-MCNC: 6.8 G/DL (ref 6–8.5)
SODIUM SERPL-SCNC: 137 MMOL/L (ref 136–145)

## 2022-11-30 PROCEDURE — 80053 COMPREHEN METABOLIC PANEL: CPT

## 2022-11-30 PROCEDURE — 83735 ASSAY OF MAGNESIUM: CPT

## 2022-11-30 PROCEDURE — 36415 COLL VENOUS BLD VENIPUNCTURE: CPT

## 2022-11-30 PROCEDURE — 99214 OFFICE O/P EST MOD 30 MIN: CPT | Performed by: NURSE PRACTITIONER

## 2022-11-30 RX ORDER — CYCLOBENZAPRINE HCL 10 MG
10 TABLET ORAL 3 TIMES DAILY PRN
Qty: 30 TABLET | Refills: 0 | Status: SHIPPED | OUTPATIENT
Start: 2022-11-30

## 2022-12-12 RX ORDER — PRAMIPEXOLE DIHYDROCHLORIDE 0.5 MG/1
TABLET ORAL
Qty: 120 TABLET | Refills: 1 | Status: SHIPPED | OUTPATIENT
Start: 2022-12-12 | End: 2023-03-13

## 2022-12-20 ENCOUNTER — OFFICE VISIT (OUTPATIENT)
Dept: INTERNAL MEDICINE | Facility: CLINIC | Age: 67
End: 2022-12-20

## 2022-12-20 VITALS
SYSTOLIC BLOOD PRESSURE: 126 MMHG | HEIGHT: 65 IN | BODY MASS INDEX: 24.93 KG/M2 | OXYGEN SATURATION: 97 % | WEIGHT: 149.6 LBS | TEMPERATURE: 98.2 F | HEART RATE: 69 BPM | DIASTOLIC BLOOD PRESSURE: 80 MMHG

## 2022-12-20 DIAGNOSIS — E78.00 HYPERCHOLESTEROLEMIA: ICD-10-CM

## 2022-12-20 DIAGNOSIS — F43.29 STRESS AND ADJUSTMENT REACTION: ICD-10-CM

## 2022-12-20 DIAGNOSIS — R63.5 WEIGHT GAIN: Chronic | ICD-10-CM

## 2022-12-20 DIAGNOSIS — R20.2 NUMBNESS AND TINGLING IN RIGHT HAND: Primary | Chronic | ICD-10-CM

## 2022-12-20 DIAGNOSIS — R21 RASH AND NONSPECIFIC SKIN ERUPTION: Chronic | ICD-10-CM

## 2022-12-20 DIAGNOSIS — R20.0 NUMBNESS AND TINGLING IN RIGHT HAND: Primary | Chronic | ICD-10-CM

## 2022-12-20 DIAGNOSIS — F51.01 PRIMARY INSOMNIA: ICD-10-CM

## 2022-12-20 DIAGNOSIS — F32.0 DEPRESSION, MAJOR, SINGLE EPISODE, MILD: ICD-10-CM

## 2022-12-20 PROCEDURE — 99214 OFFICE O/P EST MOD 30 MIN: CPT | Performed by: INTERNAL MEDICINE

## 2022-12-20 RX ORDER — DIETHYLPROPION HYDROCHLORIDE 75 MG/1
75 TABLET ORAL DAILY
Qty: 30 EACH | Refills: 2 | Status: SHIPPED | OUTPATIENT
Start: 2022-12-20 | End: 2023-03-03

## 2022-12-20 NOTE — ASSESSMENT & PLAN NOTE
We will stop the phentermine since it was not very helpful. We will start diethylpropion. She will take it every morning. The patient will also try to increase aerobic exercise to 30 minutes a day 5 days a week. She will continue eating about 1200 calories a day. The patient will continue doing intermittent fasting. She is advised to get 3 servings of protein per day, eat more veggies, and less carbohydrates.

## 2022-12-20 NOTE — ASSESSMENT & PLAN NOTE
She will make an appointment with her dermatologist. She may continue the anti-itch cream in the meantime. Make the temperature of the shower as cool as possible. Continue to avoid scratching and rubbing.

## 2022-12-20 NOTE — ASSESSMENT & PLAN NOTE
The patient will follow through with the EMG/NCV ordered by Bharti at last visit. She will also make an appointment to follow up with Dr. Quintero since she has seen her before. I suspect it is carpal tunnel and that it could also possibly be due to cervical myelopathy.

## 2022-12-20 NOTE — PROGRESS NOTES
[facility]     Suze Baeza  1955   [id number]      Patient Care Team:  Parul Lomas MD as PCP - General (Internal Medicine)  Kendrick Andersen MD as Consulting Physician (Dermatology)  Heraclio Mari MD as Consulting Physician (Orthopedic Surgery)  Chio Dhaliwal MD as Consulting Physician (Obstetrics and Gynecology)    Chief Complaint   Patient presents with   • Hyperlipidemia            HPI  Patient is a 67 y.o. female presents today for a follow-up.    Rash  The patient has a rash on her right thigh that has been present for approximately 4 months. She states that the rash is enlarging. She has not seen the dermatologist, but will call for an appointment. The patient was prescribed a prescription cream, but it did not help. She has been using an over-the-counter cortisone-type cream on the rash. The rash is itchy when she gets out of the shower. The rash is not constant. She has tried to decrease the temperature of the shower, but she does not get it too hot because her skin gets too dry. She does scratch the rash sometimes. The rash is very fine bumps.     Muscle pain  The patient saw Bharti for muscle pain, who wanted her to get a nerve test. She does not think she needs that now because she is a lot better. The patient had been redoing furniture for her daughter and using an electric yolanda. She states that the pain was in her right leg and left arm. The patient could hardly lift anything with her arm. The pain has gone out of her leg. She has had numbness and tingling in her right hand, and is still having it. The patient has numbness in her thumb, 2nd, and 3rd fingers. She cannot put her earrings on because she cannot feel them. The patient wakes up at night with her hand numb and sometimes it is tingling. Holding the steering wheel or holding a book to read does not cause it. She has pain in her neck where she carries her stress. Her neck always bothers her a little  bit. Her neck is no worse than usual. The neck does not wake her up at night. She does not feel numbness and tingling in her right arm.    Weight loss  She has lost a little weight, but feels it is not working. The patient is eating 1200 calories per day. She does not get protein 3 times a day. The patient does not snack between meals. She does not feel weak, shaky, or grumpy if she does not eat in the morning. The patient has been trying intermittent fasting as well. She has been eating more sweets the last few days. The patient has not been too good in the last few months, but is trying to control it. She walks 1 or 2 days a week. The patient does not have time to do aerobic exercise. She would like to try diethylpropion to see if it works better.    Hypertension  The patient states that her blood pressure is a little higher than usual. She states her diastolic number is usually in the 70s mmHg.     Stress   She is taking fluoxetine. The patient states that her stress is better. Her brother called her the other night and gave her a hard time over their mother. She is trying to do the best she can. The patient is taking buspirone 3 times a day, in the morning, afternoon, and early evening. If she takes it at night, it keeps her awake. She talks to her  and a good friend about her stress. The patient is always behind on everything. 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 The patient states that she cannot go to bed. She will get sleepy, but she cannot go to sleep and will be up to 1:30 AM doing things. The patient does not like to read. She does word search sometimes. She listens to music sometimes. The patient reports her 's aunt used to be a counselor and she has been talking to her.    The patient is agreeable to a [employer] swab to see which medications metabolize and will work best for her.         CHRONIC CONDITIONS      Past Medical History:   Diagnosis Date   • Degenerative tear of medial meniscus,  "right    • Hot flashes     PROZAC    • Low back pain    • Neuropathy, lumbosacral (radicular)    • Primary osteoarthritis of both knees    • Spondylolisthesis, lumbar region    • Torn meniscus     right medial   • Viral warts 02/25/2019   • Wears reading eyeglasses        Past Surgical History:   Procedure Laterality Date   • BLADDER SURGERY     • COLONOSCOPY     • HYSTERECTOMY  07/10/2012   • KNEE ARTHROSCOPY Right    • OOPHORECTOMY Bilateral 07/10/2012   • TOTAL KNEE ARTHROPLASTY Bilateral 12/29/2020    Procedure: TOTAL KNEE ARTHROPLASTY BILATERAL;  Surgeon: Heraclio Mari MD;  Location: [facility];  Service: Orthopedics;  Laterality: Bilateral;       Family History   Problem Relation Age of Onset   • Deep vein thrombosis Mother    • Stroke Mother    • Heart disease Mother 68   • Heart attack Mother    • Osteoarthritis Mother    • Heart disease Father 51        cause of death   • Heart attack Father    • Stroke Other    • Cancer Maternal Aunt         bone   • Diabetes Maternal Grandmother    • Cancer Maternal Aunt         lymphoma   • Cancer Maternal Aunt         brain   • Breast cancer Neg Hx    • Ovarian cancer Neg Hx        Social History     Socioeconomic History   • Marital status:    Tobacco Use   • Smoking status: Never   • Smokeless tobacco: Never   Vaping Use   • Vaping Use: Never used   Substance and Sexual Activity   • Alcohol use: No   • Drug use: No   • Sexual activity: Defer       Allergies   Allergen Reactions   • Other Itching and Rash     CHG       Vital Signs  Vitals:    12/20/22 1037   BP: 126/80   BP Location: Right arm   Patient Position: Sitting   Cuff Size: Adult   Pulse: 69   Temp: 98.2 °F (36.8 °C)   TempSrc: Infrared   SpO2: 97%   Weight: 67.9 kg (149 lb 9.6 oz)   Height: 165.1 cm (65\")   PainSc: 0-No pain     Body mass index is 24.89 kg/m².  BMI is within normal parameters. No other follow-up for BMI required.        Current Outpatient Medications:   •  Ascorbic Acid (VITAMIN C " PO), Take 500 mg by mouth Daily., Disp: , Rfl:   •  atorvastatin (LIPITOR) 10 MG tablet, Take 1 tablet by mouth Every Night., Disp: 90 tablet, Rfl: 1  •  buPROPion XL (WELLBUTRIN XL) 150 MG 24 hr tablet, Take 1 tablet by mouth Daily., Disp: 90 tablet, Rfl: 1  •  busPIRone (BUSPAR) 5 MG tablet, Take 2 tablets by mouth 3 (Three) Times a Day., Disp: 180 tablet, Rfl: 5  •  calcium carb-cholecalciferol 600-800 MG-UNIT tablet, Take 1 tablet by mouth Daily., Disp: , Rfl:   •  Calcium Polycarbophil (FIBER-CAPS PO), Take 1 tablet by mouth Daily., Disp: , Rfl:   •  Cyanocobalamin (VITAMIN B-12 PO), Take 5,000 mcg by mouth Daily., Disp: , Rfl:   •  cyclobenzaprine (FLEXERIL) 10 MG tablet, Take 1 tablet by mouth 3 (Three) Times a Day As Needed for Muscle Spasms., Disp: 30 tablet, Rfl: 0  •  estradiol (ESTRACE) 0.5 MG tablet, Take 0.5 mg by mouth Daily., Disp: , Rfl:   •  Flax Oil-Fish Oil-Borage Oil (FISH OIL-FLAX OIL-BORAGE OIL PO), Take 1 capsule by mouth Daily., Disp: , Rfl:   •  FLUoxetine (PROzac) 20 MG capsule, Take 1 capsule by mouth Daily., Disp: 90 capsule, Rfl: 1  •  fluticasone (FLONASE) 50 MCG/ACT nasal spray, 1 spray into the nostril(s) as directed by provider 2 (Two) Times a Day. (Patient taking differently: 1 spray into the nostril(s) as directed by provider 2 (Two) Times a Day. PRN), Disp: 16 g, Rfl: 5  •  hydrOXYzine (ATARAX) 50 MG tablet, Take 1 tablet by mouth Every Night., Disp: 45 tablet, Rfl: 5  •  pramipexole (MIRAPEX) 0.5 MG tablet, TAKE THREE TABLETS BY MOUTH EVERY NIGHT AT BEDTIME AND TAKE ONE TABLET BY MOUTH DAILY AS NEEDED, Disp: 120 tablet, Rfl: 1  •  Diethylpropion HCl ER 75 MG tablet sustained-release 24 hour, Take 75 mg by mouth Daily., Disp: 30 each, Rfl: 2    Physical Exam:    Physical Exam  Vitals and nursing note reviewed.   Constitutional:       Appearance: She is well-developed.   HENT:      Head: Normocephalic.   Eyes:      Conjunctiva/sclera: Conjunctivae normal.      Pupils: Pupils are  equal, round, and reactive to light.   Neck:      Thyroid: No thyromegaly.   Cardiovascular:      Rate and Rhythm: Normal rate and regular rhythm.      Heart sounds: Normal heart sounds.   Pulmonary:      Effort: Pulmonary effort is normal.      Breath sounds: Normal breath sounds.   Musculoskeletal:         General: Normal range of motion.      Cervical back: Normal range of motion and neck supple.      Comments: Right hand with normal peripheral pulses. Severe osteoarthritis changes of both hands.   Lymphadenopathy:      Cervical: No cervical adenopathy.   Neurological:      Mental Status: She is alert and oriented to person, place, and time.   Psychiatric:         Thought Content: Thought content normal.      Comments: High stress, normal mood, normal attention.          ACE III MINI        Results Review:    None    CMP:  Lab Results   Component Value Date    BUN 16 11/30/2022    CREATININE 0.77 11/30/2022    EGFRIFNONA 87 09/23/2021    BCR 20.8 11/30/2022     11/30/2022    K 4.5 11/30/2022    CO2 27.0 11/30/2022    CALCIUM 9.5 11/30/2022    ALBUMIN 4.20 11/30/2022    BILITOT 0.3 11/30/2022    ALKPHOS 94 11/30/2022    AST 19 11/30/2022    ALT 17 11/30/2022     HbA1c:  Lab Results   Component Value Date    HGBA1C 5.70 (H) 12/17/2020     Microalbumin:  No results found for: MICROALBUR, POCMALB, POCCREAT  Lipid Panel  Lab Results   Component Value Date    CHOL 163 09/29/2022    TRIG 56 09/29/2022    HDL 59 09/29/2022    LDL 93 09/29/2022    AST 19 11/30/2022    ALT 17 11/30/2022       Medication Review: Medications reviewed and noted  Patient Instructions   Problem List Items Addressed This Visit        Cardiac and Vasculature    Hypercholesterolemia    Overview     Taking atorvastatin every evening.           Current Assessment & Plan     She will continue atorvastatin every evening. Continue to improve low-fat, low-sugar diet.         Relevant Medications    atorvastatin (LIPITOR) 10 MG tablet        Endocrine and Metabolic    Body mass index (BMI) of 24.0-24.9 in adult (Chronic)    Weight gain (Chronic)    Current Assessment & Plan     We will stop the phentermine since it was not very helpful. We will start diethylpropion. She will take it every morning. The patient will also try to increase aerobic exercise to 30 minutes a day 5 days a week. She will continue eating about 1200 calories a day. The patient will continue doing intermittent fasting. She is advised to get 3 servings of protein per day, eat more veggies, and less carbohydrates.          Relevant Medications    Diethylpropion HCl ER 75 MG tablet sustained-release 24 hour       Mental Health    Stress and adjustment reaction    Overview     Taking fluoxetine and buspirone and bupropion XL.         Current Assessment & Plan     We will do [employer] testing today to make sure we are using the appropriate medications for this patient. In the meantime, she will continue fluoxetine, buspirone, and bupropion. She will continue talking to her , her friend, and her 's aunt. The patient will consider checking her insurance to see which counselors they approve. Regular physical activity and exercise also help with stress symptoms.         Relevant Medications    buPROPion XL (WELLBUTRIN XL) 150 MG 24 hr tablet    FLUoxetine (PROzac) 20 MG capsule    busPIRone (BUSPAR) 5 MG tablet    hydrOXYzine (ATARAX) 50 MG tablet    Diethylpropion HCl ER 75 MG tablet sustained-release 24 hour    Other Relevant Orders    [employer] - Swab,    Depression, major, single episode, mild (HCC)    Current Assessment & Plan     We will do [employer] testing today to make sure we are using the appropriate medications for this patient. In the meantime, she will continue fluoxetine, buspirone, and bupropion. She will continue talking to her , her friend, and her 's aunt. The patient will consider checking her insurance to see which counselors they approve.  Regular physical activity and exercise also help with stress symptoms.         Relevant Medications    buPROPion XL (WELLBUTRIN XL) 150 MG 24 hr tablet    FLUoxetine (PROzac) 20 MG capsule    busPIRone (BUSPAR) 5 MG tablet    hydrOXYzine (ATARAX) 50 MG tablet    Diethylpropion HCl ER 75 MG tablet sustained-release 24 hour    Other Relevant Orders    [employer] - Swab,       Skin    Rash and nonspecific skin eruption (Chronic)    Overview     Fine sandpapery rash on R anterior thigh         Current Assessment & Plan     She will make an appointment with her dermatologist. She may continue the anti-itch cream in the meantime. Make the temperature of the shower as cool as possible. Continue to avoid scratching and rubbing.            Sleep    Primary insomnia    Overview                Current Assessment & Plan     We discussed making a regular getting ready for bedtime, a regular going to bedtime, and getting up time. She will try to do relaxing things for about an hour before bedtime.     We discussed sleep hygiene including going to bed at the same time and getting up at the same time every day, going to bed early enough to get 7 or 8 hours in bed, reading and relaxing before bedtime, and avoiding the TV, computer, phone, iPad close to bedtime.               Symptoms and Signs    Numbness and tingling in right hand - Primary (Chronic)    Overview     Numbness in R first,second,third digits.         Current Assessment & Plan     The patient will follow through with the EMG/NCV ordered by Bharti at last visit. She will also make an appointment to follow up with Dr. Quintero since she has seen her before. I suspect it is carpal tunnel and that it could also possibly be due to cervical myelopathy.               Diagnosis Plan   1. Numbness and tingling in right hand        2. Rash and nonspecific skin eruption        3. Body mass index (BMI) of 24.0-24.9 in adult        4. Hypercholesterolemia        5. Stress and  adjustment reaction  [employer] - Swab,      6. Primary insomnia        7. Weight gain  Diethylpropion HCl ER 75 MG tablet sustained-release 24 hour      8. Depression, major, single episode, mild (HCC)  [employer] - Swab,        Follow-up:  She will come back for follow-up in 3 months and that will be a fasting follow-up.        Plan of care reviewed with patient at the conclusion of today's visit. Education was provided regarding diagnosis, management, and any prescribed or recommended OTC medications.Patient verbalizes understanding of and agreement with management plan.         Parul Lomas MD    Transcribed from ambient dictation for Parul Lomas MD by Elana Diallo.  12/20/22   14:16 EST    Patient or patient representative verbalized consent to the visit recording.  I have personally performed the services described in this document as transcribed by the above individual, and it is both accurate and complete.

## 2022-12-20 NOTE — ASSESSMENT & PLAN NOTE
We will do [employer] testing today to make sure we are using the appropriate medications for this patient. In the meantime, she will continue fluoxetine, buspirone, and bupropion. She will continue talking to her , her friend, and her 's aunt. The patient will consider checking her insurance to see which counselors they approve. Regular physical activity and exercise also help with stress symptoms.

## 2022-12-21 ENCOUNTER — HOSPITAL ENCOUNTER (OUTPATIENT)
Dept: MAMMOGRAPHY | Facility: HOSPITAL | Age: 67
Discharge: HOME | End: 2022-12-21
Admitting: OBSTETRICS & GYNECOLOGY

## 2022-12-21 DIAGNOSIS — Z12.31 VISIT FOR SCREENING MAMMOGRAM: ICD-10-CM

## 2022-12-21 PROCEDURE — 77063 BREAST TOMOSYNTHESIS BI: CPT | Performed by: RADIOLOGY

## 2022-12-21 PROCEDURE — 77063 BREAST TOMOSYNTHESIS BI: CPT

## 2022-12-21 PROCEDURE — 77067 SCR MAMMO BI INCL CAD: CPT | Performed by: RADIOLOGY

## 2022-12-21 PROCEDURE — 77067 SCR MAMMO BI INCL CAD: CPT

## 2022-12-21 NOTE — PATIENT INSTRUCTIONS
Patient Instructions   Problem List Items Addressed This Visit          Cardiac and Vasculature    Hypercholesterolemia    Overview     Taking atorvastatin every evening.           Current Assessment & Plan     She will continue atorvastatin every evening. Continue to improve low-fat, low-sugar diet.         Relevant Medications    atorvastatin (LIPITOR) 10 MG tablet       Endocrine and Metabolic    Body mass index (BMI) of 24.0-24.9 in adult (Chronic)    Weight gain (Chronic)    Current Assessment & Plan     We will stop the phentermine since it was not very helpful. We will start diethylpropion. She will take it every morning. The patient will also try to increase aerobic exercise to 30 minutes a day 5 days a week. She will continue eating about 1200 calories a day. The patient will continue doing intermittent fasting. She is advised to get 3 servings of protein per day, eat more veggies, and less carbohydrates.          Relevant Medications    Diethylpropion HCl ER 75 MG tablet sustained-release 24 hour       Mental Health    Stress and adjustment reaction    Overview     Taking fluoxetine and buspirone and bupropion XL.         Current Assessment & Plan     We will do [employer] testing today to make sure we are using the appropriate medications for this patient. In the meantime, she will continue fluoxetine, buspirone, and bupropion. She will continue talking to her , her friend, and her 's aunt. The patient will consider checking her insurance to see which counselors they approve. Regular physical activity and exercise also help with stress symptoms.         Relevant Medications    buPROPion XL (WELLBUTRIN XL) 150 MG 24 hr tablet    FLUoxetine (PROzac) 20 MG capsule    busPIRone (BUSPAR) 5 MG tablet    hydrOXYzine (ATARAX) 50 MG tablet    Diethylpropion HCl ER 75 MG tablet sustained-release 24 hour    Other Relevant Orders    [employer] - Swab,    Depression, major, single episode, mild (HCC)     Current Assessment & Plan     We will do [employer] testing today to make sure we are using the appropriate medications for this patient. In the meantime, she will continue fluoxetine, buspirone, and bupropion. She will continue talking to her , her friend, and her 's aunt. The patient will consider checking her insurance to see which counselors they approve. Regular physical activity and exercise also help with stress symptoms.         Relevant Medications    buPROPion XL (WELLBUTRIN XL) 150 MG 24 hr tablet    FLUoxetine (PROzac) 20 MG capsule    busPIRone (BUSPAR) 5 MG tablet    hydrOXYzine (ATARAX) 50 MG tablet    Diethylpropion HCl ER 75 MG tablet sustained-release 24 hour    Other Relevant Orders    [employer] - Swab,       Skin    Rash and nonspecific skin eruption (Chronic)    Overview     Fine sandpapery rash on R anterior thigh         Current Assessment & Plan     She will make an appointment with her dermatologist. She may continue the anti-itch cream in the meantime. Make the temperature of the shower as cool as possible. Continue to avoid scratching and rubbing.            Sleep    Primary insomnia    Overview                Current Assessment & Plan     We discussed making a regular getting ready for bedtime, a regular going to bedtime, and getting up time. She will try to do relaxing things for about an hour before bedtime.     We discussed sleep hygiene including going to bed at the same time and getting up at the same time every day, going to bed early enough to get 7 or 8 hours in bed, reading and relaxing before bedtime, and avoiding the TV, computer, phone, iPad close to bedtime.               Symptoms and Signs    Numbness and tingling in right hand - Primary (Chronic)    Overview     Numbness in R first,second,third digits.         Current Assessment & Plan     The patient will follow through with the EMG/NCV ordered by [facility] at last visit. She will also make an appointment to  follow up with Dr. Quintero since she has seen her before. I suspect it is carpal tunnel and that it could also possibly be due to cervical myelopathy.

## 2022-12-21 NOTE — ASSESSMENT & PLAN NOTE
We will do [employer] testing today to make sure we are using the appropriate medications for this patient. In the meantime, she will continue fluoxetine, buspirone, and bupropion. She will continue talking to her , her friend, and her 's aunt. The patient will consider checking her insurance to see which counselors they approve. Regular physical activity and exercise also help with stress symptoms.

## 2022-12-30 ENCOUNTER — TELEPHONE (OUTPATIENT)
Dept: INTERNAL MEDICINE | Facility: CLINIC | Age: 67
End: 2022-12-30

## 2022-12-30 NOTE — TELEPHONE ENCOUNTER
PATIENT: PACHECO DANIEL    PATIENT CALLED TO LET US KNOW SHE WANTS TO CANCEL THE NERVE CONDUCTION TEST DUE TO THE COST.

## 2023-01-03 ENCOUNTER — APPOINTMENT (OUTPATIENT)
Dept: NEUROLOGY | Facility: HOSPITAL | Age: 68
End: 2023-01-03

## 2023-01-09 RX ORDER — BUPROPION HYDROCHLORIDE 150 MG/1
TABLET ORAL
Qty: 90 TABLET | Refills: 1 | Status: SHIPPED | OUTPATIENT
Start: 2023-01-09 | End: 2023-03-03

## 2023-01-16 ENCOUNTER — OFFICE VISIT (OUTPATIENT)
Dept: INTERNAL MEDICINE | Facility: CLINIC | Age: 68
End: 2023-01-16
Payer: COMMERCIAL

## 2023-01-16 VITALS
BODY MASS INDEX: 25.09 KG/M2 | HEIGHT: 65 IN | WEIGHT: 150.6 LBS | TEMPERATURE: 98.4 F | DIASTOLIC BLOOD PRESSURE: 68 MMHG | HEART RATE: 74 BPM | OXYGEN SATURATION: 99 % | SYSTOLIC BLOOD PRESSURE: 110 MMHG

## 2023-01-16 DIAGNOSIS — L03.211 CELLULITIS OF CHIN: Primary | Chronic | ICD-10-CM

## 2023-01-16 DIAGNOSIS — U07.1 COVID-19 VIRUS INFECTION: Chronic | ICD-10-CM

## 2023-01-16 DIAGNOSIS — R05.1 ACUTE COUGH: Chronic | ICD-10-CM

## 2023-01-16 DIAGNOSIS — R09.82 POST-NASAL DRAINAGE: ICD-10-CM

## 2023-01-16 DIAGNOSIS — J01.00 ACUTE NON-RECURRENT MAXILLARY SINUSITIS: Chronic | ICD-10-CM

## 2023-01-16 LAB
EXPIRATION DATE: ABNORMAL
FLUAV AG UPPER RESP QL IA.RAPID: NOT DETECTED
FLUBV AG UPPER RESP QL IA.RAPID: NOT DETECTED
INTERNAL CONTROL: ABNORMAL
Lab: ABNORMAL
SARS-COV-2 AG UPPER RESP QL IA.RAPID: DETECTED

## 2023-01-16 PROCEDURE — 99214 OFFICE O/P EST MOD 30 MIN: CPT | Performed by: INTERNAL MEDICINE

## 2023-01-16 PROCEDURE — 87428 SARSCOV & INF VIR A&B AG IA: CPT | Performed by: INTERNAL MEDICINE

## 2023-01-16 RX ORDER — DOXYCYCLINE HYCLATE 100 MG/1
100 CAPSULE ORAL 2 TIMES DAILY
Qty: 14 CAPSULE | Refills: 0 | Status: SHIPPED | OUTPATIENT
Start: 2023-01-16 | End: 2023-01-23

## 2023-01-16 NOTE — ASSESSMENT & PLAN NOTE
See above. Continue taking Mucinex twice a day and using Flonase nasal spray twice a day and using DayQuil and NyQuil as needed.

## 2023-01-16 NOTE — PROGRESS NOTES
[facility]     Suze Baeza  1955   [id number]      Patient Care Team:  Parul Lomas MD as PCP - General (Internal Medicine)  Kendrick Andersen MD as Consulting Physician (Dermatology)  Heraclio Mari MD as Consulting Physician (Orthopedic Surgery)  Chio Dhaliwal MD as Consulting Physician (Obstetrics and Gynecology)    Chief Complaint   Patient presents with   • chin swelling   • nasal drainage            HPI  Patient is a 67 y.o. female presents today with cold symptoms. She is accompanied by an adult female.    Cold symptoms  The patient complains of a sore throat. She states that it feels like she has a [employer] truck on her chest. She denies any difficulty breathing, but states that her breathing is heavy. Her symptoms started a couple of days ago, but her breathing has improved. She denies any fever or chills. She denies any wheezing. She has a cough and postnasal drainage. She has not done a COVID-19 test at home. She denies being around anyone who has been sick. She has not been in a crowd recently. She wears a mask when she goes to visit her mother. She has been trying Mucinex for the drainage. She states that she has been drinking lots of fluids.     Chin swelling  The patient complains of throbbing and significant tightness in her chin throbs. There is a small knot present that appeared a couple of days ago; it was swollen this morning. There was also swelling under her lip. She denies any injury or trauma. She denies any tooth pain. She rarely gets pimples on her chin. She had a headache one day for a short while, but no other headaches. She denies soreness in her neck; she denies burning on her neck. She denies any itching on her chin. She denies any pain in her ear.  Her mother is in a facility at [facility]; there has been no skin infections, COVID-19, or influenza. She denies any skin infections.      CHRONIC CONDITIONS      Past Medical History:  "  Diagnosis Date   • Degenerative tear of medial meniscus, right    • Hot flashes     PROZAC    • Low back pain    • Neuropathy, lumbosacral (radicular)    • Primary osteoarthritis of both knees    • Spondylolisthesis, lumbar region    • Torn meniscus     right medial   • Viral warts 02/25/2019   • Wears reading eyeglasses        Past Surgical History:   Procedure Laterality Date   • BLADDER SURGERY     • COLONOSCOPY     • HYSTERECTOMY  07/10/2012   • KNEE ARTHROSCOPY Right    • OOPHORECTOMY Bilateral 07/10/2012   • TOTAL KNEE ARTHROPLASTY Bilateral 12/29/2020    Procedure: TOTAL KNEE ARTHROPLASTY BILATERAL;  Surgeon: Heraclio Mari MD;  Location: [facility];  Service: Orthopedics;  Laterality: Bilateral;       Family History   Problem Relation Age of Onset   • Deep vein thrombosis Mother    • Stroke Mother    • Heart disease Mother 68   • Heart attack Mother    • Osteoarthritis Mother    • Heart disease Father 51        cause of death   • Heart attack Father    • Stroke Other    • Cancer Maternal Aunt         bone   • Diabetes Maternal Grandmother    • Cancer Maternal Aunt         lymphoma   • Cancer Maternal Aunt         brain   • Breast cancer Neg Hx    • Ovarian cancer Neg Hx        Social History     Socioeconomic History   • Marital status:    Tobacco Use   • Smoking status: Never   • Smokeless tobacco: Never   Vaping Use   • Vaping Use: Never used   Substance and Sexual Activity   • Alcohol use: No   • Drug use: No   • Sexual activity: Defer       Allergies   Allergen Reactions   • Other Itching and Rash     CHG       Review of Systems:   The appropriate review of systems is included in the HPI.    Vital Signs  Vitals:    01/16/23 1638   BP: 110/68   BP Location: Left arm   Patient Position: Sitting   Cuff Size: Adult   Pulse: 74   Temp: 98.4 °F (36.9 °C)   TempSrc: Infrared   SpO2: 99%   Weight: 68.3 kg (150 lb 9.6 oz)   Height: 165.1 cm (65\")   PainSc: 0-No pain     Body mass index is 25.06 " kg/m².  BMI is >= 25 and <30. (Overweight) The following options were offered after discussion;: weight loss educational material (shared in after visit summary), exercise counseling/recommendations and nutrition counseling/recommendations        Current Outpatient Medications:   •  Ascorbic Acid (VITAMIN C PO), Take 500 mg by mouth Daily., Disp: , Rfl:   •  atorvastatin (LIPITOR) 10 MG tablet, Take 1 tablet by mouth Every Night., Disp: 90 tablet, Rfl: 1  •  buPROPion XL (WELLBUTRIN XL) 150 MG 24 hr tablet, TAKE ONE TABLET BY MOUTH DAILY, Disp: 90 tablet, Rfl: 1  •  busPIRone (BUSPAR) 5 MG tablet, Take 2 tablets by mouth 3 (Three) Times a Day., Disp: 180 tablet, Rfl: 5  •  calcium carb-cholecalciferol 600-800 MG-UNIT tablet, Take 1 tablet by mouth Daily., Disp: , Rfl:   •  Calcium Polycarbophil (FIBER-CAPS PO), Take 1 tablet by mouth Daily., Disp: , Rfl:   •  Cyanocobalamin (VITAMIN B-12 PO), Take 5,000 mcg by mouth Daily., Disp: , Rfl:   •  cyclobenzaprine (FLEXERIL) 10 MG tablet, Take 1 tablet by mouth 3 (Three) Times a Day As Needed for Muscle Spasms., Disp: 30 tablet, Rfl: 0  •  Diethylpropion HCl ER 75 MG tablet sustained-release 24 hour, Take 75 mg by mouth Daily., Disp: 30 each, Rfl: 2  •  estradiol (ESTRACE) 0.5 MG tablet, Take 0.5 mg by mouth Daily., Disp: , Rfl:   •  Flax Oil-Fish Oil-Borage Oil (FISH OIL-FLAX OIL-BORAGE OIL PO), Take 1 capsule by mouth Daily., Disp: , Rfl:   •  FLUoxetine (PROzac) 20 MG capsule, Take 1 capsule by mouth Daily., Disp: 90 capsule, Rfl: 1  •  fluticasone (FLONASE) 50 MCG/ACT nasal spray, 1 spray into the nostril(s) as directed by provider 2 (Two) Times a Day. (Patient taking differently: 1 spray into the nostril(s) as directed by provider 2 (Two) Times a Day. PRN), Disp: 16 g, Rfl: 5  •  hydrOXYzine (ATARAX) 50 MG tablet, Take 1 tablet by mouth Every Night., Disp: 45 tablet, Rfl: 5  •  pramipexole (MIRAPEX) 0.5 MG tablet, TAKE THREE TABLETS BY MOUTH EVERY NIGHT AT BEDTIME AND  TAKE ONE TABLET BY MOUTH DAILY AS NEEDED, Disp: 120 tablet, Rfl: 1  •  doxycycline (VIBRAMYCIN) 100 MG capsule, Take 1 capsule by mouth 2 (Two) Times a Day for 7 days., Disp: 14 capsule, Rfl: 0    Physical Exam:    Physical Exam  Vitals and nursing note reviewed.   Constitutional:       Appearance: She is well-developed.   HENT:      Head: Normocephalic.      Mouth/Throat:      Pharynx: Oropharynx is clear.      Comments: Oropharynx is clear. Buccal mucosa is normal. Voice is hoarse.  Eyes:      Conjunctiva/sclera: Conjunctivae normal.      Pupils: Pupils are equal, round, and reactive to light.   Neck:      Thyroid: No thyromegaly.   Cardiovascular:      Rate and Rhythm: Normal rate and regular rhythm.      Heart sounds: Normal heart sounds.   Pulmonary:      Effort: Pulmonary effort is normal.      Breath sounds: Normal breath sounds. No wheezing.   Musculoskeletal:         General: Normal range of motion.      Cervical back: Normal range of motion and neck supple.   Lymphadenopathy:      Cervical: No cervical adenopathy.   Skin:     General: Skin is warm and dry.      Findings: Erythema present.      Comments: Erythema and swelling of the chin with one area of possibly a blister that has ruptured. The redness is on both sides of the chin and there is also some swelling just under the chin.   Neurological:      Mental Status: She is alert and oriented to person, place, and time.   Psychiatric:         Thought Content: Thought content normal.          ACE III MINI        Results Review:    I reviewed the patient's new clinical results.    CMP:  Lab Results   Component Value Date    BUN 16 11/30/2022    CREATININE 0.77 11/30/2022    EGFRIFNONA 87 09/23/2021    BCR 20.8 11/30/2022     11/30/2022    K 4.5 11/30/2022    CO2 27.0 11/30/2022    CALCIUM 9.5 11/30/2022    ALBUMIN 4.20 11/30/2022    BILITOT 0.3 11/30/2022    ALKPHOS 94 11/30/2022    AST 19 11/30/2022    ALT 17 11/30/2022     HbA1c:  Lab Results    Component Value Date    HGBA1C 5.70 (H) 12/17/2020     Microalbumin:  No results found for: MICROALBUR, POCMALB, POCCREAT  Lipid Panel  Lab Results   Component Value Date    CHOL 163 09/29/2022    TRIG 56 09/29/2022    HDL 59 09/29/2022    LDL 93 09/29/2022    AST 19 11/30/2022    ALT 17 11/30/2022       Medication Review: Medications reviewed and noted  Patient Instructions   Problem List Items Addressed This Visit        ENT    Acute non-recurrent maxillary sinusitis (Chronic)    Current Assessment & Plan     See above. Continue taking Mucinex twice a day and using Flonase nasal spray twice a day and using DayQuil and NyQuil as needed.         Post-nasal drainage    Current Assessment & Plan     See above.            Infectious Diseases    Cellulitis of chin - Primary (Chronic)    Current Assessment & Plan     Take doxycycline twice a day for 7 days. Cleanse the area with a mild cleanser like Cetaphil. Do not put any topical ointments or skin products on that area.            Other    Acute cough (Chronic)    Current Assessment & Plan     We are doing flu and COVID-19 testing today.         Relevant Orders    POCT SARS-CoV-2 Antigen NIEVES + Flu (Completed)    COVID-19 virus infection (Chronic)    Current Assessment & Plan     COVID-19 test was positive. Influenza test was negative. She will treat her symptoms as discussed above. We also discussed quarantining at home for at least 5 days, and then if symptoms are cleared and COVID-19 test is negative, she may go out at that point, but still use a mask and try to social distance from other people. We discussed the possibility of using Paxlovid, but she is otherwise healthy and is not interested, and I do not recommend it for her anyway since her symptoms are mild and she is not short of breath.               Diagnosis Plan   1. Cellulitis of chin        2. COVID-19 virus infection        3. Acute cough  POCT SARS-CoV-2 Antigen NIEVES + Flu      4. Acute non-recurrent  maxillary sinusitis        5. Post-nasal drainage                She will come back to see me on 03/20/2023 as already scheduled.    Plan of care reviewed with patient at the conclusion of today's visit. Education was provided regarding diagnosis, management, and any prescribed or recommended OTC medications.Patient verbalizes understanding of and agreement with management plan.         Transcribed from ambient dictation for Parul Lomas MD by Zofia Alba.  01/16/23   18:37 EST    Patient or patient representative verbalized consent to the visit recording.  I have personally performed the services described in this document as transcribed by the above individual, and it is both accurate and complete.  Parul Lomas MD  1/17/2023  10:20 EST

## 2023-01-16 NOTE — ASSESSMENT & PLAN NOTE
COVID-19 test was positive. Influenza test was negative. She will treat her symptoms as discussed above. We also discussed quarantining at home for at least 5 days, and then if symptoms are cleared and COVID-19 test is negative, she may go out at that point, but still use a mask and try to social distance from other people. We discussed the possibility of using Paxlovid, but she is otherwise healthy and is not interested, and I do not recommend it for her anyway since her symptoms are mild and she is not short of breath.

## 2023-01-16 NOTE — ASSESSMENT & PLAN NOTE
Take doxycycline twice a day for 7 days. Cleanse the area with a mild cleanser like Cetaphil. Do not put any topical ointments or skin products on that area.

## 2023-01-17 NOTE — PATIENT INSTRUCTIONS
Patient Instructions   Problem List Items Addressed This Visit          ENT    Acute non-recurrent maxillary sinusitis (Chronic)    Current Assessment & Plan     See above. Continue taking Mucinex twice a day and using Flonase nasal spray twice a day and using DayQuil and NyQuil as needed.         Post-nasal drainage    Current Assessment & Plan     See above.            Infectious Diseases    Cellulitis of chin - Primary (Chronic)    Current Assessment & Plan     Take doxycycline twice a day for 7 days. Cleanse the area with a mild cleanser like Cetaphil. Do not put any topical ointments or skin products on that area.            Other    Acute cough (Chronic)    Current Assessment & Plan     We are doing flu and COVID-19 testing today.         Relevant Orders    POCT SARS-CoV-2 Antigen NIEVES + Flu (Completed)    COVID-19 virus infection (Chronic)    Current Assessment & Plan     COVID-19 test was positive. Influenza test was negative. She will treat her symptoms as discussed above. We also discussed quarantining at home for at least 5 days, and then if symptoms are cleared and COVID-19 test is negative, she may go out at that point, but still use a mask and try to social distance from other people. We discussed the possibility of using Paxlovid, but she is otherwise healthy and is not interested, and I do not recommend it for her anyway since her symptoms are mild and she is not short of breath.

## 2023-01-18 ENCOUNTER — TELEPHONE (OUTPATIENT)
Dept: INTERNAL MEDICINE | Facility: CLINIC | Age: 68
End: 2023-01-18

## 2023-01-18 ENCOUNTER — OFFICE VISIT (OUTPATIENT)
Dept: INTERNAL MEDICINE | Facility: CLINIC | Age: 68
End: 2023-01-18
Payer: COMMERCIAL

## 2023-01-18 ENCOUNTER — HOSPITAL ENCOUNTER (EMERGENCY)
Facility: HOSPITAL | Age: 68
Discharge: HOME | End: 2023-01-18
Attending: EMERGENCY MEDICINE | Admitting: EMERGENCY MEDICINE
Payer: COMMERCIAL

## 2023-01-18 VITALS
DIASTOLIC BLOOD PRESSURE: 80 MMHG | TEMPERATURE: 97.8 F | HEIGHT: 65 IN | SYSTOLIC BLOOD PRESSURE: 116 MMHG | BODY MASS INDEX: 24.66 KG/M2 | OXYGEN SATURATION: 97 % | WEIGHT: 148 LBS | HEART RATE: 71 BPM

## 2023-01-18 VITALS
BODY MASS INDEX: 24.66 KG/M2 | SYSTOLIC BLOOD PRESSURE: 133 MMHG | DIASTOLIC BLOOD PRESSURE: 92 MMHG | HEIGHT: 65 IN | RESPIRATION RATE: 16 BRPM | WEIGHT: 148 LBS | HEART RATE: 73 BPM | OXYGEN SATURATION: 98 % | TEMPERATURE: 97.9 F

## 2023-01-18 DIAGNOSIS — L03.211 CELLULITIS OF FACE: Primary | ICD-10-CM

## 2023-01-18 DIAGNOSIS — L02.01 ABSCESS OF CHIN: Primary | ICD-10-CM

## 2023-01-18 DIAGNOSIS — U07.1 COVID-19 VIRUS INFECTION: ICD-10-CM

## 2023-01-18 PROCEDURE — 99214 OFFICE O/P EST MOD 30 MIN: CPT | Performed by: STUDENT IN AN ORGANIZED HEALTH CARE EDUCATION/TRAINING PROGRAM

## 2023-01-18 PROCEDURE — 87147 CULTURE TYPE IMMUNOLOGIC: CPT | Performed by: STUDENT IN AN ORGANIZED HEALTH CARE EDUCATION/TRAINING PROGRAM

## 2023-01-18 PROCEDURE — 25010000002 KETOROLAC TROMETHAMINE PER 15 MG: Performed by: EMERGENCY MEDICINE

## 2023-01-18 PROCEDURE — 87070 CULTURE OTHR SPECIMN AEROBIC: CPT | Performed by: STUDENT IN AN ORGANIZED HEALTH CARE EDUCATION/TRAINING PROGRAM

## 2023-01-18 PROCEDURE — 96372 THER/PROPH/DIAG INJ SC/IM: CPT

## 2023-01-18 PROCEDURE — 87205 SMEAR GRAM STAIN: CPT | Performed by: STUDENT IN AN ORGANIZED HEALTH CARE EDUCATION/TRAINING PROGRAM

## 2023-01-18 PROCEDURE — 99283 EMERGENCY DEPT VISIT LOW MDM: CPT

## 2023-01-18 PROCEDURE — 87186 SC STD MICRODIL/AGAR DIL: CPT | Performed by: STUDENT IN AN ORGANIZED HEALTH CARE EDUCATION/TRAINING PROGRAM

## 2023-01-18 RX ORDER — LIDOCAINE HYDROCHLORIDE AND EPINEPHRINE 20; 5 MG/ML; UG/ML
20 INJECTION, SOLUTION EPIDURAL; INFILTRATION; INTRACAUDAL; PERINEURAL ONCE
Status: DISCONTINUED | OUTPATIENT
Start: 2023-01-18 | End: 2023-01-18 | Stop reason: HOSPADM

## 2023-01-18 RX ORDER — AMOXICILLIN AND CLAVULANATE POTASSIUM 875; 125 MG/1; MG/1
1 TABLET, FILM COATED ORAL 2 TIMES DAILY
Qty: 14 TABLET | Refills: 0 | Status: SHIPPED | OUTPATIENT
Start: 2023-01-18 | End: 2023-03-03

## 2023-01-18 RX ORDER — KETOROLAC TROMETHAMINE 30 MG/ML
30 INJECTION, SOLUTION INTRAMUSCULAR; INTRAVENOUS ONCE
Status: COMPLETED | OUTPATIENT
Start: 2023-01-18 | End: 2023-01-18

## 2023-01-18 RX ORDER — CEFUROXIME AXETIL 250 MG/1
TABLET ORAL EVERY 12 HOURS SCHEDULED
COMMUNITY

## 2023-01-18 RX ADMIN — KETOROLAC TROMETHAMINE 30 MG: 30 INJECTION, SOLUTION INTRAMUSCULAR; INTRAVENOUS at 17:22

## 2023-01-18 NOTE — TELEPHONE ENCOUNTER
Why was patient put on my schedule?  Was she not able to wait until later today when Dr. Lomas has availability?

## 2023-01-18 NOTE — ED PROVIDER NOTES
Subjective   History of Present Illness    Pt presents with pain and swelling to her chin.  Onset several days ago, getting worse.  Redness and induration and swelling.  Seen elsewhere a few days ago and given doxycycline which hasn't been working.  No fever, vomiting, body aches.  No trouble swallowing or voice change.  Dx covid today.    History provided by:  Patient      Review of Systems   Constitutional: Negative for fever.   Gastrointestinal: Negative for vomiting.   Musculoskeletal: Negative for myalgias.   Skin: Positive for wound.   All other systems reviewed and are negative.      Past Medical History:   Diagnosis Date   • Degenerative tear of medial meniscus, right    • Hot flashes     PROZAC    • Low back pain    • Neuropathy, lumbosacral (radicular)    • Primary osteoarthritis of both knees    • Spondylolisthesis, lumbar region    • Torn meniscus     right medial   • Viral warts 02/25/2019   • Wears reading eyeglasses        Allergies   Allergen Reactions   • Other Itching and Rash     CHG       Past Surgical History:   Procedure Laterality Date   • BLADDER SURGERY     • COLONOSCOPY     • HYSTERECTOMY  07/10/2012   • KNEE ARTHROSCOPY Right    • OOPHORECTOMY Bilateral 07/10/2012   • TOTAL KNEE ARTHROPLASTY Bilateral 12/29/2020    Procedure: TOTAL KNEE ARTHROPLASTY BILATERAL;  Surgeon: Heraclio Mari MD;  Location: [facility];  Service: Orthopedics;  Laterality: Bilateral;       Family History   Problem Relation Age of Onset   • Deep vein thrombosis Mother    • Stroke Mother    • Heart disease Mother 68   • Heart attack Mother    • Osteoarthritis Mother    • Heart disease Father 51        cause of death   • Heart attack Father    • Stroke Other    • Cancer Maternal Aunt         bone   • Diabetes Maternal Grandmother    • Cancer Maternal Aunt         lymphoma   • Cancer Maternal Aunt         brain   • Breast cancer Neg Hx    • Ovarian cancer Neg Hx        Social History     Socioeconomic History   •  Marital status:    Tobacco Use   • Smoking status: Never   • Smokeless tobacco: Never   Vaping Use   • Vaping Use: Never used   Substance and Sexual Activity   • Alcohol use: No   • Drug use: No   • Sexual activity: Defer           Objective   Physical Exam  Vitals and nursing note reviewed.   Constitutional:       General: She is not in acute distress.     Appearance: Normal appearance. She is not ill-appearing.   HENT:      Head: Normocephalic and atraumatic.   Eyes:      General: No scleral icterus.        Right eye: No discharge.         Left eye: No discharge.      Conjunctiva/sclera: Conjunctivae normal.   Cardiovascular:      Rate and Rhythm: Normal rate.   Pulmonary:      Effort: Pulmonary effort is normal. No respiratory distress.   Musculoskeletal:         General: No swelling or deformity.      Cervical back: Normal range of motion and neck supple.   Skin:     General: Skin is dry.      Findings: No rash.      Comments: There is redness, swelling, induration to the chin.  There is mild induration along the mandible on the left, extending from the chin.  It is minor.  There is some fluctuance over the chin, just a little left of center.   Neurological:      General: No focal deficit present.      Mental Status: She is alert. Mental status is at baseline.   Psychiatric:         Mood and Affect: Mood normal.         Behavior: Behavior normal.         Thought Content: Thought content normal.         Incision & Drainage    Date/Time: 1/18/2023 5:27 PM  Performed by: Enoc Henriquez MD  Authorized by: Enoc Henriquez MD     Consent:     Consent obtained:  Verbal    Consent given by:  Patient    Risks discussed:  Pain and bleeding  Universal protocol:     Patient identity confirmed:  Verbally with patient  Location:     Type:  Abscess    Size:  2 cm    Location:  Head    Head location:  Face  Pre-procedure details:     Skin preparation:  Povidone-iodine  Sedation:     Sedation type:   None  Anesthesia:     Anesthesia method:  Local infiltration    Local anesthetic:  Lidocaine 1% WITH epi  Procedure type:     Complexity:  Complex  Procedure details:     Ultrasound guidance: no      Needle aspiration: no      Incision types:  Single straight    Incision depth:  Fascia    Wound management:  Probed and deloculated    Drainage:  Purulent    Drainage amount:  Scant    Wound treatment:  Wound left open    Packing materials:  None  Post-procedure details:     Procedure completion:  Tolerated               ED Course         A little bit of pus.  Probed a few times.  Needs augmented abx but should heal.  Pt counseled.                                  Medical Decision Making  Ddx abscess, cellulitis, Isidro's    Abscess of chin: acute illness or injury  Risk  Prescription drug management.          Final diagnoses:   Abscess of chin       ED Disposition  ED Disposition     ED Disposition   Discharge    Condition   Stable    Comment   --             Parul Lomas MD  [address] [id number] [phone]    In 2 days  As needed         Medication List      New Prescriptions    amoxicillin-clavulanate 875-125 MG per tablet  Commonly known as: AUGMENTIN  Take 1 tablet by mouth 2 (Two) Times a Day.        Changed    fluticasone 50 MCG/ACT nasal spray  Commonly known as: FLONASE  1 spray into the nostril(s) as directed by provider 2 (Two) Times a Day.  What changed: additional instructions           Where to Get Your Medications      These medications were sent to [facility] [id number] - [place], [place] - [address] [facility] - [phone]  - [phone]   [address] [address]    Phone: [phone]   · amoxicillin-clavulanate 875-125 MG per tablet          Enoc Henriquez MD  01/18/23 4425

## 2023-01-18 NOTE — PROGRESS NOTES
"Chief Complaint  Suze Baeza is a 67 y.o. female presenting for Cellulitis.     Patient has a past medical history of hyperlipidemia, allergies, insomnia, restless leg syndrome, osteoarthritis of multiple joints, depression, vitamin D deficiency, hyperkalemia and cellulitis of chin.    History of Present Illness  Patient is here for urgent visit.  She was seen by PCP Dr. Lomas 2 days ago.  Pulmonology developed respiratory symptoms on Friday, 1/13/2023 and was diagnosed with COVID.  She was also found to have cellulitis of her chin.  She reports taking doxycycline for 2 full days, and despite this the rash of her chin is increasing in size, its more tender and swollen, she reports more swelling underneath her chin.  No fever or chills.    The following portions of the patient's history were reviewed and updated as appropriate: allergies, current medications, past family history, past medical history, past social history, past surgical history and problem list.    Image captured per patient verbal consent    ,mag    Objective  /80 (BP Location: Left arm, Patient Position: Sitting, Cuff Size: Adult)   Pulse 71   Temp 97.8 °F (36.6 °C) (Temporal)   Ht 165.1 cm (65\")   Wt 67.1 kg (148 lb)   SpO2 97%   BMI 24.63 kg/m²     Physical Exam  Constitutional:       General: She is not in acute distress.     Appearance: Normal appearance. She is not ill-appearing, toxic-appearing or diaphoretic.   Eyes:      Extraocular Movements: Extraocular movements intact.      Conjunctiva/sclera: Conjunctivae normal.   Pulmonary:      Effort: Pulmonary effort is normal. No respiratory distress.   Musculoskeletal:      Cervical back: Neck supple.   Skin:     Findings: Erythema and rash present.      Comments: See image of her chin.  Swelling, erythema and a crusted central lesion.   Neurological:      Mental Status: She is alert and oriented to person, place, and time. Mental status is at baseline.   Psychiatric:         " Behavior: Behavior normal.         Thought Content: Thought content normal.         Assessment/Plan   1. Cellulitis of face  I am concerned that this cellulitis is getting worse, the swelling is increasing despite doxycycline.  I did collect a wound culture, however the skin is fairly dry, so it might not grow anything.  Potentially I could give her ceftriaxone injection, but I am concerned for more invasive infection given that she has increasing swelling and I will refer her for an evaluation in the emergency room.  - Wound Culture - Wound, Chin; Future    2. COVID-19 virus infection  Still symptomatic, 5 days out since symptoms started.      Return Go to emergency room.  Follow-up with PCP after..    Norman Bauer MD  Family Medicine  01/18/2023

## 2023-01-18 NOTE — TELEPHONE ENCOUNTER
Caller: Suze Baeza    Relationship: Self    Best call back number: [phone]    What is the best time to reach you: ANYTIME    Who are you requesting to speak with (clinical staff, provider,  specific staff member): CLINICAL STAFF    Do you know the name of the person who called: PATIENT    What was the call regarding: WAS IN THE OFFICE ON 01/16/23 AND GIVEN ANTIBIOTICS TO TREAT BITE ON CHIN.  THIS HAS GOTTEN WORSE AND SPREADING    Do you require a callback: YES

## 2023-01-19 ENCOUNTER — TELEPHONE (OUTPATIENT)
Dept: INTERNAL MEDICINE | Facility: CLINIC | Age: 68
End: 2023-01-19
Payer: COMMERCIAL

## 2023-01-19 NOTE — TELEPHONE ENCOUNTER
I called patient for follow-up after she was seen in the emergency room yesterday.  She states the lesion has been draining some pus overnight and the swelling has gone down, she is already feeling a lot better.  She also tells me that she thinks she had a little scrape on the chin that started Thursday 1 week ago, she is pretty sure there is no blister.  She does not have a history of cold sores, so I think HSV is less likely with this.  Most likely this is a true bacterial infection.  In any case she is almost a week out and treating with antiviral medication would not be helpful at this time.  She will continue on Augmentin prescribed by the ED physician.  She is overall happy with the development at this time and has no more questions or concerns for me.    Norman Bauer MD

## 2023-01-20 ENCOUNTER — NURSE TRIAGE (OUTPATIENT)
Dept: CALL CENTER | Facility: HOSPITAL | Age: 68
End: 2023-01-20
Payer: COMMERCIAL

## 2023-01-20 NOTE — TELEPHONE ENCOUNTER
Rosana had I and D of chin abscess 1/18.  She is having to change the steri strips every morning due to drainage.  Rosana is asking how long she will need to use streri strips.  Discussed she may need them 1 to 2 weeks depending on depth and length of incision.  Her drainage has improved.  She is on abx and that she will finish the prescription.  Instructed to follow up with provider if she does not improve or if it starts to look worse.    Reason for Disposition  • Skin tape (e.g., Steri-strips) removal, questions about    Additional Information  • Negative: [1] Major abdominal surgical incision AND [2] wound gaping open AND [3] visible internal organs  • Negative: Sounds like a life-threatening emergency to the triager  • Negative: Patient has a Negative Pressure Wound Therapy device  • Negative: Patient is followed by a wound clinic or wound specialist for this wound  • Negative: [1] Bleeding from incision AND [2] won't stop after 10 minutes of direct pressure  • Negative: [1] Widespread rash AND [2] bright red, sunburn-like  • Negative: Severe pain in the incision  • Negative: [1] Incision gaping open AND [2] < 48 hours since wound re-opened  • Negative: [1] Incision gaping open AND [2] length of opening > 2 inches (5 cm)  • Negative: Patient sounds very sick or weak to the triager  • Negative: Sounds like a serious complication to the triager  • Negative: Fever > 100.4 F (38.0 C)  • Negative: [1] Incision looks infected (spreading redness, pain) AND [2] fever > 99.5 F (37.5 C)  • Negative: [1] Incision looks infected (spreading redness, pain) AND [2] large red area (> 2 in. or 5 cm)  • Negative: [1] Incision looks infected (spreading redness, pain) AND [2] face wound  • Negative: [1] Red streak runs from the incision AND [2] longer than 1 inch (2.5 cm)  • Negative: [1] Pus or bad-smelling fluid draining from incision AND [2] no fever  • Negative: [1] Post-op pain AND [2] not controlled with pain medications  •  "Negative: Dressing soaked with blood or body fluid (e.g., drainage)  • Negative: [1] Raised bruise and [2] size > 2 inches (5 cm) and expanding  • Negative: [1] Caller has URGENT question AND [2] triager unable to answer question  • Negative: [1] INCREASING pain in incision AND [2] > 2 days (48 hours) since surgery  • Negative: [1] Small red area or streak AND [2] no fever  • Negative: [1] Clear or blood-tinged fluid draining from wound AND [2] no fever  • Negative: [1] Incision gaping open AND [2] > 48 hours since wound re-opened AND [3] length of opening > 1/2 inch (12 mm)  • Negative: [1] Incision on face gaping open after skin glue AND [2] > 48 hours since wound re-opened AND [3] length of opening > 1/4 inch (6 mm)  • Negative: Suture or staple removal is overdue  • Negative: [1] Suture or staple came out early AND [2] caller wants wound checked  • Negative: [1] Caller has NON-URGENT question AND [2] triager unable to answer question  • Negative: Pimple where a stitch comes through the skin  • Negative: Suture (or staple) came out early  • Negative: Mild bruising near incision site  • Negative: Suture removal date, questions about    Answer Assessment - Initial Assessment Questions  1. SYMPTOM: \"What's the main symptom you're concerned about?\" (e.g., redness, pain, drainage)  Steri strips  2. ONSET: \"When did abscess  start?\"      I and D on 1/18  3. SURGERY: \"What surgery was performed?\"      I and D  4. DATE of SURGERY: \"When was surgery performed?\"       1/18  5. INCISION SITE: \"Where is the incision located?\"       chin  6. REDNESS: \"Is there any redness at the incision site?\" If yes, ask: \"How wide across is the redness?\" (Inches, centimeters)       slight  7. PAIN: \"Is there any pain?\" If Yes, ask: \"How bad is it?\"  (Scale 1-10; or mild, moderate, severe)      mild  8. BLEEDING: \"Is there any bleeding?\" If Yes, ask: \"How much?\" and \"Where?\"      no  9. DRAINAGE: \"Is there any drainage from the incision " "site?\" If yes, ask: \"What color and how much?\" (e.g., red, cloudy, pus; drops, teaspoon)      Some pus but much improved and also clear drainage  10. FEVER: \"Do you have a fever?\" If Yes, ask: \"What is your temperature, how was it measured, and when did it start?\"        no  11. OTHER SYMPTOMS: \"Do you have any other symptoms?\" (e.g., shaking chills, weakness, rash elsewhere on body)        No, just a lot of drainage in the am.    Protocols used: POST-OP INCISION SYMPTOMS AND QUESTIONS-ADULT-AH      "

## 2023-02-22 ENCOUNTER — APPOINTMENT (OUTPATIENT)
Dept: NEUROLOGY | Facility: HOSPITAL | Age: 68
End: 2023-02-22

## 2023-03-02 ENCOUNTER — APPOINTMENT (OUTPATIENT)
Dept: NEUROLOGY | Facility: HOSPITAL | Age: 68
End: 2023-03-02

## 2023-03-03 ENCOUNTER — TELEPHONE (OUTPATIENT)
Dept: INTERNAL MEDICINE | Facility: CLINIC | Age: 68
End: 2023-03-03
Payer: COMMERCIAL

## 2023-03-03 DIAGNOSIS — R63.5 WEIGHT GAIN: Chronic | ICD-10-CM

## 2023-03-03 DIAGNOSIS — R63.5 WEIGHT GAIN, ABNORMAL: Primary | ICD-10-CM

## 2023-03-03 RX ORDER — BUPROPION HYDROCHLORIDE 150 MG/1
150 TABLET ORAL DAILY
Qty: 90 TABLET | Refills: 1 | Status: SHIPPED | OUTPATIENT
Start: 2023-03-03

## 2023-03-03 RX ORDER — DIETHYLPROPION HYDROCHLORIDE 75 MG/1
75 TABLET ORAL DAILY
Qty: 30 EACH | Refills: 2 | Status: CANCELLED | OUTPATIENT
Start: 2023-03-03

## 2023-03-03 RX ORDER — NALTREXONE HYDROCHLORIDE 50 MG/1
50 TABLET, FILM COATED ORAL DAILY
Qty: 30 TABLET | Refills: 5 | Status: SHIPPED | OUTPATIENT
Start: 2023-03-03

## 2023-03-03 NOTE — TELEPHONE ENCOUNTER
"Called and left detailed voicemail advising the below, requesting a callback for follow-up to go over verbally:    \"I sent Contrave to her pharmacy.  It may not be covered by her insurance since her BMI is still only 24.  When she starts taking Contrave she should stop the bupropion since there is some bupropion and the Contrave tablet. \"    "

## 2023-03-03 NOTE — TELEPHONE ENCOUNTER
PT HAS SOME QUESTIONS THAT SHE NEEDS TO ASK AND WOULD LIKE A CALL BACK FROM RICKEY.     PLEASE CALL [phone]

## 2023-03-03 NOTE — TELEPHONE ENCOUNTER
Called [facility] pharmacy; the pharmacy technician said the insurance won't cover her bupropion until 3/18.    Advised [facility], as the power has been going in and out if there was an electronic issue. He stated he wasn't able to check the [employer] system to see if it was sent to another location; our system confirms it was sent to the correct location

## 2023-03-03 NOTE — TELEPHONE ENCOUNTER
Pt confirmed it was around $200 for Contrave.    She asked if you could send in an rx for low dose naltrexone and bupropion to match the ingredients in Contrave for a cheaper cost?

## 2023-03-03 NOTE — TELEPHONE ENCOUNTER
Caller: Suze Baeza    Relationship: Self    Best call back number: [phone]    What is the best time to reach you: ANYTIME    Who are you requesting to speak with (clinical staff, provider,  specific staff member): CLINICAL STAFF    Do you know the name of the person who called: SELF    What was the call regarding: PATIENT STATES THAT SHE HAS BEEN TAKING THIS MEDICATION Diethylpropion HCl ER 75 MG tablet sustained-release 24 hour AND IT HAS NOT BEEN WORKING. PATIENT IS REQUESTING CONTRAVE.    Do you require a callback: YES

## 2023-03-06 NOTE — TELEPHONE ENCOUNTER
Called pharmacy; clarified she cannot  rx until 3/18/23 when insurance will cover as the bupropion 150 is the same she received back in January. Will notify pt

## 2023-03-06 NOTE — TELEPHONE ENCOUNTER
"Called and left detailed voicemail advising the below, requesting a callback for follow-up to go over verbally:    \"clarified pt cannot  rx until 3/18/23 when insurance will cover as the bupropion 150 is the same she received back in January\"  "

## 2023-03-07 NOTE — TELEPHONE ENCOUNTER
Patient understood and verbalized understanding. Advised she has the rx for bupropion 150 and the naltrexone 50 to be taken at the same time

## 2023-03-13 RX ORDER — PRAMIPEXOLE DIHYDROCHLORIDE 0.5 MG/1
TABLET ORAL
Qty: 120 TABLET | Refills: 1 | Status: SHIPPED | OUTPATIENT
Start: 2023-03-13

## 2023-03-20 ENCOUNTER — OFFICE VISIT (OUTPATIENT)
Dept: INTERNAL MEDICINE | Facility: CLINIC | Age: 68
End: 2023-03-20
Payer: COMMERCIAL

## 2023-03-20 ENCOUNTER — TELEPHONE (OUTPATIENT)
Dept: INTERNAL MEDICINE | Facility: CLINIC | Age: 68
End: 2023-03-20

## 2023-03-20 VITALS
DIASTOLIC BLOOD PRESSURE: 72 MMHG | HEIGHT: 65 IN | TEMPERATURE: 98.2 F | WEIGHT: 152.8 LBS | SYSTOLIC BLOOD PRESSURE: 140 MMHG | OXYGEN SATURATION: 98 % | HEART RATE: 62 BPM | BODY MASS INDEX: 25.46 KG/M2

## 2023-03-20 DIAGNOSIS — E78.00 HYPERCHOLESTEROLEMIA: Primary | ICD-10-CM

## 2023-03-20 DIAGNOSIS — E55.9 VITAMIN D DEFICIENCY: ICD-10-CM

## 2023-03-20 DIAGNOSIS — F32.0 DEPRESSION, MAJOR, SINGLE EPISODE, MILD: ICD-10-CM

## 2023-03-20 DIAGNOSIS — E66.3 OVERWEIGHT WITH BODY MASS INDEX (BMI) OF 25 TO 25.9 IN ADULT: Chronic | ICD-10-CM

## 2023-03-20 PROBLEM — U07.1 COVID-19 VIRUS INFECTION: Chronic | Status: RESOLVED | Noted: 2023-01-16 | Resolved: 2023-03-20

## 2023-03-20 PROCEDURE — 99214 OFFICE O/P EST MOD 30 MIN: CPT | Performed by: INTERNAL MEDICINE

## 2023-03-20 RX ORDER — ATORVASTATIN CALCIUM 10 MG/1
10 TABLET, FILM COATED ORAL NIGHTLY
Qty: 90 TABLET | Refills: 1 | Status: SHIPPED | OUTPATIENT
Start: 2023-03-20

## 2023-03-20 RX ORDER — DESVENLAFAXINE SUCCINATE 50 MG/1
50 TABLET, EXTENDED RELEASE ORAL DAILY
Qty: 30 TABLET | Refills: 5 | Status: SHIPPED | OUTPATIENT
Start: 2023-03-20

## 2023-03-20 NOTE — ASSESSMENT & PLAN NOTE
After receiving the [employer] testing, we will change fluoxetine to desvenlafaxine 50 mg tablet daily. We will continue bupropion daily and buspirone 3 times daily. Exercise and physical activity also help decrease symptoms of stress, anxiety, and mood.

## 2023-03-20 NOTE — ASSESSMENT & PLAN NOTE
She will continue atorvastatin every evening. Continue to improve low-fat, low-sugar diet. Increase exercise.

## 2023-03-20 NOTE — ASSESSMENT & PLAN NOTE
She is encouraged to increase exercise. Try to get 30 minutes a day 5 days a week. Continue the intermittent fasting, but do get some protein and vegetables at lunchtime. Decrease the portion of red meat in the evening to the size of a deck of cards. Weigh at home once a week to monitor progress. We will continue bupropion every morning and naltrexone every morning. She has tried phentermine and diethylpropion in the past without benefit.

## 2023-03-20 NOTE — PROGRESS NOTES
"[facility]     Suze Baeza  1955   [id number]      Patient Care Team:  Parul Lomas MD as PCP - General (Internal Medicine)  Kendrick Andersen MD as Consulting Physician (Dermatology)  Heraclio Mari MD as Consulting Physician (Orthopedic Surgery)  Chio Dhaliwal MD as Consulting Physician (Obstetrics and Gynecology)    Chief Complaint   Patient presents with   • Hyperlipidemia            HPI  Patient is a 68 y.o. female presents for a follow-up.    Overweight  The patient complains of her inability to lose weight and is becoming discouraged. She is rarely exercising. She denies joint pain with walking. She does not use hand weights at home. She does not currently have a gym membership. She has been doing intermittent fasting. She usually does not get hungry until about 2:30 PM and will eat cheese and crackers. For dinner, she usually eat meat and vegetables. She snacks on fruits, popcorn, and peanuts. She is avoiding potatoes and bread. She eats a lot of red meat. She is taking bupropion and naltrexone. She was on phentermine and diethylpropion in the past without benefit.     Depression  The patient is taking bupropion and buspirone, which help with anxiety. She is also taking fluoxetine; she did take it this morning. She has been feeling down some days. The patient is dealing with her brother, and her mother is in a facility and she is getting worse. [employer] testing shows that she metabolizes bupropion and buspirone well; fluoxetine is in the moderate range.    Hot flashes  The patient's hot flashes have returned. She thinks it is due to her nerves.    Insomnia  The patient takes a medication at night to help her sleep, but is unsure of the name. She has been sleeping well. She occasionally has some night in which she cannot go to sleep. The patient feels like she gets up just as tired as when she went to bed. If she gets up to use the restroom, her mind starts \"going\" " and then it takes her a while to go back to sleep. The last few nights she has been able to go straight back to sleep.    Hypercholesterolemia  The patient is taking atorvastatin. Her most recent LDL was 93 and triglycerides were 56.    Skin lesions  The patient has a few places on her back that she would like to have frozen off. The lesions are asymptomatic.    Immunizations  The patients states that she received her pneumococcal and Shingrix vaccines at [facility].        CHRONIC CONDITIONS      Past Medical History:   Diagnosis Date   • COVID-19 virus infection 1/16/2023   • Degenerative tear of medial meniscus, right    • Hot flashes     PROZAC    • Low back pain    • Neuropathy, lumbosacral (radicular)    • Primary osteoarthritis of both knees    • Spondylolisthesis, lumbar region    • Torn meniscus     right medial   • Viral warts 02/25/2019   • Wears reading eyeglasses        Past Surgical History:   Procedure Laterality Date   • BLADDER SURGERY     • COLONOSCOPY     • HYSTERECTOMY  07/10/2012   • KNEE ARTHROSCOPY Right    • OOPHORECTOMY Bilateral 07/10/2012   • TOTAL KNEE ARTHROPLASTY Bilateral 12/29/2020    Procedure: TOTAL KNEE ARTHROPLASTY BILATERAL;  Surgeon: Heraclio Mari MD;  Location: [facility];  Service: Orthopedics;  Laterality: Bilateral;       Family History   Problem Relation Age of Onset   • Deep vein thrombosis Mother    • Stroke Mother    • Heart disease Mother 68   • Heart attack Mother    • Osteoarthritis Mother    • Heart disease Father 51        cause of death   • Heart attack Father    • Stroke Other    • Cancer Maternal Aunt         bone   • Diabetes Maternal Grandmother    • Cancer Maternal Aunt         lymphoma   • Cancer Maternal Aunt         brain   • Breast cancer Neg Hx    • Ovarian cancer Neg Hx        Social History     Socioeconomic History   • Marital status:    Tobacco Use   • Smoking status: Never   • Smokeless tobacco: Never   Vaping Use   • Vaping Use: Never  "used   Substance and Sexual Activity   • Alcohol use: No   • Drug use: No   • Sexual activity: Defer       Allergies   Allergen Reactions   • Other Itching and Rash     CHG       Review of Systems:   The appropriate review of systems is included in the HPI,    Vital Signs  Vitals:    03/20/23 1038   BP: 140/72   BP Location: Left arm   Patient Position: Sitting   Cuff Size: Adult   Pulse: 62   Temp: 98.2 °F (36.8 °C)   TempSrc: Infrared   SpO2: 98%   Weight: 69.3 kg (152 lb 12.8 oz)   Height: 165.1 cm (65\")     Body mass index is 25.43 kg/m².  BMI is >= 25 and <30. (Overweight) The following options were offered after discussion;: weight loss educational material (shared in after visit summary), exercise counseling/recommendations, nutrition counseling/recommendations and pharmacological intervention options        Current Outpatient Medications:   •  Ascorbic Acid (VITAMIN C PO), Take 500 mg by mouth Daily., Disp: , Rfl:   •  atorvastatin (LIPITOR) 10 MG tablet, Take 1 tablet by mouth Every Night., Disp: 90 tablet, Rfl: 1  •  buPROPion XL (WELLBUTRIN XL) 150 MG 24 hr tablet, Take 1 tablet by mouth Daily., Disp: 90 tablet, Rfl: 1  •  busPIRone (BUSPAR) 5 MG tablet, Take 2 tablets by mouth 3 (Three) Times a Day., Disp: 180 tablet, Rfl: 5  •  calcium carb-cholecalciferol 600-800 MG-UNIT tablet, Take 1 tablet by mouth Daily., Disp: , Rfl:   •  Calcium Polycarbophil (FIBER-CAPS PO), Take 1 tablet by mouth Daily., Disp: , Rfl:   •  cefuroxime (CEFTIN) 250 MG tablet, Every 12 (Twelve) Hours., Disp: , Rfl:   •  Cyanocobalamin (VITAMIN B-12 PO), Take 5,000 mcg by mouth Daily., Disp: , Rfl:   •  cyclobenzaprine (FLEXERIL) 10 MG tablet, Take 1 tablet by mouth 3 (Three) Times a Day As Needed for Muscle Spasms., Disp: 30 tablet, Rfl: 0  •  estradiol (ESTRACE) 0.5 MG tablet, Take 1 tablet by mouth Daily., Disp: , Rfl:   •  Flax Oil-Fish Oil-Borage Oil (FISH OIL-FLAX OIL-BORAGE OIL PO), Take 1 capsule by mouth Daily., Disp: , " Rfl:   •  fluticasone (FLONASE) 50 MCG/ACT nasal spray, 1 spray into the nostril(s) as directed by provider 2 (Two) Times a Day. (Patient taking differently: 1 spray into the nostril(s) as directed by provider 2 (Two) Times a Day. PRN), Disp: 16 g, Rfl: 5  •  hydrOXYzine (ATARAX) 50 MG tablet, Take 1 tablet by mouth Every Night., Disp: 45 tablet, Rfl: 5  •  naltrexone (DEPADE) 50 MG tablet, Take 1 tablet by mouth Daily., Disp: 30 tablet, Rfl: 5  •  pramipexole (MIRAPEX) 0.5 MG tablet, TAKE ONE TABLET BY MOUTH DAILY AS NEEDED AND TAKE THREE TABLETS BY MOUTH EVERY NIGHT AT BEDTIME, Disp: 120 tablet, Rfl: 1  •  desvenlafaxine (PRISTIQ) 50 MG 24 hr tablet, Take 1 tablet by mouth Daily., Disp: 30 tablet, Rfl: 5    Physical Exam:    Physical Exam  Vitals and nursing note reviewed.   Constitutional:       Appearance: Normal appearance. She is well-developed.   HENT:      Head: Normocephalic.   Eyes:      Conjunctiva/sclera: Conjunctivae normal.      Pupils: Pupils are equal, round, and reactive to light.   Neck:      Thyroid: No thyromegaly.   Cardiovascular:      Rate and Rhythm: Normal rate and regular rhythm.      Heart sounds: Normal heart sounds.   Pulmonary:      Effort: Pulmonary effort is normal.      Breath sounds: Normal breath sounds. No wheezing.   Musculoskeletal:         General: Normal range of motion.      Cervical back: Normal range of motion and neck supple.   Lymphadenopathy:      Cervical: No cervical adenopathy.   Skin:     General: Skin is warm and dry.   Neurological:      Mental Status: She is alert and oriented to person, place, and time.   Psychiatric:         Mood and Affect: Mood normal.         Behavior: Behavior normal.         Thought Content: Thought content normal.          ACE III MINI        Results Review:    I reviewed the patient's new clinical results.    CMP:  Lab Results   Component Value Date    BUN 16 11/30/2022    CREATININE 0.77 11/30/2022    EGFRIFNONA 87 09/23/2021    BCR 20.8  11/30/2022     11/30/2022    K 4.5 11/30/2022    CO2 27.0 11/30/2022    CALCIUM 9.5 11/30/2022    ALBUMIN 4.20 11/30/2022    BILITOT 0.3 11/30/2022    ALKPHOS 94 11/30/2022    AST 19 11/30/2022    ALT 17 11/30/2022     HbA1c:  Lab Results   Component Value Date    HGBA1C 5.70 (H) 12/17/2020     Microalbumin:  No results found for: MICROALBUR, POCMALB, POCCREAT  Lipid Panel  Lab Results   Component Value Date    CHOL 163 09/29/2022    TRIG 56 09/29/2022    HDL 59 09/29/2022    LDL 93 09/29/2022    AST 19 11/30/2022    ALT 17 11/30/2022       Medication Review: Medications reviewed and noted  Patient Instructions   Problem List Items Addressed This Visit        Cardiac and Vasculature    Hypercholesterolemia - Primary    Overview     Taking atorvastatin every evening.           Current Assessment & Plan     She will continue atorvastatin every evening. Continue to improve low-fat, low-sugar diet. Increase exercise.         Relevant Medications    atorvastatin (LIPITOR) 10 MG tablet    Other Relevant Orders    Comprehensive Metabolic Panel    Lipid Panel    Urinalysis With Microscopic - Urine, Clean Catch    Microalbumin / Creatinine Urine Ratio - Urine, Clean Catch       Endocrine and Metabolic    Vitamin D deficiency    Current Assessment & Plan     Recheck today.         Relevant Orders    Vitamin D,25-Hydroxy       Mental Health    Depression, major, single episode, mild (HCC)    Current Assessment & Plan     [facility], we will change fluoxetine to desvenlafaxine 50 mg tablet daily. We will continue bupropion daily and buspirone 3 times daily. Exercise and physical activity also help decrease symptoms of stress, anxiety, and mood.         Relevant Medications    busPIRone (BUSPAR) 5 MG tablet    hydrOXYzine (ATARAX) 50 MG tablet    buPROPion XL (WELLBUTRIN XL) 150 MG 24 hr tablet    desvenlafaxine (PRISTIQ) 50 MG 24 hr tablet       Other    Overweight with body mass index (BMI) of  25 to 25.9 in adult (Chronic)    Overview     Tried phentermine and diethylpropion without benefit.         Current Assessment & Plan     She is encouraged to increase exercise. Try to get 30 minutes a day 5 days a week. Continue the intermittent fasting, but do get some protein and vegetables at lunchtime. Decrease the portion of red meat in the evening to the size of a deck of cards. Weigh at home once a week to monitor progress. We will continue bupropion every morning and naltrexone every morning. She has tried phentermine and diethylpropion in the past without benefit.               Diagnosis Plan   1. Hypercholesterolemia  Comprehensive Metabolic Panel    Lipid Panel    Urinalysis With Microscopic - Urine, Clean Catch    Microalbumin / Creatinine Urine Ratio - Urine, Clean Catch      2. Vitamin D deficiency  Vitamin D,25-Hydroxy      3. Depression, major, single episode, mild (HCC)        4. Overweight with body mass index (BMI) of 25 to 25.9 in adult                Follow-up: She will come back and see me in about 6 weeks to follow up on the medication change.    Plan of care reviewed with patient at the conclusion of today's visit. Education was provided regarding diagnosis, management, and any prescribed or recommended OTC medications.Patient verbalizes understanding of and agreement with management plan.        Transcribed from ambient dictation for Parul Lomas MD by Zofia Alba.  03/20/23   13:19 EDT    Patient or patient representative verbalized consent to the visit recording.  I have personally performed the services described in this document as transcribed by the above individual, and it is both accurate and complete.  Parul Lomas MD  3/20/2023  17:31 EDT

## 2023-03-20 NOTE — PATIENT INSTRUCTIONS
Patient Instructions   Problem List Items Addressed This Visit          Cardiac and Vasculature    Hypercholesterolemia - Primary    Overview     Taking atorvastatin every evening.           Current Assessment & Plan     She will continue atorvastatin every evening. Continue to improve low-fat, low-sugar diet. Increase exercise.         Relevant Medications    atorvastatin (LIPITOR) 10 MG tablet    Other Relevant Orders    Comprehensive Metabolic Panel    Lipid Panel    Urinalysis With Microscopic - Urine, Clean Catch    Microalbumin / Creatinine Urine Ratio - Urine, Clean Catch       Endocrine and Metabolic    Vitamin D deficiency    Current Assessment & Plan     Recheck today.         Relevant Orders    Vitamin D,25-Hydroxy       Mental Health    Depression, major, single episode, mild (HCC)    Current Assessment & Plan     After receiving the [facility] testing, we will change fluoxetine to desvenlafaxine 50 mg tablet daily. We will continue bupropion daily and buspirone 3 times daily. Exercise and physical activity also help decrease symptoms of stress, anxiety, and mood.         Relevant Medications    busPIRone (BUSPAR) 5 MG tablet    hydrOXYzine (ATARAX) 50 MG tablet    buPROPion XL (WELLBUTRIN XL) 150 MG 24 hr tablet    desvenlafaxine (PRISTIQ) 50 MG 24 hr tablet       Other    Overweight with body mass index (BMI) of 25 to 25.9 in adult (Chronic)    Overview     Tried phentermine and diethylpropion without benefit.         Current Assessment & Plan     She is encouraged to increase exercise. Try to get 30 minutes a day 5 days a week. Continue the intermittent fasting, but do get some protein and vegetables at lunchtime. Decrease the portion of red meat in the evening to the size of a deck of cards. Weigh at home once a week to monitor progress. We will continue bupropion every morning and naltrexone every morning. She has tried phentermine and diethylpropion in the past without benefit.            BMI for  "Adults  What is BMI?  Body mass index (BMI) is a number that is calculated from a person's weight and height. BMI can help estimate how much of a person's weight is composed of fat. BMI does not measure body fat directly. Rather, it is an alternative to procedures that directly measure body fat, which can be difficult and expensive.  BMI can help identify people who may be at higher risk for certain medical problems.  What are BMI measurements used for?  BMI is used as a screening tool to identify possible weight problems. It helps determine whether a person is obese, overweight, a healthy weight, or underweight.  BMI is useful for:  Identifying a weight problem that may be related to a medical condition or may increase the risk for medical problems.  Promoting changes, such as changes in diet and exercise, to help reach a healthy weight. BMI screening can be repeated to see if these changes are working.  How is BMI calculated?  BMI involves measuring your weight in relation to your height. Both height and weight are measured, and the BMI is calculated from those numbers. This can be done either in English (U[place] or metric measurements. Note that charts and online BMI calculators are available to help you find your BMI quickly and easily without having to do these calculations yourself.  To calculate your BMI in English (U.S[place] measurements:    Measure your weight in pounds (lb).  Multiply the number of pounds by 703.  For example, for a person who weighs 180 lb, multiply that number by 703, which equals 126,540.  Measure your height in inches. Then multiply that number by itself to get a measurement called \"inches squared.\"  For example, for a person who is 70 inches tall, the \"inches squared\" measurement is 70 inches x 70 inches, which equals 4,900 inches squared.  Divide the total from step 2 (number of lb x 703) by the total from step 3 (inches squared): 126,540 ÷ 4,900 = 25.8. This is your BMI.  To calculate " "your BMI in metric measurements:  Measure your weight in kilograms (kg).  Measure your height in meters (m). Then multiply that number by itself to get a measurement called \"meters squared.\"  For example, for a person who is 1.75 m tall, the \"meters squared\" measurement is 1.75 m x 1.75 m, which is equal to 3.1 meters squared.  Divide the number of kilograms (your weight) by the meters squared number. In this example: 70 ÷ 3.1 = 22.6. This is your BMI.  What do the results mean?  BMI charts are used to identify whether you are underweight, normal weight, overweight, or obese. The following guidelines will be used:  Underweight: BMI less than 18.5.  Normal weight: BMI between 18.5 and 24.9.  Overweight: BMI between 25 and 29.9.  Obese: BMI of 30 or above.  Keep these notes in mind:  Weight includes both fat and muscle, so someone with a muscular build, such as an athlete, may have a BMI that is higher than 24.9. In cases like these, BMI is not an accurate measure of body fat.  To determine if excess body fat is the cause of a BMI of 25 or higher, further assessments may need to be done by a health care provider.  BMI is usually interpreted in the same way for men and women.  Where to find more information  For more information about BMI, including tools to quickly calculate your BMI, go to these websites:  Centers for Disease Control and Prevention: www.cdc.gov  American Heart Association: www.heart.org  National Heart, Lung, and Blood [place]: www.nhlbi.nih.gov  Summary  Body mass index (BMI) is a number that is calculated from a person's weight and height.  BMI may help estimate how much of a person's weight is composed of fat. BMI can help identify those who may be at higher risk for certain medical problems.  BMI can be measured using English measurements or metric measurements.  BMI charts are used to identify whether you are underweight, normal weight, overweight, or obese.  This information is not intended " to replace advice given to you by your health care provider. Make sure you discuss any questions you have with your health care provider.  Document Revised: 09/09/2020 Document Reviewed: 07/17/2020  Elsevier Patient Education © 2022 Elsevier Inc.

## 2023-03-20 NOTE — TELEPHONE ENCOUNTER
Called [facility]; they confirmed the pt has not had a pneumonia vaccine done through them recently, and the shingles vaccine done in 2019 only.      I called and advised pt she can have her pneumonia and shingles vaccines done at the office here at her next visit or at her pharmacy

## 2023-04-26 RX ORDER — FLUOXETINE HYDROCHLORIDE 20 MG/1
CAPSULE ORAL
Qty: 90 CAPSULE | Refills: 1 | OUTPATIENT
Start: 2023-04-26

## 2023-04-26 NOTE — TELEPHONE ENCOUNTER
Rx Refill Note  Requested Prescriptions     Pending Prescriptions Disp Refills   • FLUoxetine (PROzac) 20 MG capsule [Pharmacy Med Name: FLUoxetine HCL 20 MG CAPSULE] 90 capsule 1     Sig: TAKE ONE CAPSULE BY MOUTH DAILY      Last office visit with prescribing clinician: 3/20/2023   Last telemedicine visit with prescribing clinician: 5/23/2023   Next office visit with prescribing clinician: 5/23/2023                         Would you like a call back once the refill request has been completed: [] Yes [] No    If the office needs to give you a call back, can they leave a voicemail: [] Yes [] No    Mer Mccoy LPN  04/26/23, 07:59 EDT

## 2023-05-08 RX ORDER — PRAMIPEXOLE DIHYDROCHLORIDE 0.5 MG/1
TABLET ORAL
Qty: 120 TABLET | Refills: 1 | Status: SHIPPED | OUTPATIENT
Start: 2023-05-08

## 2023-05-08 NOTE — TELEPHONE ENCOUNTER
Rx Refill Note  Requested Prescriptions     Pending Prescriptions Disp Refills   • pramipexole (MIRAPEX) 0.5 MG tablet [Pharmacy Med Name: PRAMIPEXOLE 0.5 MG TABLET] 120 tablet 1     Sig: TAKE ONE TABLET BY MOUTH DAILY AS NEEDED AND TAKE THREE TABLETS BY MOUTH EVERY NIGHT AT BEDTIME      Last office visit with prescribing clinician: 3/20/2023   Last telemedicine visit with prescribing clinician: 5/23/2023   Next office visit with prescribing clinician: 5/23/2023                         Would you like a call back once the refill request has been completed: [] Yes [] No    If the office needs to give you a call back, can they leave a voicemail: [] Yes [] No    Leodan Birmingham MA  05/08/23, 09:34 EDT

## 2023-05-23 ENCOUNTER — OFFICE VISIT (OUTPATIENT)
Dept: INTERNAL MEDICINE | Facility: CLINIC | Age: 68
End: 2023-05-23
Payer: COMMERCIAL

## 2023-05-23 VITALS
WEIGHT: 157.2 LBS | TEMPERATURE: 98.6 F | OXYGEN SATURATION: 95 % | SYSTOLIC BLOOD PRESSURE: 134 MMHG | BODY MASS INDEX: 26.19 KG/M2 | HEART RATE: 69 BPM | DIASTOLIC BLOOD PRESSURE: 66 MMHG | HEIGHT: 65 IN

## 2023-05-23 DIAGNOSIS — F43.29 STRESS AND ADJUSTMENT REACTION: ICD-10-CM

## 2023-05-23 DIAGNOSIS — E66.3 OVERWEIGHT WITH BODY MASS INDEX (BMI) OF 26 TO 26.9 IN ADULT: Chronic | ICD-10-CM

## 2023-05-23 DIAGNOSIS — F32.0 DEPRESSION, MAJOR, SINGLE EPISODE, MILD: ICD-10-CM

## 2023-05-23 DIAGNOSIS — E78.00 HYPERCHOLESTEROLEMIA: Primary | ICD-10-CM

## 2023-05-23 DIAGNOSIS — F51.01 PRIMARY INSOMNIA: ICD-10-CM

## 2023-05-23 PROCEDURE — 99214 OFFICE O/P EST MOD 30 MIN: CPT | Performed by: INTERNAL MEDICINE

## 2023-05-23 RX ORDER — AZELASTINE 1 MG/ML
2 SPRAY, METERED NASAL 2 TIMES DAILY
Qty: 1 EACH | Refills: 12 | Status: SHIPPED | OUTPATIENT
Start: 2023-05-23

## 2023-05-23 RX ORDER — FLUTICASONE PROPIONATE 50 MCG
1 SPRAY, SUSPENSION (ML) NASAL 2 TIMES DAILY
Qty: 16 G | Refills: 5
Start: 2023-05-23

## 2023-05-23 RX ORDER — PHENDIMETRAZINE TARTRATE 35 MG/1
35 TABLET ORAL 3 TIMES DAILY
Qty: 90 EACH | Refills: 2 | Status: SHIPPED | OUTPATIENT
Start: 2023-05-23

## 2023-05-23 NOTE — PROGRESS NOTES
[facility]     Suze Baeza  1955   [id number]      Patient Care Team:  Parul Lomas MD as PCP - General (Internal Medicine)  Kendrick Andersen MD as Consulting Physician (Dermatology)  Heraclio Mari MD as Consulting Physician (Orthopedic Surgery)  Chio Dhaliwal MD as Consulting Physician (Obstetrics and Gynecology)    Chief Complaint   Patient presents with   • Hyperlipidemia            HPI  Patient is a 68 y.o. female who presents today for a follow-up.    Stress  The patient states her stress is a little better. She states that Pristiq and bupropion help a little bit. She states that she is taking hydroxyzine, which sometimes works and sometimes it does not.     Insomnia   She states that some nights she cannot go to sleep. She states that the other nights as soon as she hits the pillow, she is asleep, but she will wake up at 4 AM and cannot go back to sleep. She states that she does not try to read when she wakes up too early.    Weight gain  The patient states that her weight keeps going up. She states that the first pills she took helped more than any of them. The patient is taking naltrexone and bupropion. She does not feel like it has helped any to decrease appetite. The patient will discontinue the naltrexone. She does not eat breakfast, but she eats lunch and dinner. For lunch she usually has a banana with peanut butter or cottage cheese with some fruit. She states that she does not feel like she is eating that bad. The patient eats dinner around 6 PM. There are a few times that they might eat a little bit later because they are busy. She does not eat out that much at all. The patient has more pasta than rice. She is trying to cut back on that. The patient used to have chicken, vegetables, burgers, or a taco salad. She is getting a little bit of exercise. The patient usually has a cookie at nighttime after dinner. She did not have anything at all yesterday. All  of her clothes are getting too little. She has been walking a little bit. The patient is not sure if she could work in a [employer] class or [employer] or go to the gym. She states that she is working from morning to late at night. The patient will try phendimetrazine 3 times per day to see if it works for her.     Allergies  She is having a lot of drainage lately. The patient has been working outside. She is using Flonase twice per day. The patient feels like it is going down her throat. She has not tried the azelastine nasal spray and will add that along with Flonase when her symptoms are bad.    Back pain  Her back has been hurting a little because she has been working in the yard. She has some back stretches to do.    Hypercholesterolemia  Taking atorvastatin nightly.  She is trying to eat less fats and sugars.  She does walk some but it is very difficult to get the time to do so.  She has not been checking her blood pressure at home. Today it is 134/66 mmHg and last visit it was 140/72 mmHg. Her blood pressure is usually around 116 to 125 mmHg at home. She does not eat that much salt. The patient does not drink any sodas or diet sodas. She drinks lemonade and water.    Arthritis  The arthritis in her hands seems so much worse. She has big knots on her DIPs and PIPs. The patient has more pain in her fingers. She also has carpal tunnel and was told she probably will need surgery. The patient has not tried Voltaren gel and will try it 4 times per day.        CHRONIC CONDITIONS      Past Medical History:   Diagnosis Date   • COVID-19 virus infection 1/16/2023   • Degenerative tear of medial meniscus, right    • Hot flashes     PROZAC    • Low back pain    • Neuropathy, lumbosacral (radicular)    • Primary osteoarthritis of both knees    • Spondylolisthesis, lumbar region    • Torn meniscus     right medial   • Viral warts 02/25/2019   • Wears reading eyeglasses        Past Surgical History:   Procedure Laterality Date  "  • BLADDER SURGERY     • COLONOSCOPY     • HYSTERECTOMY  07/10/2012   • KNEE ARTHROSCOPY Right    • OOPHORECTOMY Bilateral 07/10/2012   • TOTAL KNEE ARTHROPLASTY Bilateral 12/29/2020    Procedure: TOTAL KNEE ARTHROPLASTY BILATERAL;  Surgeon: Heraclio Mari MD;  Location: [facility];  Service: Orthopedics;  Laterality: Bilateral;       Family History   Problem Relation Age of Onset   • Deep vein thrombosis Mother    • Stroke Mother    • Heart disease Mother 68   • Heart attack Mother    • Osteoarthritis Mother    • Heart disease Father 51        cause of death   • Heart attack Father    • Stroke Other    • Cancer Maternal Aunt         bone   • Diabetes Maternal Grandmother    • Cancer Maternal Aunt         lymphoma   • Cancer Maternal Aunt         brain   • Breast cancer Neg Hx    • Ovarian cancer Neg Hx        Social History     Socioeconomic History   • Marital status:    Tobacco Use   • Smoking status: Never   • Smokeless tobacco: Never   Vaping Use   • Vaping Use: Never used   Substance and Sexual Activity   • Alcohol use: No   • Drug use: No   • Sexual activity: Defer       Allergies   Allergen Reactions   • Other Itching and Rash     CHG         Vital Signs  Vitals:    05/23/23 1109   BP: 134/66   BP Location: Left arm   Patient Position: Sitting   Cuff Size: Adult   Pulse: 69   Temp: 98.6 °F (37 °C)   TempSrc: Infrared   SpO2: 95%   Weight: 71.3 kg (157 lb 3.2 oz)   Height: 165.1 cm (65\")     Body mass index is 26.16 kg/m².  BMI is >= 25 and <30. (Overweight) The following options were offered after discussion;: weight loss educational material (shared in after visit summary), exercise counseling/recommendations and nutrition counseling/recommendations        Current Outpatient Medications:   •  Ascorbic Acid (VITAMIN C PO), Take 500 mg by mouth Daily., Disp: , Rfl:   •  atorvastatin (LIPITOR) 10 MG tablet, Take 1 tablet by mouth Every Night., Disp: 90 tablet, Rfl: 1  •  buPROPion XL (WELLBUTRIN XL) " 150 MG 24 hr tablet, Take 1 tablet by mouth Daily., Disp: 90 tablet, Rfl: 1  •  busPIRone (BUSPAR) 5 MG tablet, Take 2 tablets by mouth 3 (Three) Times a Day., Disp: 180 tablet, Rfl: 5  •  calcium carb-cholecalciferol 600-800 MG-UNIT tablet, Take 1 tablet by mouth Daily., Disp: , Rfl:   •  Calcium Polycarbophil (FIBER-CAPS PO), Take 1 tablet by mouth Daily., Disp: , Rfl:   •  Cyanocobalamin (VITAMIN B-12 PO), Take 5,000 mcg by mouth Daily., Disp: , Rfl:   •  cyclobenzaprine (FLEXERIL) 10 MG tablet, Take 1 tablet by mouth 3 (Three) Times a Day As Needed for Muscle Spasms., Disp: 30 tablet, Rfl: 0  •  desvenlafaxine (PRISTIQ) 50 MG 24 hr tablet, Take 1 tablet by mouth Daily., Disp: 30 tablet, Rfl: 5  •  estradiol (ESTRACE) 0.5 MG tablet, Take 1 tablet by mouth Daily., Disp: , Rfl:   •  Flax Oil-Fish Oil-Borage Oil (FISH OIL-FLAX OIL-BORAGE OIL PO), Take 1 capsule by mouth Daily., Disp: , Rfl:   •  fluticasone (FLONASE) 50 MCG/ACT nasal spray, 1 spray into the nostril(s) as directed by provider 2 (Two) Times a Day., Disp: 16 g, Rfl: 5  •  hydrOXYzine (ATARAX) 50 MG tablet, Take 1 tablet by mouth Every Night., Disp: 45 tablet, Rfl: 5  •  pramipexole (MIRAPEX) 0.5 MG tablet, TAKE ONE TABLET BY MOUTH DAILY AS NEEDED AND TAKE THREE TABLETS BY MOUTH EVERY NIGHT AT BEDTIME, Disp: 120 tablet, Rfl: 1  •  triamcinolone (KENALOG) 0.1 % ointment, Apply  topically to the appropriate area as directed., Disp: , Rfl:   •  azelastine (ASTELIN) 0.1 % nasal spray, 2 sprays into the nostril(s) as directed by provider 2 (Two) Times a Day. Use in each nostril as directed, Disp: 1 each, Rfl: 12  •  Diclofenac Sodium (VOLTAREN) 1 % gel gel, Apply 4 g topically to the appropriate area as directed 4 (Four) Times a Day As Needed (joint pain)., Disp: 350 g, Rfl: 5  •  Phendimetrazine Tartrate 35 MG tablet, Take 35 mg by mouth 3 (Three) Times a Day., Disp: 90 each, Rfl: 2    Physical Exam:    Physical Exam  Vitals and nursing note reviewed.    Constitutional:       Appearance: She is well-developed.      Comments: Mild overweight.   HENT:      Head: Normocephalic.   Eyes:      Conjunctiva/sclera: Conjunctivae normal.      Pupils: Pupils are equal, round, and reactive to light.   Neck:      Thyroid: No thyromegaly.   Cardiovascular:      Rate and Rhythm: Normal rate and regular rhythm.      Heart sounds: Normal heart sounds.   Pulmonary:      Effort: Pulmonary effort is normal.      Breath sounds: Normal breath sounds.   Musculoskeletal:         General: Normal range of motion.      Cervical back: Normal range of motion and neck supple.   Lymphadenopathy:      Cervical: No cervical adenopathy.   Neurological:      Mental Status: She is alert and oriented to person, place, and time.   Psychiatric:         Thought Content: Thought content normal.          ACE III MINI        Results Review:    I reviewed the patient's new clinical results.    CMP:  Lab Results   Component Value Date    BUN 16 11/30/2022    CREATININE 0.77 11/30/2022    EGFRIFNONA 87 09/23/2021    BCR 20.8 11/30/2022     11/30/2022    K 4.5 11/30/2022    CO2 27.0 11/30/2022    CALCIUM 9.5 11/30/2022    ALBUMIN 4.20 11/30/2022    BILITOT 0.3 11/30/2022    ALKPHOS 94 11/30/2022    AST 19 11/30/2022    ALT 17 11/30/2022     HbA1c:  Lab Results   Component Value Date    HGBA1C 5.70 (H) 12/17/2020     Microalbumin:  No results found for: MICROALBUR, POCMALB, POCCREAT  Lipid Panel  Lab Results   Component Value Date    CHOL 163 09/29/2022    TRIG 56 09/29/2022    HDL 59 09/29/2022    LDL 93 09/29/2022    AST 19 11/30/2022    ALT 17 11/30/2022       Medication Review: Medications reviewed and noted  Patient Instructions   Problem List Items Addressed This Visit        Cardiac and Vasculature    Hypercholesterolemia - Primary    Overview     Taking atorvastatin every evening.           Current Assessment & Plan     - Continue atorvastatin every evening. Continue to decrease fats and sugars in  the diet.         Relevant Medications    atorvastatin (LIPITOR) 10 MG tablet       Mental Health    Depression, major, single episode, mild (HCC)    Current Assessment & Plan     - She will continue bupropion and desvenlafaxine. Exercise and physical activity also helps decrease symptoms of stress, anxiety, and mood. She will continue taking buspirone 3 times per day, morning, lunch, and evening mealtime.         Relevant Medications    busPIRone (BUSPAR) 5 MG tablet    hydrOXYzine (ATARAX) 50 MG tablet    buPROPion XL (WELLBUTRIN XL) 150 MG 24 hr tablet    desvenlafaxine (PRISTIQ) 50 MG 24 hr tablet    Phendimetrazine Tartrate 35 MG tablet    Stress and adjustment reaction    Current Assessment & Plan     - She will continue bupropion and desvenlafaxine. exercise and physical activity also helps decrease symptoms of stress, anxiety and mood. She will continue taking buspirone 3 times per day, morning, lunch, and evening mealtime.         Relevant Medications    busPIRone (BUSPAR) 5 MG tablet    hydrOXYzine (ATARAX) 50 MG tablet    buPROPion XL (WELLBUTRIN XL) 150 MG 24 hr tablet    desvenlafaxine (PRISTIQ) 50 MG 24 hr tablet    Phendimetrazine Tartrate 35 MG tablet       Sleep    Primary insomnia    Overview                Current Assessment & Plan     - Continue hydroxyzine in the evenings.    - We discussed sleep hygiene including going to bed at the same time and getting up at the same time every day, going to bed early enough to get 7 or 8 hours in bed, reading and relaxing before bedtime, and avoiding the TV, computer, phone, iPad close to bedtime.             Other    Overweight with body mass index (BMI) of 26 to 26.9 in adult (Chronic)    Overview     Tried phentermine and diethylpropion without benefit.         Current Assessment & Plan     - Adding the naltrexone to the bupropion has not helped with decreasing appetite, so we will stop the naltrexone. She will continue bupropion daily. We will try adding  phendimetrazine 3 times per day, taking 1 in the morning, 1 with lunch, and then 1 with the evening meal. She will continue to get some protein at lunch and some protein with dinner. If she needs a snack, doing a veggie or a little bit of protein like peanut butter or yogurt would be a good choice. She will continue to limit the sweet treats in the evening. She will try to work in more walking and maybe a [employer] or [employer] exercise class.          Relevant Medications    Phendimetrazine Tartrate 35 MG tablet          Diagnosis Plan   1. Hypercholesterolemia        2. Overweight with body mass index (BMI) of 26 to 26.9 in adult  Phendimetrazine Tartrate 35 MG tablet      3. Depression, major, single episode, mild (HCC)        4. Stress and adjustment reaction        5. Primary insomnia          Follow-up: She will come back to see me in a couple of months.        Plan of care reviewed with patient at the conclusion of today's visit. Education was provided regarding diagnosis, management, and any prescribed or recommended OTC medications.Patient verbalizes understanding of and agreement with management plan.         Parul Lomas MD      Transcribed from ambient dictation for Parul Lomas MD by Elana Diallo.  05/23/23   13:08 EDT    Patient or patient representative verbalized consent to the visit recording.  I have personally performed the services described in this document as transcribed by the above individual, and it is both accurate and complete.

## 2023-05-23 NOTE — ASSESSMENT & PLAN NOTE
- She will continue bupropion and desvenlafaxine. Exercise and physical activity also helps decrease symptoms of stress, anxiety, and mood. She will continue taking buspirone 3 times per day, morning, lunch, and evening mealtime.

## 2023-05-23 NOTE — PATIENT INSTRUCTIONS
Patient Instructions   Problem List Items Addressed This Visit          Cardiac and Vasculature    Hypercholesterolemia - Primary    Overview     Taking atorvastatin every evening.           Current Assessment & Plan     - Continue atorvastatin every evening. Continue to decrease fats and sugars in the diet.         Relevant Medications    atorvastatin (LIPITOR) 10 MG tablet       Mental Health    Depression, major, single episode, mild (HCC)    Current Assessment & Plan     - She will continue bupropion and desvenlafaxine. Exercise and physical activity also helps decrease symptoms of stress, anxiety, and mood. She will continue taking buspirone 3 times per day, morning, lunch, and evening mealtime.         Relevant Medications    busPIRone (BUSPAR) 5 MG tablet    hydrOXYzine (ATARAX) 50 MG tablet    buPROPion XL (WELLBUTRIN XL) 150 MG 24 hr tablet    desvenlafaxine (PRISTIQ) 50 MG 24 hr tablet    Phendimetrazine Tartrate 35 MG tablet    Stress and adjustment reaction    Current Assessment & Plan     - She will continue bupropion and desvenlafaxine. exercise and physical activity also helps decrease symptoms of stress, anxiety and mood. She will continue taking buspirone 3 times per day, morning, lunch, and evening mealtime.         Relevant Medications    busPIRone (BUSPAR) 5 MG tablet    hydrOXYzine (ATARAX) 50 MG tablet    buPROPion XL (WELLBUTRIN XL) 150 MG 24 hr tablet    desvenlafaxine (PRISTIQ) 50 MG 24 hr tablet    Phendimetrazine Tartrate 35 MG tablet       Sleep    Primary insomnia    Overview                Current Assessment & Plan     - Continue hydroxyzine in the evenings.    - We discussed sleep hygiene including going to bed at the same time and getting up at the same time every day, going to bed early enough to get 7 or 8 hours in bed, reading and relaxing before bedtime, and avoiding the TV, computer, phone, iPad close to bedtime.             Other    Overweight with body mass index (BMI) of 26 to  26.9 in adult (Chronic)    Overview     Tried phentermine and diethylpropion without benefit.         Current Assessment & Plan     - Adding the naltrexone to the bupropion has not helped with decreasing appetite, so we will stop the naltrexone. She will continue bupropion daily. We will try adding phendimetrazine 3 times per day, taking 1 in the morning, 1 with lunch, and then 1 with the evening meal. She will continue to get some protein at lunch and some protein with dinner. If she needs a snack, doing a veggie or a little bit of protein like peanut butter or yogurt would be a good choice. She will continue to limit the sweet treats in the evening. She will try to work in more walking and maybe a [employer] or [employer] exercise class.          Relevant Medications    Phendimetrazine Tartrate 35 MG tablet       BMI for Adults  What is BMI?  Body mass index (BMI) is a number that is calculated from a person's weight and height. BMI can help estimate how much of a person's weight is composed of fat. BMI does not measure body fat directly. Rather, it is an alternative to procedures that directly measure body fat, which can be difficult and expensive.  BMI can help identify people who may be at higher risk for certain medical problems.  What are BMI measurements used for?  BMI is used as a screening tool to identify possible weight problems. It helps determine whether a person is obese, overweight, a healthy weight, or underweight.  BMI is useful for:  Identifying a weight problem that may be related to a medical condition or may increase the risk for medical problems.  Promoting changes, such as changes in diet and exercise, to help reach a healthy weight. BMI screening can be repeated to see if these changes are working.  How is BMI calculated?  BMI involves measuring your weight in relation to your height. Both height and weight are measured, and the BMI is calculated from those numbers. This can be done either in  "English ([place] or metric measurements. Note that charts and online BMI calculators are available to help you find your BMI quickly and easily without having to do these calculations yourself.  To calculate your BMI in English ([place] measurements:    Measure your weight in pounds (lb).  Multiply the number of pounds by 703.  For example, for a person who weighs 180 lb, multiply that number by 703, which equals [phone].  Measure your height in inches. Then multiply that number by itself to get a measurement called \"inches squared.\"  For example, for a person who is 70 inches tall, the \"inches squared\" measurement is 70 inches x 70 inches, which equals 4,900 inches squared.  Divide the total from step 2 (number of lb x 703) by the total from step 3 (inches squared): 126,540 ÷ 4,900 = 25.8. This is your BMI.  To calculate your BMI in metric measurements:  Measure your weight in kilograms (kg).  Measure your height in meters (m). Then multiply that number by itself to get a measurement called \"meters squared.\"  For example, for a person who is 1.75 m tall, the \"meters squared\" measurement is 1.75 m x 1.75 m, which is equal to 3.1 meters squared.  Divide the number of kilograms (your weight) by the meters squared number. In this example: 70 ÷ 3.1 = 22.6. This is your BMI.  What do the results mean?  BMI charts are used to identify whether you are underweight, normal weight, overweight, or obese. The following guidelines will be used:  Underweight: BMI less than 18.5.  Normal weight: BMI between 18.5 and 24.9.  Overweight: BMI between 25 and 29.9.  Obese: BMI of 30 or above.  Keep these notes in mind:  Weight includes both fat and muscle, so someone with a muscular build, such as an athlete, may have a BMI that is higher than 24.9. In cases like these, BMI is not an accurate measure of body fat.  To determine if excess body fat is the cause of a BMI of 25 or higher, further assessments may need to be done by a health care " provider.  BMI is usually interpreted in the same way for men and women.  Where to find more information  For more information about BMI, including tools to quickly calculate your BMI, go to these websites:  Centers for Disease Control and Prevention: www.cdc.gov  American Heart Association: www.heart.org  National Heart, Lung, and Blood [place]: www.nhlbi.nih.gov  Summary  Body mass index (BMI) is a number that is calculated from a person's weight and height.  BMI may help estimate how much of a person's weight is composed of fat. BMI can help identify those who may be at higher risk for certain medical problems.  BMI can be measured using English measurements or metric measurements.  BMI charts are used to identify whether you are underweight, normal weight, overweight, or obese.  This information is not intended to replace advice given to you by your health care provider. Make sure you discuss any questions you have with your health care provider.  Document Revised: 09/09/2020 Document Reviewed: 07/17/2020  [employer] Patient Education © 2022 [employer]  Exercising to Lose Weight  Getting regular exercise is important for everyone. It is especially important if you are overweight. Being overweight increases your risk of heart disease, stroke, diabetes, high blood pressure, and several types of cancer. Exercising, and reducing the calories you consume, can help you lose weight and improve fitness and health.  Exercise can be moderate or vigorous intensity. To lose weight, most people need to do a certain amount of moderate or vigorous-intensity exercise each week.  How can exercise affect me?  You lose weight when you exercise enough to burn more calories than you eat. Exercise also reduces body fat and builds muscle. The more muscle you have, the more calories you burn. Exercise also:  Improves mood.  Reduces stress and tension.  Improves your overall fitness, flexibility, and endurance.  Increases bone  strength.  Moderate-intensity exercise  A person riding a bicycle wearing a safety helmet.      Moderate-intensity exercise is any activity that gets you moving enough to burn at least three times more energy (calories) than if you were sitting.  Examples of moderate exercise include:  Walking a mile in 15 minutes.  Doing light yard work.  Biking at an easy pace.  Most people should get at least 150 minutes of moderate-intensity exercise a week to maintain their body weight.  Vigorous-intensity exercise  Vigorous-intensity exercise is any activity that gets you moving enough to burn at least six times more calories than if you were sitting. When you exercise at this intensity, you should be working hard enough that you are not able to carry on a conversation.  Examples of vigorous exercise include:  Running.  Playing a team sport, such as football, basketball, and soccer.  Jumping rope.  Most people should get at least 75 minutes a week of vigorous exercise to maintain their body weight.  What actions can I take to lose weight?  The amount of exercise you need to lose weight depends on:  Your age.  The type of exercise.  Any health conditions you have.  Your overall physical ability.  Talk to your health care provider about how much exercise you need and what types of activities are safe for you.  Nutrition  A plate with healthy, colorful foods.      Make changes to your diet as told by your health care provider or diet and nutrition specialist (dietitian). This may include:  Eating fewer calories.  Eating more protein.  Eating less unhealthy fats.  Eating a diet that includes fresh fruits and vegetables, whole grains, low-fat dairy products, and lean protein.  Avoiding foods with added fat, salt, and sugar.  Drink plenty of water while you exercise to prevent dehydration or heat stroke.  Activity  Choose an activity that you enjoy and set realistic goals. Your health care provider can help you make an exercise plan  that works for you.  Exercise at a moderate or vigorous intensity most days of the week.  The intensity of exercise may vary from person to person. You can tell how intense a workout is for you by paying attention to your breathing and heartbeat. Most people will notice their breathing and heartbeat get faster with more intense exercise.  Do resistance training twice each week, such as:  Push-ups.  Sit-ups.  Lifting weights.  Using resistance bands.  Getting short amounts of exercise can be just as helpful as long, structured periods of exercise. If you have trouble finding time to exercise, try doing these things as part of your daily routine:  Get up, stretch, and walk around every 30 minutes throughout the day.  Go for a walk during your lunch break.  Park your car farther away from your destination.  If you take public transportation, get off one stop early and walk the rest of the way.  Make phone calls while standing up and walking around.  Take the stairs instead of elevators or escalators.  Wear comfortable clothes and shoes with good support.  Do not exercise so much that you hurt yourself, feel dizzy, or get very short of breath.  Where to find more information  [place] Department of Health and Human Services: www.hhs.gov  Centers for Disease Control and Prevention: www.cdc.gov  Contact a health care provider:  Before starting a new exercise program.  If you have questions or concerns about your weight.  If you have a medical problem that keeps you from exercising.  Get help right away if:  You have any of the following while exercising:  Injury.  Dizziness.  Difficulty breathing or shortness of breath that does not go away when you stop exercising.  Chest pain.  Rapid heartbeat.  These symptoms may represent a serious problem that is an emergency. Do not wait to see if the symptoms will go away. Get medical help right away. Call your local emergency services (911 in the [place] Do not drive yourself to the  [facility].  Summary  Getting regular exercise is especially important if you are overweight.  Being overweight increases your risk of heart disease, stroke, diabetes, high blood pressure, and several types of cancer.  Losing weight happens when you burn more calories than you eat.  Reducing the amount of calories you eat, and getting regular moderate or vigorous exercise each week, helps you lose weight.  This information is not intended to replace advice given to you by your health care provider. Make sure you discuss any questions you have with your health care provider. Heart-Healthy Eating Plan  Many factors influence your heart (coronary) health, including eating and exercise habits. Coronary risk increases with abnormal blood fat (lipid) levels. Heart-healthy meal planning includes limiting unhealthy fats, increasing healthy fats, and making other diet and lifestyle changes.  What is my plan?  Your health care provider may recommend that you:  Limit your fat intake to _________% or less of your total calories each day.  Limit your saturated fat intake to _________% or less of your total calories each day.  Limit the amount of cholesterol in your diet to less than _________ mg per day.  What are tips for following this plan?  Cooking  Cook foods using methods other than frying. Baking, boiling, grilling, and broiling are all good options. Other ways to reduce fat include:  Removing the skin from poultry.  Removing all visible fats from meats.  Steaming vegetables in water or broth.  Meal planning  A plate with examples of foods in a healthy diet.      At meals, imagine dividing your plate into fourths:  Fill one-half of your plate with vegetables and green salads.  Fill one-fourth of your plate with whole grains.  Fill one-fourth of your plate with lean protein foods.  Eat 4-5 servings of vegetables per day. One serving equals 1 cup raw or cooked vegetable, or 2 cups raw leafy greens.  Eat 4-5 servings of fruit  per day. One serving equals 1 medium whole fruit, ¼ cup dried fruit, ½ cup fresh, frozen, or canned fruit, or ½ cup 100% fruit juice.  Eat more foods that contain soluble fiber. Examples include apples, broccoli, carrots, beans, peas, and barley. Aim to get 25-30 g of fiber per day.  Increase your consumption of legumes, nuts, and seeds to 4-5 servings per week. One serving of dried beans or legumes equals ½ cup cooked, 1 serving of nuts is ¼ cup, and 1 serving of seeds equals 1 tablespoon.  Fats  Choose healthy fats more often. Choose monounsaturated and polyunsaturated fats, such as olive and canola oils, flaxseeds, walnuts, almonds, and seeds.  Eat more omega-3 fats. Choose salmon, mackerel, sardines, tuna, flaxseed oil, and ground flaxseeds. Aim to eat fish at least 2 times each week.  Check food labels carefully to identify foods with trans fats or high amounts of saturated fat.  Limit saturated fats. These are found in animal products, such as meats, butter, and cream. Plant sources of saturated fats include palm oil, palm kernel oil, and coconut oil.  Avoid foods with partially hydrogenated oils in them. These contain trans fats. Examples are stick margarine, some tub margarines, cookies, crackers, and other baked goods.  Avoid fried foods.  General information  Eat more home-cooked food and less restaurant, buffet, and fast food.  Limit or avoid alcohol.  Limit foods that are high in starch and sugar.  Lose weight if you are overweight. Losing just 5-10% of your body weight can help your overall health and prevent diseases such as diabetes and heart disease.  Monitor your salt (sodium) intake, especially if you have high blood pressure. Talk with your health care provider about your sodium intake.  Try to incorporate more vegetarian meals weekly.  What foods can I eat?  Fruits  All fresh, canned (in natural juice), or frozen fruits.  Vegetables  Fresh or frozen vegetables (raw, steamed, roasted, or grilled).  Green salads.  Grains  Most grains. Choose whole wheat and whole grains most of the time. Rice and pasta, including brown rice and pastas made with whole wheat.  Meats and other proteins  Lean, well-trimmed beef, veal, pork, and lamb. Chicken and turkey without skin. All fish and shellfish. Wild duck, rabbit, pheasant, and venison. Egg whites or low-cholesterol egg substitutes. Dried beans, peas, lentils, and tofu. Seeds and most nuts.  Dairy  Low-fat or nonfat cheeses, including ricotta and mozzarella. Skim or 1% milk (liquid, powdered, or evaporated). Buttermilk made with low-fat milk. Nonfat or low-fat yogurt.  Fats and oils  Non-hydrogenated (trans-free) margarines. Vegetable oils, including soybean, sesame, sunflower, olive, peanut, safflower, corn, canola, and cottonseed. Salad dressings or mayonnaise made with a vegetable oil.  Beverages  Water (mineral or sparkling). Coffee and tea. Diet carbonated beverages.  Sweets and desserts  Sherbet, gelatin, and fruit ice. Small amounts of dark chocolate.  Limit all sweets and desserts.  Seasonings and condiments  All seasonings and condiments.  The items listed above may not be a complete list of foods and beverages you can eat. Contact a dietitian for more options.  What foods are not recommended?  Fruits  Canned fruit in heavy syrup. Fruit in cream or butter sauce. Fried fruit. Limit coconut.  Vegetables  Vegetables cooked in cheese, cream, or butter sauce. Fried vegetables.  Grains  Breads made with saturated or trans fats, oils, or whole milk. Croissants. Sweet rolls. Donuts. High-fat crackers, such as cheese crackers.  Meats and other proteins  Fatty meats, such as hot dogs, ribs, sausage, garduno, rib-eye roast or steak. High-fat deli meats, such as salami and bologna. Caviar. Domestic duck and goose. Organ meats, such as liver.  Dairy  Cream, sour cream, cream cheese, and creamed cottage cheese. Whole-milk cheeses. Whole or 2% milk (liquid, evaporated, or  condensed). Whole buttermilk. Cream sauce or high-fat cheese sauce. Whole-milk yogurt.  Fats and oils  Meat fat, or shortening. Cocoa butter, hydrogenated oils, palm oil, coconut oil, palm kernel oil. Solid fats and shortenings, including garduno fat, salt pork, lard, and butter. Nondairy cream substitutes. Salad dressings with cheese or sour cream.  Beverages  Regular sodas and any drinks with added sugar.  Sweets and desserts  Frosting. Pudding. Cookies. Cakes. Pies. Milk chocolate or white chocolate. Buttered syrups. Full-fat ice cream or ice cream drinks.  The items listed above may not be a complete list of foods and beverages to avoid. Contact a dietitian for more information.  Summary  Heart-healthy meal planning includes limiting unhealthy fats, increasing healthy fats, and making other diet and lifestyle changes.  Lose weight if you are overweight. Losing just 5-10% of your body weight can help your overall health and prevent diseases such as diabetes and heart disease.  Focus on eating a balance of foods, including fruits and vegetables, low-fat or nonfat dairy, lean protein, nuts and legumes, whole grains, and heart-healthy oils and fats.  This information is not intended to replace advice given to you by your health care provider. Make sure you discuss any questions you have with your health care provider.  Document Revised: 04/28/2022 Document Reviewed: 04/28/2022  [employer] Patient Education © 2022 [employer]

## 2023-05-23 NOTE — ASSESSMENT & PLAN NOTE
- She will continue bupropion and desvenlafaxine. exercise and physical activity also helps decrease symptoms of stress, anxiety and mood. She will continue taking buspirone 3 times per day, morning, lunch, and evening mealtime.

## 2023-05-23 NOTE — ASSESSMENT & PLAN NOTE
- Adding the naltrexone to the bupropion has not helped with decreasing appetite, so we will stop the naltrexone. She will continue bupropion daily. We will try adding phendimetrazine 3 times per day, taking 1 in the morning, 1 with lunch, and then 1 with the evening meal. She will continue to get some protein at lunch and some protein with dinner. If she needs a snack, doing a veggie or a little bit of protein like peanut butter or yogurt would be a good choice. She will continue to limit the sweet treats in the evening. She will try to work in more walking and maybe a Pilates or [employer] exercise class.

## 2023-05-23 NOTE — ASSESSMENT & PLAN NOTE
- Continue hydroxyzine in the evenings.    - We discussed sleep hygiene including going to bed at the same time and getting up at the same time every day, going to bed early enough to get 7 or 8 hours in bed, reading and relaxing before bedtime, and avoiding the TV, computer, phone, iPad close to bedtime.

## 2023-08-23 RX ORDER — HYDROXYZINE 50 MG/1
TABLET, FILM COATED ORAL
Qty: 30 TABLET | Refills: 2 | Status: SHIPPED | OUTPATIENT
Start: 2023-08-23

## 2023-08-23 NOTE — TELEPHONE ENCOUNTER
Rx Refill Note  Requested Prescriptions     Pending Prescriptions Disp Refills    hydrOXYzine (ATARAX) 50 MG tablet [Pharmacy Med Name: hydrOXYzine HCL 50 MG TABLET] 30 tablet      Sig: TAKE ONE TABLET BY MOUTH ONCE NIGHTLY      Last office visit with prescribing clinician: 5/23/2023   Next office visit with prescribing clinician: 10/3/2023                         Would you like a call back once the refill request has been completed: [] Yes [] No    If the office needs to give you a call back, can they leave a voicemail: [] Yes [] No    Hillary Amador LPN  08/23/23, 08:32 EDT

## 2023-08-28 RX ORDER — PRAMIPEXOLE DIHYDROCHLORIDE 0.5 MG/1
TABLET ORAL
Qty: 120 TABLET | Refills: 1 | Status: SHIPPED | OUTPATIENT
Start: 2023-08-28

## 2023-08-28 NOTE — TELEPHONE ENCOUNTER
Rx Refill Note  Requested Prescriptions     Pending Prescriptions Disp Refills    pramipexole (MIRAPEX) 0.5 MG tablet 120 tablet 1     Sig: TAKE ONE TABLET BY MOUTH DAILY AS NEEDED AND TAKE THREE TABLETS BY MOUTH EVERY NIGHT AT BEDTIME      Last office visit with prescribing clinician: 5/23/2023   Last telemedicine visit with prescribing clinician: Visit date not found   Next office visit with prescribing clinician: 10/3/2023                         Would you like a call back once the refill request has been completed: [] Yes [] No    If the office needs to give you a call back, can they leave a voicemail: [] Yes [] No    Leodan Birmingham MA  08/28/23, 16:15 EDT

## 2023-08-31 RX ORDER — PRAMIPEXOLE DIHYDROCHLORIDE 0.5 MG/1
TABLET ORAL
Qty: 120 TABLET | Refills: 1 | OUTPATIENT
Start: 2023-08-31

## 2023-09-11 ENCOUNTER — LAB (OUTPATIENT)
Dept: LAB | Facility: HOSPITAL | Age: 68
End: 2023-09-11
Payer: COMMERCIAL

## 2023-09-11 DIAGNOSIS — E55.9 VITAMIN D DEFICIENCY: ICD-10-CM

## 2023-09-11 DIAGNOSIS — E78.00 HYPERCHOLESTEROLEMIA: ICD-10-CM

## 2023-09-11 LAB
ALBUMIN UR-MCNC: <1.2 MG/DL
BILIRUB UR QL STRIP: NEGATIVE
CLARITY UR: CLEAR
COLOR UR: YELLOW
CREAT UR-MCNC: 48.4 MG/DL
GLUCOSE UR STRIP-MCNC: NEGATIVE MG/DL
HGB UR QL STRIP.AUTO: NEGATIVE
KETONES UR QL STRIP: NEGATIVE
LEUKOCYTE ESTERASE UR QL STRIP.AUTO: NEGATIVE
MICROALBUMIN/CREAT UR: NORMAL MG/G{CREAT}
NITRITE UR QL STRIP: NEGATIVE
PH UR STRIP.AUTO: 7 [PH] (ref 5–8)
PROT UR QL STRIP: NEGATIVE
SP GR UR STRIP: 1.01 (ref 1–1.03)
UROBILINOGEN UR QL STRIP: NORMAL

## 2023-09-11 PROCEDURE — 82306 VITAMIN D 25 HYDROXY: CPT

## 2023-09-11 PROCEDURE — 80061 LIPID PANEL: CPT

## 2023-09-11 PROCEDURE — 82043 UR ALBUMIN QUANTITATIVE: CPT

## 2023-09-11 PROCEDURE — 82570 ASSAY OF URINE CREATININE: CPT

## 2023-09-11 PROCEDURE — 80053 COMPREHEN METABOLIC PANEL: CPT

## 2023-09-11 PROCEDURE — 81001 URINALYSIS AUTO W/SCOPE: CPT

## 2023-09-12 LAB
25(OH)D3 SERPL-MCNC: 63.8 NG/ML (ref 30–100)
ALBUMIN SERPL-MCNC: 4.4 G/DL (ref 3.5–5.2)
ALBUMIN/GLOB SERPL: 1.8 G/DL
ALP SERPL-CCNC: 103 U/L (ref 39–117)
ALT SERPL W P-5'-P-CCNC: 20 U/L (ref 1–33)
ANION GAP SERPL CALCULATED.3IONS-SCNC: 11.6 MMOL/L (ref 5–15)
AST SERPL-CCNC: 18 U/L (ref 1–32)
BACTERIA UR QL AUTO: ABNORMAL /HPF
BILIRUB SERPL-MCNC: 0.3 MG/DL (ref 0–1.2)
BUN SERPL-MCNC: 13 MG/DL (ref 8–23)
BUN/CREAT SERPL: 18.3 (ref 7–25)
CALCIUM SPEC-SCNC: 9.4 MG/DL (ref 8.6–10.5)
CHLORIDE SERPL-SCNC: 103 MMOL/L (ref 98–107)
CHOLEST SERPL-MCNC: 164 MG/DL (ref 0–200)
CO2 SERPL-SCNC: 26.4 MMOL/L (ref 22–29)
CREAT SERPL-MCNC: 0.71 MG/DL (ref 0.57–1)
EGFRCR SERPLBLD CKD-EPI 2021: 93.9 ML/MIN/1.73
GLOBULIN UR ELPH-MCNC: 2.5 GM/DL
GLUCOSE SERPL-MCNC: 145 MG/DL (ref 65–99)
HDLC SERPL-MCNC: 53 MG/DL (ref 40–60)
HYALINE CASTS UR QL AUTO: ABNORMAL /LPF
LDLC SERPL CALC-MCNC: 89 MG/DL (ref 0–100)
LDLC/HDLC SERPL: 1.63 {RATIO}
POTASSIUM SERPL-SCNC: 4 MMOL/L (ref 3.5–5.2)
PROT SERPL-MCNC: 6.9 G/DL (ref 6–8.5)
RBC # UR STRIP: ABNORMAL /HPF
REF LAB TEST METHOD: ABNORMAL
SODIUM SERPL-SCNC: 141 MMOL/L (ref 136–145)
SQUAMOUS #/AREA URNS HPF: ABNORMAL /HPF
TRIGL SERPL-MCNC: 122 MG/DL (ref 0–150)
VLDLC SERPL-MCNC: 22 MG/DL (ref 5–40)
WBC # UR STRIP: ABNORMAL /HPF

## 2023-09-12 RX ORDER — DESVENLAFAXINE SUCCINATE 50 MG/1
TABLET, EXTENDED RELEASE ORAL
Qty: 30 TABLET | Refills: 5 | Status: SHIPPED | OUTPATIENT
Start: 2023-09-12

## 2023-09-19 RX ORDER — ATORVASTATIN CALCIUM 10 MG/1
TABLET, FILM COATED ORAL
Qty: 30 TABLET | Refills: 5 | Status: SHIPPED | OUTPATIENT
Start: 2023-09-19

## 2023-10-03 ENCOUNTER — OFFICE VISIT (OUTPATIENT)
Dept: INTERNAL MEDICINE | Facility: CLINIC | Age: 68
End: 2023-10-03
Payer: COMMERCIAL

## 2023-10-03 VITALS
OXYGEN SATURATION: 97 % | HEIGHT: 65 IN | HEART RATE: 69 BPM | DIASTOLIC BLOOD PRESSURE: 72 MMHG | SYSTOLIC BLOOD PRESSURE: 130 MMHG | TEMPERATURE: 98 F | BODY MASS INDEX: 26.26 KG/M2 | WEIGHT: 157.6 LBS

## 2023-10-03 DIAGNOSIS — Z00.00 ANNUAL PHYSICAL EXAM: Primary | ICD-10-CM

## 2023-10-03 DIAGNOSIS — E66.3 OVERWEIGHT WITH BODY MASS INDEX (BMI) OF 26 TO 26.9 IN ADULT: Chronic | ICD-10-CM

## 2023-10-03 DIAGNOSIS — E55.9 VITAMIN D DEFICIENCY: ICD-10-CM

## 2023-10-03 DIAGNOSIS — R73.09 ABNORMAL GLUCOSE: ICD-10-CM

## 2023-10-03 DIAGNOSIS — E78.00 HYPERCHOLESTEROLEMIA: ICD-10-CM

## 2023-10-03 DIAGNOSIS — F32.0 DEPRESSION, MAJOR, SINGLE EPISODE, MILD: ICD-10-CM

## 2023-10-03 LAB
EXPIRATION DATE: NORMAL
HBA1C MFR BLD: 5.7 %
Lab: NORMAL

## 2023-10-03 RX ORDER — PHENDIMETRAZINE TARTRATE 35 MG/1
35 TABLET ORAL 3 TIMES DAILY
Qty: 90 EACH | Refills: 2 | Status: SHIPPED | OUTPATIENT
Start: 2023-10-03

## 2023-10-03 RX ORDER — TOPIRAMATE 25 MG/1
TABLET ORAL
Qty: 90 TABLET | Refills: 1 | Status: SHIPPED | OUTPATIENT
Start: 2023-10-03

## 2023-10-03 NOTE — PATIENT INSTRUCTIONS
Patient Instructions  Problem List Items Addressed This Visit          Cardiac and Vasculature    Hypercholesterolemia    Overview     Taking atorvastatin every evening.           Current Assessment & Plan     Continue atorvastatin every evening.         Relevant Medications    atorvastatin (LIPITOR) 10 MG tablet    Other Relevant Orders    ECG 12 Lead       Endocrine and Metabolic    Vitamin D deficiency    Current Assessment & Plan     She will continue her current dose of calcium.         Abnormal glucose    Current Assessment & Plan     We are rechecking hemoglobin A1c today. She will decrease sugars in the diet. She will increase physical activity and exercise.         Relevant Orders    POC Glycosylated Hemoglobin (Hb A1C) (Completed)       Health Encounters    Annual physical exam - Primary    Overview       She was advised to get the flu shot about the first week of October.  She was also advised to get hepatitis a second injection and shingrix second injection at her pharmacy.            Mental Health    Depression, major, single episode, mild    Current Assessment & Plan     She will continue desvenlafaxine daily, bupropion every morning, and buspirone 2 to 3 times per day. She will come back to see me in about 6 weeks.         Relevant Medications    buPROPion XL (WELLBUTRIN XL) 150 MG 24 hr tablet    busPIRone (BUSPAR) 5 MG tablet    hydrOXYzine (ATARAX) 50 MG tablet    desvenlafaxine (PRISTIQ) 50 MG 24 hr tablet    Phendimetrazine Tartrate 35 MG tablet       Other    Overweight with body mass index (BMI) of 26 to 26.9 in adult (Chronic)    Overview     Tried phentermine and diethylpropion without benefit.         Current Assessment & Plan     She will continue phendimetrazine 3 times per day before meals. The patient will continue bupropion  mg tablet every morning. We will add topiramate (Topamax) every evening. She will start with the 25 mg tablet every evening for 6 days, then 2 every evening  for 6 days, then 3 every evening until she comes back to see me in about 6 weeks. We discussed increasing exercise and physical activity. She will try to walk more. Try to go to the gym more often. Add a hard boiled egg in the mornings or some peanut butter. At lunchtime, add some raw vegetables or leftover cooked vegetables from the night before. In the evenings, decrease the meat and chicken or fish portion to 4 ounces or the size of a deck of cards. Continue to avoid bread and potatoes in the diet.         Relevant Medications    Phendimetrazine Tartrate 35 MG tablet       BMI for Adults  What is BMI?  Body mass index (BMI) is a number that is calculated from a person's weight and height. BMI can help estimate how much of a person's weight is composed of fat. BMI does not measure body fat directly. Rather, it is an alternative to procedures that directly measure body fat, which can be difficult and expensive.  BMI can help identify people who may be at higher risk for certain medical problems.  What are BMI measurements used for?  BMI is used as a screening tool to identify possible weight problems. It helps determine whether a person is obese, overweight, a healthy weight, or underweight.  BMI is useful for:  Identifying a weight problem that may be related to a medical condition or may increase the risk for medical problems.  Promoting changes, such as changes in diet and exercise, to help reach a healthy weight. BMI screening can be repeated to see if these changes are working.  How is BMI calculated?  BMI involves measuring your weight in relation to your height. Both height and weight are measured, and the BMI is calculated from those numbers. This can be done either in English ([place] or metric measurements. Note that charts and online BMI calculators are available to help you find your BMI quickly and easily without having to do these calculations yourself.  To calculate your BMI in English ([place]  "measurements:    Measure your weight in pounds (lb).  Multiply the number of pounds by 703.  For example, for a person who weighs 180 lb, multiply that number by 703, which equals [phone].  Measure your height in inches. Then multiply that number by itself to get a measurement called \"inches squared.\"  For example, for a person who is 70 inches tall, the \"inches squared\" measurement is 70 inches x 70 inches, which equals 4,900 inches squared.  Divide the total from step 2 (number of lb x 703) by the total from step 3 (inches squared): 126,540 ÷ 4,900 = 25.8. This is your BMI.  To calculate your BMI in metric measurements:  Measure your weight in kilograms (kg).  Measure your height in meters (m). Then multiply that number by itself to get a measurement called \"meters squared.\"  For example, for a person who is 1.75 m tall, the \"meters squared\" measurement is 1.75 m x 1.75 m, which is equal to 3.1 meters squared.  Divide the number of kilograms (your weight) by the meters squared number. In this example: 70 ÷ 3.1 = 22.6. This is your BMI.  What do the results mean?  BMI charts are used to identify whether you are underweight, normal weight, overweight, or obese. The following guidelines will be used:  Underweight: BMI less than 18.5.  Normal weight: BMI between 18.5 and 24.9.  Overweight: BMI between 25 and 29.9.  Obese: BMI of 30 or above.  Keep these notes in mind:  Weight includes both fat and muscle, so someone with a muscular build, such as an athlete, may have a BMI that is higher than 24.9. In cases like these, BMI is not an accurate measure of body fat.  To determine if excess body fat is the cause of a BMI of 25 or higher, further assessments may need to be done by a health care provider.  BMI is usually interpreted in the same way for men and women.  Where to find more information  For more information about BMI, including tools to quickly calculate your BMI, go to these websites:  Centers for Disease " Control and Prevention: www.cdc.gov  American Heart Association: www.heart.org  National Heart, Lung, and Blood Cashion: www.nhlbi.nih.gov  Summary  Body mass index (BMI) is a number that is calculated from a person's weight and height.  BMI may help estimate how much of a person's weight is composed of fat. BMI can help identify those who may be at higher risk for certain medical problems.  BMI can be measured using English measurements or metric measurements.  BMI charts are used to identify whether you are underweight, normal weight, overweight, or obese.  This information is not intended to replace advice given to you by your health care provider. Make sure you discuss any questions you have with your health care provider.  Document Revised: 09/09/2020 Document Reviewed: 07/17/2020  [employer] Patient Education © 2023 [employer]  Calorie Counting for Weight Loss  Calories are units of energy. Your body needs a certain number of calories from food to keep going throughout the day. When you eat or drink more calories than your body needs, your body stores the extra calories mostly as fat. When you eat or drink fewer calories than your body needs, your body burns fat to get the energy it needs.  Calorie counting means keeping track of how many calories you eat and drink each day. Calorie counting can be helpful if you need to lose weight. If you eat fewer calories than your body needs, you should lose weight. Ask your health care provider what a healthy weight is for you.  For calorie counting to work, you will need to eat the right number of calories each day to lose a healthy amount of weight per week. A dietitian can help you figure out how many calories you need in a day and will suggest ways to reach your calorie goal.  A healthy amount of weight to lose each week is usually 1-2 lb (0.5-0.9 kg). This usually means that your daily calorie intake should be reduced by 500-750 calories.  Eating 1,200-1,500  calories a day can help most women lose weight.  Eating 1,500-1,800 calories a day can help most men lose weight.  What do I need to know about calorie counting?  Work with your health care provider or dietitian to determine how many calories you should get each day. To meet your daily calorie goal, you will need to:  Find out how many calories are in each food that you would like to eat. Try to do this before you eat.  Decide how much of the food you plan to eat.  Keep a food log. Do this by writing down what you ate and how many calories it had.  To successfully lose weight, it is important to balance calorie counting with a healthy lifestyle that includes regular activity.  Where do I find calorie information?    The number of calories in a food can be found on a Nutrition Facts label. If a food does not have a Nutrition Facts label, try to look up the calories online or ask your dietitian for help.  Remember that calories are listed per serving. If you choose to have more than one serving of a food, you will have to multiply the calories per serving by the number of servings you plan to eat. For example, the label on a package of bread might say that a serving size is 1 slice and that there are 90 calories in a serving. If you eat 1 slice, you will have eaten 90 calories. If you eat 2 slices, you will have eaten 180 calories.  How do I keep a food log?  After each time that you eat, record the following in your food log as soon as possible:  What you ate. Be sure to include toppings, sauces, and other extras on the food.  How much you ate. This can be measured in cups, ounces, or number of items.  How many calories were in each food and drink.  The total number of calories in the food you ate.  Keep your food log near you, such as in a pocket-sized notebook or on an sena or website on your mobile phone. Some programs will calculate calories for you and show you how many calories you have left to meet your daily  goal.  What are some portion-control tips?  Know how many calories are in a serving. This will help you know how many servings you can have of a certain food.  Use a measuring cup to measure serving sizes. You could also try weighing out portions on a kitchen scale. With time, you will be able to estimate serving sizes for some foods.  Take time to put servings of different foods on your favorite plates or in your favorite bowls and cups so you know what a serving looks like.  Try not to eat straight from a food's packaging, such as from a bag or box. Eating straight from the package makes it hard to see how much you are eating and can lead to overeating. Put the amount you would like to eat in a cup or on a plate to make sure you are eating the right portion.  Use smaller plates, glasses, and bowls for smaller portions and to prevent overeating.  Try not to multitask. For example, avoid watching TV or using your computer while eating. If it is time to eat, sit down at a table and enjoy your food. This will help you recognize when you are full. It will also help you be more mindful of what and how much you are eating.  What are tips for following this plan?  Reading food labels  Check the calorie count compared with the serving size. The serving size may be smaller than what you are used to eating.  Check the source of the calories. Try to choose foods that are high in protein, fiber, and vitamins, and low in saturated fat, trans fat, and sodium.  Shopping  Read nutrition labels while you shop. This will help you make healthy decisions about which foods to buy.  Pay attention to nutrition labels for low-fat or fat-free foods. These foods sometimes have the same number of calories or more calories than the full-fat versions. They also often have added sugar, starch, or salt to make up for flavor that was removed with the fat.  Make a grocery list of lower-calorie foods and stick to it.  Cooking  Try to cook your  favorite foods in a healthier way. For example, try baking instead of frying.  Use low-fat dairy products.  Meal planning  Use more fruits and vegetables. One-half of your plate should be fruits and vegetables.  Include lean proteins, such as chicken, turkey, and fish.  Lifestyle  Each week, aim to do one of the followin minutes of moderate exercise, such as walking.  75 minutes of vigorous exercise, such as running.  General information  Know how many calories are in the foods you eat most often. This will help you calculate calorie counts faster.  Find a way of tracking calories that works for you. Get creative. Try different apps or programs if writing down calories does not work for you.  What foods should I eat?    Eat nutritious foods. It is better to have a nutritious, high-calorie food, such as an avocado, than a food with few nutrients, such as a bag of potato chips.  Use your calories on foods and drinks that will fill you up and will not leave you hungry soon after eating.  Examples of foods that fill you up are nuts and nut butters, vegetables, lean proteins, and high-fiber foods such as whole grains. High-fiber foods are foods with more than 5 g of fiber per serving.  Pay attention to calories in drinks. Low-calorie drinks include water and unsweetened drinks.  The items listed above may not be a complete list of foods and beverages you can eat. Contact a dietitian for more information.  What foods should I limit?  Limit foods or drinks that are not good sources of vitamins, minerals, or protein or that are high in unhealthy fats. These include:  Candy.  Other sweets.  Sodas, specialty coffee drinks, alcohol, and juice.  The items listed above may not be a complete list of foods and beverages you should avoid. Contact a dietitian for more information.  How do I count calories when eating out?  Pay attention to portions. Often, portions are much larger when eating out. Try these tips to keep  portions smaller:  Consider sharing a meal instead of getting your own.  If you get your own meal, eat only half of it. Before you start eating, ask for a container and put half of your meal into it.  When available, consider ordering smaller portions from the menu instead of full portions.  Pay attention to your food and drink choices. Knowing the way food is cooked and what is included with the meal can help you eat fewer calories.  If calories are listed on the menu, choose the lower-calorie options.  Choose dishes that include vegetables, fruits, whole grains, low-fat dairy products, and lean proteins.  Choose items that are boiled, broiled, grilled, or steamed. Avoid items that are buttered, battered, fried, or served with cream sauce. Items labeled as crispy are usually fried, unless stated otherwise.  Choose water, low-fat milk, unsweetened iced tea, or other drinks without added sugar. If you want an alcoholic beverage, choose a lower-calorie option, such as a glass of wine or light beer.  Ask for dressings, sauces, and syrups on the side. These are usually high in calories, so you should limit the amount you eat.  If you want a salad, choose a garden salad and ask for grilled meats. Avoid extra toppings such as garduno, cheese, or fried items. Ask for the dressing on the side, or ask for olive oil and vinegar or lemon to use as dressing.  Estimate how many servings of a food you are given. Knowing serving sizes will help you be aware of how much food you are eating at restaurants.  Where to find more information  Centers for Disease Control and Prevention: www.cdc.gov  [place] Department of Agriculture: myplate.gov  Summary  Calorie counting means keeping track of how many calories you eat and drink each day. If you eat fewer calories than your body needs, you should lose weight.  A healthy amount of weight to lose per week is usually 1-2 lb (0.5-0.9 kg). This usually means reducing your daily calorie intake by  500-750 calories.  The number of calories in a food can be found on a Nutrition Facts label. If a food does not have a Nutrition Facts label, try to look up the calories online or ask your dietitian for help.  Use smaller plates, glasses, and bowls for smaller portions and to prevent overeating.  Use your calories on foods and drinks that will fill you up and not leave you hungry shortly after a meal.  This information is not intended to replace advice given to you by your health care provider. Make sure you discuss any questions you have with your health care provider.  Document Revised: 01/28/2021 Document Reviewed: 01/28/2021  [employer] Patient Education © 2023 [employer]  Heart-Healthy Eating Plan  Many factors influence your heart (coronary) health, including eating and exercise habits. Coronary risk increases with abnormal blood fat (lipid) levels. Heart-healthy meal planning includes limiting unhealthy fats, increasing healthy fats, and making other diet and lifestyle changes.  What is my plan?  Your health care provider may recommend that you:  Limit your fat intake to _________% or less of your total calories each day.  Limit your saturated fat intake to _________% or less of your total calories each day.  Limit the amount of cholesterol in your diet to less than _________ mg per day.  What are tips for following this plan?  Cooking  Cook foods using methods other than frying. Baking, boiling, grilling, and broiling are all good options. Other ways to reduce fat include:  Removing the skin from poultry.  Removing all visible fats from meats.  Steaming vegetables in water or broth.  Meal planning  A plate with examples of foods in a healthy diet.      At meals, imagine dividing your plate into fourths:  Fill one-half of your plate with vegetables and green salads.  Fill one-fourth of your plate with whole grains.  Fill one-fourth of your plate with lean protein foods.  Eat 4-5 servings of vegetables per  day. One serving equals 1 cup raw or cooked vegetable, or 2 cups raw leafy greens.  Eat 4-5 servings of fruit per day. One serving equals 1 medium whole fruit, ¼ cup dried fruit, ½ cup fresh, frozen, or canned fruit, or ½ cup 100% fruit juice.  Eat more foods that contain soluble fiber. Examples include apples, broccoli, carrots, beans, peas, and barley. Aim to get 25-30 g of fiber per day.  Increase your consumption of legumes, nuts, and seeds to 4-5 servings per week. One serving of dried beans or legumes equals ½ cup cooked, 1 serving of nuts is ¼ cup, and 1 serving of seeds equals 1 tablespoon.  Fats  Choose healthy fats more often. Choose monounsaturated and polyunsaturated fats, such as olive and canola oils, flaxseeds, walnuts, almonds, and seeds.  Eat more omega-3 fats. Choose salmon, mackerel, sardines, tuna, flaxseed oil, and ground flaxseeds. Aim to eat fish at least 2 times each week.  Check food labels carefully to identify foods with trans fats or high amounts of saturated fat.  Limit saturated fats. These are found in animal products, such as meats, butter, and cream. Plant sources of saturated fats include palm oil, palm kernel oil, and coconut oil.  Avoid foods with partially hydrogenated oils in them. These contain trans fats. Examples are stick margarine, some tub margarines, cookies, crackers, and other baked goods.  Avoid fried foods.  General information  Eat more home-cooked food and less restaurant, buffet, and fast food.  Limit or avoid alcohol.  Limit foods that are high in starch and sugar.  Lose weight if you are overweight. Losing just 5-10% of your body weight can help your overall health and prevent diseases such as diabetes and heart disease.  Monitor your salt (sodium) intake, especially if you have high blood pressure. Talk with your health care provider about your sodium intake.  Try to incorporate more vegetarian meals weekly.  What foods can I eat?  Fruits  All fresh, canned (in  natural juice), or frozen fruits.  Vegetables  Fresh or frozen vegetables (raw, steamed, roasted, or grilled). Green salads.  Grains  Most grains. Choose whole wheat and whole grains most of the time. Rice and pasta, including brown rice and pastas made with whole wheat.  Meats and other proteins  Lean, well-trimmed beef, veal, pork, and lamb. Chicken and turkey without skin. All fish and shellfish. Wild duck, rabbit, pheasant, and venison. Egg whites or low-cholesterol egg substitutes. Dried beans, peas, lentils, and tofu. Seeds and most nuts.  Dairy  Low-fat or nonfat cheeses, including ricotta and mozzarella. Skim or 1% milk (liquid, powdered, or evaporated). Buttermilk made with low-fat milk. Nonfat or low-fat yogurt.  Fats and oils  Non-hydrogenated (trans-free) margarines. Vegetable oils, including soybean, sesame, sunflower, olive, peanut, safflower, corn, canola, and cottonseed. Salad dressings or mayonnaise made with a vegetable oil.  Beverages  Water (mineral or sparkling). Coffee and tea. Diet carbonated beverages.  Sweets and desserts  Sherbet, gelatin, and fruit ice. Small amounts of dark chocolate.  Limit all sweets and desserts.  Seasonings and condiments  All seasonings and condiments.  The items listed above may not be a complete list of foods and beverages you can eat. Contact a dietitian for more options.  What foods are not recommended?  Fruits  Canned fruit in heavy syrup. Fruit in cream or butter sauce. Fried fruit. Limit coconut.  Vegetables  Vegetables cooked in cheese, cream, or butter sauce. Fried vegetables.  Grains  Breads made with saturated or trans fats, oils, or whole milk. Croissants. Sweet rolls. Donuts. High-fat crackers, such as cheese crackers.  Meats and other proteins  Fatty meats, such as hot dogs, ribs, sausage, garduno, rib-eye roast or steak. High-fat deli meats, such as salami and bologna. Caviar. Domestic duck and goose. Organ meats, such as liver.  Dairy  Cream, sour  cream, cream cheese, and creamed cottage cheese. Whole-milk cheeses. Whole or 2% milk (liquid, evaporated, or condensed). Whole buttermilk. Cream sauce or high-fat cheese sauce. Whole-milk yogurt.  Fats and oils  Meat fat, or shortening. Cocoa butter, hydrogenated oils, palm oil, coconut oil, palm kernel oil. Solid fats and shortenings, including garduno fat, salt pork, lard, and butter. Nondairy cream substitutes. Salad dressings with cheese or sour cream.  Beverages  Regular sodas and any drinks with added sugar.  Sweets and desserts  Frosting. Pudding. Cookies. Cakes. Pies. Milk chocolate or white chocolate. Buttered syrups. Full-fat ice cream or ice cream drinks.  The items listed above may not be a complete list of foods and beverages to avoid. Contact a dietitian for more information.  Summary  Heart-healthy meal planning includes limiting unhealthy fats, increasing healthy fats, and making other diet and lifestyle changes.  Lose weight if you are overweight. Losing just 5-10% of your body weight can help your overall health and prevent diseases such as diabetes and heart disease.  Focus on eating a balance of foods, including fruits and vegetables, low-fat or nonfat dairy, lean protein, nuts and legumes, whole grains, and heart-healthy oils and fats.  This information is not intended to replace advice given to you by your health care provider. Make sure you discuss any questions you have with your health care provider.  Document Revised: 04/28/2022 Document Reviewed: 04/28/2022  [employer] Patient Education © 2022 [employer]

## 2023-10-03 NOTE — PROGRESS NOTES
Preventative Annual Visit    Suze Baeza  1955   [id number]    Patient Care Team:  Parul Lomas MD as PCP - General (Internal Medicine)  Kendrick Andersen MD as Consulting Physician (Dermatology)  Heraclio Mari MD as Consulting Physician (Orthopedic Surgery)  Chio hDaliwal MD as Consulting Physician (Obstetrics and Gynecology)    Chief Complaint::   Chief Complaint   Patient presents with    Annual Exam    Hyperlipidemia        Subjective   History of Present Illness    Suze Baeza is a 68 y.o. female who presents for an Annual Wellness Visit.    CHRONIC CONDITIONS    Anxiety and depression  We added bupropion for anxiety and depression. The patient thinks it has helped. She is under so much stress right now. The patient thinks the buspirone is helping with anxiety and stress. She also takes desvenlafaxine.     Hypercholesterolemia  Her LDL is 89 mg/dL, triglycerides very good at 122 mg/dL.    Vitamin D  deficiency  Vitamin D is good at 63 mg/mL.     Rash  The patient has a rash on her back. It is sometimes itchy. She does put lotion on it. The patient will try hydrocortisone cream on it after she showers.    Review of labs   Her kidney function was good.   She is not losing too much protein in the urine.   Urinalysis shows no red or white cells in the urine.     Health maintenance  The patient will get her influenza vaccine the 2nd  or 3rd week in 10/2023.  She will get the new COVID-19 booster after her influenza vaccine. The patient is unsure is she will get the new COVID-19 booster.  The patient has only received 1 shingles vaccine.  She thinks she received her pneumonia vaccine in 2019 at her pharmacy. The patient will check and have the record sent here.        Patient Active Problem List   Diagnosis    Hypercholesterolemia    Primary insomnia    Restless leg syndrome    Depression, major, single episode, mild    Chronic bilateral low back pain    Other viral warts     Bilateral headaches    Stress and adjustment reaction    Primary osteoarthritis involving multiple joints    Cervicalgia    Lizama cyst, right    Chronic fatigue    Hot flashes due to menopause    Primary osteoarthritis of both knees    Synovial cyst of popliteal space    Annual physical exam    S/P total knee arthroplasty, bilateral    Hypokalemia    Perennial allergic rhinitis with seasonal variation    Overweight with body mass index (BMI) of 26 to 26.9 in adult    Rash and nonspecific skin eruption    Numbness and tingling in right hand    Weight gain    Cellulitis of chin    Vitamin D deficiency    Abnormal glucose        Past Medical History:   Diagnosis Date    COVID-19 virus infection 1/16/2023    Degenerative tear of medial meniscus, right     Hot flashes     PROZAC     Low back pain     Neuropathy, lumbosacral (radicular)     Primary osteoarthritis of both knees     Spondylolisthesis, lumbar region     Torn meniscus     right medial    Viral warts 02/25/2019    Wears reading eyeglasses        Past Surgical History:   Procedure Laterality Date    BLADDER SURGERY      COLONOSCOPY      HYSTERECTOMY  07/10/2012    KNEE ARTHROSCOPY Right     OOPHORECTOMY Bilateral 07/10/2012    TOTAL KNEE ARTHROPLASTY Bilateral 12/29/2020    Procedure: TOTAL KNEE ARTHROPLASTY BILATERAL;  Surgeon: Heraclio Mari MD;  Location: [facility];  Service: Orthopedics;  Laterality: Bilateral;       Family History   Problem Relation Age of Onset    Deep vein thrombosis Mother     Stroke Mother     Heart disease Mother 68    Heart attack Mother     Osteoarthritis Mother     Heart disease Father 51        cause of death    Heart attack Father     Stroke Other     Cancer Maternal Aunt         bone    Diabetes Maternal Grandmother     Cancer Maternal Aunt         lymphoma    Cancer Maternal Aunt         brain    Breast cancer Neg Hx     Ovarian cancer Neg Hx        Social History     Socioeconomic History    Marital status:    Tobacco  Use    Smoking status: Never    Smokeless tobacco: Never   Vaping Use    Vaping Use: Never used   Substance and Sexual Activity    Alcohol use: No    Drug use: No    Sexual activity: Defer       Allergies   Allergen Reactions    Other Itching and Rash     [facility]         Current Outpatient Medications:     Ascorbic Acid (VITAMIN C PO), Take 500 mg by mouth Daily., Disp: , Rfl:     atorvastatin (LIPITOR) 10 MG tablet, TAKE ONE TABLET BY MOUTH ONCE NIGHTLY, Disp: 30 tablet, Rfl: 5    azelastine (ASTELIN) 0.1 % nasal spray, 2 sprays into the nostril(s) as directed by provider 2 (Two) Times a Day. Use in each nostril as directed, Disp: 1 each, Rfl: 12    buPROPion XL (WELLBUTRIN XL) 150 MG 24 hr tablet, TAKE ONE TABLET BY MOUTH DAILY, Disp: 90 tablet, Rfl: 1    busPIRone (BUSPAR) 5 MG tablet, TAKE TWO TABLETS BY MOUTH THREE TIMES A DAY, Disp: 180 tablet, Rfl: 5    calcium carb-cholecalciferol 600-800 MG-UNIT tablet, Take 1 tablet by mouth Daily., Disp: , Rfl:     Calcium Polycarbophil (FIBER-CAPS PO), Take 1 tablet by mouth Daily., Disp: , Rfl:     Cyanocobalamin (VITAMIN B-12 PO), Take 5,000 mcg by mouth Daily., Disp: , Rfl:     cyclobenzaprine (FLEXERIL) 10 MG tablet, Take 1 tablet by mouth 3 (Three) Times a Day As Needed for Muscle Spasms., Disp: 30 tablet, Rfl: 0    desvenlafaxine (PRISTIQ) 50 MG 24 hr tablet, TAKE ONE TABLET BY MOUTH DAILY, Disp: 30 tablet, Rfl: 5    Diclofenac Sodium (VOLTAREN) 1 % gel gel, Apply 4 g topically to the appropriate area as directed 4 (Four) Times a Day As Needed (joint pain)., Disp: 350 g, Rfl: 5    estradiol (ESTRACE) 0.5 MG tablet, Take 1 tablet by mouth Daily., Disp: , Rfl:     Flax Oil-Fish Oil-Borage Oil (FISH OIL-FLAX OIL-BORAGE OIL PO), Take 1 capsule by mouth Daily., Disp: , Rfl:     fluticasone (FLONASE) 50 MCG/ACT nasal spray, 1 spray into the nostril(s) as directed by provider 2 (Two) Times a Day., Disp: 16 g, Rfl: 5    hydrOXYzine (ATARAX) 50 MG tablet, TAKE ONE TABLET BY  "MOUTH ONCE NIGHTLY, Disp: 30 tablet, Rfl: 2    Phendimetrazine Tartrate 35 MG tablet, Take 35 mg by mouth 3 (Three) Times a Day., Disp: 90 each, Rfl: 2    pramipexole (MIRAPEX) 0.5 MG tablet, TAKE ONE TABLET BY MOUTH DAILY AS NEEDED AND TAKE THREE TABLETS BY MOUTH EVERY NIGHT AT BEDTIME, Disp: 120 tablet, Rfl: 1    triamcinolone (KENALOG) 0.1 % ointment, Apply  topically to the appropriate area as directed., Disp: , Rfl:     topiramate (TOPAMAX) 25 MG tablet, Take one every evening for 6 days then 2 every evening for 6 days then 3 every evening ongoing, Disp: 90 tablet, Rfl: 1    Immunization History   Administered Date(s) Administered    COVID-19 (PFIZER) Purple Cap Monovalent 03/12/2021, 04/07/2021    Covid-19 (Pfizer) Gray Cap Monovalent 03/25/2022    Flu Vaccine Quad PF >36MO 10/31/2017    Fluad Quad 65+ 10/02/2019    Flublock Quad =>18yrs 10/10/2020    Fluzone (or Fluarix & Flulaval for VFC) >6mos 10/09/2021    Fluzone High Dose =>65 Years (Vaxcare ONLY) 11/01/2015, 10/01/2022    Fluzone Quad >6mos (Multi-dose) 10/26/2016    Hepatitis A 07/19/2019    Influenza TIV (IM) 10/31/2014    Shingrix 09/16/2019    Tdap 06/24/2015    Zostavax 11/13/2017        Health Maintenance Due   Topic Date Due    HEPATITIS C SCREENING  Never done    ZOSTER VACCINE (3 of 3) 11/11/2019    Pneumococcal Vaccine 65+ (1 - PCV) Never done    INFLUENZA VACCINE  08/01/2023    COVID-19 Vaccine (4 - 2023-24 season) 09/01/2023    ANNUAL PHYSICAL  09/29/2023        Vital Signs  Vitals:    10/03/23 1006   BP: 130/72   BP Location: Left arm   Patient Position: Sitting   Cuff Size: Adult   Pulse: 69   Temp: 98 °F (36.7 °C)   TempSrc: Infrared   SpO2: 97%   Weight: 71.5 kg (157 lb 9.6 oz)   Height: 165.1 cm (65\")          Physical Exam  Vitals and nursing note reviewed.   Constitutional:       Appearance: She is well-developed.      Comments:  Overweight.   Eyes:      Conjunctiva/sclera: Conjunctivae normal.      Pupils: Pupils are equal, round, " and reactive to light.   Neck:      Thyroid: No thyromegaly.   Cardiovascular:      Rate and Rhythm: Normal rate and regular rhythm.      Heart sounds: Normal heart sounds. No murmur heard.  Pulmonary:      Effort: Pulmonary effort is normal.      Breath sounds: Normal breath sounds. No wheezing.   Chest:   Breasts:     Right: No inverted nipple, mass, nipple discharge, skin change or tenderness.      Left: No inverted nipple, mass, nipple discharge, skin change or tenderness.   Abdominal:      General: Bowel sounds are normal. There is no distension.      Palpations: Abdomen is soft. There is no mass.      Tenderness: There is no abdominal tenderness.   Musculoskeletal:         General: No tenderness. Normal range of motion.      Cervical back: Normal range of motion and neck supple.   Lymphadenopathy:      Cervical: No cervical adenopathy.   Skin:     General: Skin is warm and dry.      Findings: No rash.   Neurological:      Mental Status: She is alert and oriented to person, place, and time.      Cranial Nerves: No cranial nerve deficit.      Sensory: No sensory deficit.      Coordination: Coordination normal.      Gait: Gait normal.   Psychiatric:         Speech: Speech normal.         Behavior: Behavior normal.         Thought Content: Thought content normal.         Judgment: Judgment normal.          ECG 12 Lead    Date/Time: 10/3/2023 10:32 AM  Performed by: Parul Lomas MD  Authorized by: Parul Lomas MD   Comparison: compared with previous ECG   Similar to previous ECG  Rhythm: sinus rhythm  Rate: normal  BPM: 60  Conduction: conduction normal  ST Segments: ST segments normal  T Waves: T waves normal  QRS axis: normal    Clinical impression: normal ECG         Fall Risk Screen:  STEADI Fall Risk Assessment was completed, and patient is at LOW risk for falls.Assessment completed on:1/16/2023    Health Habits and Functional and Cognitive Screening:       No data to display                Smoking  Status:  Social History     Tobacco Use   Smoking Status Never   Smokeless Tobacco Never       Alcohol Consumption:  Social History     Substance and Sexual Activity   Alcohol Use No       Depression [place]  PHQ-9:        1/18/2023    11:59 AM   PHQ-2/PHQ-9 Depression Screening   Little Interest or Pleasure in Doing Things 0-->not at all   Feeling Down, Depressed or Hopeless 0-->not at all   PHQ-9: Brief Depression Severity Measure Score 0      Patient's depression is single episode and is mild without psychosis. Their depression is currently in partial remission and the condition is improving with treatment. This will be reassessed at the next regular appointment. F/U as described:patient will continue current medication therapy.     ACE III MINI        Labs  Results for orders placed or performed in visit on 10/03/23   POC Glycosylated Hemoglobin (Hb A1C)    Specimen: Blood   Result Value Ref Range    Hemoglobin A1C 5.7 %    Lot Number [id number]     Expiration Date 6/6/2025         Assessment & Plan     Patient Self-Management and Personalized Health Advice    The patient has been provided counseling and guidance about: diet, exercise, weight management, and prevention of cardiac or vascular disease and preventive services including:   Annual Wellness Visit (AWV).  Patient Instructions   Problem List Items Addressed This Visit          Cardiac and Vasculature    Hypercholesterolemia    Overview     Taking atorvastatin every evening.           Current Assessment & Plan     Continue atorvastatin every evening.         Relevant Medications    atorvastatin (LIPITOR) 10 MG tablet    Other Relevant Orders    ECG 12 Lead       Endocrine and Metabolic    Vitamin D deficiency    Current Assessment & Plan     She will continue her current dose of calcium.         Abnormal glucose    Current Assessment & Plan     We are rechecking hemoglobin A1c today. She will decrease sugars in the diet. She will increase physical  activity and exercise.         Relevant Orders    POC Glycosylated Hemoglobin (Hb A1C) (Completed)       Health Encounters    Annual physical exam - Primary    Overview       She was advised to get the flu shot about the first week of October.  She was also advised to get hepatitis a second injection and shingrix second injection at her pharmacy.            Mental Health    Depression, major, single episode, mild    Current Assessment & Plan     She will continue desvenlafaxine daily, bupropion every morning, and buspirone 2 to 3 times per day. She will come back to see me in about 6 weeks.         Relevant Medications    buPROPion XL (WELLBUTRIN XL) 150 MG 24 hr tablet    busPIRone (BUSPAR) 5 MG tablet    hydrOXYzine (ATARAX) 50 MG tablet    desvenlafaxine (PRISTIQ) 50 MG 24 hr tablet    Phendimetrazine Tartrate 35 MG tablet       Other    Overweight with body mass index (BMI) of 26 to 26.9 in adult (Chronic)    Overview     Tried phentermine and diethylpropion without benefit.         Current Assessment & Plan     She will continue phendimetrazine 3 times per day before meals. The patient will continue bupropion  mg tablet every morning. We will add topiramate (Topamax) every evening. She will start with the 25 mg tablet every evening for 6 days, then 2 every evening for 6 days, then 3 every evening until she comes back to see me in about 6 weeks. We discussed increasing exercise and physical activity. She will try to walk more. Try to go to the gym more often. Add a hard boiled egg in the mornings or some peanut butter. At lunchtime, add some raw vegetables or leftover cooked vegetables from the night before. In the evenings, decrease the meat and chicken or fish portion to 4 ounces or the size of a deck of cards. Continue to avoid bread and potatoes in the diet.         Relevant Medications    Phendimetrazine Tartrate 35 MG tablet          Diagnosis Plan   1. Annual physical exam        2.  Hypercholesterolemia  ECG 12 Lead      3. Depression, major, single episode, mild        4. Overweight with body mass index (BMI) of 26 to 26.9 in adult  Phendimetrazine Tartrate 35 MG tablet      5. Abnormal glucose  POC Glycosylated Hemoglobin (Hb A1C)      6. Vitamin D deficiency            Outpatient Encounter Medications as of 10/3/2023   Medication Sig Dispense Refill    Ascorbic Acid (VITAMIN C PO) Take 500 mg by mouth Daily.      atorvastatin (LIPITOR) 10 MG tablet TAKE ONE TABLET BY MOUTH ONCE NIGHTLY 30 tablet 5    azelastine (ASTELIN) 0.1 % nasal spray 2 sprays into the nostril(s) as directed by provider 2 (Two) Times a Day. Use in each nostril as directed 1 each 12    buPROPion XL (WELLBUTRIN XL) 150 MG 24 hr tablet TAKE ONE TABLET BY MOUTH DAILY 90 tablet 1    busPIRone (BUSPAR) 5 MG tablet TAKE TWO TABLETS BY MOUTH THREE TIMES A  tablet 5    calcium carb-cholecalciferol 600-800 MG-UNIT tablet Take 1 tablet by mouth Daily.      Calcium Polycarbophil (FIBER-CAPS PO) Take 1 tablet by mouth Daily.      Cyanocobalamin (VITAMIN B-12 PO) Take 5,000 mcg by mouth Daily.      cyclobenzaprine (FLEXERIL) 10 MG tablet Take 1 tablet by mouth 3 (Three) Times a Day As Needed for Muscle Spasms. 30 tablet 0    desvenlafaxine (PRISTIQ) 50 MG 24 hr tablet TAKE ONE TABLET BY MOUTH DAILY 30 tablet 5    Diclofenac Sodium (VOLTAREN) 1 % gel gel Apply 4 g topically to the appropriate area as directed 4 (Four) Times a Day As Needed (joint pain). 350 g 5    estradiol (ESTRACE) 0.5 MG tablet Take 1 tablet by mouth Daily.      Flax Oil-Fish Oil-Borage Oil (FISH OIL-FLAX OIL-BORAGE OIL PO) Take 1 capsule by mouth Daily.      fluticasone (FLONASE) 50 MCG/ACT nasal spray 1 spray into the nostril(s) as directed by provider 2 (Two) Times a Day. 16 g 5    hydrOXYzine (ATARAX) 50 MG tablet TAKE ONE TABLET BY MOUTH ONCE NIGHTLY 30 tablet 2    Phendimetrazine Tartrate 35 MG tablet Take 35 mg by mouth 3 (Three) Times a Day. 90 each 2     pramipexole (MIRAPEX) 0.5 MG tablet TAKE ONE TABLET BY MOUTH DAILY AS NEEDED AND TAKE THREE TABLETS BY MOUTH EVERY NIGHT AT BEDTIME 120 tablet 1    triamcinolone (KENALOG) 0.1 % ointment Apply  topically to the appropriate area as directed.      [DISCONTINUED] Phendimetrazine Tartrate 35 MG tablet Take 35 mg by mouth 3 (Three) Times a Day. 90 each 2    topiramate (TOPAMAX) 25 MG tablet Take one every evening for 6 days then 2 every evening for 6 days then 3 every evening ongoing 90 tablet 1     No facility-administered encounter medications on file as of 10/3/2023.       Reviewed use of high risk medication in the elderly: yes  Reviewed for potential of harmful drug interactions in the elderly: yes    Age appropriate preventive counseling done including age appropriate vaccines,regular  Mammogram and self breast exam, pap smear, colonoscopy, regular dental visits, mental health, injury prevention such as wearing seat belt and preventing falls, healthy  nutrition, healthy weight, regular physical exercise. Alcohol use is moderate.  Tobacco history-none. Drug use-none.  STD's-not at risk.    Follow Up:  Return in about 6 weeks (around 11/14/2023) for Recheck.         An After Visit Summary and PPPS with all of these plans were given to the patient.      Additional E&M Note during same encounter follows:  Patient has multiple medical problems which are significant and separately identifiable that require additional work above and beyond the Medicare Wellness Visit.      Chief Complaint  Annual Exam and Hyperlipidemia    Subjective        HPI  Suze Baeza is also being seen today for overweight and abnormal glucose.    Overweight  The patient is unsure if the phendimetrazine is helping. She has been on phentermine in the past, but she did not seem to lose weight on it. We stopped the phentermine in 12/2022 and then replaced it with the phendimetrazine. The patient is not exercising much. She does not think she  "could increase her exercise at this time. The patient goes up and down the steps 100 times a day. She belongs to the gym, but she does not have that right now. The patient sees her mother every day. She has very little time for exercise. For lunch, she will have peanuts, cashews, a few pretzels, and cheese. The patient eats meat and vegetables for dinner. She does not eat breakfast anymore. The patient is usually hungry by dinner time sometimes. She eats dinner at 6 PM. The patient will eat a piece of fruit after dinner. She drinks water with lemonade. The patient will have a glass of real lemonade, but when she runs out she will buy sugar free. Her meat portion is not big. The vegetables in the evening are usually plain. She does not eat bread. The patient very seldom eats potatoes. She is going to start eating sugar free. The patient will add topiramate 1 tablet in the evening for 6 days, then 2 every evening for 6 days, and then go to 3 every evening to see if that helps with weight loss. The patient will stay on 3 per day until she is seen again. She will call if it causes sleepiness the next day.    Abnormal glucose elevation  Her blood sugar was 145 mg/dL. We will check her hemoglobin A1c today.         Objective   Vital Signs:  /72 (BP Location: Left arm, Patient Position: Sitting, Cuff Size: Adult)   Pulse 69   Temp 98 °F (36.7 °C) (Infrared)   Ht 165.1 cm (65\")   Wt 71.5 kg (157 lb 9.6 oz)   SpO2 97%   BMI 26.23 kg/m²     The following data was reviewed by: Parul Lomas MD on 10/03/2023:  CMP          11/30/2022    10:51 9/11/2023    13:27   CMP   Glucose 92  145    BUN 16  13    Creatinine 0.77  0.71    EGFR 85.7  93.9    Sodium 137  141    Potassium 4.5  4.0    Chloride 100  103    Calcium 9.5  9.4    Total Protein 6.8  6.9    Albumin 4.20  4.4    Globulin 2.6  2.5    Total Bilirubin 0.3  0.3    Alkaline Phosphatase 94  103    AST (SGOT) 19  18    ALT (SGPT) 17  20    Albumin/Globulin " Ratio 1.6  1.8    BUN/Creatinine Ratio 20.8  18.3    Anion Gap 10.0  11.6          Lipid Panel          9/11/2023    13:27   Lipid Panel   Total Cholesterol 164    Triglycerides 122    HDL Cholesterol 53    VLDL Cholesterol 22    LDL Cholesterol  89    LDL/HDL Ratio 1.63         Assessment and Plan   Diagnoses and all orders for this visit:    1. Annual physical exam (Primary)    2. Hypercholesterolemia  Assessment & Plan:  Continue atorvastatin every evening.    Orders:  -     ECG 12 Lead    3. Depression, major, single episode, mild  Assessment & Plan:  She will continue desvenlafaxine daily, bupropion every morning, and buspirone 2 to 3 times per day. She will come back to see me in about 6 weeks.      4. Overweight with body mass index (BMI) of 26 to 26.9 in adult  Assessment & Plan:  She will continue phendimetrazine 3 times per day before meals. The patient will continue bupropion  mg tablet every morning. We will add topiramate (Topamax) every evening. She will start with the 25 mg tablet every evening for 6 days, then 2 every evening for 6 days, then 3 every evening until she comes back to see me in about 6 weeks. We discussed increasing exercise and physical activity. She will try to walk more. Try to go to the gym more often. Add a hard boiled egg in the mornings or some peanut butter. At lunchtime, add some raw vegetables or leftover cooked vegetables from the night before. In the evenings, decrease the meat and chicken or fish portion to 4 ounces or the size of a deck of cards. Continue to avoid bread and potatoes in the diet.    Orders:  -     Phendimetrazine Tartrate 35 MG tablet; Take 35 mg by mouth 3 (Three) Times a Day.  Dispense: 90 each; Refill: 2    5. Abnormal glucose  Assessment & Plan:  We are rechecking hemoglobin A1c today. She will decrease sugars in the diet. She will increase physical activity and exercise.    Orders:  -     POC Glycosylated Hemoglobin (Hb A1C)    6. Vitamin D  deficiency  Assessment & Plan:  She will continue her current dose of calcium.      Other orders  -     topiramate (TOPAMAX) 25 MG tablet; Take one every evening for 6 days then 2 every evening for 6 days then 3 every evening ongoing  Dispense: 90 tablet; Refill: 1             Follow Up   Return in about 6 weeks (around 11/14/2023) for Recheck.  Patient was given instructions and counseling regarding her condition or for health maintenance advice. Please see specific information pulled into the AVS if appropriate.     Note: Part of this note may be an electronic transcription/translation of spoken language to printed text using the Dragon Dictation System.     Parul Lomas MD       Transcribed from ambient dictation for Parul Lomas MD by Elana Diallo.  10/03/23   11:47 EDT    Patient or patient representative verbalized consent to the visit recording.  I have personally performed the services described in this document as transcribed by the above individual, and it is both accurate and complete.

## 2023-10-03 NOTE — ASSESSMENT & PLAN NOTE
She will continue phendimetrazine 3 times per day before meals. The patient will continue bupropion  mg tablet every morning. We will add topiramate (Topamax) every evening. She will start with the 25 mg tablet every evening for 6 days, then 2 every evening for 6 days, then 3 every evening until she comes back to see me in about 6 weeks. We discussed increasing exercise and physical activity. She will try to walk more. Try to go to the gym more often. Add a hard boiled egg in the mornings or some peanut butter. At lunchtime, add some raw vegetables or leftover cooked vegetables from the night before. In the evenings, decrease the meat and chicken or fish portion to 4 ounces or the size of a deck of cards. Continue to avoid bread and potatoes in the diet.

## 2023-10-03 NOTE — ASSESSMENT & PLAN NOTE
We are rechecking hemoglobin A1c today. She will decrease sugars in the diet. She will increase physical activity and exercise.

## 2023-10-03 NOTE — ASSESSMENT & PLAN NOTE
She will continue desvenlafaxine daily, bupropion every morning, and buspirone 2 to 3 times per day. She will come back to see me in about 6 weeks.

## 2023-10-10 ENCOUNTER — TELEPHONE (OUTPATIENT)
Dept: INTERNAL MEDICINE | Facility: CLINIC | Age: 68
End: 2023-10-10
Payer: COMMERCIAL

## 2023-10-26 RX ORDER — PRAMIPEXOLE DIHYDROCHLORIDE 0.5 MG/1
TABLET ORAL
Qty: 120 TABLET | Refills: 1 | Status: SHIPPED | OUTPATIENT
Start: 2023-10-26

## 2023-10-26 NOTE — TELEPHONE ENCOUNTER
Rx Refill Note  Requested Prescriptions     Pending Prescriptions Disp Refills    pramipexole (MIRAPEX) 0.5 MG tablet [Pharmacy Med Name: PRAMIPEXOLE 0.5 MG TABLET] 120 tablet 1     Sig: TAKE 1 TABLET BY MOUTH DAILY AS NEEDED AND TAKE TAKE THREE TABLETS BY MOUTH EVERY NIGHT AT BEDTIME      Last office visit with prescribing clinician: Visit date not found   Last telemedicine visit with prescribing clinician: Visit date not found   Next office visit with prescribing clinician: Visit date not found                         Would you like a call back once the refill request has been completed: [] Yes [] No    If the office needs to give you a call back, can they leave a voicemail: [] Yes [] No    Mer Mccoy LPN  10/26/23, 08:17 EDT

## 2023-10-30 ENCOUNTER — LAB (OUTPATIENT)
Dept: LAB | Facility: HOSPITAL | Age: 68
End: 2023-10-30
Payer: COMMERCIAL

## 2023-10-30 ENCOUNTER — TELEPHONE (OUTPATIENT)
Dept: INTERNAL MEDICINE | Facility: CLINIC | Age: 68
End: 2023-10-30
Payer: COMMERCIAL

## 2023-10-30 DIAGNOSIS — R30.0 DYSURIA: ICD-10-CM

## 2023-10-30 DIAGNOSIS — R30.0 DYSURIA: Primary | ICD-10-CM

## 2023-10-30 LAB
BACTERIA UR QL AUTO: ABNORMAL /HPF
BILIRUB UR QL STRIP: NEGATIVE
CLARITY UR: ABNORMAL
COLOR UR: YELLOW
GLUCOSE UR STRIP-MCNC: NEGATIVE MG/DL
HGB UR QL STRIP.AUTO: ABNORMAL
HOLD SPECIMEN: NORMAL
HYALINE CASTS UR QL AUTO: ABNORMAL /LPF
KETONES UR QL STRIP: ABNORMAL
LEUKOCYTE ESTERASE UR QL STRIP.AUTO: ABNORMAL
NITRITE UR QL STRIP: POSITIVE
PH UR STRIP.AUTO: 7 [PH] (ref 5–8)
PROT UR QL STRIP: ABNORMAL
RBC # UR STRIP: ABNORMAL /HPF
REF LAB TEST METHOD: ABNORMAL
SP GR UR STRIP: 1.02 (ref 1–1.03)
SQUAMOUS #/AREA URNS HPF: ABNORMAL /HPF
UROBILINOGEN UR QL STRIP: ABNORMAL
WBC # UR STRIP: ABNORMAL /HPF

## 2023-10-30 PROCEDURE — 87088 URINE BACTERIA CULTURE: CPT

## 2023-10-30 PROCEDURE — 87186 SC STD MICRODIL/AGAR DIL: CPT

## 2023-10-30 PROCEDURE — 81001 URINALYSIS AUTO W/SCOPE: CPT

## 2023-10-30 PROCEDURE — 87086 URINE CULTURE/COLONY COUNT: CPT

## 2023-10-30 NOTE — TELEPHONE ENCOUNTER
Pt called. She thinks she has UTI but can't come in for appt. She said she wants to drop off urine sample for testing.    She has blood in urine.  She has other appts today.      Call pt can leave message  [phone]

## 2023-11-01 LAB — BACTERIA SPEC AEROBE CULT: ABNORMAL

## 2023-11-09 ENCOUNTER — OFFICE VISIT (OUTPATIENT)
Dept: INTERNAL MEDICINE | Facility: CLINIC | Age: 68
End: 2023-11-09
Payer: COMMERCIAL

## 2023-11-09 VITALS
TEMPERATURE: 98.2 F | HEART RATE: 88 BPM | WEIGHT: 149 LBS | BODY MASS INDEX: 24.83 KG/M2 | HEIGHT: 65 IN | SYSTOLIC BLOOD PRESSURE: 98 MMHG | DIASTOLIC BLOOD PRESSURE: 62 MMHG

## 2023-11-09 DIAGNOSIS — R53.83 OTHER FATIGUE: Primary | ICD-10-CM

## 2023-11-09 DIAGNOSIS — R30.0 DYSURIA: ICD-10-CM

## 2023-11-09 DIAGNOSIS — R68.83 CHILLS: ICD-10-CM

## 2023-11-09 PROCEDURE — 99213 OFFICE O/P EST LOW 20 MIN: CPT

## 2023-11-09 RX ORDER — NITROFURANTOIN 25; 75 MG/1; MG/1
100 CAPSULE ORAL 2 TIMES DAILY
Qty: 14 CAPSULE | Refills: 0 | Status: SHIPPED | OUTPATIENT
Start: 2023-11-09 | End: 2023-11-16

## 2023-11-09 NOTE — PROGRESS NOTES
Acute Office Visit      Name: Suze Baeza    : 1955     MRN: [id number]   Care Team: Patient Care Team:  Parul Lomas MD as PCP - General (Internal Medicine)  Kendrick Andersen MD as Consulting Physician (Dermatology)  Heraclio Mari MD as Consulting Physician (Orthopedic Surgery)  Chio Dhaliwal MD as Consulting Physician (Obstetrics and Gynecology)    Chief Complaint  Chills (Chilling at night ) and Fatigue    Subjective     History of Present Illness:  Suze Baeza is a 68-year-old female who presents to the clinic for fatigue related to stress and urinary symptoms.    She feels fatigue. Her mother is at a facility for memory care. Her symptoms are worsening. She had to take her to the emergency department for a bowel obstruction that she had previously had two surgeries for. Her mother has hospice and palliative care. Her mother was staying with her aunt who recently passed away last 2023. She feels like since all this has been going on, she feels shaky on the inside.     She says she sleeps but she continues to wake up tired. She has not had a sleep study and denies snoring. Dr. Lomas prescribed her medication to assist with her sleeping. She has a general malaise. She denies any known sick contact besides her mother. She recently completed labs. She says her glucose was elevated which she attributes to consuming too much lemonade. She takes a vitamin B12 supplement.     She was recently concerned for a urinary tract infection and dropped off a urine specimen but was told she did not have one. She says she was feeling heaviness in her bladder and experienced blood while wiping which made her believe she had a urinary tract infection. She notes minimal frequency. She denies allergies to antibiotics.      Review of Systems:  positive for fatigue  positive for general malaise  positive for chills  positive for stress  positive for urinary frequency  Past Medical  History:   Diagnosis Date    Annual physical exam 09/10/2020    COVID-19 virus infection 01/16/2023    Degenerative tear of medial meniscus, right     Hot flashes     PROZAC     Low back pain     Neuropathy, lumbosacral (radicular)     Primary osteoarthritis of both knees     Spondylolisthesis, lumbar region     Torn meniscus     right medial    Viral warts 02/25/2019    Wears reading eyeglasses        Past Surgical History:   Procedure Laterality Date    BLADDER SURGERY      COLONOSCOPY      HYSTERECTOMY  07/10/2012    KNEE ARTHROSCOPY Right     OOPHORECTOMY Bilateral 07/10/2012    TOTAL KNEE ARTHROPLASTY Bilateral 12/29/2020    Procedure: TOTAL KNEE ARTHROPLASTY BILATERAL;  Surgeon: Heraclio Mari MD;  Location: [facility];  Service: Orthopedics;  Laterality: Bilateral;       Social History     Socioeconomic History    Marital status:    Tobacco Use    Smoking status: Never    Smokeless tobacco: Never   Vaping Use    Vaping Use: Never used   Substance and Sexual Activity    Alcohol use: No    Drug use: No    Sexual activity: Defer         Current Outpatient Medications:     Ascorbic Acid (VITAMIN C PO), Take 500 mg by mouth Daily., Disp: , Rfl:     atorvastatin (LIPITOR) 10 MG tablet, TAKE ONE TABLET BY MOUTH ONCE NIGHTLY, Disp: 30 tablet, Rfl: 5    azelastine (ASTELIN) 0.1 % nasal spray, 2 sprays into the nostril(s) as directed by provider 2 (Two) Times a Day. Use in each nostril as directed, Disp: 1 each, Rfl: 12    buPROPion XL (WELLBUTRIN XL) 150 MG 24 hr tablet, TAKE ONE TABLET BY MOUTH DAILY, Disp: 90 tablet, Rfl: 1    busPIRone (BUSPAR) 5 MG tablet, TAKE TWO TABLETS BY MOUTH THREE TIMES A DAY, Disp: 180 tablet, Rfl: 5    calcium carb-cholecalciferol 600-800 MG-UNIT tablet, Take 1 tablet by mouth Daily., Disp: , Rfl:     Calcium Polycarbophil (FIBER-CAPS PO), Take 1 tablet by mouth Daily., Disp: , Rfl:     Cyanocobalamin (VITAMIN B-12 PO), Take 5,000 mcg by mouth Daily., Disp: , Rfl:      "desvenlafaxine (PRISTIQ) 50 MG 24 hr tablet, TAKE ONE TABLET BY MOUTH DAILY, Disp: 30 tablet, Rfl: 5    Diclofenac Sodium (VOLTAREN) 1 % gel gel, Apply 4 g topically to the appropriate area as directed 4 (Four) Times a Day As Needed (joint pain)., Disp: 350 g, Rfl: 5    estradiol (ESTRACE) 0.5 MG tablet, Take 1 tablet by mouth Daily., Disp: , Rfl:     Flax Oil-Fish Oil-Borage Oil (FISH OIL-FLAX OIL-BORAGE OIL PO), Take 1 capsule by mouth Daily., Disp: , Rfl:     fluticasone (FLONASE) 50 MCG/ACT nasal spray, 1 spray into the nostril(s) as directed by provider 2 (Two) Times a Day., Disp: 16 g, Rfl: 5    hydrOXYzine (ATARAX) 50 MG tablet, TAKE ONE TABLET BY MOUTH ONCE NIGHTLY, Disp: 30 tablet, Rfl: 2    Phendimetrazine Tartrate 35 MG tablet, Take 35 mg by mouth 3 (Three) Times a Day., Disp: 90 each, Rfl: 2    pramipexole (MIRAPEX) 0.5 MG tablet, TAKE 1 TABLET BY MOUTH DAILY AS NEEDED AND TAKE TAKE THREE TABLETS BY MOUTH EVERY NIGHT AT BEDTIME, Disp: 120 tablet, Rfl: 1    topiramate (TOPAMAX) 25 MG tablet, Take one every evening for 6 days then 2 every evening for 6 days then 3 every evening ongoing (Patient taking differently: Take 3 tablets by mouth Every Night. Take one every evening for 6 days then 2 every evening for 6 days then 3 every evening ongoing), Disp: 90 tablet, Rfl: 1    nitrofurantoin, macrocrystal-monohydrate, (Macrobid) 100 MG capsule, Take 1 capsule by mouth 2 (Two) Times a Day for 7 days., Disp: 14 capsule, Rfl: 0    Procedures    PHQ-9 Total Score:      Objective     Vital Signs  BP 98/62 (BP Location: Left arm, Patient Position: Sitting, Cuff Size: Adult)   Pulse 88   Temp 98.2 °F (36.8 °C) (Temporal)   Ht 165.1 cm (65\")   Wt 67.6 kg (149 lb)   BMI 24.79 kg/m²   Estimated body mass index is 24.79 kg/m² as calculated from the following:    Height as of this encounter: 165.1 cm (65\").    Weight as of this encounter: 67.6 kg (149 lb).            Physical Exam  Vitals and nursing note reviewed. "   HENT:      Head: Normocephalic.   Cardiovascular:      Rate and Rhythm: Normal rate.   Pulmonary:      Effort: Pulmonary effort is normal.      Breath sounds: Normal breath sounds.   Skin:     General: Skin is warm and dry.   Neurological:      General: No focal deficit present.      Mental Status: She is alert and oriented to person, place, and time.   Psychiatric:         Mood and Affect: Mood normal.         Behavior: Behavior normal.         Thought Content: Thought content normal.         Judgment: Judgment normal.          Assessment and Plan     Assessment/Plan:  Diagnoses and all orders for this visit:    1. Other fatigue (Primary)  -Discussed the possibility of fatigue coming from both stress, anxiety, and urinary tract infection.  Will treat for UTI however, discussed deep breathing exercises and meditation to help decrease anxiety and overall stress while dealing with the loss of her aunt and declining health of her mother.  - I advised the patient to utilize deep breathing exercises.    2. Chills  -     nitrofurantoin, macrocrystal-monohydrate, (Macrobid) 100 MG capsule; Take 1 capsule by mouth 2 (Two) Times a Day for 7 days.  Dispense: 14 capsule; Refill: 0    3. Dysuria  -     nitrofurantoin, macrocrystal-monohydrate, (Macrobid) 100 MG capsule; Take 1 capsule by mouth 2 (Two) Times a Day for 7 days.  Dispense: 14 capsule; Refill: 0  -Upon review of recent labs to include both UA and UC, and continued symptoms, will start patient on Macrobid 100 mg twice daily x7 days.  -Encouraged to drink plenty of water.       There are no Patient Instructions on file for this visit.  Plan of care reviewed with patient at the conclusion of today's visit. Education was provided regarding diagnosis, management and any prescribed or recommended OTC medications.  Patient verbalizes understanding of and agreement with management plan.    Follow Up  Return for Next Scheduled Follow up.    TAQUERIA Infante      Transcribed from ambient dictation for TAQUERIA Infante by Enrique Buitrago.  11/09/23   13:35 EST    Patient or patient representative verbalized consent to the visit recording.  I have personally performed the services described in this document as transcribed by the above individual, and it is both accurate and complete.

## 2023-11-20 ENCOUNTER — TRANSCRIBE ORDERS (OUTPATIENT)
Dept: ADMINISTRATIVE | Facility: HOSPITAL | Age: 68
End: 2023-11-20
Payer: COMMERCIAL

## 2023-11-20 DIAGNOSIS — Z13.820 OSTEOPOROSIS SCREENING: Primary | ICD-10-CM

## 2023-11-22 ENCOUNTER — OFFICE VISIT (OUTPATIENT)
Dept: INTERNAL MEDICINE | Facility: CLINIC | Age: 68
End: 2023-11-22
Payer: COMMERCIAL

## 2023-11-22 VITALS
BODY MASS INDEX: 24.12 KG/M2 | WEIGHT: 144.8 LBS | HEART RATE: 67 BPM | HEIGHT: 65 IN | TEMPERATURE: 98.2 F | DIASTOLIC BLOOD PRESSURE: 68 MMHG | OXYGEN SATURATION: 97 % | SYSTOLIC BLOOD PRESSURE: 122 MMHG

## 2023-11-22 DIAGNOSIS — M54.2 CERVICALGIA: ICD-10-CM

## 2023-11-22 DIAGNOSIS — E78.00 HYPERCHOLESTEROLEMIA: ICD-10-CM

## 2023-11-22 DIAGNOSIS — F43.29 STRESS AND ADJUSTMENT REACTION: ICD-10-CM

## 2023-11-22 DIAGNOSIS — R73.09 ABNORMAL GLUCOSE: ICD-10-CM

## 2023-11-22 DIAGNOSIS — R30.0 DYSURIA: Primary | ICD-10-CM

## 2023-11-22 DIAGNOSIS — F32.0 DEPRESSION, MAJOR, SINGLE EPISODE, MILD: ICD-10-CM

## 2023-11-22 DIAGNOSIS — F43.21 GRIEF: Chronic | ICD-10-CM

## 2023-11-22 LAB
BILIRUB BLD-MCNC: NEGATIVE MG/DL
CLARITY, POC: ABNORMAL
COLOR UR: YELLOW
EXPIRATION DATE: ABNORMAL
GLUCOSE UR STRIP-MCNC: NEGATIVE MG/DL
KETONES UR QL: NEGATIVE
LEUKOCYTE EST, POC: ABNORMAL
Lab: ABNORMAL
NITRITE UR-MCNC: NEGATIVE MG/ML
PH UR: 7 [PH] (ref 5–8)
PROT UR STRIP-MCNC: ABNORMAL MG/DL
RBC # UR STRIP: ABNORMAL /UL
SP GR UR: 1.02 (ref 1–1.03)
UROBILINOGEN UR QL: ABNORMAL

## 2023-11-22 PROCEDURE — 87086 URINE CULTURE/COLONY COUNT: CPT | Performed by: INTERNAL MEDICINE

## 2023-11-22 PROCEDURE — 81003 URINALYSIS AUTO W/O SCOPE: CPT | Performed by: INTERNAL MEDICINE

## 2023-11-22 PROCEDURE — 87186 SC STD MICRODIL/AGAR DIL: CPT | Performed by: INTERNAL MEDICINE

## 2023-11-22 PROCEDURE — 99214 OFFICE O/P EST MOD 30 MIN: CPT | Performed by: INTERNAL MEDICINE

## 2023-11-22 NOTE — ASSESSMENT & PLAN NOTE
We will recheck urinalysis today. She did finish her antibiotic for E. coli UTI. She does have some residual symptoms though. She will continue drinking lots of fluids.

## 2023-11-22 NOTE — ASSESSMENT & PLAN NOTE
She was given some neck stretches to do at home throughout the day. Also use a moist heat pack on the neck and upper back to relax tight muscles. Relaxation will also help with the stress and the tight muscles.

## 2023-11-22 NOTE — ASSESSMENT & PLAN NOTE
She will continue taking bupropion daily, buspirone 3 times per day, and desvenlafaxine daily. She will continue getting out and walking and being physically active, which also decreases symptoms of stress, anxiety, and mood.

## 2023-11-22 NOTE — PATIENT INSTRUCTIONS
Patient Instructions  Problem List Items Addressed This Visit          Cardiac and Vasculature    Hypercholesterolemia    Overview     Taking atorvastatin every evening.           Current Assessment & Plan     She will continue regular exercise and low-fat, low-carbohydrate diet. Continue atorvastatin nightly.         Relevant Medications    atorvastatin (LIPITOR) 10 MG tablet       Endocrine and Metabolic    Abnormal glucose    Current Assessment & Plan     Continue to avoid sugars and white carbohydrates in the diet.            Genitourinary and Reproductive     Dysuria - Primary    Current Assessment & Plan     We will recheck urinalysis today. She did finish her antibiotic for E. coli UTI. She does have some residual symptoms though. She will continue drinking lots of fluids.           Relevant Orders    POC Urinalysis Dipstick, Automated (Completed)    Urine Culture - Urine, Urine, Clean Catch       Mental Health    Grief (Chronic)    Overview     Mother passed recently.         Depression, major, single episode, mild    Current Assessment & Plan     She will continue taking bupropion daily, buspirone 3 times per day, and desvenlafaxine daily. She will continue getting out and walking and being physically active, which also decreases symptoms of stress, anxiety, and mood.         Relevant Medications    buPROPion XL (WELLBUTRIN XL) 150 MG 24 hr tablet    busPIRone (BUSPAR) 5 MG tablet    hydrOXYzine (ATARAX) 50 MG tablet    desvenlafaxine (PRISTIQ) 50 MG 24 hr tablet    Phendimetrazine Tartrate 35 MG tablet    Stress and adjustment reaction    Current Assessment & Plan     She will continue taking bupropion daily, buspirone 3 times per day, and desvenlafaxine daily. She will continue getting out and walking and being physically active, which also decreases symptoms of stress, anxiety, and mood.         Relevant Medications    buPROPion XL (WELLBUTRIN XL) 150 MG 24 hr tablet    busPIRone (BUSPAR) 5 MG tablet     hydrOXYzine (ATARAX) 50 MG tablet    desvenlafaxine (PRISTIQ) 50 MG 24 hr tablet    Phendimetrazine Tartrate 35 MG tablet       Musculoskeletal and Injuries    Cervicalgia    Current Assessment & Plan     She was given some neck stretches to do at home throughout the day. Also use a moist heat pack on the neck and upper back to relax tight muscles. Relaxation will also help with the stress and the tight muscles.            Exercising to Stay Healthy  To become healthy and stay healthy, it is recommended that you do moderate-intensity and vigorous-intensity exercise. You can tell that you are exercising at a moderate intensity if your heart starts beating faster and you start breathing faster but can still hold a conversation. You can tell that you are exercising at a vigorous intensity if you are breathing much harder and faster and cannot hold a conversation while exercising.  How can exercise benefit me?  Exercising regularly is important. It has many health benefits, such as:  Improving overall fitness, flexibility, and endurance.  Increasing bone density.  Helping with weight control.  Decreasing body fat.  Increasing muscle strength and endurance.  Reducing stress and tension, anxiety, depression, or anger.  Improving overall health.  What guidelines should I follow while exercising?  Before you start a new exercise program, talk with your health care provider.  Do not exercise so much that you hurt yourself, feel dizzy, or get very short of breath.  Wear comfortable clothes and wear shoes with good support.  Drink plenty of water while you exercise to prevent dehydration or heat stroke.  Work out until your breathing and your heartbeat get faster (moderate intensity).  How often should I exercise?  Choose an activity that you enjoy, and set realistic goals. Your health care provider can help you make an activity plan that is individually designed and works best for you.  Exercise regularly as told by your  health care provider. This may include:  Doing strength training two times a week, such as:  Lifting weights.  Using resistance bands.  Push-ups.  Sit-ups.  Yoga.  Doing a certain intensity of exercise for a given amount of time. Choose from these options:  A total of 150 minutes of moderate-intensity exercise every week.  A total of 75 minutes of vigorous-intensity exercise every week.  A mix of moderate-intensity and vigorous-intensity exercise every week.  Children, pregnant women, people who have not exercised regularly, people who are overweight, and older adults may need to talk with a health care provider about what activities are safe to perform. If you have a medical condition, be sure to talk with your health care provider before you start a new exercise program.  What are some exercise ideas?  Moderate-intensity exercise ideas include:  Walking 1 mile (1.6 km) in about 15 minutes.  Biking.  Hiking.  Golfing.  Dancing.  Water aerobics.  Vigorous-intensity exercise ideas include:  Walking 4.5 miles (7.2 km) or more in about 1 hour.  Jogging or running 5 miles (8 km) in about 1 hour.  Biking 10 miles (16.1 km) or more in about 1 hour.  Lap swimming.  Roller-skating or in-line skating.  Cross-country skiing.  Vigorous competitive sports, such as football, basketball, and soccer.  Jumping rope.  Aerobic dancing.  What are some everyday activities that can help me get exercise?  Yard work, such as:  Pushing a .  Raking and bagging leaves.  Washing your car.  Pushing a stroller.  Shoveling snow.  Gardening.  Washing windows or floors.  How can I be more active in my day-to-day activities?  Use stairs instead of an elevator.  Take a walk during your lunch break.  If you drive, park your car farther away from your work or school.  If you take public transportation, get off one stop early and walk the rest of the way.  Stand up or walk around during all of your indoor phone calls.  Get up, stretch, and  walk around every 30 minutes throughout the day.  Enjoy exercise with a friend. Support to continue exercising will help you keep a regular routine of activity.  Where to find more information  You can find more information about exercising to stay healthy from:  [place] Department of Health and Human Services: www.hhs.gov  Centers for Disease Control and Prevention (CDC): www.cdc.gov  Summary  Exercising regularly is important. It will improve your overall fitness, flexibility, and endurance.  Regular exercise will also improve your overall health. It can help you control your weight, reduce stress, and improve your bone density.  Do not exercise so much that you hurt yourself, feel dizzy, or get very short of breath.  Before you start a new exercise program, talk with your health care provider.  This information is not intended to replace advice given to you by your health care provider. Make sure you discuss any questions you have with your health care provider.  Document Revised: 04/15/2022 Document Reviewed: 04/15/2022  [employer] Patient Education © 2023 [employer] Heart-Healthy Eating Plan  Many factors influence your heart (coronary) health, including eating and exercise habits. Coronary risk increases with abnormal blood fat (lipid) levels. Heart-healthy meal planning includes limiting unhealthy fats, increasing healthy fats, and making other diet and lifestyle changes.  What is my plan?  Your health care provider may recommend that you:  Limit your fat intake to _________% or less of your total calories each day.  Limit your saturated fat intake to _________% or less of your total calories each day.  Limit the amount of cholesterol in your diet to less than _________ mg per day.  What are tips for following this plan?  Cooking  Cook foods using methods other than frying. Baking, boiling, grilling, and broiling are all good options. Other ways to reduce fat include:  Removing the skin from poultry.  Removing  all visible fats from meats.  Steaming vegetables in water or broth.  Meal planning  A plate with examples of foods in a healthy diet.      At meals, imagine dividing your plate into fourths:  Fill one-half of your plate with vegetables and green salads.  Fill one-fourth of your plate with whole grains.  Fill one-fourth of your plate with lean protein foods.  Eat 4-5 servings of vegetables per day. One serving equals 1 cup raw or cooked vegetable, or 2 cups raw leafy greens.  Eat 4-5 servings of fruit per day. One serving equals 1 medium whole fruit, ¼ cup dried fruit, ½ cup fresh, frozen, or canned fruit, or ½ cup 100% fruit juice.  Eat more foods that contain soluble fiber. Examples include apples, broccoli, carrots, beans, peas, and barley. Aim to get 25-30 g of fiber per day.  Increase your consumption of legumes, nuts, and seeds to 4-5 servings per week. One serving of dried beans or legumes equals ½ cup cooked, 1 serving of nuts is ¼ cup, and 1 serving of seeds equals 1 tablespoon.  Fats  Choose healthy fats more often. Choose monounsaturated and polyunsaturated fats, such as olive and canola oils, flaxseeds, walnuts, almonds, and seeds.  Eat more omega-3 fats. Choose salmon, mackerel, sardines, tuna, flaxseed oil, and ground flaxseeds. Aim to eat fish at least 2 times each week.  Check food labels carefully to identify foods with trans fats or high amounts of saturated fat.  Limit saturated fats. These are found in animal products, such as meats, butter, and cream. Plant sources of saturated fats include palm oil, palm kernel oil, and coconut oil.  Avoid foods with partially hydrogenated oils in them. These contain trans fats. Examples are stick margarine, some tub margarines, cookies, crackers, and other baked goods.  Avoid fried foods.  General information  Eat more home-cooked food and less restaurant, buffet, and fast food.  Limit or avoid alcohol.  Limit foods that are high in starch and sugar.  Lose  weight if you are overweight. Losing just 5-10% of your body weight can help your overall health and prevent diseases such as diabetes and heart disease.  Monitor your salt (sodium) intake, especially if you have high blood pressure. Talk with your health care provider about your sodium intake.  Try to incorporate more vegetarian meals weekly.  What foods can I eat?  Fruits  All fresh, canned (in natural juice), or frozen fruits.  Vegetables  Fresh or frozen vegetables (raw, steamed, roasted, or grilled). Green salads.  Grains  Most grains. Choose whole wheat and whole grains most of the time. Rice and pasta, including brown rice and pastas made with whole wheat.  Meats and other proteins  Lean, well-trimmed beef, veal, pork, and lamb. Chicken and turkey without skin. All fish and shellfish. Wild duck, rabbit, pheasant, and venison. Egg whites or low-cholesterol egg substitutes. Dried beans, peas, lentils, and tofu. Seeds and most nuts.  Dairy  Low-fat or nonfat cheeses, including ricotta and mozzarella. Skim or 1% milk (liquid, powdered, or evaporated). Buttermilk made with low-fat milk. Nonfat or low-fat yogurt.  Fats and oils  Non-hydrogenated (trans-free) margarines. Vegetable oils, including soybean, sesame, sunflower, olive, peanut, safflower, corn, canola, and cottonseed. Salad dressings or mayonnaise made with a vegetable oil.  Beverages  Water (mineral or sparkling). Coffee and tea. Diet carbonated beverages.  Sweets and desserts  Sherbet, gelatin, and fruit ice. Small amounts of dark chocolate.  Limit all sweets and desserts.  Seasonings and condiments  All seasonings and condiments.  The items listed above may not be a complete list of foods and beverages you can eat. Contact a dietitian for more options.  What foods are not recommended?  Fruits  Canned fruit in heavy syrup. Fruit in cream or butter sauce. Fried fruit. Limit coconut.  Vegetables  Vegetables cooked in cheese, cream, or butter sauce. Fried  vegetables.  Grains  Breads made with saturated or trans fats, oils, or whole milk. Croissants. Sweet rolls. Donuts. High-fat crackers, such as cheese crackers.  Meats and other proteins  Fatty meats, such as hot dogs, ribs, sausage, garduno, rib-eye roast or steak. High-fat deli meats, such as salami and bologna. Caviar. Domestic duck and goose. Organ meats, such as liver.  Dairy  Cream, sour cream, cream cheese, and creamed cottage cheese. Whole-milk cheeses. Whole or 2% milk (liquid, evaporated, or condensed). Whole buttermilk. Cream sauce or high-fat cheese sauce. Whole-milk yogurt.  Fats and oils  Meat fat, or shortening. Cocoa butter, hydrogenated oils, palm oil, coconut oil, palm kernel oil. Solid fats and shortenings, including garduno fat, salt pork, lard, and butter. Nondairy cream substitutes. Salad dressings with cheese or sour cream.  Beverages  Regular sodas and any drinks with added sugar.  Sweets and desserts  Frosting. Pudding. Cookies. Cakes. Pies. Milk chocolate or white chocolate. Buttered syrups. Full-fat ice cream or ice cream drinks.  The items listed above may not be a complete list of foods and beverages to avoid. Contact a dietitian for more information.  Summary  Heart-healthy meal planning includes limiting unhealthy fats, increasing healthy fats, and making other diet and lifestyle changes.  Lose weight if you are overweight. Losing just 5-10% of your body weight can help your overall health and prevent diseases such as diabetes and heart disease.  Focus on eating a balance of foods, including fruits and vegetables, low-fat or nonfat dairy, lean protein, nuts and legumes, whole grains, and heart-healthy oils and fats.  This information is not intended to replace advice given to you by your health care provider. Make sure you discuss any questions you have with your health care provider.  Document Revised: 04/28/2022 Document Reviewed: 04/28/2022  [employer] Patient Education © 2022 [employer]

## 2023-11-22 NOTE — ASSESSMENT & PLAN NOTE
She will continue regular exercise and low-fat, low-carbohydrate diet. Continue atorvastatin nightly.

## 2023-11-22 NOTE — PROGRESS NOTES
[facility]     Suze Baeza  1955   [id number]      Patient Care Team:  Parul Lomas MD as PCP - General (Internal Medicine)  Kendrick Andersen MD as Consulting Physician (Dermatology)  Heraclio Mari MD as Consulting Physician (Orthopedic Surgery)  Chio Dhaliwal MD as Consulting Physician (Obstetrics and Gynecology)    Chief Complaint   Patient presents with    Hyperlipidemia            HPI  Patient is a 68 y.o. female presents today for a follow-up.    Urinary tract infection  The patient was recently seen by TAQUERIA Murguia, and was treated with Macrobid 100 mg for a urinary tract infection. She finished the medication in the last couple of days. Her symptoms have improved, but she still has mild burning with urination and some heaviness. She had a urine culture done on 10/30/2023, which was sensitive to all antibiotics.     Stress and anxiety  The patient's mother just passed away on Thursday, 11/16/2022, from a bowel obstruction. She is sleeping well; her medications are helping some.    Abnormal blood pressure  The patient's blood pressure is good today at 122/68 mmHg. On 11/09/20223, it 98/62 mmHg. She has not had any more low blood pressure readings.    Weight loss  The patient has lost 13 pounds. She is getting very little exercise. She has been eliminating sweets from her diet.    Neck pain  The patient has been experiencing pain in her upper back that radiates around to the front. The pain is present whether she is sitting or exercising. She states that it feels like it is muscle related. She would like to do physical therapy at home.    Immunizations  The patient is getting her influenza vaccine at [facility]. She believes that she has received her pneumococcal vaccine at [facility].       CHRONIC CONDITIONS      Past Medical History:   Diagnosis Date    Annual physical exam 09/10/2020    COVID-19 virus infection 01/16/2023    Degenerative tear of medial meniscus,  "right     Hot flashes     PROZAC     Low back pain     Neuropathy, lumbosacral (radicular)     Primary osteoarthritis of both knees     Spondylolisthesis, lumbar region     Torn meniscus     right medial    Viral warts 02/25/2019    Wears reading eyeglasses        Past Surgical History:   Procedure Laterality Date    BLADDER SURGERY      COLONOSCOPY      HYSTERECTOMY  07/10/2012    KNEE ARTHROSCOPY Right     OOPHORECTOMY Bilateral 07/10/2012    TOTAL KNEE ARTHROPLASTY Bilateral 12/29/2020    Procedure: TOTAL KNEE ARTHROPLASTY BILATERAL;  Surgeon: Heraclio Mari MD;  [facility];  Service: Orthopedics;  Laterality: Bilateral;       Family History   Problem Relation Age of Onset    Deep vein thrombosis Mother     Stroke Mother     Heart disease Mother 68    Heart attack Mother     Osteoarthritis Mother     Heart disease Father 51        cause of death    Heart attack Father     Stroke Other     Cancer Maternal Aunt         bone    Diabetes Maternal Grandmother     Cancer Maternal Aunt         lymphoma    Cancer Maternal Aunt         brain    Breast cancer Neg Hx     Ovarian cancer Neg Hx        Social History     Socioeconomic History    Marital status:    Tobacco Use    Smoking status: Never    Smokeless tobacco: Never   Vaping Use    Vaping Use: Never used   Substance and Sexual Activity    Alcohol use: No    Drug use: No    Sexual activity: Defer       Allergies   Allergen Reactions    Other Itching and Rash     CHG       Review of Systems:   The appropriate review of systems is included in the HPI.    Vital Signs  Vitals:    11/22/23 1324   BP: 122/68   BP Location: Left arm   Patient Position: Sitting   Cuff Size: Adult   Pulse: 67   Temp: 98.2 °F (36.8 °C)   TempSrc: Infrared   SpO2: 97%   Weight: 65.7 kg (144 lb 12.8 oz)   Height: 165.1 cm (65\")   PainSc: 0-No pain     Body mass index is 24.1 kg/m².  BMI is within normal parameters. No other follow-up for BMI required.        Current Outpatient " Medications:     Ascorbic Acid (VITAMIN C PO), Take 500 mg by mouth Daily., Disp: , Rfl:     atorvastatin (LIPITOR) 10 MG tablet, TAKE ONE TABLET BY MOUTH ONCE NIGHTLY, Disp: 30 tablet, Rfl: 5    azelastine (ASTELIN) 0.1 % nasal spray, 2 sprays into the nostril(s) as directed by provider 2 (Two) Times a Day. Use in each nostril as directed, Disp: 1 each, Rfl: 12    buPROPion XL (WELLBUTRIN XL) 150 MG 24 hr tablet, TAKE ONE TABLET BY MOUTH DAILY, Disp: 90 tablet, Rfl: 1    busPIRone (BUSPAR) 5 MG tablet, TAKE TWO TABLETS BY MOUTH THREE TIMES A DAY, Disp: 180 tablet, Rfl: 5    calcium carb-cholecalciferol 600-800 MG-UNIT tablet, Take 1 tablet by mouth Daily., Disp: , Rfl:     Calcium Polycarbophil (FIBER-CAPS PO), Take 1 tablet by mouth Daily., Disp: , Rfl:     Cyanocobalamin (VITAMIN B-12 PO), Take 5,000 mcg by mouth Daily., Disp: , Rfl:     desvenlafaxine (PRISTIQ) 50 MG 24 hr tablet, TAKE ONE TABLET BY MOUTH DAILY, Disp: 30 tablet, Rfl: 5    Diclofenac Sodium (VOLTAREN) 1 % gel gel, Apply 4 g topically to the appropriate area as directed 4 (Four) Times a Day As Needed (joint pain)., Disp: 350 g, Rfl: 5    estradiol (ESTRACE) 0.5 MG tablet, Take 1 tablet by mouth Daily., Disp: , Rfl:     Flax Oil-Fish Oil-Borage Oil (FISH OIL-FLAX OIL-BORAGE OIL PO), Take 1 capsule by mouth Daily., Disp: , Rfl:     fluticasone (FLONASE) 50 MCG/ACT nasal spray, 1 spray into the nostril(s) as directed by provider 2 (Two) Times a Day., Disp: 16 g, Rfl: 5    hydrOXYzine (ATARAX) 50 MG tablet, TAKE ONE TABLET BY MOUTH ONCE NIGHTLY, Disp: 30 tablet, Rfl: 2    Phendimetrazine Tartrate 35 MG tablet, Take 35 mg by mouth 3 (Three) Times a Day., Disp: 90 each, Rfl: 2    pramipexole (MIRAPEX) 0.5 MG tablet, TAKE 1 TABLET BY MOUTH DAILY AS NEEDED AND TAKE TAKE THREE TABLETS BY MOUTH EVERY NIGHT AT BEDTIME, Disp: 120 tablet, Rfl: 1    topiramate (TOPAMAX) 25 MG tablet, Take one every evening for 6 days then 2 every evening for 6 days then 3 every  evening ongoing (Patient taking differently: Take 3 tablets by mouth Every Night.), Disp: 90 tablet, Rfl: 1    Physical Exam:    Physical Exam  Vitals and nursing note reviewed.   Constitutional:       Appearance: Normal appearance. She is well-developed.   HENT:      Head: Normocephalic.   Eyes:      Conjunctiva/sclera: Conjunctivae normal.      Pupils: Pupils are equal, round, and reactive to light.   Neck:      Thyroid: No thyromegaly.   Cardiovascular:      Rate and Rhythm: Normal rate and regular rhythm.      Heart sounds: Normal heart sounds.   Pulmonary:      Effort: Pulmonary effort is normal.      Breath sounds: Normal breath sounds. No wheezing.   Musculoskeletal:         General: Normal range of motion.      Cervical back: Normal range of motion and neck supple.   Lymphadenopathy:      Cervical: No cervical adenopathy.   Skin:     General: Skin is warm and dry.   Neurological:      Mental Status: She is alert and oriented to person, place, and time.   Psychiatric:         Thought Content: Thought content normal.          ACE III MINI        Results Review:    I reviewed the patient's new clinical results.    CMP:  Lab Results   Component Value Date    BUN 13 09/11/2023    CREATININE 0.71 09/11/2023    EGFRIFNONA 87 09/23/2021    BCR 18.3 09/11/2023     09/11/2023    K 4.0 09/11/2023    CO2 26.4 09/11/2023    CALCIUM 9.4 09/11/2023    ALBUMIN 4.4 09/11/2023    BILITOT 0.3 09/11/2023    ALKPHOS 103 09/11/2023    AST 18 09/11/2023    ALT 20 09/11/2023     HbA1c:  Lab Results   Component Value Date    HGBA1C 5.7 10/03/2023    HGBA1C 5.70 (H) 12/17/2020     Microalbumin:  Lab Results   Component Value Date    MICROALBUR <1.2 09/11/2023     Lipid Panel  Lab Results   Component Value Date    CHOL 164 09/11/2023    TRIG 122 09/11/2023    HDL 53 09/11/2023    LDL 89 09/11/2023    AST 18 09/11/2023    ALT 20 09/11/2023       Medication Review: Medications reviewed and noted  Patient Instructions   Problem  List Items Addressed This Visit          Cardiac and Vasculature    Hypercholesterolemia    Overview     Taking atorvastatin every evening.           Current Assessment & Plan     She will continue regular exercise and low-fat, low-carbohydrate diet. Continue atorvastatin nightly.         Relevant Medications    atorvastatin (LIPITOR) 10 MG tablet       Endocrine and Metabolic    Abnormal glucose    Current Assessment & Plan     Continue to avoid sugars and white carbohydrates in the diet.            Genitourinary and Reproductive     Dysuria - Primary    Current Assessment & Plan     We will recheck urinalysis today. She did finish her antibiotic for E. coli UTI. She does have some residual symptoms though. She will continue drinking lots of fluids.           Relevant Orders    POC Urinalysis Dipstick, Automated (Completed)    Urine Culture - Urine, Urine, Clean Catch       Mental Health    Grief (Chronic)    Overview     Mother passed recently.         Depression, major, single episode, mild    Current Assessment & Plan     She will continue taking bupropion daily, buspirone 3 times per day, and desvenlafaxine daily. She will continue getting out and walking and being physically active, which also decreases symptoms of stress, anxiety, and mood.         Relevant Medications    buPROPion XL (WELLBUTRIN XL) 150 MG 24 hr tablet    busPIRone (BUSPAR) 5 MG tablet    hydrOXYzine (ATARAX) 50 MG tablet    desvenlafaxine (PRISTIQ) 50 MG 24 hr tablet    Phendimetrazine Tartrate 35 MG tablet    Stress and adjustment reaction    Current Assessment & Plan     She will continue taking bupropion daily, buspirone 3 times per day, and desvenlafaxine daily. She will continue getting out and walking and being physically active, which also decreases symptoms of stress, anxiety, and mood.         Relevant Medications    buPROPion XL (WELLBUTRIN XL) 150 MG 24 hr tablet    busPIRone (BUSPAR) 5 MG tablet    hydrOXYzine (ATARAX) 50 MG  tablet    desvenlafaxine (PRISTIQ) 50 MG 24 hr tablet    Phendimetrazine Tartrate 35 MG tablet       Musculoskeletal and Injuries    Cervicalgia    Current Assessment & Plan     She was given some neck stretches to do at home throughout the day. Also use a moist heat pack on the neck and upper back to relax tight muscles. Relaxation will also help with the stress and the tight muscles.               Diagnosis Plan   1. Dysuria  POC Urinalysis Dipstick, Automated    Urine Culture - Urine, Urine, Clean Catch    Urine Culture - Urine, Urine, Clean Catch      2. Grief        3. Cervicalgia        4. Depression, major, single episode, mild        5. Stress and adjustment reaction        6. Hypercholesterolemia        7. Abnormal glucose                Follow-up: She will come back to see me in 4 months for follow-up fasting.    Plan of care reviewed with patient at the conclusion of today's visit. Education was provided regarding diagnosis, management, and any prescribed or recommended OTC medications.Patient verbalizes understanding of and agreement with management plan.         Transcribed from ambient dictation for Parul Lomas MD by Zofia Alba.  11/22/23   15:57 EST    Patient or patient representative verbalized consent to the visit recording.  I have personally performed the services described in this document as transcribed by the above individual, and it is both accurate and complete.

## 2023-11-28 ENCOUNTER — TELEPHONE (OUTPATIENT)
Dept: INTERNAL MEDICINE | Facility: CLINIC | Age: 68
End: 2023-11-28

## 2023-11-28 ENCOUNTER — TELEPHONE (OUTPATIENT)
Dept: INTERNAL MEDICINE | Facility: CLINIC | Age: 68
End: 2023-11-28
Payer: COMMERCIAL

## 2023-11-28 ENCOUNTER — TRANSCRIBE ORDERS (OUTPATIENT)
Dept: ADMINISTRATIVE | Facility: HOSPITAL | Age: 68
End: 2023-11-28
Payer: COMMERCIAL

## 2023-11-28 DIAGNOSIS — Z12.31 VISIT FOR SCREENING MAMMOGRAM: Primary | ICD-10-CM

## 2023-11-28 NOTE — TELEPHONE ENCOUNTER
Caller: Suze Baeza    Relationship: Self    Best call back number:  [phone] (home)     What test was performed: URINE CULTURE     When was the test performed: 11-22-23    Where was the test performed:  IN OFFICE     Additional notes: PLEASE CALL WITH RESULTS

## 2023-11-28 NOTE — TELEPHONE ENCOUNTER
Patient understood and verbalized understanding. Advised of a delay in the processing of a urine culture and provided timeframe of 1-2 more days until we receive them from the lab

## 2023-11-28 NOTE — TELEPHONE ENCOUNTER
HERBERTH CALLED THE OFFICE TODAY AND WANTS TO KNOW IF HER LAB RESULTS ARE BACK FOR HER URINE TEST.   PLEASE CALL BACK WITH THE RESULTS

## 2023-11-28 NOTE — TELEPHONE ENCOUNTER
Hub staff attempted to follow warm transfer process and was unsuccessful     Caller: Suze Baeza    Relationship to patient: Self    Best call back number: [phone]     Patient is needing: PATIENT WAS CALLING MANGO BACK.  PATIENT WAS DISCONNECTED.

## 2023-11-28 NOTE — TELEPHONE ENCOUNTER
Spoke with pt. She states the urinary burning is intense and asked if there would be anything you recommend while waiting on the urinary culture?

## 2023-11-29 LAB
BACTERIA SPEC AEROBE CULT: ABNORMAL
BACTERIA SPEC AEROBE CULT: ABNORMAL

## 2023-11-29 RX ORDER — HYDROXYZINE 50 MG/1
50 TABLET, FILM COATED ORAL NIGHTLY
Qty: 90 TABLET | Refills: 1 | Status: SHIPPED | OUTPATIENT
Start: 2023-11-29

## 2023-11-29 RX ORDER — PHENAZOPYRIDINE HYDROCHLORIDE 200 MG/1
200 TABLET, FILM COATED ORAL 3 TIMES DAILY PRN
Qty: 10 TABLET | Refills: 0 | Status: SHIPPED | OUTPATIENT
Start: 2023-11-29

## 2023-11-29 RX ORDER — CIPROFLOXACIN 500 MG/1
500 TABLET, FILM COATED ORAL 2 TIMES DAILY
Qty: 14 TABLET | Refills: 0 | Status: SHIPPED | OUTPATIENT
Start: 2023-11-29

## 2023-11-29 NOTE — TELEPHONE ENCOUNTER
"Called and left detailed voicemail advising the below, requesting a callback for follow-up to discuss verbally over the phone:    \"I sent Pyridium for dysuria symptoms to take as needed.  I also went on and sent Cipro antibiotic to take twice a day until it is finished.  We will see if we need to do anything else when the urine culture comes back\"    The urine culture results are in her chart as of right now  "

## 2023-11-29 NOTE — TELEPHONE ENCOUNTER
Patient understood and verbalized understanding.    She also requested a refill on the following medication:    Rx Refill Note  Requested Prescriptions     Pending Prescriptions Disp Refills    hydrOXYzine (ATARAX) 50 MG tablet 30 tablet 2     Sig: Take 1 tablet by mouth Every Night.      Last office visit with prescribing clinician: 11/22/2023   Last telemedicine visit with prescribing clinician: Visit date not found   Next office visit with prescribing clinician: 3/26/2024                         Would you like a call back once the refill request has been completed: [] Yes [] No    If the office needs to give you a call back, can they leave a voicemail: [] Yes [] No    Leodan Birmingham MA  11/29/23, 14:47 EST

## 2023-12-11 RX ORDER — TOPIRAMATE 25 MG/1
75 TABLET ORAL NIGHTLY
Qty: 90 TABLET | Refills: 5 | Status: SHIPPED | OUTPATIENT
Start: 2023-12-11

## 2023-12-11 NOTE — TELEPHONE ENCOUNTER
Rx Refill Note  Requested Prescriptions     Pending Prescriptions Disp Refills    topiramate (TOPAMAX) 25 MG tablet [Pharmacy Med Name: TOPIRAMATE 25 MG TABLET] 90 tablet 1     Sig: TAKE ONE TABLET BY MOUTH EVERY EVENING FOR SIX DAYS THEN TAKE TWO TABLETS BY MOUTH EVERY EVENING FOR SIX DAYS THEN TAKE THREE TABLETS BY MOUTH EVERY EVENING ON GOING      Last office visit with prescribing clinician: 11/22/2023   Last telemedicine visit with prescribing clinician: Visit date not found   Next office visit with prescribing clinician: 3/26/2024                         Would you like a call back once the refill request has been completed: [] Yes [] No    If the office needs to give you a call back, can they leave a voicemail: [] Yes [] No    Kary Brown RN  12/11/23, 09:42 EST

## 2023-12-13 ENCOUNTER — TELEPHONE (OUTPATIENT)
Dept: INTERNAL MEDICINE | Facility: CLINIC | Age: 68
End: 2023-12-13
Payer: COMMERCIAL

## 2023-12-13 NOTE — TELEPHONE ENCOUNTER
Caller: Suze Baeza    Relationship: Self    Best call back number: [phone]     Which medication are you concerned about: topiramate (TOPAMAX) 25 MG tablet     Who prescribed you this medication: PCP    What are your concerns: PATIENT STATES SHE JUST PICKED THIS MEDICATION UP FROM THE PHARMACY 12/12/23 AND IS UNSURE WHY THIS MEDICATION WAS PRESCRIBED TO HER SINCE SHE DOESN'T SUFFER FROM MIGRAINES. PATIENT STATES HER NAME IS ON THE PRESCRIPTION BUT IS UNSURE IF THIS MEDICATION WAS INTENDED FOR HER DAUGHTER WHO DOES HAVE MIGRAINES. PATIENT STATES THE RECEIPT FROM THE PHARMACY STATES SHE WAS CHARGED $11.15 BUT THE SHEET THAT COMES WITH THE MEDICATION STATES THE topiramate (TOPAMAX) 25 MG tablet COST $17.73. PATIENT WOULD LIKE A CALLBACK FROM [employer] TO DISCUSS.      PHARMACY: [facility] [id number] - [place], [place] - [address] [place] [address] - [phone] [facility] [phone]  [phone]

## 2023-12-13 NOTE — TELEPHONE ENCOUNTER
Spoke with pt. Advised the topamax is to be taken at night, and was prescribed in October to assist with weight gain. Advised the Phendimetrazine is taken 3 separate times per day and is for weight loss, and the bupropion is taken once daily for depression

## 2023-12-20 ENCOUNTER — TELEPHONE (OUTPATIENT)
Dept: INTERNAL MEDICINE | Facility: CLINIC | Age: 68
End: 2023-12-20
Payer: COMMERCIAL

## 2023-12-20 NOTE — TELEPHONE ENCOUNTER
Caller: Suze Baeza    Relationship: Self    Best call back number: [phone]     Which medication are you concerned about: TOPRAMATE    Who prescribed you this medication: BRIAN BEDOLLA    When did you start taking this medication:     What are your concerns: PATIENT STATES MEDICATION IS NOT AGREEING WITH HER. PLEASE CALL TO DISCUSS.     How long have you had these concerns: NA

## 2023-12-20 NOTE — TELEPHONE ENCOUNTER
I spoke to patient she stated since she started the topiramate she has a dull ache in her chest right side residential across breast. She never had it until then.

## 2023-12-20 NOTE — TELEPHONE ENCOUNTER
Spoke with pt Pt was advised and verbalized understanding. She also stated how long should she wait if symptoms go away for her to resume back on medication?

## 2024-02-21 ENCOUNTER — TELEPHONE (OUTPATIENT)
Dept: INTERNAL MEDICINE | Facility: CLINIC | Age: 69
End: 2024-02-21
Payer: COMMERCIAL

## 2024-02-21 NOTE — TELEPHONE ENCOUNTER
If no fevers, chills, body aches, probably a mild cold or allergies.  She could add a antihistamine to azelastine or could take cold medicine to control symptoms.

## 2024-02-21 NOTE — TELEPHONE ENCOUNTER
Caller: Suze Baeza    Relationship: Self    Best call back number: [phone]     What medication are you requesting: MEDICATION TO HELP SYMPTOMS.    What are your current symptoms: YELLOW MUCUS    How long have you been experiencing symptoms: 1 WEEK    Have you had these symptoms before:    [x] Yes  [] No    Have you been treated for these symptoms before:   [x] Yes  [] No    If a prescription is needed, what is your preferred pharmacy and phone number: [facility] [id number] [address] [phone] [facility] [phone]      Additional notes:

## 2024-02-21 NOTE — TELEPHONE ENCOUNTER
Pt states she has a slight cough at night when she lays down and a slight sore throat. She states her mucus has been yellow for a week and it has just gotten worse.

## 2024-02-22 ENCOUNTER — TELEPHONE (OUTPATIENT)
Dept: INTERNAL MEDICINE | Facility: CLINIC | Age: 69
End: 2024-02-22
Payer: COMMERCIAL

## 2024-02-22 RX ORDER — DOXYCYCLINE HYCLATE 100 MG/1
100 CAPSULE ORAL 2 TIMES DAILY
Qty: 14 CAPSULE | Refills: 0 | Status: SHIPPED | OUTPATIENT
Start: 2024-02-22 | End: 2024-02-29

## 2024-02-22 RX ORDER — AMOXICILLIN 875 MG/1
875 TABLET, COATED ORAL 2 TIMES DAILY
Qty: 14 TABLET | Refills: 0 | Status: SHIPPED | OUTPATIENT
Start: 2024-02-22 | End: 2024-02-29

## 2024-02-22 NOTE — TELEPHONE ENCOUNTER
Pt cannot come in for an appt. She recently had hand surgery and cannot drive. Please send something in for her.

## 2024-02-22 NOTE — TELEPHONE ENCOUNTER
Caller: Suze Baeza    Relationship: Self    Best call back number: [phone]     What medication are you requesting: PER PCP    What are your current symptoms: BLOOD IN MUCUS WHEN BLOWING NOSE, SORE THROAT, EAR PAIN, VERY CONGESTED ESPECIALLY AT NIGHT WHEN LYING DOWN    How long have you been experiencing symptoms: ABOUT A WEEK BUT GETTING A LOT WORSE     Have you had these symptoms before:    [x] Yes  [] No    Have you been treated for these symptoms before:   [x] Yes  [] No    If a prescription is needed, what is your preferred pharmacy and phone number: [facility] [id number] [place], [place] - [address] [place] [address] - [phone] [facility] [phone]      Additional notes: PATIENT STATES SHE SPOKE WITH BRADY 2/21/24 AND WAS TOLD TO GET A MEDICATION OVER THE COUNTER TO TREAT HER SYMPTOMS. PATIENT STATES SHE PURCHASED A GENERIC BRAND OF ZYRTEC. PATIENT STATES SINCE SPEAKING TO BRADY SHE HAS STARTED SEEING BLOOD IN MUCUS WHEN BLOWING HER NOSE AND HER EARS ARE STARTING TO HURT. PATIENT WOULD LIKE TO REQUEST A PRESCRIPTION INSTEAD OF OVER THE COUNTER MEDICATION.

## 2024-03-08 RX ORDER — DESVENLAFAXINE SUCCINATE 50 MG/1
50 TABLET, EXTENDED RELEASE ORAL DAILY
Qty: 30 TABLET | Refills: 5 | Status: SHIPPED | OUTPATIENT
Start: 2024-03-08

## 2024-03-08 NOTE — TELEPHONE ENCOUNTER
Rx Refill Note  Requested Prescriptions     Pending Prescriptions Disp Refills    desvenlafaxine (PRISTIQ) 50 MG 24 hr tablet [Pharmacy Med Name: DESVENLAFAXINE SUCCNT ER 50 MG] 30 tablet 5     Sig: TAKE 1 TABLET BY MOUTH DAILY      Last office visit with prescribing clinician: 11/22/2023   Last telemedicine visit with prescribing clinician: Visit date not found   Next office visit with prescribing clinician: 3/26/2024                         Would you like a call back once the refill request has been completed: [] Yes [] No    If the office needs to give you a call back, can they leave a voicemail: [] Yes [] No    Mer Mccoy LPN  03/08/24, 10:32 EST

## 2024-03-14 RX ORDER — ATORVASTATIN CALCIUM 10 MG/1
TABLET, FILM COATED ORAL
Qty: 30 TABLET | Refills: 5 | Status: SHIPPED | OUTPATIENT
Start: 2024-03-14

## 2024-03-26 ENCOUNTER — OFFICE VISIT (OUTPATIENT)
Dept: INTERNAL MEDICINE | Facility: CLINIC | Age: 69
End: 2024-03-26
Payer: COMMERCIAL

## 2024-03-26 VITALS
BODY MASS INDEX: 23.49 KG/M2 | TEMPERATURE: 98.2 F | HEIGHT: 65 IN | DIASTOLIC BLOOD PRESSURE: 58 MMHG | HEART RATE: 67 BPM | SYSTOLIC BLOOD PRESSURE: 124 MMHG | OXYGEN SATURATION: 98 % | WEIGHT: 141 LBS

## 2024-03-26 DIAGNOSIS — R63.5 WEIGHT GAIN: Chronic | ICD-10-CM

## 2024-03-26 DIAGNOSIS — F32.0 DEPRESSION, MAJOR, SINGLE EPISODE, MILD: ICD-10-CM

## 2024-03-26 DIAGNOSIS — E66.3 OVERWEIGHT WITH BODY MASS INDEX (BMI) OF 26 TO 26.9 IN ADULT: Chronic | ICD-10-CM

## 2024-03-26 DIAGNOSIS — M54.2 CERVICALGIA: ICD-10-CM

## 2024-03-26 DIAGNOSIS — R73.09 ABNORMAL GLUCOSE: ICD-10-CM

## 2024-03-26 DIAGNOSIS — E78.00 HYPERCHOLESTEROLEMIA: Primary | ICD-10-CM

## 2024-03-26 DIAGNOSIS — F51.01 PRIMARY INSOMNIA: ICD-10-CM

## 2024-03-26 PROCEDURE — 99214 OFFICE O/P EST MOD 30 MIN: CPT | Performed by: INTERNAL MEDICINE

## 2024-03-26 RX ORDER — PHENDIMETRAZINE TARTRATE 35 MG/1
35 TABLET ORAL 3 TIMES DAILY
Qty: 90 EACH | Refills: 2 | Status: SHIPPED | OUTPATIENT
Start: 2024-03-26

## 2024-03-26 NOTE — PROGRESS NOTES
[facility]     Suze Baeza  1955   [id number]      Patient Care Team:  Parul Lomas MD as PCP - General (Internal Medicine)  Kendrick Andersen MD as Consulting Physician (Dermatology)  Heraclio Mari MD as Consulting Physician (Orthopedic Surgery)  Chio Dhaliwal MD as Consulting Physician (Obstetrics and Gynecology)    Chief Complaint   Patient presents with    Hyperlipidemia     4 mo f/u            HPI  Patient is a 69 y.o. female presents with     Weight loss.  She has lost approximately 16 pounds since 10/2023. She has been ambulating some; however, approximately for the last few weeks she has not been ambulating. She currently eats a small cookie at night. She was eating all sugar free. She denies being hungry for lunch, so she eats a small amount. Occasionally, she will eat a banana with peanut butter or yogurt. She denies having protein in the morning due to not being hungry. She eats dinner around 6:00 PM or 6:30 PM. She is inquiring if she needs something stronger to make her not hungry; however, she denies being hungry in the morning.     Insomnia.   She denies sleeping well. She denies if the phendimetrazine 3 times daily is helping significantly. Her sleep was well initially; however, it has progressively worsened in approximately the last month. She has more stress currently. She is taking Topamax 25 mg 3 tablets at night. She wakes up fatigued as when she went to bed. She denies anything has been helping her sleep. She is fatigued during the day. She takes a nap on a Saturday or Sunday occasionally.     Anxiety.   She denies feeling anxious or depressed. The bupropion, buspirone, and desvenlafaxine are helping. She is taking hydroxyzine in the evenings.     Restless leg syndrome.   She intermittently experiences restless legs; however, it has been better. She is taking pramipexole in the evenings.     Neck pain.  She is experiencing neck pain bilaterally. The  right-side pain radiates down her right upper extremity to her right elbow. She has been noticing this for approximately a little over a month. She has experienced some neck tightness. She had a massage approximately a long time ago, which helped slightly. She denies doing physical therapy for her neck. She was given exercises to do; however, she was unable to find her paper. She has had to take Advil approximately for the last few nights, which did help. Running a sweeper aggravates it.     Cataracts.   She went to the ophthalmologist, who told her that she has growing cataracts. She was referred to Dr. Mccoy.     Sinus infection.  When she experienced a sinus infection, she experienced blood coming out. This has resolved; however, she currently has a significant amount of clear mucus. She experiences drainage. She uses Flonase twice daily. When she lays down, she is unable to breathe and starts experiencing tussis.     Rash.   She is experiencing a rash currently. She denies experiencing it all winter; however, approximately the last couple of weeks, it has returned. It is occasionally itchy. She has been putting hydrocortisone cream on it for approximately 3 weeks, which does help. She does use lotion daily.    She is currently taking phenmetrazine, pramipexole, Topamax, hydroxyzine, bupropion, buspirone, desvenlafaxine, Flonase, and hydrocortisone.       CHRONIC CONDITIONS      Past Medical History:   Diagnosis Date    Annual physical exam 09/10/2020    COVID-19 virus infection 01/16/2023    Degenerative tear of medial meniscus, right     Hot flashes     PROZAC     Low back pain     Neuropathy, lumbosacral (radicular)     Primary osteoarthritis of both knees     Spondylolisthesis, lumbar region     Torn meniscus     right medial    Viral warts 02/25/2019    Wears reading eyeglasses        Past Surgical History:   Procedure Laterality Date    BLADDER SURGERY      COLONOSCOPY      HYSTERECTOMY  07/10/2012     "KNEE ARTHROSCOPY Right     OOPHORECTOMY Bilateral 07/10/2012    TOTAL KNEE ARTHROPLASTY Bilateral 12/29/2020    Procedure: TOTAL KNEE ARTHROPLASTY BILATERAL;  Surgeon: Heraclio Mari MD;  Location: [facility];  Service: Orthopedics;  Laterality: Bilateral;       Family History   Problem Relation Age of Onset    Deep vein thrombosis Mother     Stroke Mother     Heart disease Mother 68    Heart attack Mother     Osteoarthritis Mother     Heart disease Father 51        cause of death    Heart attack Father     Stroke Other     Cancer Maternal Aunt         bone    Diabetes Maternal Grandmother     Cancer Maternal Aunt         lymphoma    Cancer Maternal Aunt         brain    Breast cancer Neg Hx     Ovarian cancer Neg Hx        Social History     Socioeconomic History    Marital status:    Tobacco Use    Smoking status: Never    Smokeless tobacco: Never   Vaping Use    Vaping status: Never Used   Substance and Sexual Activity    Alcohol use: No    Drug use: No    Sexual activity: Defer       Allergies   Allergen Reactions    Other Itching and Rash     CHG       Review of Systems:     Review of Systems    Vital Signs  Vitals:    03/26/24 1332   BP: 124/58   BP Location: Left arm   Patient Position: Sitting   Cuff Size: Adult   Pulse: 67   Temp: 98.2 °F (36.8 °C)   TempSrc: Infrared   SpO2: 98%   Weight: 64 kg (141 lb)   Height: 165.1 cm (65\")     Body mass index is 23.46 kg/m².  BMI is within normal parameters. No other follow-up for BMI required.        Current Outpatient Medications:     Ascorbic Acid (VITAMIN C PO), Take 500 mg by mouth Daily., Disp: , Rfl:     atorvastatin (LIPITOR) 10 MG tablet, TAKE ONE TABLET BY MOUTH ONCE NIGHTLY, Disp: 30 tablet, Rfl: 5    azelastine (ASTELIN) 0.1 % nasal spray, 2 sprays into the nostril(s) as directed by provider 2 (Two) Times a Day. Use in each nostril as directed, Disp: 1 each, Rfl: 12    buPROPion XL (WELLBUTRIN XL) 150 MG 24 hr tablet, TAKE ONE TABLET BY MOUTH " DAILY, Disp: 90 tablet, Rfl: 1    busPIRone (BUSPAR) 5 MG tablet, TAKE TWO TABLETS BY MOUTH THREE TIMES A DAY, Disp: 180 tablet, Rfl: 5    calcium carb-cholecalciferol 600-800 MG-UNIT tablet, Take 1 tablet by mouth Daily., Disp: , Rfl:     Calcium Polycarbophil (FIBER-CAPS PO), Take 1 tablet by mouth Daily., Disp: , Rfl:     Cyanocobalamin (VITAMIN B-12 PO), Take 5,000 mcg by mouth Daily., Disp: , Rfl:     desvenlafaxine (PRISTIQ) 50 MG 24 hr tablet, TAKE 1 TABLET BY MOUTH DAILY, Disp: 30 tablet, Rfl: 5    Diclofenac Sodium (VOLTAREN) 1 % gel gel, Apply 4 g topically to the appropriate area as directed 4 (Four) Times a Day As Needed (joint pain)., Disp: 350 g, Rfl: 5    estradiol (ESTRACE) 0.5 MG tablet, Take 1 tablet by mouth Daily., Disp: , Rfl:     Flax Oil-Fish Oil-Borage Oil (FISH OIL-FLAX OIL-BORAGE OIL PO), Take 1 capsule by mouth Daily., Disp: , Rfl:     fluticasone (FLONASE) 50 MCG/ACT nasal spray, 1 spray into the nostril(s) as directed by provider 2 (Two) Times a Day., Disp: 16 g, Rfl: 5    hydrOXYzine (ATARAX) 50 MG tablet, Take 1 tablet by mouth Every Night., Disp: 90 tablet, Rfl: 1    phenazopyridine (Pyridium) 200 MG tablet, Take 1 tablet by mouth 3 (Three) Times a Day As Needed for Bladder Spasms., Disp: 10 tablet, Rfl: 0    Phendimetrazine Tartrate 35 MG tablet, Take 35 mg by mouth 3 (Three) Times a Day., Disp: 90 each, Rfl: 2    pramipexole (MIRAPEX) 0.5 MG tablet, TAKE 1 TABLET BY MOUTH DAILY AS NEEDED AND TAKE TAKE THREE TABLETS BY MOUTH EVERY NIGHT AT BEDTIME, Disp: 120 tablet, Rfl: 1    topiramate (TOPAMAX) 25 MG tablet, Take 3 tablets by mouth Every Night., Disp: 90 tablet, Rfl: 5    Physical Exam:    Physical Exam  Vitals and nursing note reviewed.   Constitutional:       Appearance: She is well-developed.   HENT:      Head: Normocephalic.   Eyes:      Conjunctiva/sclera: Conjunctivae normal.      Pupils: Pupils are equal, round, and reactive to light.   Neck:      Thyroid: No thyromegaly.       Comments: Tight tender musculature on bilateral sides of the neck, worse on the right side.  Cardiovascular:      Rate and Rhythm: Normal rate and regular rhythm.      Heart sounds: Normal heart sounds.   Pulmonary:      Effort: Pulmonary effort is normal.      Breath sounds: Normal breath sounds. No wheezing.   Musculoskeletal:         General: Normal range of motion.      Cervical back: Normal range of motion and neck supple.   Lymphadenopathy:      Cervical: No cervical adenopathy.   Skin:     General: Skin is warm and dry.   Neurological:      Mental Status: She is alert and oriented to person, place, and time.   Psychiatric:         Thought Content: Thought content normal.     Tight tender musculature on bilateral sides of the neck, worse on the right side.     ACE III MINI        Results Review:    I reviewed the patient's new clinical results.    CMP:  Lab Results   Component Value Date    BUN 13 09/11/2023    CREATININE 0.71 09/11/2023    EGFRIFNONA 87 09/23/2021    BCR 18.3 09/11/2023     09/11/2023    K 4.0 09/11/2023    CO2 26.4 09/11/2023    CALCIUM 9.4 09/11/2023    ALBUMIN 4.4 09/11/2023    BILITOT 0.3 09/11/2023    ALKPHOS 103 09/11/2023    AST 18 09/11/2023    ALT 20 09/11/2023     HbA1c:  Lab Results   Component Value Date    HGBA1C 5.7 10/03/2023    HGBA1C 5.70 (H) 12/17/2020     Microalbumin:  Lab Results   Component Value Date    MICROALBUR <1.2 09/11/2023     Lipid Panel  Lab Results   Component Value Date    CHOL 164 09/11/2023    TRIG 122 09/11/2023    HDL 53 09/11/2023    LDL 89 09/11/2023    AST 18 09/11/2023    ALT 20 09/11/2023       Medication Review: Medications reviewed and noted  Patient Instructions   Problem List Items Addressed This Visit          Cardiac and Vasculature    Hypercholesterolemia - Primary    Overview     Taking atorvastatin every evening.           Current Assessment & Plan      She will continue atorvastatin every evening. She will continue a low-fat healthy  diet and increase regular exercise and ambulating.         Relevant Medications    atorvastatin (LIPITOR) 10 MG tablet       Endocrine and Metabolic    Weight gain (Chronic)    Current Assessment & Plan     She will continue taking phenmetrazine in the morning and at lunchtime; however, leave off the evening tablet to help with sleep. She will continue topiramate in the evenings and bupropion in the mornings. Add slightly more protein at lunchtime. Continue to eat smaller portions, especially in the evenings.          Abnormal glucose    Current Assessment & Plan     Continue to increase exercise and ambulating. Continue to decrease sugars and carbohydrates in the diet. Maintain weight loss.            Mental Health    Depression, major, single episode, mild    Current Assessment & Plan     Continue buspirone, bupropion, and desvenlafaxine. Increasing exercise and physical activity also helps decrease stress, anxiety, and mood symptoms.         Relevant Medications    buPROPion XL (WELLBUTRIN XL) 150 MG 24 hr tablet    busPIRone (BUSPAR) 5 MG tablet    hydrOXYzine (ATARAX) 50 MG tablet    desvenlafaxine (PRISTIQ) 50 MG 24 hr tablet    Phendimetrazine Tartrate 35 MG tablet       Musculoskeletal and Injuries    Cervicalgia    Current Assessment & Plan     She was given neck stretches today. She will try to do some throughout the day. Use good posture when sitting, reading, or working on things with her bilateral upper extremities and bilateral hands. Avoid heavy lifting. If not improving with the exercises, she will let us know.            Sleep    Primary insomnia    Overview                Current Assessment & Plan     I recommend not taking the phendimetrazine in the evenings; however, just taking it in the morning and at lunchtime. Do some exercises and ambulating during the day.          Other Visit Diagnoses       Overweight with body mass index (BMI) of 26 to 26.9 in adult  (Chronic)       Relevant Medications     Phendimetrazine Tartrate 35 MG tablet         We discussed sleep hygiene including going to bed at the same time and getting up at the same time every day, going to bed early enough to get 7 or 8 hours in bed, reading and relaxing before bedtime, and avoiding the TV, computer, phone, iPad close to bedtime.        Diagnosis Plan   1. Hypercholesterolemia        2. Abnormal glucose        3. Depression, major, single episode, mild        4. Primary insomnia        5. Cervicalgia        6. Overweight with body mass index (BMI) of 26 to 26.9 in adult  Phendimetrazine Tartrate 35 MG tablet      7. Weight gain                Plan of care reviewed with patient at the conclusion of today's visit. Education was provided regarding diagnosis, management, and any prescribed or recommended OTC medications.Patient verbalizes understanding of and agreement with management plan.         Parul Lomas MD      Transcribed from ambient dictation for Parul Lomas MD by Candy Holt.  03/26/24   15:14 EDT    Patient or patient representative verbalized consent to the visit recording.  I have personally performed the services described in this document as transcribed by the above individual, and it is both accurate and complete.

## 2024-03-26 NOTE — ASSESSMENT & PLAN NOTE
She was given neck stretches today. She will try to do some throughout the day. Use good posture when sitting, reading, or working on things with her bilateral upper extremities and bilateral hands. Avoid heavy lifting. If not improving with the exercises, she will let us know.

## 2024-03-26 NOTE — ASSESSMENT & PLAN NOTE
Continue to increase exercise and ambulating. Continue to decrease sugars and carbohydrates in the diet. Maintain weight loss.   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 Hand x rays. Blood tests. Likely has fatty liver, consider having hepatobiliary ultrasound. Methotrexate 3 per week all at once one day per week for 2 weeks the 4 per week for 2 wks then 5 per week together with folic acid at 2 once daily. Prednisone stay at 7.5 mg daily for 3 wks then 5 mg daily for 4 weeks then 2.5 mg daily with breakfast. ff up in 2 months in [place].

## 2024-03-26 NOTE — ASSESSMENT & PLAN NOTE
She will continue taking phenmetrazine in the morning and at lunchtime; however, leave off the evening tablet to help with sleep. She will continue topiramate in the evenings and bupropion in the mornings. Add slightly more protein at lunchtime. Continue to eat smaller portions, especially in the evenings.

## 2024-03-26 NOTE — ASSESSMENT & PLAN NOTE
I recommend not taking the phendimetrazine in the evenings; however, just taking it in the morning and at lunchtime. Do some exercises and ambulating during the day.

## 2024-03-26 NOTE — ASSESSMENT & PLAN NOTE
She will continue atorvastatin every evening. She will continue a low-fat healthy diet and increase regular exercise and ambulating.

## 2024-03-26 NOTE — ASSESSMENT & PLAN NOTE
Continue buspirone, bupropion, and desvenlafaxine. Increasing exercise and physical activity also helps decrease stress, anxiety, and mood symptoms.

## 2024-03-27 NOTE — PATIENT INSTRUCTIONS
Patient Instructions  Problem List Items Addressed This Visit          Cardiac and Vasculature    Hypercholesterolemia - Primary    Overview     Taking atorvastatin every evening.           Current Assessment & Plan      She will continue atorvastatin every evening. She will continue a low-fat healthy diet and increase regular exercise and ambulating.         Relevant Medications    atorvastatin (LIPITOR) 10 MG tablet       Endocrine and Metabolic    Weight gain (Chronic)    Current Assessment & Plan     She will continue taking phenmetrazine in the morning and at lunchtime; however, leave off the evening tablet to help with sleep. She will continue topiramate in the evenings and bupropion in the mornings. Add slightly more protein at lunchtime. Continue to eat smaller portions, especially in the evenings.          Abnormal glucose    Current Assessment & Plan     Continue to increase exercise and ambulating. Continue to decrease sugars and carbohydrates in the diet. Maintain weight loss.            Mental Health    Depression, major, single episode, mild    Current Assessment & Plan     Continue buspirone, bupropion, and desvenlafaxine. Increasing exercise and physical activity also helps decrease stress, anxiety, and mood symptoms.         Relevant Medications    buPROPion XL (WELLBUTRIN XL) 150 MG 24 hr tablet    busPIRone (BUSPAR) 5 MG tablet    hydrOXYzine (ATARAX) 50 MG tablet    desvenlafaxine (PRISTIQ) 50 MG 24 hr tablet    Phendimetrazine Tartrate 35 MG tablet       Musculoskeletal and Injuries    Cervicalgia    Current Assessment & Plan     She was given neck stretches today. She will try to do some throughout the day. Use good posture when sitting, reading, or working on things with her bilateral upper extremities and bilateral hands. Avoid heavy lifting. If not improving with the exercises, she will let us know.            Sleep    Primary insomnia    Overview                Current Assessment & Plan      I recommend not taking the phendimetrazine in the evenings; however, just taking it in the morning and at lunchtime. Do some exercises and ambulating during the day.          Other Visit Diagnoses       Overweight with body mass index (BMI) of 26 to 26.9 in adult  (Chronic)       Relevant Medications    Phendimetrazine Tartrate 35 MG tablet         We discussed sleep hygiene including going to bed at the same time and getting up at the same time every day, going to bed early enough to get 7 or 8 hours in bed, reading and relaxing before bedtime, and avoiding the TV, computer, phone, iPad close to bedtime.     Exercising to Stay Healthy  To become healthy and stay healthy, it is recommended that you do moderate-intensity and vigorous-intensity exercise. You can tell that you are exercising at a moderate intensity if your heart starts beating faster and you start breathing faster but can still hold a conversation. You can tell that you are exercising at a vigorous intensity if you are breathing much harder and faster and cannot hold a conversation while exercising.  How can exercise benefit me?  Exercising regularly is important. It has many health benefits, such as:  Improving overall fitness, flexibility, and endurance.  Increasing bone density.  Helping with weight control.  Decreasing body fat.  Increasing muscle strength and endurance.  Reducing stress and tension, anxiety, depression, or anger.  Improving overall health.  What guidelines should I follow while exercising?  Before you start a new exercise program, talk with your health care provider.  Do not exercise so much that you hurt yourself, feel dizzy, or get very short of breath.  Wear comfortable clothes and wear shoes with good support.  Drink plenty of water while you exercise to prevent dehydration or heat stroke.  Work out until your breathing and your heartbeat get faster (moderate intensity).  How often should I exercise?  Choose an activity that  you enjoy, and set realistic goals. Your health care provider can help you make an activity plan that is individually designed and works best for you.  Exercise regularly as told by your health care provider. This may include:  Doing strength training two times a week, such as:  Lifting weights.  Using resistance bands.  Push-ups.  Sit-ups.  Yoga.  Doing a certain intensity of exercise for a given amount of time. Choose from these options:  A total of 150 minutes of moderate-intensity exercise every week.  A total of 75 minutes of vigorous-intensity exercise every week.  A mix of moderate-intensity and vigorous-intensity exercise every week.  Children, pregnant women, people who have not exercised regularly, people who are overweight, and older adults may need to talk with a health care provider about what activities are safe to perform. If you have a medical condition, be sure to talk with your health care provider before you start a new exercise program.  What are some exercise ideas?  Moderate-intensity exercise ideas include:  Walking 1 mile (1.6 km) in about 15 minutes.  Biking.  Hiking.  Golfing.  Dancing.  Water aerobics.  Vigorous-intensity exercise ideas include:  Walking 4.5 miles (7.2 km) or more in about 1 hour.  Jogging or running 5 miles (8 km) in about 1 hour.  Biking 10 miles (16.1 km) or more in about 1 hour.  Lap swimming.  Roller-skating or in-line skating.  Cross-country skiing.  Vigorous competitive sports, such as football, basketball, and soccer.  Jumping rope.  Aerobic dancing.  What are some everyday activities that can help me get exercise?  Yard work, such as:  Pushing a .  Raking and bagging leaves.  Washing your car.  Pushing a stroller.  Shoveling snow.  Gardening.  Washing windows or floors.  How can I be more active in my day-to-day activities?  Use stairs instead of an elevator.  Take a walk during your lunch break.  If you drive, park your car farther away from your work  or school.  If you take public transportation, get off one stop early and walk the rest of the way.  Stand up or walk around during all of your indoor phone calls.  Get up, stretch, and walk around every 30 minutes throughout the day.  Enjoy exercise with a friend. Support to continue exercising will help you keep a regular routine of activity.  Where to find more information  You can find more information about exercising to stay healthy from:  [place] Department of Health and Human Services: www.hhs.gov  Centers for Disease Control and Prevention (CDC): www.cdc.gov  Summary  Exercising regularly is important. It will improve your overall fitness, flexibility, and endurance.  Regular exercise will also improve your overall health. It can help you control your weight, reduce stress, and improve your bone density.  Do not exercise so much that you hurt yourself, feel dizzy, or get very short of breath.  Before you start a new exercise program, talk with your health care provider.  This information is not intended to replace advice given to you by your health care provider. Make sure you discuss any questions you have with your health care provider.  Document Revised: 04/15/2022 Document Reviewed: 04/15/2022  [employer] Patient Education © 2023 [employer] BMI for Adults  Body mass index (BMI) is a number found using a person's weight and height. BMI can help tell how much of a person's weight is made up of fat. BMI does not measure body fat directly. It is used instead of tests that directly measure body fat, which can be difficult and expensive.  What are BMI measurements used for?  BMI is useful to:  Find out if your weight puts you at higher risk for medical problems.  Help recommend changes, such as in diet and exercise. This can help you reach a healthy weight. BMI screening can be done again to see if these changes are working.  How is BMI calculated?  Your height and weight are measured. The BMI is found from  "those numbers. This can be done with U.[place] or metric measurements. Note that charts and online BMI calculators are available to help you find your BMI quickly and easily without doing these calculations.  To calculate your BMI in U.[place] measurements:  Measure your weight in pounds (lb).  Multiply the number of pounds by 703.  So, for an adult who weighs 150 lb, multiply that number by 703: 150 x 703, which equals 105,450.  Measure your height in inches. Then multiply that number by itself to get a measurement called \"inches squared.\"  So, for an adult who is 70 inches tall, the \"inches squared\" measurement is 70 inches x 70 inches, which equals 4,900 inches squared.  Divide the total from step 2 (number of lb x 703) by the total from step 3 (inches squared): 105,450 ÷ 4,900 = 21.5. This is your BMI.  To calculate your BMI in metric measurements:    Measure your weight in kilograms (kg).  For this example, the weight is 70 kg.  Measure your height in meters (m). Then multiply that number by itself to get a measurement called \"meters squared.\"  So, for an adult who is 1.75 m tall, the \"meters squared\" measurement is 1.75 m x 1.75 m, which equals 3.1 meters squared.  Divide the number of kilograms (your weight) by the meters squared number. In this example: 70 ÷ 3.1 = 22.6. This is your BMI.  What do the results mean?  BMI charts are used to see if you are underweight, normal weight, overweight, or obese. The following guidelines will be used:  Underweight: BMI less than 18.5.  Normal weight: BMI between 18.5 and 24.9.  Overweight: BMI between 25 and 29.9.  Obese: BMI of 30 or above.  BMI is a tool and cannot diagnose a condition. Talk with your health care provider about what your BMI means for you. Keep these notes in mind:  Weight includes fat and muscle. Someone with a muscular build, such as an athlete, may have a BMI that is higher than 24.9. In cases like these, BMI is not a correct measure of body fat.  If you " have a BMI of 25 or higher, your provider may need to do more testing to find out if excess body fat is the cause.  BMI is measured the same way for males and females. Females usually have more body fat than males of the same height and weight.  Where to find more information  For more information about BMI, including tools to quickly find your BMI, go to:  Centers for Disease Control and Prevention: cdc.gov  American Heart Association: heart.org  National Heart, Lung, and Blood [place]: nhlbi.nih.gov  This information is not intended to replace advice given to you by your health care provider. Make sure you discuss any questions you have with your health care provider.  Document Revised: 09/07/2023 Document Reviewed: 08/31/2023  [employer]vier Patient Education © 2023 [employer]

## 2024-04-12 RX ORDER — PRAMIPEXOLE DIHYDROCHLORIDE 0.5 MG/1
TABLET ORAL
Qty: 120 TABLET | Refills: 1 | Status: SHIPPED | OUTPATIENT
Start: 2024-04-12

## 2024-04-12 NOTE — TELEPHONE ENCOUNTER
Caller: Aryan Suze K    Relationship: Self    Best call back number: [phone]     Requested Prescriptions:   Requested Prescriptions     Pending Prescriptions Disp Refills    pramipexole (MIRAPEX) 0.5 MG tablet 120 tablet 1     Sig: TAKE 1 TABLET BY MOUTH DAILY AS NEEDED AND TAKE TAKE THREE TABLETS BY MOUTH EVERY NIGHT AT BEDTIME        Pharmacy where request should be sent: [facility] [id number] [address] [phone] [facility] [phone] FX     Last office visit with prescribing clinician: 3/26/2024   Last telemedicine visit with prescribing clinician: Visit date not found   Next office visit with prescribing clinician: 8/12/2024     Additional details provided by patient: PATIENT IS COMPLETELY OUT OF THIS MEDICATION      Does the patient have less than a 3 day supply:  [x] Yes  [] No    Would you like a call back once the refill request has been completed: [] Yes [x] No    If the office needs to give you a call back, can they leave a voicemail: [] Yes [x] No    Karina Galaviz Rep   04/12/24 12:50 EDT

## 2024-04-12 NOTE — TELEPHONE ENCOUNTER
Rx Refill Note  Requested Prescriptions     Pending Prescriptions Disp Refills    pramipexole (MIRAPEX) 0.5 MG tablet 120 tablet 1     Sig: TAKE 1 TABLET BY MOUTH DAILY AS NEEDED AND TAKE TAKE THREE TABLETS BY MOUTH EVERY NIGHT AT BEDTIME      Last office visit with prescribing clinician: 3/26/2024   Last telemedicine visit with prescribing clinician: Visit date not found   Next office visit with prescribing clinician: 8/12/2024                         Would you like a call back once the refill request has been completed: [] Yes [] No    If the office needs to give you a call back, can they leave a voicemail: [] Yes [] No    Ernestina Farah MA  04/12/24, 12:51 EDT

## 2024-05-08 ENCOUNTER — TELEPHONE (OUTPATIENT)
Dept: INTERNAL MEDICINE | Facility: CLINIC | Age: 69
End: 2024-05-08
Payer: COMMERCIAL

## 2024-05-08 RX ORDER — BENZONATATE 100 MG/1
100 CAPSULE ORAL 3 TIMES DAILY PRN
Qty: 30 CAPSULE | Refills: 1 | Status: SHIPPED | OUTPATIENT
Start: 2024-05-08

## 2024-05-08 RX ORDER — DOXYCYCLINE HYCLATE 100 MG
100 TABLET ORAL 2 TIMES DAILY
Qty: 14 TABLET | Refills: 0 | Status: SHIPPED | OUTPATIENT
Start: 2024-05-08

## 2024-06-05 RX ORDER — TOPIRAMATE 25 MG/1
TABLET ORAL
Qty: 90 TABLET | Refills: 5 | Status: SHIPPED | OUTPATIENT
Start: 2024-06-05

## 2024-06-05 NOTE — TELEPHONE ENCOUNTER
Rx Refill Note  Requested Prescriptions     Pending Prescriptions Disp Refills    topiramate (TOPAMAX) 25 MG tablet [Pharmacy Med Name: TOPIRAMATE 25 MG TABLET] 90 tablet 5     Sig: TAKE 3 TABLET BY MOUTH EVERY NIGHT AT BEDTIME      Last office visit with prescribing clinician: 3/26/2024   Last telemedicine visit with prescribing clinician: Visit date not found   Next office visit with prescribing clinician: 8/12/2024                         Would you like a call back once the refill request has been completed: [] Yes [] No    If the office needs to give you a call back, can they leave a voicemail: [] Yes [] No    Eugenia Phillips MA  06/05/24, 08:15 EDT

## 2024-06-07 RX ORDER — PRAMIPEXOLE DIHYDROCHLORIDE 0.5 MG/1
TABLET ORAL
Qty: 120 TABLET | Refills: 1 | Status: SHIPPED | OUTPATIENT
Start: 2024-06-07

## 2024-06-10 RX ORDER — HYDROXYZINE 50 MG/1
50 TABLET, FILM COATED ORAL NIGHTLY
Qty: 90 TABLET | Refills: 1 | Status: SHIPPED | OUTPATIENT
Start: 2024-06-10

## 2024-06-10 NOTE — TELEPHONE ENCOUNTER
PT CALLED AND STATED SHE NEEDS A REFILL ON HER HYDROXYZINE.    PT ALSO STATED THAT SHE PICKED UP A MEDICATION TOPAMAX FROM THE PHARMACY ON 06/05/24 AND DOESN'T KNOW WHAT THIS IS FOR.    PT WOULD LIKE MANGO TO CALL HER BACK.

## 2024-06-10 NOTE — TELEPHONE ENCOUNTER
Spoke with pt. She states she has not been taking the topamax; we discussed further, and she is indeed taking this and that it was a misunderstanding.    She requested a refill of hydroxyzine    Rx Refill Note  Requested Prescriptions     Pending Prescriptions Disp Refills    hydrOXYzine (ATARAX) 50 MG tablet [Pharmacy Med Name: HYDROXYZINE HCL 50 MG TABLET] 90 tablet 1     Sig: TAKE ONE TABLET BY MOUTH ONCE NIGHTLY      Last office visit with prescribing clinician: 3/26/2024   Last telemedicine visit with prescribing clinician: Visit date not found   Next office visit with prescribing clinician: 8/12/2024                         Would you like a call back once the refill request has been completed: [] Yes [] No    If the office needs to give you a call back, can they leave a voicemail: [] Yes [] No    Leodan Birmingham MA  06/10/24, 15:00 EDT

## 2024-07-01 ENCOUNTER — HOSPITAL ENCOUNTER (OUTPATIENT)
Dept: BONE DENSITY | Facility: HOSPITAL | Age: 69
Discharge: HOME | End: 2024-07-01
Payer: COMMERCIAL

## 2024-07-01 ENCOUNTER — APPOINTMENT (OUTPATIENT)
Dept: BONE DENSITY | Facility: HOSPITAL | Age: 69
End: 2024-07-01
Payer: COMMERCIAL

## 2024-07-01 ENCOUNTER — HOSPITAL ENCOUNTER (OUTPATIENT)
Dept: MAMMOGRAPHY | Facility: HOSPITAL | Age: 69
Discharge: HOME | End: 2024-07-01
Payer: COMMERCIAL

## 2024-07-01 DIAGNOSIS — Z12.31 VISIT FOR SCREENING MAMMOGRAM: ICD-10-CM

## 2024-07-01 DIAGNOSIS — Z13.820 OSTEOPOROSIS SCREENING: ICD-10-CM

## 2024-07-01 PROCEDURE — 77080 DXA BONE DENSITY AXIAL: CPT

## 2024-07-01 PROCEDURE — 77067 SCR MAMMO BI INCL CAD: CPT

## 2024-07-01 PROCEDURE — 77063 BREAST TOMOSYNTHESIS BI: CPT

## 2024-07-06 DIAGNOSIS — F43.29 STRESS AND ADJUSTMENT REACTION: ICD-10-CM

## 2024-07-08 RX ORDER — BUSPIRONE HYDROCHLORIDE 5 MG/1
TABLET ORAL
Qty: 180 TABLET | Refills: 5 | Status: SHIPPED | OUTPATIENT
Start: 2024-07-08

## 2024-07-11 RX ORDER — BUPROPION HYDROCHLORIDE 150 MG/1
150 TABLET ORAL DAILY
Qty: 90 TABLET | Refills: 1 | Status: SHIPPED | OUTPATIENT
Start: 2024-07-11

## 2024-07-11 NOTE — TELEPHONE ENCOUNTER
Rx Refill Note  Requested Prescriptions     Pending Prescriptions Disp Refills    buPROPion XL (WELLBUTRIN XL) 150 MG 24 hr tablet [Pharmacy Med Name: buPROPion HCL  MG TABLET] 90 tablet 1     Sig: TAKE 1 TABLET BY MOUTH DAILY      Last office visit with prescribing clinician: 3/26/2024   Last telemedicine visit with prescribing clinician: Visit date not found   Next office visit with prescribing clinician: 8/12/2024                         Would you like a call back once the refill request has been completed: [] Yes [] No    If the office needs to give you a call back, can they leave a voicemail: [] Yes [] No    Mer Mccoy LPN  07/11/24, 09:16 EDT

## 2024-08-12 ENCOUNTER — OFFICE VISIT (OUTPATIENT)
Dept: INTERNAL MEDICINE | Facility: CLINIC | Age: 69
End: 2024-08-12
Payer: COMMERCIAL

## 2024-08-12 ENCOUNTER — LAB (OUTPATIENT)
Dept: LAB | Facility: HOSPITAL | Age: 69
End: 2024-08-12
Payer: COMMERCIAL

## 2024-08-12 VITALS
HEIGHT: 65 IN | BODY MASS INDEX: 22.89 KG/M2 | WEIGHT: 137.4 LBS | TEMPERATURE: 98.4 F | SYSTOLIC BLOOD PRESSURE: 110 MMHG | OXYGEN SATURATION: 96 % | HEART RATE: 76 BPM | DIASTOLIC BLOOD PRESSURE: 70 MMHG

## 2024-08-12 DIAGNOSIS — R73.09 ABNORMAL GLUCOSE: ICD-10-CM

## 2024-08-12 DIAGNOSIS — E78.00 HYPERCHOLESTEROLEMIA: ICD-10-CM

## 2024-08-12 DIAGNOSIS — F32.0 DEPRESSION, MAJOR, SINGLE EPISODE, MILD: ICD-10-CM

## 2024-08-12 DIAGNOSIS — E55.9 VITAMIN D DEFICIENCY: ICD-10-CM

## 2024-08-12 DIAGNOSIS — M19.041 PRIMARY OSTEOARTHRITIS OF BOTH HANDS: Chronic | ICD-10-CM

## 2024-08-12 DIAGNOSIS — M54.2 CERVICALGIA: Primary | ICD-10-CM

## 2024-08-12 DIAGNOSIS — M19.042 PRIMARY OSTEOARTHRITIS OF BOTH HANDS: Chronic | ICD-10-CM

## 2024-08-12 DIAGNOSIS — Z23 NEED FOR VACCINATION: ICD-10-CM

## 2024-08-12 DIAGNOSIS — S76.311A HAMSTRING STRAIN, RIGHT, INITIAL ENCOUNTER: ICD-10-CM

## 2024-08-12 LAB
ALBUMIN UR-MCNC: <1.2 MG/DL
BACTERIA UR QL AUTO: ABNORMAL /HPF
BASOPHILS # BLD AUTO: 0.05 10*3/MM3 (ref 0–0.2)
BASOPHILS NFR BLD AUTO: 0.7 % (ref 0–1.5)
BILIRUB UR QL STRIP: NEGATIVE
CLARITY UR: CLEAR
COLOR UR: YELLOW
CREAT UR-MCNC: 96 MG/DL
DEPRECATED RDW RBC AUTO: 40.2 FL (ref 37–54)
EOSINOPHIL # BLD AUTO: 0.06 10*3/MM3 (ref 0–0.4)
EOSINOPHIL NFR BLD AUTO: 0.8 % (ref 0.3–6.2)
ERYTHROCYTE [DISTWIDTH] IN BLOOD BY AUTOMATED COUNT: 13.3 % (ref 12.3–15.4)
GLUCOSE UR STRIP-MCNC: NEGATIVE MG/DL
HCT VFR BLD AUTO: 45.8 % (ref 34–46.6)
HGB BLD-MCNC: 14.8 G/DL (ref 12–15.9)
HGB UR QL STRIP.AUTO: NEGATIVE
HYALINE CASTS UR QL AUTO: ABNORMAL /LPF
IMM GRANULOCYTES # BLD AUTO: 0.02 10*3/MM3 (ref 0–0.05)
IMM GRANULOCYTES NFR BLD AUTO: 0.3 % (ref 0–0.5)
KETONES UR QL STRIP: NEGATIVE
LEUKOCYTE ESTERASE UR QL STRIP.AUTO: ABNORMAL
LYMPHOCYTES # BLD AUTO: 2.26 10*3/MM3 (ref 0.7–3.1)
LYMPHOCYTES NFR BLD AUTO: 29.9 % (ref 19.6–45.3)
MCH RBC QN AUTO: 26.8 PG (ref 26.6–33)
MCHC RBC AUTO-ENTMCNC: 32.3 G/DL (ref 31.5–35.7)
MCV RBC AUTO: 83 FL (ref 79–97)
MICROALBUMIN/CREAT UR: NORMAL MG/G{CREAT}
MONOCYTES # BLD AUTO: 0.49 10*3/MM3 (ref 0.1–0.9)
MONOCYTES NFR BLD AUTO: 6.5 % (ref 5–12)
NEUTROPHILS NFR BLD AUTO: 4.69 10*3/MM3 (ref 1.7–7)
NEUTROPHILS NFR BLD AUTO: 61.8 % (ref 42.7–76)
NITRITE UR QL STRIP: NEGATIVE
NRBC BLD AUTO-RTO: 0 /100 WBC (ref 0–0.2)
PH UR STRIP.AUTO: 6 [PH] (ref 5–8)
PLATELET # BLD AUTO: 423 10*3/MM3 (ref 140–450)
PMV BLD AUTO: 9 FL (ref 6–12)
PROT UR QL STRIP: NEGATIVE
RBC # BLD AUTO: 5.52 10*6/MM3 (ref 3.77–5.28)
RBC # UR STRIP: ABNORMAL /HPF
REF LAB TEST METHOD: ABNORMAL
SP GR UR STRIP: 1.02 (ref 1–1.03)
SQUAMOUS #/AREA URNS HPF: ABNORMAL /HPF
UROBILINOGEN UR QL STRIP: ABNORMAL
WBC # UR STRIP: ABNORMAL /HPF
WBC NRBC COR # BLD AUTO: 7.57 10*3/MM3 (ref 3.4–10.8)

## 2024-08-12 PROCEDURE — 80061 LIPID PANEL: CPT

## 2024-08-12 PROCEDURE — 82607 VITAMIN B-12: CPT

## 2024-08-12 PROCEDURE — 84443 ASSAY THYROID STIM HORMONE: CPT

## 2024-08-12 PROCEDURE — 83036 HEMOGLOBIN GLYCOSYLATED A1C: CPT

## 2024-08-12 PROCEDURE — 82570 ASSAY OF URINE CREATININE: CPT

## 2024-08-12 PROCEDURE — 81001 URINALYSIS AUTO W/SCOPE: CPT

## 2024-08-12 PROCEDURE — 99214 OFFICE O/P EST MOD 30 MIN: CPT | Performed by: INTERNAL MEDICINE

## 2024-08-12 PROCEDURE — 80053 COMPREHEN METABOLIC PANEL: CPT

## 2024-08-12 PROCEDURE — 82306 VITAMIN D 25 HYDROXY: CPT

## 2024-08-12 PROCEDURE — 90471 IMMUNIZATION ADMIN: CPT | Performed by: INTERNAL MEDICINE

## 2024-08-12 PROCEDURE — 90677 PCV20 VACCINE IM: CPT | Performed by: INTERNAL MEDICINE

## 2024-08-12 PROCEDURE — 82043 UR ALBUMIN QUANTITATIVE: CPT

## 2024-08-12 PROCEDURE — 85025 COMPLETE CBC W/AUTO DIFF WBC: CPT

## 2024-08-12 RX ORDER — DESVENLAFAXINE SUCCINATE 50 MG/1
50 TABLET, EXTENDED RELEASE ORAL DAILY
Qty: 90 TABLET | Refills: 1 | Status: SHIPPED | OUTPATIENT
Start: 2024-08-12

## 2024-08-12 RX ORDER — ATORVASTATIN CALCIUM 10 MG/1
10 TABLET, FILM COATED ORAL NIGHTLY
Qty: 90 TABLET | Refills: 1 | Status: SHIPPED | OUTPATIENT
Start: 2024-08-12

## 2024-08-12 NOTE — LETTER
[facility]  Vaccine Consent Form    Patient Name:  Suze Baeza  Patient :  1955  MRN: [id number]   Vaccine(s) Ordered    Pneumococcal Conjugate Vaccine 20-Valent All        Screening Checklist  The following questions should be completed prior to vaccination. If you answer “yes” to any question, it does not necessarily mean you should not be vaccinated. It just means we may need to clarify or ask more questions. If a question is unclear, please ask your healthcare provider to explain it.    Yes No   Any fever or moderate to severe illness today (mild illness and/or antibiotic treatment are not contraindications)?     Do you have a history of a serious reaction to any previous vaccinations, such as anaphylaxis, encephalopathy within 7 days, Guillain-[place] syndrome within 6 weeks, seizure?     Have you received any live vaccine(s) (e.g MMR, FAVIOLA) or any other vaccines in the last month (to ensure duplicate doses aren't given)?     Do you have an anaphylactic allergy to latex (DTaP, DTaP-IPV, Hep A, Hep B, MenB, RV, Td, Tdap), baker’s yeast (Hep B, HPV), polysorbates (RSV, nirsevimab, PCV 20, Rotavirrus, Tdap, Shingrix), or gelatin (FAVIOLA, MMR)?     Do you have an anaphylactic allergy to neomycin (Rabies, FAVIOLA, MMR, IPV, Hep A), polymyxin B (IPV), or streptomycin (IPV)?      Any cancer, leukemia, AIDS, or other immune system disorder? (FAVIOLA, MMR, RV)     Do you have a parent, brother, or sister with an immune system problem (if immune competence of vaccine recipient clinically verified, can proceed)? (MMR, FAVIOLA)     Any recent steroid treatments for >2 weeks, chemotherapy, or radiation treatment? (FAVIOLA, MMR)     Have you received antibody-containing blood transfusions or IVIG in the past 11 months (recommended interval is dependent on product)? (MMR, FAVIOLA)     Have you taken antiviral drugs (acyclovir, famciclovir, valacyclovir for FAVIOLA) in the last 24 or 48 hours, respectively?      Are you pregnant or planning  "to become pregnant within 1 month? (FAVIOLA, MMR, HPV, IPV, MenB, Abrexvy; For Hep B- refer to Engerix-B; For RSV - Abrysvo is indicated for 32-36 weeks of pregnancy from September to January)     For infants, have you ever been told your child has had intussusception or a medical emergency involving obstruction of the intestine (Rotavirus)? If not for an infant, can skip this question.         *Ordering Physicians/APC should be consulted if \"yes\" is checked by the patient or guardian above.  I have received, read, and understand the Vaccine Information Statement (VIS) for each vaccine ordered.  I have considered my or my child's health status as well as the health status of my close contacts.  I have taken the opportunity to discuss my vaccine questions with my or my child's health care provider.   I have requested that the ordered vaccine(s) be given to me or my child.  I understand the benefits and risks of the vaccines.  I understand that I should remain in the clinic for 15 minutes after receiving the vaccine(s).  _________________________________________________________  Signature of Patient or Parent/Legal Guardian ____________________  Date     "

## 2024-08-12 NOTE — PATIENT INSTRUCTIONS
Patient Instructions  Problem List Items Addressed This Visit          Cardiac and Vasculature    Hypercholesterolemia    Overview     Taking atorvastatin every evening.           Relevant Medications    atorvastatin (LIPITOR) 10 MG tablet    Other Relevant Orders    CBC & Differential    Comprehensive Metabolic Panel    Lipid Panel    TSH    Microalbumin / Creatinine Urine Ratio - Urine, Clean Catch    Urinalysis With Microscopic - Urine, Clean Catch    Vitamin B12       Endocrine and Metabolic    Vitamin D deficiency    Relevant Orders    Vitamin D,25-Hydroxy    Abnormal glucose    Relevant Orders    Hemoglobin A1c       Mental Health    Depression, major, single episode, mild    Relevant Medications    Phendimetrazine Tartrate 35 MG tablet    hydrOXYzine (ATARAX) 50 MG tablet    busPIRone (BUSPAR) 5 MG tablet    buPROPion XL (WELLBUTRIN XL) 150 MG 24 hr tablet    desvenlafaxine (PRISTIQ) 50 MG 24 hr tablet       Musculoskeletal and Injuries    Primary osteoarthritis of both hands (Chronic)    Cervicalgia - Primary    Relevant Orders    Ambulatory Referral to Physical Therapy for Evaluation & Treatment (Completed)       Other    Hamstring strain, right, initial encounter    Relevant Orders    Ambulatory Referral to Physical Therapy for Evaluation & Treatment (Completed)     Other Visit Diagnoses       Need for vaccination        Relevant Orders    Pneumococcal Conjugate Vaccine 20-Valent All            Exercising to Stay Healthy  To become healthy and stay healthy, it is recommended that you do moderate-intensity and vigorous-intensity exercise. You can tell that you are exercising at a moderate intensity if your heart starts beating faster and you start breathing faster but can still hold a conversation. You can tell that you are exercising at a vigorous intensity if you are breathing much harder and faster and cannot hold a conversation while exercising.  How can exercise benefit me?  Exercising regularly is  important. It has many health benefits, such as:  Improving overall fitness, flexibility, and endurance.  Increasing bone density.  Helping with weight control.  Decreasing body fat.  Increasing muscle strength and endurance.  Reducing stress and tension, anxiety, depression, or anger.  Improving overall health.  What guidelines should I follow while exercising?  Before you start a new exercise program, talk with your health care provider.  Do not exercise so much that you hurt yourself, feel dizzy, or get very short of breath.  Wear comfortable clothes and wear shoes with good support.  Drink plenty of water while you exercise to prevent dehydration or heat stroke.  Work out until your breathing and your heartbeat get faster (moderate intensity).  How often should I exercise?  Choose an activity that you enjoy, and set realistic goals. Your health care provider can help you make an activity plan that is individually designed and works best for you.  Exercise regularly as told by your health care provider. This may include:  Doing strength training two times a week, such as:  Lifting weights.  Using resistance bands.  Push-ups.  Sit-ups.  Yoga.  Doing a certain intensity of exercise for a given amount of time. Choose from these options:  A total of 150 minutes of moderate-intensity exercise every week.  A total of 75 minutes of vigorous-intensity exercise every week.  A mix of moderate-intensity and vigorous-intensity exercise every week.  Children, pregnant women, people who have not exercised regularly, people who are overweight, and older adults may need to talk with a health care provider about what activities are safe to perform. If you have a medical condition, be sure to talk with your health care provider before you start a new exercise program.  What are some exercise ideas?  Moderate-intensity exercise ideas include:  Walking 1 mile (1.6 km) in about 15 minutes.  Biking.  Hiking.  Golfing.  Dancing.  Water  aerobics.  Vigorous-intensity exercise ideas include:  Walking 4.5 miles (7.2 km) or more in about 1 hour.  Jogging or running 5 miles (8 km) in about 1 hour.  Biking 10 miles (16.1 km) or more in about 1 hour.  Lap swimming.  Roller-skating or in-line skating.  Cross-country skiing.  Vigorous competitive sports, such as football, basketball, and soccer.  Jumping rope.  Aerobic dancing.  What are some everyday activities that can help me get exercise?  Yard work, such as:  Pushing a .  Raking and bagging leaves.  Washing your car.  Pushing a stroller.  Shoveling snow.  Gardening.  Washing windows or floors.  How can I be more active in my day-to-day activities?  Use stairs instead of an elevator.  Take a walk during your lunch break.  If you drive, park your car farther away from your work or school.  If you take public transportation, get off one stop early and walk the rest of the way.  Stand up or walk around during all of your indoor phone calls.  Get up, stretch, and walk around every 30 minutes throughout the day.  Enjoy exercise with a friend. Support to continue exercising will help you keep a regular routine of activity.  Where to find more information  You can find more information about exercising to stay healthy from:  [place] Department of Health and Human Services: www.hhs.gov  Centers for Disease Control and Prevention (CDC): www.cdc.gov  Summary  Exercising regularly is important. It will improve your overall fitness, flexibility, and endurance.  Regular exercise will also improve your overall health. It can help you control your weight, reduce stress, and improve your bone density.  Do not exercise so much that you hurt yourself, feel dizzy, or get very short of breath.  Before you start a new exercise program, talk with your health care provider.  This information is not intended to replace advice given to you by your health care provider. Make sure you discuss any questions you have with  your health care provider.  Document Revised: 04/15/2022 Document Reviewed: 04/15/2022  Elsevier Patient Education © 2023 [employer]

## 2024-08-12 NOTE — PROGRESS NOTES
[facility]     Suze Baeza  1955   [id number]      Patient Care Team:  Parul Lomas MD as PCP - General (Internal Medicine)  Kendrick Andersen MD as Consulting Physician (Dermatology)  Heraclio Mari MD as Consulting Physician (Orthopedic Surgery)  Chio Dhaliwal MD as Consulting Physician (Obstetrics and Gynecology)    Chief Complaint   Patient presents with    Follow-up    Hyperlipidemia        Patient is a 69 y.o. female.   History of Present Illness  The patient is here for an office visit.    She has been experiencing pain in her right posterior leg for the past 2 to 3 months. The pain intensifies when she ascends and descends stairs or walks, and the area is tender. She denies any pain in the lateral hip or lower back.    She experiences sudden, intense pain in the left neck, which occasionally extends to her ear. This discomfort can occur even when she is at rest, such as when she is sitting in the car or looking down at the phone.    The arthritis in her hands has worsened, with her thumbs beginning to turn. She has previously received injections from Dr. Quintero for trigger finger. Previous use of Voltaren gel did not provide much relief. She has been taking turmeric 500 mg and Osteo Bi-Flex 1500 mg for several years, and occasionally takes ibuprofen or Aleve, which provide some relief.    She has lost 12 pounds in the last 5 months through dietary changes, including reducing her sugar intake, except for a small portion of ice cream during the summer. Her exercise routine has been limited due to the pain in her right hamstring. She continues to take phendimetrazine 35 mg for weight loss, with a goal to reduce an additional 2 pounds.    Her depression and anxiety are well-managed, and she wishes to continue with her buspirone medication.    FAMILY HISTORY  Her mother had arthritis from head to toe.         CHRONIC CONDITIONS      Past Medical History:   Diagnosis  "Date    Annual physical exam 09/10/2020    COVID-19 virus infection 01/16/2023    Degenerative tear of medial meniscus, right     Hot flashes     PROZAC     Low back pain     Neuropathy, lumbosacral (radicular)     Primary osteoarthritis of both knees     Spondylolisthesis, lumbar region     Torn meniscus     right medial    Viral warts 02/25/2019    Wears reading eyeglasses        Past Surgical History:   Procedure Laterality Date    BLADDER SURGERY      COLONOSCOPY      HYSTERECTOMY  07/10/2012    KNEE ARTHROSCOPY Right     OOPHORECTOMY Bilateral 07/10/2012    TOTAL KNEE ARTHROPLASTY Bilateral 12/29/2020    Procedure: TOTAL KNEE ARTHROPLASTY BILATERAL;  Surgeon: Heraclio Mari MD;  Location: [facility];  Service: Orthopedics;  Laterality: Bilateral;       Family History   Problem Relation Age of Onset    Deep vein thrombosis Mother     Stroke Mother     Heart disease Mother 68    Heart attack Mother     Osteoarthritis Mother     Heart disease Father 51        cause of death    Heart attack Father     Stroke Other     Cancer Maternal Aunt         bone    Diabetes Maternal Grandmother     Cancer Maternal Aunt         lymphoma    Cancer Maternal Aunt         brain    Breast cancer Neg Hx     Ovarian cancer Neg Hx        Social History     Socioeconomic History    Marital status:    Tobacco Use    Smoking status: Never    Smokeless tobacco: Never   Vaping Use    Vaping status: Never Used   Substance and Sexual Activity    Alcohol use: No    Drug use: No    Sexual activity: Defer       Allergies   Allergen Reactions    Other Itching and Rash     CHG       Review of Systems:     Review of Systems    Vital Signs  Vitals:    08/12/24 1334   BP: 110/70   BP Location: Left arm   Patient Position: Sitting   Cuff Size: Adult   Pulse: 76   Temp: 98.4 °F (36.9 °C)   SpO2: 96%   Weight: 62.3 kg (137 lb 6.4 oz)   Height: 165.1 cm (65\")     Body mass index is 22.86 kg/m².  BMI is within normal parameters. No other " follow-up for BMI required.          Current Outpatient Medications:     Ascorbic Acid (VITAMIN C PO), Take 500 mg by mouth Daily., Disp: , Rfl:     atorvastatin (LIPITOR) 10 MG tablet, Take 1 tablet by mouth Every Night., Disp: 90 tablet, Rfl: 1    azelastine (ASTELIN) 0.1 % nasal spray, 2 sprays into the nostril(s) as directed by provider 2 (Two) Times a Day. Use in each nostril as directed, Disp: 1 each, Rfl: 12    benzonatate (Tessalon Perles) 100 MG capsule, Take 1 capsule by mouth 3 (Three) Times a Day As Needed for Cough., Disp: 30 capsule, Rfl: 1    buPROPion XL (WELLBUTRIN XL) 150 MG 24 hr tablet, TAKE 1 TABLET BY MOUTH DAILY, Disp: 90 tablet, Rfl: 1    busPIRone (BUSPAR) 5 MG tablet, TAKE TWO TABLETS BY MOUTH THREE TIMES A DAY, Disp: 180 tablet, Rfl: 5    calcium carb-cholecalciferol 600-800 MG-UNIT tablet, Take 1 tablet by mouth Daily., Disp: , Rfl:     Calcium Polycarbophil (FIBER-CAPS PO), Take 1 tablet by mouth Daily., Disp: , Rfl:     Cyanocobalamin (VITAMIN B-12 PO), Take 5,000 mcg by mouth Daily., Disp: , Rfl:     desvenlafaxine (PRISTIQ) 50 MG 24 hr tablet, Take 1 tablet by mouth Daily., Disp: 90 tablet, Rfl: 1    Diclofenac Sodium (VOLTAREN) 1 % gel gel, Apply 4 g topically to the appropriate area as directed 4 (Four) Times a Day As Needed (joint pain)., Disp: 350 g, Rfl: 5    estradiol (ESTRACE) 0.5 MG tablet, Take 1 tablet by mouth Daily., Disp: , Rfl:     Flax Oil-Fish Oil-Borage Oil (FISH OIL-FLAX OIL-BORAGE OIL PO), Take 1 capsule by mouth Daily., Disp: , Rfl:     fluticasone (FLONASE) 50 MCG/ACT nasal spray, 1 spray into the nostril(s) as directed by provider 2 (Two) Times a Day., Disp: 16 g, Rfl: 5    hydrOXYzine (ATARAX) 50 MG tablet, TAKE ONE TABLET BY MOUTH ONCE NIGHTLY, Disp: 90 tablet, Rfl: 1    phenazopyridine (Pyridium) 200 MG tablet, Take 1 tablet by mouth 3 (Three) Times a Day As Needed for Bladder Spasms., Disp: 10 tablet, Rfl: 0    Phendimetrazine Tartrate 35 MG tablet, Take 35  mg by mouth 3 (Three) Times a Day., Disp: 90 each, Rfl: 2    pramipexole (MIRAPEX) 0.5 MG tablet, TAKE 1 TABLET BY MOUTH DAILY AS NEEDED, THEN TAKE THREE TABLETS BY MOUTH EVERY EVENING AT BEDTIME, Disp: 120 tablet, Rfl: 1    topiramate (TOPAMAX) 25 MG tablet, TAKE 3 TABLET BY MOUTH EVERY NIGHT AT BEDTIME, Disp: 90 tablet, Rfl: 5    Physical Exam:    Physical Exam  Vitals and nursing note reviewed.   Constitutional:       Appearance: She is well-developed.   HENT:      Head: Normocephalic.   Eyes:      Conjunctiva/sclera: Conjunctivae normal.      Pupils: Pupils are equal, round, and reactive to light.   Neck:      Thyroid: No thyromegaly.   Cardiovascular:      Rate and Rhythm: Normal rate and regular rhythm.      Heart sounds: Normal heart sounds.   Pulmonary:      Effort: Pulmonary effort is normal.      Breath sounds: Normal breath sounds. No wheezing.   Musculoskeletal:         General: Normal range of motion.      Right hand: Deformity present. No swelling or tenderness.      Left hand: Deformity present. No swelling or tenderness.      Cervical back: Normal range of motion and neck supple. Spasms and tenderness present.      Comments: Bilateral osteoarthritis changes of DIPs and PIPs and CMC joints.  Tenderness of right hamstring proximally.   Lymphadenopathy:      Cervical: No cervical adenopathy.   Skin:     General: Skin is warm and dry.   Neurological:      Mental Status: She is alert and oriented to person, place, and time.   Psychiatric:         Thought Content: Thought content normal.          ACE III MINI        Results Review:    None  Results         CMP:  Lab Results   Component Value Date    BUN 13 09/11/2023    CREATININE 0.71 09/11/2023    EGFRIFNONA 87 09/23/2021    BCR 18.3 09/11/2023     09/11/2023    K 4.0 09/11/2023    CO2 26.4 09/11/2023    CALCIUM 9.4 09/11/2023    ALBUMIN 4.4 09/11/2023    BILITOT 0.3 09/11/2023    ALKPHOS 103 09/11/2023    AST 18 09/11/2023    ALT 20 09/11/2023      HbA1c:  Lab Results   Component Value Date    HGBA1C 5.7 10/03/2023    HGBA1C 5.70 (H) 12/17/2020     Microalbumin:  Lab Results   Component Value Date    MICROALBUR <1.2 09/11/2023     Lipid Panel  Lab Results   Component Value Date    CHOL 164 09/11/2023    TRIG 122 09/11/2023    HDL 53 09/11/2023    LDL 89 09/11/2023    AST 18 09/11/2023    ALT 20 09/11/2023       Medication Review: Medications reviewed and noted  Patient Instructions   Problem List Items Addressed This Visit          Cardiac and Vasculature    Hypercholesterolemia    Overview     Taking atorvastatin every evening.           Relevant Medications    atorvastatin (LIPITOR) 10 MG tablet    Other Relevant Orders    CBC & Differential    Comprehensive Metabolic Panel    Lipid Panel    TSH    Microalbumin / Creatinine Urine Ratio - Urine, Clean Catch    Urinalysis With Microscopic - Urine, Clean Catch    Vitamin B12       Endocrine and Metabolic    Vitamin D deficiency    Relevant Orders    Vitamin D,25-Hydroxy    Abnormal glucose    Relevant Orders    Hemoglobin A1c       Mental Health    Depression, major, single episode, mild    Relevant Medications    Phendimetrazine Tartrate 35 MG tablet    hydrOXYzine (ATARAX) 50 MG tablet    busPIRone (BUSPAR) 5 MG tablet    buPROPion XL (WELLBUTRIN XL) 150 MG 24 hr tablet    desvenlafaxine (PRISTIQ) 50 MG 24 hr tablet       Musculoskeletal and Injuries    Primary osteoarthritis of both hands (Chronic)    Cervicalgia - Primary    Relevant Orders    Ambulatory Referral to Physical Therapy for Evaluation & Treatment (Completed)       Other    Hamstring strain, right, initial encounter    Relevant Orders    Ambulatory Referral to Physical Therapy for Evaluation & Treatment (Completed)     Other Visit Diagnoses       Need for vaccination        Relevant Orders    Pneumococcal Conjugate Vaccine 20-Valent All               Diagnosis Plan   1. Cervicalgia  Ambulatory Referral to Physical Therapy for Evaluation &  08-Jun-2023 17:14 Treatment      2. Hamstring strain, right, initial encounter  Ambulatory Referral to Physical Therapy for Evaluation & Treatment      3. Primary osteoarthritis of both hands        4. Hypercholesterolemia  CBC & Differential    Comprehensive Metabolic Panel    Lipid Panel    TSH    Microalbumin / Creatinine Urine Ratio - Urine, Clean Catch    Urinalysis With Microscopic - Urine, Clean Catch    Vitamin B12      5. Depression, major, single episode, mild        6. Abnormal glucose  Hemoglobin A1c      7. Vitamin D deficiency  Vitamin D,25-Hydroxy      8. Need for vaccination  Pneumococcal Conjugate Vaccine 20-Valent All        Assessment & Plan  1. Right hamstring strain.  A referral to physical therapy will be made. She is advised to use a moist heat pack to relax tight muscles and perform daily stretching.    2. Neck pain/cervicalgia.  A referral to physical therapy will be made. Using a moist heat pack on the neck helps to relax tight muscles.    3. Osteoarthritis of both hands.  She may use Voltaren gel as needed. She may take ibuprofen (Advil) as needed with food. A referral back to Dr. Quintero can be arranged as needed.    4. Hypercholesterolemia.  She will continue to improve a low-fat diet and get some regular exercise. She will continue atorvastatin nightly. She will continue a low-fat diet and regular exercise.    5. Depression.  She will continue bupropion daily, desvenlafaxine daily, and buspirone 2 tablets 3 times a day. Physical activity and exercise also helps decrease symptoms of stress, anxiety, and mood.    6. Abnormal glucose.  She will continue to avoid sugars and carbohydrates in the diet. Continue regular exercise.    7. Vitamin D deficiency.  She will continue taking vitamin D3 daily. I will recheck level.    Follow-up  She will come back to see me for annual physical in 10/2024.         Plan of care reviewed with patient at the conclusion of today's visit. Education was provided regarding  diagnosis, management, and any prescribed or recommended OTC medications. Patient verbalizes understanding of and agreement with management plan.         08/12/24   13:37 EDT    Patient or patient representative verbalized consent for the use of Ambient Listening during the visit with  Parul Lomas MD for chart documentation. 8/12/2024  14:10 EDT

## 2024-08-13 LAB
25(OH)D3 SERPL-MCNC: 75.8 NG/ML (ref 30–100)
ALBUMIN SERPL-MCNC: 4.6 G/DL (ref 3.5–5.2)
ALBUMIN/GLOB SERPL: 1.7 G/DL
ALP SERPL-CCNC: 94 U/L (ref 39–117)
ALT SERPL W P-5'-P-CCNC: 19 U/L (ref 1–33)
ANION GAP SERPL CALCULATED.3IONS-SCNC: 9.7 MMOL/L (ref 5–15)
AST SERPL-CCNC: 22 U/L (ref 1–32)
BILIRUB SERPL-MCNC: 0.3 MG/DL (ref 0–1.2)
BUN SERPL-MCNC: 20 MG/DL (ref 8–23)
BUN/CREAT SERPL: 23.5 (ref 7–25)
CALCIUM SPEC-SCNC: 10.1 MG/DL (ref 8.6–10.5)
CHLORIDE SERPL-SCNC: 106 MMOL/L (ref 98–107)
CHOLEST SERPL-MCNC: 173 MG/DL (ref 0–200)
CO2 SERPL-SCNC: 25.3 MMOL/L (ref 22–29)
CREAT SERPL-MCNC: 0.85 MG/DL (ref 0.57–1)
EGFRCR SERPLBLD CKD-EPI 2021: 75.2 ML/MIN/1.73
GLOBULIN UR ELPH-MCNC: 2.7 GM/DL
GLUCOSE SERPL-MCNC: 95 MG/DL (ref 65–99)
HBA1C MFR BLD: 5.7 % (ref 4.8–5.6)
HDLC SERPL-MCNC: 51 MG/DL (ref 40–60)
LDLC SERPL CALC-MCNC: 103 MG/DL (ref 0–100)
LDLC/HDLC SERPL: 1.98 {RATIO}
POTASSIUM SERPL-SCNC: 4 MMOL/L (ref 3.5–5.2)
PROT SERPL-MCNC: 7.3 G/DL (ref 6–8.5)
SODIUM SERPL-SCNC: 141 MMOL/L (ref 136–145)
TRIGL SERPL-MCNC: 105 MG/DL (ref 0–150)
TSH SERPL DL<=0.05 MIU/L-ACNC: 2.75 UIU/ML (ref 0.27–4.2)
VIT B12 BLD-MCNC: >2000 PG/ML (ref 211–946)
VLDLC SERPL-MCNC: 19 MG/DL (ref 5–40)

## 2024-09-03 RX ORDER — PRAMIPEXOLE DIHYDROCHLORIDE 0.5 MG/1
TABLET ORAL
Qty: 120 TABLET | Refills: 1 | Status: SHIPPED | OUTPATIENT
Start: 2024-09-03

## 2024-10-30 RX ORDER — PRAMIPEXOLE DIHYDROCHLORIDE 0.5 MG/1
TABLET ORAL
Qty: 120 TABLET | Refills: 1 | Status: SHIPPED | OUTPATIENT
Start: 2024-10-30

## 2024-11-12 DIAGNOSIS — E66.3 OVERWEIGHT WITH BODY MASS INDEX (BMI) OF 26 TO 26.9 IN ADULT: Chronic | ICD-10-CM

## 2024-11-12 NOTE — TELEPHONE ENCOUNTER
Rx Refill Note  Requested Prescriptions     Pending Prescriptions Disp Refills    Phendimetrazine Tartrate 35 MG tablet [Pharmacy Med Name: PHENDIMETRAZINE 35 MG TABLET]       Sig: TAKE 1 TABLET BY MOUTH 3 TIMES A DAY      Last office visit with prescribing clinician: 8/12/2024   Last telemedicine visit with prescribing clinician: Visit date not found   Next office visit with prescribing clinician: 12/31/2024                         Would you like a call back once the refill request has been completed: [] Yes [] No    If the office needs to give you a call back, can they leave a voicemail: [] Yes [] No    Fior Zavala MA  11/12/24, 13:53 EST

## 2024-11-13 RX ORDER — PHENDIMETRAZINE TARTRATE 35 MG/1
1 TABLET ORAL 3 TIMES DAILY
Qty: 90 EACH | Refills: 2 | Status: SHIPPED | OUTPATIENT
Start: 2024-11-13

## 2024-12-12 RX ORDER — HYDROXYZINE HYDROCHLORIDE 50 MG/1
50 TABLET, FILM COATED ORAL NIGHTLY
Qty: 90 TABLET | Refills: 1 | Status: SHIPPED | OUTPATIENT
Start: 2024-12-12

## 2024-12-12 NOTE — TELEPHONE ENCOUNTER
Rx Refill Note  Requested Prescriptions     Pending Prescriptions Disp Refills    hydrOXYzine (ATARAX) 50 MG tablet [Pharmacy Med Name: hydrOXYzine HCL 50 MG TABLET] 90 tablet 1     Sig: TAKE ONE TABLET BY MOUTH ONCE NIGHTLY      Last office visit with prescribing clinician: 8/12/2024   Last telemedicine visit with prescribing clinician: Visit date not found   Next office visit with prescribing clinician: 12/31/2024                         Would you like a call back once the refill request has been completed: [] Yes [] No    If the office needs to give you a call back, can they leave a voicemail: [] Yes [] No    Fior Zavala MA  12/12/24, 12:50 EST

## 2024-12-30 RX ORDER — PRAMIPEXOLE DIHYDROCHLORIDE 0.5 MG/1
TABLET ORAL
Qty: 120 TABLET | Refills: 1 | Status: SHIPPED | OUTPATIENT
Start: 2024-12-30

## 2024-12-31 ENCOUNTER — OFFICE VISIT (OUTPATIENT)
Dept: INTERNAL MEDICINE | Facility: CLINIC | Age: 69
End: 2024-12-31
Payer: COMMERCIAL

## 2024-12-31 VITALS
OXYGEN SATURATION: 100 % | HEIGHT: 64 IN | SYSTOLIC BLOOD PRESSURE: 126 MMHG | TEMPERATURE: 98 F | DIASTOLIC BLOOD PRESSURE: 70 MMHG | WEIGHT: 140.2 LBS | HEART RATE: 72 BPM | BODY MASS INDEX: 23.93 KG/M2

## 2024-12-31 DIAGNOSIS — E66.3 OVERWEIGHT WITH BODY MASS INDEX (BMI) OF 26 TO 26.9 IN ADULT: Chronic | ICD-10-CM

## 2024-12-31 DIAGNOSIS — R73.09 ABNORMAL GLUCOSE: ICD-10-CM

## 2024-12-31 DIAGNOSIS — M19.042 PRIMARY OSTEOARTHRITIS OF BOTH HANDS: Chronic | ICD-10-CM

## 2024-12-31 DIAGNOSIS — G25.81 RESTLESS LEG SYNDROME: ICD-10-CM

## 2024-12-31 DIAGNOSIS — F32.0 DEPRESSION, MAJOR, SINGLE EPISODE, MILD: ICD-10-CM

## 2024-12-31 DIAGNOSIS — M19.041 PRIMARY OSTEOARTHRITIS OF BOTH HANDS: Chronic | ICD-10-CM

## 2024-12-31 DIAGNOSIS — Z00.00 ANNUAL PHYSICAL EXAM: Primary | ICD-10-CM

## 2024-12-31 DIAGNOSIS — F43.29 STRESS AND ADJUSTMENT REACTION: ICD-10-CM

## 2024-12-31 DIAGNOSIS — E55.9 VITAMIN D DEFICIENCY: ICD-10-CM

## 2024-12-31 DIAGNOSIS — F51.01 PRIMARY INSOMNIA: ICD-10-CM

## 2024-12-31 DIAGNOSIS — E78.00 HYPERCHOLESTEROLEMIA: ICD-10-CM

## 2024-12-31 PROCEDURE — 93000 ELECTROCARDIOGRAM COMPLETE: CPT | Performed by: INTERNAL MEDICINE

## 2024-12-31 PROCEDURE — 99397 PER PM REEVAL EST PAT 65+ YR: CPT | Performed by: INTERNAL MEDICINE

## 2024-12-31 RX ORDER — TOPIRAMATE 25 MG/1
75 TABLET, FILM COATED ORAL NIGHTLY
Qty: 90 TABLET | Refills: 5 | Status: SHIPPED | OUTPATIENT
Start: 2024-12-31

## 2024-12-31 RX ORDER — DESVENLAFAXINE 50 MG/1
50 TABLET, FILM COATED, EXTENDED RELEASE ORAL DAILY
Qty: 90 TABLET | Refills: 1 | Status: SHIPPED | OUTPATIENT
Start: 2024-12-31

## 2024-12-31 RX ORDER — BUSPIRONE HYDROCHLORIDE 5 MG/1
10 TABLET ORAL 3 TIMES DAILY
Qty: 180 TABLET | Refills: 5 | Status: SHIPPED | OUTPATIENT
Start: 2024-12-31

## 2024-12-31 RX ORDER — HYDROXYZINE HYDROCHLORIDE 50 MG/1
TABLET, FILM COATED ORAL
Qty: 90 TABLET | Refills: 1 | Status: SHIPPED | OUTPATIENT
Start: 2024-12-31

## 2024-12-31 RX ORDER — PHENDIMETRAZINE TARTRATE 35 MG/1
1 TABLET ORAL 3 TIMES DAILY
Qty: 90 EACH | Refills: 2 | Status: SHIPPED | OUTPATIENT
Start: 2024-12-31

## 2024-12-31 RX ORDER — ATORVASTATIN CALCIUM 10 MG/1
10 TABLET, FILM COATED ORAL NIGHTLY
Qty: 90 TABLET | Refills: 1 | Status: SHIPPED | OUTPATIENT
Start: 2024-12-31

## 2024-12-31 RX ORDER — BUPROPION HYDROCHLORIDE 150 MG/1
150 TABLET ORAL DAILY
Qty: 90 TABLET | Refills: 1 | Status: SHIPPED | OUTPATIENT
Start: 2024-12-31

## 2024-12-31 NOTE — PROGRESS NOTES
Preventative Annual Visit    Suze Baeza  1955   [id number]    Patient Care Team:  Parul Lomas MD as PCP - General (Internal Medicine)  Kendrick nAdersen MD as Consulting Physician (Dermatology)  Heraclio Mari MD as Consulting Physician (Orthopedic Surgery)  Chio Dhaliwal MD as Consulting Physician (Obstetrics and Gynecology)    Chief Complaint::   Chief Complaint   Patient presents with    Annual Exam    Hyperlipidemia        Subjective   History of Present Illness  The patient is here for an annual physical.    She reports no chest pain, shortness of breath, or palpitations. She experiences occasional gas, which is effectively managed with Gas-X. She has been using Beano prophylactically before meals that she anticipates may cause gas. Her physical activity has been limited recently due to the holiday season and assisting her daughter with a move, but she plans to resume walking and gym workouts. She does not monitor her blood pressure at home. She reports no side effects from atorvastatin and requires a refill. She reports no constipation or diarrhea but notes a recent increase in bowel movements to 2 to 3 times daily, which are loose but not excessively so. She attributes this to an increased sugar intake and acknowledges the need to reduce her sugar consumption. She has been using Voltaren gel for joint pain and finds it helpful. She is also on estradiol, prescribed by her gynecologist, and is unsure if she needs a refill. She takes hydroxyzine at night for sleep and anxiety but reports poor sleep quality due to late bedtime. She is on phendimetrazine, taken 3 times daily, which she finds somewhat effective in reducing her appetite. She reports no palpitations. She aims to lose an additional 10 pounds. She takes pramipexole for restless legs, which is effective when taken earlier in the evening. She takes topiramate 3 times at night for weight loss. She has been experiencing  numbness in her right hand, diagnosed as carpal tunnel syndrome. Over the past few months, she has noticed swelling in her left hand, which is painful and feels tight. She suspects this may be due to arthritis. She has been using Voltaren gel on her hands, which provides some relief. She recently had cataract surgery in both eyes and had light-adjusting lenses implanted. She is scheduled for a follow-up visit on Tuesday. She has been advised that she may need reading glasses for fine print in the future. She has not seen a dentist recently.    She finds bupropion and buspirone beneficial for her mood and anxiety. She is on Pristiq for anxiety and stress, which she finds effective.    MEDICATIONS  atorvastatin, bupropion, buspirone, desvenlafaxine, Voltaren gel, estradiol, hydroxyzine, phendmetrazine, pramipexole, topiramate       Suze Baeza is a 69 y.o. female who presents for an Annual Wellness Visit.    CHRONIC CONDITIONS    Patient Active Problem List   Diagnosis    Hypercholesterolemia    Primary insomnia    Restless leg syndrome    Depression, major, single episode, mild    Chronic bilateral low back pain    Other viral warts    Bilateral headaches    Stress and adjustment reaction    Primary osteoarthritis involving multiple joints    Cervicalgia    Liazma cyst, right    Chronic fatigue    Hot flashes due to menopause    Primary osteoarthritis of both knees    Synovial cyst of popliteal space    Annual physical exam    S/P total knee arthroplasty, bilateral    Hypokalemia    Perennial allergic rhinitis with seasonal variation    Rash and nonspecific skin eruption    Numbness and tingling in right hand    Weight gain    Cellulitis of chin    Vitamin D deficiency    Abnormal glucose    Grief    Dysuria    Hamstring strain, right, initial encounter    Primary osteoarthritis of both hands        Past Medical History:   Diagnosis Date    Annual physical exam 09/10/2020    COVID-19 virus infection 01/16/2023     Degenerative tear of medial meniscus, right     Hot flashes     PROZAC     Low back pain     Neuropathy, lumbosacral (radicular)     Primary osteoarthritis of both knees     Spondylolisthesis, lumbar region     Torn meniscus     right medial    Viral warts 02/25/2019    Wears reading eyeglasses        Past Surgical History:   Procedure Laterality Date    BLADDER SURGERY      CATARACT EXTRACTION, BILATERAL Bilateral 2024    COLONOSCOPY      HYSTERECTOMY  07/10/2012    KNEE ARTHROSCOPY Right     OOPHORECTOMY Bilateral 07/10/2012    TOTAL KNEE ARTHROPLASTY Bilateral 12/29/2020    Procedure: TOTAL KNEE ARTHROPLASTY BILATERAL;  Surgeon: Heraclio Mari MD;  Location: [facility];  Service: Orthopedics;  Laterality: Bilateral;       Family History   Problem Relation Age of Onset    Deep vein thrombosis Mother     Stroke Mother     Heart disease Mother 68    Heart attack Mother     Osteoarthritis Mother     Heart disease Father 51        cause of death    Heart attack Father     Stroke Other     Cancer Maternal Aunt         bone    Diabetes Maternal Grandmother     Cancer Maternal Aunt         lymphoma    Cancer Maternal Aunt         brain    Breast cancer Neg Hx     Ovarian cancer Neg Hx        Social History     Socioeconomic History    Marital status:    Tobacco Use    Smoking status: Never    Smokeless tobacco: Never   Vaping Use    Vaping status: Never Used   Substance and Sexual Activity    Alcohol use: No    Drug use: No    Sexual activity: Defer       Allergies   Allergen Reactions    Other Itching and Rash     CHG         Current Outpatient Medications:     Ascorbic Acid (VITAMIN C PO), Take 500 mg by mouth Daily., Disp: , Rfl:     atorvastatin (LIPITOR) 10 MG tablet, Take 1 tablet by mouth Every Night., Disp: 90 tablet, Rfl: 1    azelastine (ASTELIN) 0.1 % nasal spray, 2 sprays into the nostril(s) as directed by provider 2 (Two) Times a Day. Use in each nostril as directed, Disp: 1 each, Rfl: 12     buPROPion XL (WELLBUTRIN XL) 150 MG 24 hr tablet, Take 1 tablet by mouth Daily., Disp: 90 tablet, Rfl: 1    busPIRone (BUSPAR) 5 MG tablet, Take 2 tablets by mouth 3 (Three) Times a Day., Disp: 180 tablet, Rfl: 5    calcium carb-cholecalciferol 600-800 MG-UNIT tablet, Take 1 tablet by mouth Daily., Disp: , Rfl:     Calcium Polycarbophil (FIBER-CAPS PO), Take 1 tablet by mouth Daily., Disp: , Rfl:     Cyanocobalamin (VITAMIN B-12 PO), Take 5,000 mcg by mouth Daily., Disp: , Rfl:     desvenlafaxine (PRISTIQ) 50 MG 24 hr tablet, Take 1 tablet by mouth Daily., Disp: 90 tablet, Rfl: 1    Diclofenac Sodium (VOLTAREN) 1 % gel gel, Apply 4 g topically to the appropriate area as directed 4 (Four) Times a Day As Needed (joint pain)., Disp: 350 g, Rfl: 5    estradiol (ESTRACE) 0.5 MG tablet, Take 1 tablet by mouth Daily., Disp: , Rfl:     Flax Oil-Fish Oil-Borage Oil (FISH OIL-FLAX OIL-BORAGE OIL PO), Take 1 capsule by mouth Daily., Disp: , Rfl:     fluticasone (FLONASE) 50 MCG/ACT nasal spray, 1 spray into the nostril(s) as directed by provider 2 (Two) Times a Day., Disp: 16 g, Rfl: 5    hydrOXYzine (ATARAX) 50 MG tablet, Take 1 to 1 1/2 tabs every evening for sleep and anxiety, Disp: 90 tablet, Rfl: 1    Phendimetrazine Tartrate 35 MG tablet, Take 1 tablet by mouth 3 (Three) Times a Day., Disp: 90 each, Rfl: 2    pramipexole (MIRAPEX) 0.5 MG tablet, TAKE 1 TABLET BY MOUTH DAILY AS NEEDED THEN TAKE THREE TABLETS BY MOUTH EVERY NIGHT AT BEDTIME, Disp: 120 tablet, Rfl: 1    topiramate (TOPAMAX) 25 MG tablet, Take 3 tablets by mouth Every Night., Disp: 90 tablet, Rfl: 5    Immunization History   Administered Date(s) Administered    COVID-19 (PFIZER) Purple Cap Monovalent 03/12/2021, 04/07/2021    Covid-19 (Pfizer) Gray Cap Monovalent 03/25/2022    Flu Vaccine Quad PF >36MO 10/31/2017    Fluad Quad 65+ 10/02/2019    Flublock Quad =>18yrs 10/10/2020    Fluzone (or Fluarix & Flulaval for VFC) >6mos 10/31/2017, 10/09/2021    Fluzone  "High-Dose 65+YRS 11/01/2015, 10/01/2022, 12/22/2024    Fluzone High-Dose 65+yrs 11/22/2023    Fluzone Quad >6mos (Multi-dose) 10/26/2016    Hepatitis A 07/19/2019    Influenza TIV (IM) 10/31/2014    Pneumococcal Conjugate 20-Valent (PCV20) 08/12/2024    Shingrix 09/16/2019    Tdap 06/24/2015    Zostavax 11/13/2017        Health Maintenance Due   Topic Date Due    HEPATITIS C SCREENING  Never done    ZOSTER VACCINE (3 of 3) 11/11/2019    PAP SMEAR  08/11/2024    COVID-19 Vaccine (4 - 2024-25 season) 09/01/2024    ANNUAL PHYSICAL  10/03/2024        Review of Systems     Vital Signs  Vitals:    12/31/24 1401   BP: 126/70   BP Location: Left arm   Patient Position: Sitting   Cuff Size: Adult   Pulse: 72   Temp: 98 °F (36.7 °C)   TempSrc: Infrared   SpO2: 100%   Weight: 63.6 kg (140 lb 3.2 oz)   Height: 162 cm (63.78\")   PainSc: 0-No pain     BMI is within normal parameters. No other follow-up for BMI required.     Physical Exam  Vitals and nursing note reviewed.   Constitutional:       Appearance: She is well-developed.   HENT:      Head: Normocephalic.   Eyes:      Conjunctiva/sclera: Conjunctivae normal.      Pupils: Pupils are equal, round, and reactive to light.   Neck:      Thyroid: No thyromegaly.   Cardiovascular:      Rate and Rhythm: Normal rate and regular rhythm.      Heart sounds: Normal heart sounds.   Pulmonary:      Effort: Pulmonary effort is normal.      Breath sounds: Normal breath sounds. No wheezing.   Chest:   Breasts:     Right: No inverted nipple, mass, nipple discharge, skin change or tenderness.      Left: No inverted nipple, mass, nipple discharge, skin change or tenderness.   Abdominal:      General: Bowel sounds are normal.      Palpations: Abdomen is soft.      Tenderness: There is no abdominal tenderness.   Musculoskeletal:         General: No tenderness. Normal range of motion.      Cervical back: Normal range of motion and neck supple.      Right lower leg: No edema.      Left lower leg: " No edema.   Lymphadenopathy:      Cervical: No cervical adenopathy.   Skin:     General: Skin is warm and dry.      Findings: No rash.   Neurological:      Mental Status: She is alert and oriented to person, place, and time.      Cranial Nerves: No cranial nerve deficit.      Sensory: No sensory deficit.      Coordination: Coordination normal.      Gait: Gait normal.   Psychiatric:         Attention and Perception: Attention normal.         Mood and Affect: Mood normal.         Speech: Speech normal.         Behavior: Behavior normal.         Thought Content: Thought content normal.         Judgment: Judgment normal.            ECG 12 Lead    Date/Time: 12/31/2024 2:40 PM  Performed by: Parul Lomas MD    Authorized by: Parul Lomas MD  Comparison: compared with previous ECG   Similar to previous ECG  Rhythm: sinus rhythm  Rate: normal  BPM: 80  Conduction: conduction normal  ST Segments: ST segments normal  T Waves: T waves normal  QRS axis: normal    Clinical impression: normal ECG           Fall Risk Screen:  [facility] Fall Risk Assessment has not been completed.    Health Habits and Functional and Cognitive Screening:       No data to display                Smoking Status:  Social History     Tobacco Use   Smoking Status Never   Smokeless Tobacco Never       Alcohol Consumption:  Social History     Substance and Sexual Activity   Alcohol Use No       Depression [place]  PHQ-9:        8/12/2024     1:32 PM   PHQ-2/PHQ-9 Depression Screening   Retired Little Interest or Pleasure in Doing Things 0-->not at all   Retired Feeling Down, Depressed or Hopeless 0-->not at all   Retired PHQ-9: Brief Depression Severity Measure Score 0      Patient's depression is single episode and is mild without psychosis. Their depression is currently in full remission and the condition is improving with treatment. This will be reassessed in 6 months. F/U as described:patient will continue current medication therapy.     ACE  III MINI        Labs  Results for orders placed or performed in visit on 08/12/24   Comprehensive Metabolic Panel    Collection Time: 08/12/24  2:48 PM    Specimen: Blood   Result Value Ref Range    Glucose 95 65 - 99 mg/dL    BUN 20 8 - 23 mg/dL    Creatinine 0.85 0.57 - 1.00 mg/dL    Sodium 141 136 - 145 mmol/L    Potassium 4.0 3.5 - 5.2 mmol/L    Chloride 106 98 - 107 mmol/L    CO2 25.3 22.0 - 29.0 mmol/L    Calcium 10.1 8.6 - 10.5 mg/dL    Total Protein 7.3 6.0 - 8.5 g/dL    Albumin 4.6 3.5 - 5.2 g/dL    ALT (SGPT) 19 1 - 33 U/L    AST (SGOT) 22 1 - 32 U/L    Alkaline Phosphatase 94 39 - 117 U/L    Total Bilirubin 0.3 0.0 - 1.2 mg/dL    Globulin 2.7 gm/dL    A/G Ratio 1.7 g/dL    BUN/Creatinine Ratio 23.5 7.0 - 25.0    Anion Gap 9.7 5.0 - 15.0 mmol/L    eGFR 75.2 >60.0 mL/min/1.73   Lipid Panel    Collection Time: 08/12/24  2:48 PM    Specimen: Blood   Result Value Ref Range    Total Cholesterol 173 0 - 200 mg/dL    Triglycerides 105 0 - 150 mg/dL    HDL Cholesterol 51 40 - 60 mg/dL    LDL Cholesterol  103 (H) 0 - 100 mg/dL    VLDL Cholesterol 19 5 - 40 mg/dL    LDL/HDL Ratio 1.98    TSH    Collection Time: 08/12/24  2:48 PM    Specimen: Blood   Result Value Ref Range    TSH 2.750 0.270 - 4.200 uIU/mL   Hemoglobin A1c    Collection Time: 08/12/24  2:48 PM    Specimen: Blood   Result Value Ref Range    Hemoglobin A1C 5.70 (H) 4.80 - 5.60 %   Microalbumin / Creatinine Urine Ratio - Urine, Clean Catch    Collection Time: 08/12/24  2:48 PM    Specimen: Urine, Clean Catch   Result Value Ref Range    Microalbumin/Creatinine Ratio      Creatinine, Urine 96.0 mg/dL    Microalbumin, Urine <1.2 mg/dL   Vitamin D,25-Hydroxy    Collection Time: 08/12/24  2:48 PM    Specimen: Blood   Result Value Ref Range    25 Hydroxy, Vitamin D 75.8 30.0 - 100.0 ng/ml   Vitamin B12    Collection Time: 08/12/24  2:48 PM    Specimen: Blood   Result Value Ref Range    Vitamin B-12 >2,000 (H) 211 - 946 pg/mL   CBC Auto Differential     Collection Time: 08/12/24  2:48 PM    Specimen: Blood   Result Value Ref Range    WBC 7.57 3.40 - 10.80 10*3/mm3    RBC 5.52 (H) 3.77 - 5.28 10*6/mm3    Hemoglobin 14.8 12.0 - 15.9 g/dL    Hematocrit 45.8 34.0 - 46.6 %    MCV 83.0 79.0 - 97.0 fL    MCH 26.8 26.6 - 33.0 pg    MCHC 32.3 31.5 - 35.7 g/dL    RDW 13.3 12.3 - 15.4 %    RDW-SD 40.2 37.0 - 54.0 fl    MPV 9.0 6.0 - 12.0 fL    Platelets 423 140 - 450 10*3/mm3    Neutrophil % 61.8 42.7 - 76.0 %    Lymphocyte % 29.9 19.6 - 45.3 %    Monocyte % 6.5 5.0 - 12.0 %    Eosinophil % 0.8 0.3 - 6.2 %    Basophil % 0.7 0.0 - 1.5 %    Immature Grans % 0.3 0.0 - 0.5 %    Neutrophils, Absolute 4.69 1.70 - 7.00 10*3/mm3    Lymphocytes, Absolute 2.26 0.70 - 3.10 10*3/mm3    Monocytes, Absolute 0.49 0.10 - 0.90 10*3/mm3    Eosinophils, Absolute 0.06 0.00 - 0.40 10*3/mm3    Basophils, Absolute 0.05 0.00 - 0.20 10*3/mm3    Immature Grans, Absolute 0.02 0.00 - 0.05 10*3/mm3    nRBC 0.0 0.0 - 0.2 /100 WBC   Urinalysis without microscopic (no culture) - Urine, Clean Catch    Collection Time: 08/12/24  2:48 PM    Specimen: Urine, Clean Catch   Result Value Ref Range    Color, UA Yellow Yellow, Straw    Appearance, UA Clear Clear    pH, UA 6.0 5.0 - 8.0    Specific Gravity, UA 1.020 1.005 - 1.030    Glucose, UA Negative Negative    Ketones, UA Negative Negative    Bilirubin, UA Negative Negative    Blood, UA Negative Negative    Protein, UA Negative Negative    Leuk Esterase, UA Trace (A) Negative    Nitrite, UA Negative Negative    Urobilinogen, UA 0.2 E.U./dL 0.2 - 1.0 E.U./dL   Urinalysis, Microscopic Only - Urine, Clean Catch    Collection Time: 08/12/24  2:48 PM    Specimen: Urine, Clean Catch   Result Value Ref Range    RBC, UA 0-2 None Seen, 0-2 /HPF    WBC, UA 3-5 (A) None Seen, 0-2 /HPF    Bacteria, UA 1+ (A) None Seen /HPF    Squamous Epithelial Cells, UA 21-30 (A) None Seen, 0-2 /HPF    Hyaline Casts, UA None Seen None Seen /LPF    Methodology Automated Microscopy        Results  Testing  EKG is normal.    Assessment & Plan     Patient Self-Management and Personalized Health Advice    The patient has been provided counseling and guidance about: diet, exercise, weight management, prevention of cardiac or vascular disease, and mental health concerns and preventive services including:   Annual Wellness Visit (AWV).  Patient Instructions   Problem List Items Addressed This Visit          Cardiac and Vasculature    Hypercholesterolemia    Overview     Taking atorvastatin every evening.           Relevant Medications    atorvastatin (LIPITOR) 10 MG tablet    Other Relevant Orders    CBC & Differential    Comprehensive Metabolic Panel    Lipid Panel    Microalbumin / Creatinine Urine Ratio - Urine, Clean Catch    Urinalysis With Microscopic - Urine, Clean Catch    TSH    Vitamin B12    ECG 12 Lead       Endocrine and Metabolic    Vitamin D deficiency    Relevant Orders    Vitamin D,25-Hydroxy    Abnormal glucose    Relevant Orders    Hemoglobin A1c       Health Encounters    Annual physical exam - Primary    Overview       She was advised to get the flu shot about the first week of October.  She was also advised to get hepatitis a second injection and shingrix second injection at her pharmacy.            Mental Health    Stress and adjustment reaction    Relevant Medications    buPROPion XL (WELLBUTRIN XL) 150 MG 24 hr tablet    busPIRone (BUSPAR) 5 MG tablet    desvenlafaxine (PRISTIQ) 50 MG 24 hr tablet    hydrOXYzine (ATARAX) 50 MG tablet    Phendimetrazine Tartrate 35 MG tablet    Depression, major, single episode, mild    Relevant Medications    buPROPion XL (WELLBUTRIN XL) 150 MG 24 hr tablet    busPIRone (BUSPAR) 5 MG tablet    desvenlafaxine (PRISTIQ) 50 MG 24 hr tablet    hydrOXYzine (ATARAX) 50 MG tablet    Phendimetrazine Tartrate 35 MG tablet       Musculoskeletal and Injuries    Primary osteoarthritis of both hands (Chronic)       Neuro    Restless leg syndrome    Overview       Mirapex every evening.  Continue drinking plenty of fluids every day            Sleep    Primary insomnia    Overview                 Other Visit Diagnoses       Overweight with body mass index (BMI) of 26 to 26.9 in adult  (Chronic)       Relevant Medications    Phendimetrazine Tartrate 35 MG tablet               Diagnosis Plan   1. Annual physical exam        2. Hypercholesterolemia  CBC & Differential    Comprehensive Metabolic Panel    Lipid Panel    Microalbumin / Creatinine Urine Ratio - Urine, Clean Catch    Urinalysis With Microscopic - Urine, Clean Catch    TSH    Vitamin B12    ECG 12 Lead      3. Abnormal glucose  Hemoglobin A1c      4. Depression, major, single episode, mild        5. Stress and adjustment reaction  busPIRone (BUSPAR) 5 MG tablet      6. Primary insomnia        7. Restless leg syndrome        8. Primary osteoarthritis of both hands        9. Vitamin D deficiency  Vitamin D,25-Hydroxy      10. Overweight with body mass index (BMI) of 26 to 26.9 in adult  Phendimetrazine Tartrate 35 MG tablet        Outpatient Encounter Medications as of 12/31/2024   Medication Sig Dispense Refill    Ascorbic Acid (VITAMIN C PO) Take 500 mg by mouth Daily.      atorvastatin (LIPITOR) 10 MG tablet Take 1 tablet by mouth Every Night. 90 tablet 1    azelastine (ASTELIN) 0.1 % nasal spray 2 sprays into the nostril(s) as directed by provider 2 (Two) Times a Day. Use in each nostril as directed 1 each 12    buPROPion XL (WELLBUTRIN XL) 150 MG 24 hr tablet Take 1 tablet by mouth Daily. 90 tablet 1    busPIRone (BUSPAR) 5 MG tablet Take 2 tablets by mouth 3 (Three) Times a Day. 180 tablet 5    calcium carb-cholecalciferol 600-800 MG-UNIT tablet Take 1 tablet by mouth Daily.      Calcium Polycarbophil (FIBER-CAPS PO) Take 1 tablet by mouth Daily.      Cyanocobalamin (VITAMIN B-12 PO) Take 5,000 mcg by mouth Daily.      desvenlafaxine (PRISTIQ) 50 MG 24 hr tablet Take 1 tablet by mouth Daily. 90 tablet 1     Diclofenac Sodium (VOLTAREN) 1 % gel gel Apply 4 g topically to the appropriate area as directed 4 (Four) Times a Day As Needed (joint pain). 350 g 5    estradiol (ESTRACE) 0.5 MG tablet Take 1 tablet by mouth Daily.      Flax Oil-Fish Oil-Borage Oil (FISH OIL-FLAX OIL-BORAGE OIL PO) Take 1 capsule by mouth Daily.      fluticasone (FLONASE) 50 MCG/ACT nasal spray 1 spray into the nostril(s) as directed by provider 2 (Two) Times a Day. 16 g 5    hydrOXYzine (ATARAX) 50 MG tablet Take 1 to 1 1/2 tabs every evening for sleep and anxiety 90 tablet 1    Phendimetrazine Tartrate 35 MG tablet Take 1 tablet by mouth 3 (Three) Times a Day. 90 each 2    pramipexole (MIRAPEX) 0.5 MG tablet TAKE 1 TABLET BY MOUTH DAILY AS NEEDED THEN TAKE THREE TABLETS BY MOUTH EVERY NIGHT AT BEDTIME 120 tablet 1    topiramate (TOPAMAX) 25 MG tablet Take 3 tablets by mouth Every Night. 90 tablet 5    [DISCONTINUED] atorvastatin (LIPITOR) 10 MG tablet Take 1 tablet by mouth Every Night. 90 tablet 1    [DISCONTINUED] benzonatate (Tessalon Perles) 100 MG capsule Take 1 capsule by mouth 3 (Three) Times a Day As Needed for Cough. 30 capsule 1    [DISCONTINUED] buPROPion XL (WELLBUTRIN XL) 150 MG 24 hr tablet TAKE 1 TABLET BY MOUTH DAILY 90 tablet 1    [DISCONTINUED] busPIRone (BUSPAR) 5 MG tablet TAKE TWO TABLETS BY MOUTH THREE TIMES A  tablet 5    [DISCONTINUED] desvenlafaxine (PRISTIQ) 50 MG 24 hr tablet Take 1 tablet by mouth Daily. 90 tablet 1    [DISCONTINUED] hydrOXYzine (ATARAX) 50 MG tablet TAKE ONE TABLET BY MOUTH ONCE NIGHTLY 90 tablet 1    [DISCONTINUED] Phendimetrazine Tartrate 35 MG tablet TAKE 1 TABLET BY MOUTH 3 TIMES A DAY 90 each 2    [DISCONTINUED] topiramate (TOPAMAX) 25 MG tablet TAKE 3 TABLET BY MOUTH EVERY NIGHT AT BEDTIME 90 tablet 5     No facility-administered encounter medications on file as of 12/31/2024.       Reviewed use of high risk medication in the elderly: yes  Reviewed for potential of harmful drug  "interactions in the elderly: yes    Age appropriate preventive counseling done including age appropriate vaccines,regular  Mammogram and self breast exam, pap smear, colonoscopy, regular dental visits, mental health, injury prevention such as wearing seat belt and preventing falls, healthy  nutrition, healthy weight, regular physical exercise. Alcohol use is moderate.  Tobacco history-none. Drug use-none.  STD's-not at risk.       Objective   Vital Signs:  /70 (BP Location: Left arm, Patient Position: Sitting, Cuff Size: Adult)   Pulse 72   Temp 98 °F (36.7 °C) (Infrared)   Ht 162 cm (63.78\")   Wt 63.6 kg (140 lb 3.2 oz)   SpO2 100%   BMI 24.23 kg/m²     [unfilled]    The following data was reviewed by: Parul Lomas MD on 12/31/2024:  CMP          8/12/2024    14:48   CMP   Glucose 95    BUN 20    Creatinine 0.85    EGFR 75.2    Sodium 141    Potassium 4.0    Chloride 106    Calcium 10.1    Total Protein 7.3    Albumin 4.6    Globulin 2.7    Total Bilirubin 0.3    Alkaline Phosphatase 94    AST (SGOT) 22    ALT (SGPT) 19    Albumin/Globulin Ratio 1.7    BUN/Creatinine Ratio 23.5    Anion Gap 9.7      CBC          8/12/2024    14:48   CBC   WBC 7.57    RBC 5.52    Hemoglobin 14.8    Hematocrit 45.8    MCV 83.0    MCH 26.8    MCHC 32.3    RDW 13.3    Platelets 423      CBC w/diff          8/12/2024    14:48   CBC w/Diff   WBC 7.57    RBC 5.52    Hemoglobin 14.8    Hematocrit 45.8    MCV 83.0    MCH 26.8    MCHC 32.3    RDW 13.3    Platelets 423    Neutrophil Rel % 61.8    Immature Granulocyte Rel % 0.3    Lymphocyte Rel % 29.9    Monocyte Rel % 6.5    Eosinophil Rel % 0.8    Basophil Rel % 0.7      Lipid Panel          8/12/2024    14:48   Lipid Panel   Total Cholesterol 173    Triglycerides 105    HDL Cholesterol 51    VLDL Cholesterol 19    LDL Cholesterol  103    LDL/HDL Ratio 1.98      TSH          8/12/2024    14:48   TSH   TSH 2.750      A1C Last 3 Results          8/12/2024    14:48   HGBA1C " Last 3 Results   Hemoglobin A1C 5.70         Assessment and Plan   Diagnoses and all orders for this visit:    1. Annual physical exam (Primary)    2. Hypercholesterolemia  -     CBC & Differential; Future  -     Comprehensive Metabolic Panel; Future  -     Lipid Panel; Future  -     Microalbumin / Creatinine Urine Ratio - Urine, Clean Catch; Future  -     Urinalysis With Microscopic - Urine, Clean Catch; Future  -     TSH; Future  -     Vitamin B12; Future  -     ECG 12 Lead    3. Abnormal glucose  -     Hemoglobin A1c; Future    4. Depression, major, single episode, mild    5. Stress and adjustment reaction  -     busPIRone (BUSPAR) 5 MG tablet; Take 2 tablets by mouth 3 (Three) Times a Day.  Dispense: 180 tablet; Refill: 5    6. Primary insomnia    7. Restless leg syndrome    8. Primary osteoarthritis of both hands    9. Vitamin D deficiency  -     Vitamin D,25-Hydroxy; Future    10. Overweight with body mass index (BMI) of 26 to 26.9 in adult  -     Phendimetrazine Tartrate 35 MG tablet; Take 1 tablet by mouth 3 (Three) Times a Day.  Dispense: 90 each; Refill: 2    Other orders  -     atorvastatin (LIPITOR) 10 MG tablet; Take 1 tablet by mouth Every Night.  Dispense: 90 tablet; Refill: 1  -     buPROPion XL (WELLBUTRIN XL) 150 MG 24 hr tablet; Take 1 tablet by mouth Daily.  Dispense: 90 tablet; Refill: 1  -     desvenlafaxine (PRISTIQ) 50 MG 24 hr tablet; Take 1 tablet by mouth Daily.  Dispense: 90 tablet; Refill: 1  -     hydrOXYzine (ATARAX) 50 MG tablet; Take 1 to 1 1/2 tabs every evening for sleep and anxiety  Dispense: 90 tablet; Refill: 1  -     topiramate (TOPAMAX) 25 MG tablet; Take 3 tablets by mouth Every Night.  Dispense: 90 tablet; Refill: 5      Assessment & Plan  1. Hypercholesterolemia.  She is advised to maintain a low-fat, healthy diet and increase regular exercise, including walking and gym activities. She will continue taking atorvastatin 20 mg nightly.    2. Abnormal glucose.  She is advised  to reduce the intake of sugars, snack foods, and starchy foods, and increase the consumption of vegetables, fruits, and whole grains. Protein should be included in 3 daily meals. Regular exercise and physical activity are also recommended to help lower blood sugar levels.    3. Depression and stress.  Regular exercise and physical activity are also recommended to help alleviate symptoms of stress, anxiety, and mood disorders. She will continue her current regimen of bupropion 150 mg every morning, buspirone 10 mg 3 times daily, and desvenlafaxine 50 mg daily.    4. Primary insomnia.  Good sleep hygiene  including going to bed at the same time and getting up at the same time every day, going to bed early enough to get 7 or 8 hours in bed, reading and relaxing before bedtime, and avoiding the TV, computer, phone, iPad close to bedtime.  Physical activity early in the day is recommended to improve sleep quality at night. She may continue taking hydroxyzine 25 mg in the evenings and may increase the dose to 1.5  to 2 tablets if needed to aid sleep.    5. Restless leg syndrome.  She will continue her current use of pramipexole 0.125 mg.    6. Vitamin D deficiency.  A recheck of her vitamin D level will be conducted.    7. Weight management.    She is advised to maintain a low-fat, healthy diet and increase physical activity and exercise. She is currently taking bupropion 150 mg in the mornings, topiramate 25 mg 3 tablets at night, and phendimetrazine 35 mg 3 times daily with meals.    8.  Bilateral hand arthritis.  She is advised to use Voltaren gel and may take Tylenol Arthritis as needed, up to 3 times in 24 hours. If symptoms worsen or if joints become red and tender, she should notify the clinic.    Health maintenance.  She is advised to get the tetanus/diphtheria/whooping cough vaccine first and then wait a couple of weeks before getting the shingles vaccine. She is up to date on colonoscopy, mammogram, DEXA. A lab  order has been placed, and she can go to any [facility] lab any day.    Follow-up  The patient will follow up in 6 months.    PROCEDURE  The patient recently had cataract surgery in both eyes.          Follow Up   Return in about 6 months (around 6/30/2025) for recheck fasting.  Patient was given instructions and counseling regarding her condition or for health maintenance advice. Please see specific information pulled into the AVS if appropriate.     Note: Part of this note may be an electronic transcription/translation of spoken language to printed text using the Dragon Dictation System.     Parul Lomas MD  Patient or patient representative verbalized consent for the use of Ambient Listening during the visit with  Parul Lomas MD for chart documentation. 1/1/2025  14:42 EST

## 2025-01-01 NOTE — PATIENT INSTRUCTIONS
Patient Instructions  Problem List Items Addressed This Visit          Cardiac and Vasculature    Hypercholesterolemia    Overview     Taking atorvastatin every evening.           Relevant Medications    atorvastatin (LIPITOR) 10 MG tablet    Other Relevant Orders    CBC & Differential    Comprehensive Metabolic Panel    Lipid Panel    Microalbumin / Creatinine Urine Ratio - Urine, Clean Catch    Urinalysis With Microscopic - Urine, Clean Catch    TSH    Vitamin B12    ECG 12 Lead       Endocrine and Metabolic    Vitamin D deficiency    Relevant Orders    Vitamin D,25-Hydroxy    Abnormal glucose    Relevant Orders    Hemoglobin A1c       Health Encounters    Annual physical exam - Primary    Overview       She was advised to get the flu shot about the first week of October.  She was also advised to get hepatitis a second injection and shingrix second injection at her pharmacy.            Mental Health    Stress and adjustment reaction    Relevant Medications    buPROPion XL (WELLBUTRIN XL) 150 MG 24 hr tablet    busPIRone (BUSPAR) 5 MG tablet    desvenlafaxine (PRISTIQ) 50 MG 24 hr tablet    hydrOXYzine (ATARAX) 50 MG tablet    Phendimetrazine Tartrate 35 MG tablet    Depression, major, single episode, mild    Relevant Medications    buPROPion XL (WELLBUTRIN XL) 150 MG 24 hr tablet    busPIRone (BUSPAR) 5 MG tablet    desvenlafaxine (PRISTIQ) 50 MG 24 hr tablet    hydrOXYzine (ATARAX) 50 MG tablet    Phendimetrazine Tartrate 35 MG tablet       Musculoskeletal and Injuries    Primary osteoarthritis of both hands (Chronic)       Neuro    Restless leg syndrome    Overview      [employer] every evening.  Continue drinking plenty of fluids every day            Sleep    Primary insomnia    Overview                 Other Visit Diagnoses       Overweight with body mass index (BMI) of 26 to 26.9 in adult  (Chronic)       Relevant Medications    Phendimetrazine Tartrate 35 MG tablet            Exercising to Stay Healthy  To  become healthy and stay healthy, it is recommended that you do moderate-intensity and vigorous-intensity exercise. You can tell that you are exercising at a moderate intensity if your heart starts beating faster and you start breathing faster but can still hold a conversation. You can tell that you are exercising at a vigorous intensity if you are breathing much harder and faster and cannot hold a conversation while exercising.  How can exercise benefit me?  Exercising regularly is important. It has many health benefits, such as:  Improving overall fitness, flexibility, and endurance.  Increasing bone density.  Helping with weight control.  Decreasing body fat.  Increasing muscle strength and endurance.  Reducing stress and tension, anxiety, depression, or anger.  Improving overall health.  What guidelines should I follow while exercising?  Before you start a new exercise program, talk with your health care provider.  Do not exercise so much that you hurt yourself, feel dizzy, or get very short of breath.  Wear comfortable clothes and wear shoes with good support.  Drink plenty of water while you exercise to prevent dehydration or heat stroke.  Work out until your breathing and your heartbeat get faster (moderate intensity).  How often should I exercise?  Choose an activity that you enjoy, and set realistic goals. Your health care provider can help you make an activity plan that is individually designed and works best for you.  Exercise regularly as told by your health care provider. This may include:  Doing strength training two times a week, such as:  Lifting weights.  Using resistance bands.  Push-ups.  Sit-ups.  Yoga.  Doing a certain intensity of exercise for a given amount of time. Choose from these options:  A total of 150 minutes of moderate-intensity exercise every week.  A total of 75 minutes of vigorous-intensity exercise every week.  A mix of moderate-intensity and vigorous-intensity exercise every  week.  Children, pregnant women, people who have not exercised regularly, people who are overweight, and older adults may need to talk with a health care provider about what activities are safe to perform. If you have a medical condition, be sure to talk with your health care provider before you start a new exercise program.  What are some exercise ideas?  Moderate-intensity exercise ideas include:  Walking 1 mile (1.6 km) in about 15 minutes.  Biking.  Hiking.  Golfing.  Dancing.  Water aerobics.  Vigorous-intensity exercise ideas include:  Walking 4.5 miles (7.2 km) or more in about 1 hour.  Jogging or running 5 miles (8 km) in about 1 hour.  Biking 10 miles (16.1 km) or more in about 1 hour.  Lap swimming.  Roller-skating or in-line skating.  Cross-country skiing.  Vigorous competitive sports, such as football, basketball, and soccer.  Jumping rope.  Aerobic dancing.  What are some everyday activities that can help me get exercise?  Yard work, such as:  Pushing a .  Raking and bagging leaves.  Washing your car.  Pushing a stroller.  Shoveling snow.  Gardening.  Washing windows or floors.  How can I be more active in my day-to-day activities?  Use stairs instead of an elevator.  Take a walk during your lunch break.  If you drive, park your car farther away from your work or school.  If you take public transportation, get off one stop early and walk the rest of the way.  Stand up or walk around during all of your indoor phone calls.  Get up, stretch, and walk around every 30 minutes throughout the day.  Enjoy exercise with a friend. Support to continue exercising will help you keep a regular routine of activity.  Where to find more information  You can find more information about exercising to stay healthy from:  [place] Department of Health and Human Services: www.hhs.gov  Centers for Disease Control and Prevention (CDC): www.cdc.gov  Summary  Exercising regularly is important. It will improve your overall  "fitness, flexibility, and endurance.  Regular exercise will also improve your overall health. It can help you control your weight, reduce stress, and improve your bone density.  Do not exercise so much that you hurt yourself, feel dizzy, or get very short of breath.  Before you start a new exercise program, talk with your health care provider.  This information is not intended to replace advice given to you by your health care provider. Make sure you discuss any questions you have with your health care provider.  Document Revised: 04/15/2022 Document Reviewed: 04/15/2022  [employer] Patient Education © 2023 [employer] BMI for Adults  Body mass index (BMI) is a number found using a person's weight and height. BMI can help tell how much of a person's weight is made up of fat. BMI does not measure body fat directly. It is used instead of tests that directly measure body fat, which can be difficult and expensive.  What are BMI measurements used for?  BMI is useful to:  Find out if your weight puts you at higher risk for medical problems.  Help recommend changes, such as in diet and exercise. This can help you reach a healthy weight. BMI screening can be done again to see if these changes are working.  How is BMI calculated?  Your height and weight are measured. The BMI is found from those numbers. This can be done with U.S[place] or metric measurements. Note that charts and online BMI calculators are available to help you find your BMI quickly and easily without doing these calculations.  To calculate your BMI in U.S[place] measurements:  Measure your weight in pounds (lb).  Multiply the number of pounds by 703.  So, for an adult who weighs 150 lb, multiply that number by 703: 150 x 703, which equals 105,450.  Measure your height in inches. Then multiply that number by itself to get a measurement called \"inches squared.\"  So, for an adult who is 70 inches tall, the \"inches squared\" measurement is 70 inches x 70 inches, which " "equals 4,900 inches squared.  Divide the total from step 2 (number of lb x 703) by the total from step 3 (inches squared): 105,450 ÷ 4,900 = 21.5. This is your BMI.  To calculate your BMI in metric measurements:    Measure your weight in kilograms (kg).  For this example, the weight is 70 kg.  Measure your height in meters (m). Then multiply that number by itself to get a measurement called \"meters squared.\"  So, for an adult who is 1.75 m tall, the \"meters squared\" measurement is 1.75 m x 1.75 m, which equals 3.1 meters squared.  Divide the number of kilograms (your weight) by the meters squared number. In this example: 70 ÷ 3.1 = 22.6. This is your BMI.  What do the results mean?  BMI charts are used to see if you are underweight, normal weight, overweight, or obese. The following guidelines will be used:  Underweight: BMI less than 18.5.  Normal weight: BMI between 18.5 and 24.9.  Overweight: BMI between 25 and 29.9.  Obese: BMI of 30 or above.  BMI is a tool and cannot diagnose a condition. Talk with your health care provider about what your BMI means for you. Keep these notes in mind:  Weight includes fat and muscle. Someone with a muscular build, such as an athlete, may have a BMI that is higher than 24.9. In cases like these, BMI is not a correct measure of body fat.  If you have a BMI of 25 or higher, your provider may need to do more testing to find out if excess body fat is the cause.  BMI is measured the same way for males and females. Females usually have more body fat than males of the same height and weight.  Where to find more information  For more information about BMI, including tools to quickly find your BMI, go to:  Centers for Disease Control and Prevention: cdc.gov  American Heart Association: heart.org  National Heart, Lung, and Blood [place]: nhlbi.nih.gov  This information is not intended to replace advice given to you by your health care provider. Make sure you discuss any questions you " have with your health care provider.  Document Revised: 09/07/2023 Document Reviewed: 08/31/2023  Else[employer] Patient Education © 2024 [employer]

## 2025-01-22 ENCOUNTER — TELEPHONE (OUTPATIENT)
Dept: INTERNAL MEDICINE | Facility: CLINIC | Age: 70
End: 2025-01-22

## 2025-01-22 NOTE — TELEPHONE ENCOUNTER
Pt states she thinks she has a sinus infection.  Denies fever, cough and congestion.  Please advise.

## 2025-01-22 NOTE — TELEPHONE ENCOUNTER
Caller: Suze Baeza    Relationship: Self    Best call back number: [phone]     What medication are you requesting: AMOXICILLIN    What are your current symptoms:  NASAL DRAINAGE AND  POST NASAL DRAINAGE    How long have you been experiencing symptoms:  COUPLE DAYS    Have you had these symptoms before:    [x] Yes  [] No    Have you been treated for these symptoms before:   [x] Yes  [] No    If a prescription is needed, what is your preferred pharmacy and phone number:    [facility] [id number] [address] - [phone] [facility] [phone]  [phone]   Additional notes:

## 2025-03-10 RX ORDER — PRAMIPEXOLE DIHYDROCHLORIDE 0.5 MG/1
TABLET ORAL
Qty: 120 TABLET | Refills: 1 | Status: SHIPPED | OUTPATIENT
Start: 2025-03-10

## 2025-03-18 ENCOUNTER — OFFICE VISIT (OUTPATIENT)
Dept: ORTHOPEDIC SURGERY | Facility: CLINIC | Age: 70
End: 2025-03-18
Payer: COMMERCIAL

## 2025-03-18 ENCOUNTER — LAB (OUTPATIENT)
Dept: LAB | Facility: HOSPITAL | Age: 70
End: 2025-03-18
Payer: COMMERCIAL

## 2025-03-18 VITALS
DIASTOLIC BLOOD PRESSURE: 64 MMHG | WEIGHT: 138 LBS | SYSTOLIC BLOOD PRESSURE: 110 MMHG | HEIGHT: 64 IN | BODY MASS INDEX: 23.56 KG/M2

## 2025-03-18 DIAGNOSIS — S49.91XA INJURY OF RIGHT SHOULDER, INITIAL ENCOUNTER: ICD-10-CM

## 2025-03-18 DIAGNOSIS — M79.601 PAIN OF RIGHT UPPER EXTREMITY: Primary | ICD-10-CM

## 2025-03-18 DIAGNOSIS — E55.9 VITAMIN D DEFICIENCY: ICD-10-CM

## 2025-03-18 DIAGNOSIS — S46.111A RUPTURE LONG HEAD BICEPS TENDON, RIGHT, INITIAL ENCOUNTER: ICD-10-CM

## 2025-03-18 DIAGNOSIS — E78.00 HYPERCHOLESTEROLEMIA: ICD-10-CM

## 2025-03-18 DIAGNOSIS — M75.101 ROTATOR CUFF SYNDROME OF RIGHT SHOULDER: ICD-10-CM

## 2025-03-18 DIAGNOSIS — R73.09 ABNORMAL GLUCOSE: ICD-10-CM

## 2025-03-18 LAB
25(OH)D3 SERPL-MCNC: 66.1 NG/ML (ref 30–100)
ALBUMIN SERPL-MCNC: 4.2 G/DL (ref 3.5–5.2)
ALBUMIN UR-MCNC: <1.2 MG/DL
ALBUMIN/GLOB SERPL: 1.6 G/DL
ALP SERPL-CCNC: 91 U/L (ref 39–117)
ALT SERPL W P-5'-P-CCNC: 21 U/L (ref 1–33)
ANION GAP SERPL CALCULATED.3IONS-SCNC: 10 MMOL/L (ref 5–15)
AST SERPL-CCNC: 21 U/L (ref 1–32)
BACTERIA UR QL AUTO: ABNORMAL /HPF
BASOPHILS # BLD AUTO: 0.07 10*3/MM3 (ref 0–0.2)
BASOPHILS NFR BLD AUTO: 1.2 % (ref 0–1.5)
BILIRUB SERPL-MCNC: 0.3 MG/DL (ref 0–1.2)
BILIRUB UR QL STRIP: NEGATIVE
BUN SERPL-MCNC: 22 MG/DL (ref 8–23)
BUN/CREAT SERPL: 28.9 (ref 7–25)
CALCIUM SPEC-SCNC: 9.4 MG/DL (ref 8.6–10.5)
CHLORIDE SERPL-SCNC: 106 MMOL/L (ref 98–107)
CHOLEST SERPL-MCNC: 164 MG/DL (ref 0–200)
CLARITY UR: CLEAR
CO2 SERPL-SCNC: 24 MMOL/L (ref 22–29)
COLOR UR: YELLOW
CREAT SERPL-MCNC: 0.76 MG/DL (ref 0.57–1)
CREAT UR-MCNC: 30.4 MG/DL
DEPRECATED RDW RBC AUTO: 42.3 FL (ref 37–54)
EGFRCR SERPLBLD CKD-EPI 2021: 85.5 ML/MIN/1.73
EOSINOPHIL # BLD AUTO: 0.1 10*3/MM3 (ref 0–0.4)
EOSINOPHIL NFR BLD AUTO: 1.7 % (ref 0.3–6.2)
ERYTHROCYTE [DISTWIDTH] IN BLOOD BY AUTOMATED COUNT: 13.8 % (ref 12.3–15.4)
GLOBULIN UR ELPH-MCNC: 2.6 GM/DL
GLUCOSE SERPL-MCNC: 77 MG/DL (ref 65–99)
GLUCOSE UR STRIP-MCNC: NEGATIVE MG/DL
HBA1C MFR BLD: 5.7 % (ref 4.8–5.6)
HCT VFR BLD AUTO: 42.5 % (ref 34–46.6)
HDLC SERPL-MCNC: 50 MG/DL (ref 40–60)
HGB BLD-MCNC: 14 G/DL (ref 12–15.9)
HGB UR QL STRIP.AUTO: NEGATIVE
HYALINE CASTS UR QL AUTO: ABNORMAL /LPF
IMM GRANULOCYTES # BLD AUTO: 0.02 10*3/MM3 (ref 0–0.05)
IMM GRANULOCYTES NFR BLD AUTO: 0.3 % (ref 0–0.5)
KETONES UR QL STRIP: NEGATIVE
LDLC SERPL CALC-MCNC: 92 MG/DL (ref 0–100)
LDLC/HDLC SERPL: 1.78 {RATIO}
LEUKOCYTE ESTERASE UR QL STRIP.AUTO: NEGATIVE
LYMPHOCYTES # BLD AUTO: 1.72 10*3/MM3 (ref 0.7–3.1)
LYMPHOCYTES NFR BLD AUTO: 29.3 % (ref 19.6–45.3)
MCH RBC QN AUTO: 27.6 PG (ref 26.6–33)
MCHC RBC AUTO-ENTMCNC: 32.9 G/DL (ref 31.5–35.7)
MCV RBC AUTO: 83.8 FL (ref 79–97)
MICROALBUMIN/CREAT UR: NORMAL MG/G{CREAT}
MONOCYTES # BLD AUTO: 0.42 10*3/MM3 (ref 0.1–0.9)
MONOCYTES NFR BLD AUTO: 7.2 % (ref 5–12)
NEUTROPHILS NFR BLD AUTO: 3.54 10*3/MM3 (ref 1.7–7)
NEUTROPHILS NFR BLD AUTO: 60.3 % (ref 42.7–76)
NITRITE UR QL STRIP: NEGATIVE
NRBC BLD AUTO-RTO: 0 /100 WBC (ref 0–0.2)
PH UR STRIP.AUTO: 7.5 [PH] (ref 5–8)
PLATELET # BLD AUTO: 399 10*3/MM3 (ref 140–450)
PMV BLD AUTO: 9.1 FL (ref 6–12)
POTASSIUM SERPL-SCNC: 4.1 MMOL/L (ref 3.5–5.2)
PROT SERPL-MCNC: 6.8 G/DL (ref 6–8.5)
PROT UR QL STRIP: NEGATIVE
RBC # BLD AUTO: 5.07 10*6/MM3 (ref 3.77–5.28)
RBC # UR STRIP: ABNORMAL /HPF
REF LAB TEST METHOD: ABNORMAL
SODIUM SERPL-SCNC: 140 MMOL/L (ref 136–145)
SP GR UR STRIP: 1.01 (ref 1–1.03)
SQUAMOUS #/AREA URNS HPF: ABNORMAL /HPF
TRIGL SERPL-MCNC: 124 MG/DL (ref 0–150)
TSH SERPL DL<=0.05 MIU/L-ACNC: 1.46 UIU/ML (ref 0.27–4.2)
UROBILINOGEN UR QL STRIP: NORMAL
VIT B12 BLD-MCNC: 1046 PG/ML (ref 211–946)
VLDLC SERPL-MCNC: 22 MG/DL (ref 5–40)
WBC # UR STRIP: ABNORMAL /HPF
WBC NRBC COR # BLD AUTO: 5.87 10*3/MM3 (ref 3.4–10.8)

## 2025-03-18 PROCEDURE — 80053 COMPREHEN METABOLIC PANEL: CPT

## 2025-03-18 PROCEDURE — 82570 ASSAY OF URINE CREATININE: CPT

## 2025-03-18 PROCEDURE — 85025 COMPLETE CBC W/AUTO DIFF WBC: CPT

## 2025-03-18 PROCEDURE — 81001 URINALYSIS AUTO W/SCOPE: CPT

## 2025-03-18 PROCEDURE — 82306 VITAMIN D 25 HYDROXY: CPT

## 2025-03-18 PROCEDURE — 82607 VITAMIN B-12: CPT

## 2025-03-18 PROCEDURE — 80061 LIPID PANEL: CPT

## 2025-03-18 PROCEDURE — 84443 ASSAY THYROID STIM HORMONE: CPT

## 2025-03-18 PROCEDURE — 83036 HEMOGLOBIN GLYCOSYLATED A1C: CPT

## 2025-03-18 PROCEDURE — 82043 UR ALBUMIN QUANTITATIVE: CPT

## 2025-03-18 NOTE — PROGRESS NOTES
[facility] Note        Subjective     Initial Evaluation of the Right Shoulder      HPI    Suze Baeza is a 70 y.o. female.  Patient is here today see me for the first time for evaluation of her right shoulder.  Patient tells me about a week ago she was picking up a bag of mulch in her flower garden and felt a pop.  Has had no prior pain in the arm or shoulder before the injury.  Tells me she does not have any pain.  She is here for further evaluation treatment.  She is right-hand dominant.  Is a patient of Dr. Mari's for knee arthroplasty.        Past Medical History:   Diagnosis Date    Annual physical exam 09/10/2020    COVID-19 virus infection 01/16/2023    Degenerative tear of medial meniscus, right     Hot flashes     PROZAC     Low back pain     Neuropathy, lumbosacral (radicular)     Primary osteoarthritis of both knees     Spondylolisthesis, lumbar region     Torn meniscus     right medial    Viral warts 02/25/2019    Wears reading eyeglasses       Past Surgical History:   Procedure Laterality Date    BLADDER SURGERY      CATARACT EXTRACTION, BILATERAL Bilateral 2024    COLONOSCOPY      HYSTERECTOMY  07/10/2012    KNEE ARTHROSCOPY Right     OOPHORECTOMY Bilateral 07/10/2012    TOTAL KNEE ARTHROPLASTY Bilateral 12/29/2020    Procedure: TOTAL KNEE ARTHROPLASTY BILATERAL;  Surgeon: Heraclio Mari MD;  Location: [facility];  Service: Orthopedics;  Laterality: Bilateral;      Family History   Problem Relation Age of Onset    Deep vein thrombosis Mother     Stroke Mother     Heart disease Mother 68    Heart attack Mother     Osteoarthritis Mother     Heart disease Father 51        cause of death    Heart attack Father     Stroke Other     Cancer Maternal Aunt         bone    Diabetes Maternal Grandmother     Cancer Maternal Aunt         lymphoma    Cancer Maternal Aunt         brain    Breast cancer Neg Hx     Ovarian cancer Neg Hx      Social History     Socioeconomic History     Marital status:    Tobacco Use    Smoking status: Never    Smokeless tobacco: Never   Vaping Use    Vaping status: Never Used   Substance and Sexual Activity    Alcohol use: No    Drug use: No    Sexual activity: Defer      Current Outpatient Medications on File Prior to Visit   Medication Sig Dispense Refill    pramipexole (MIRAPEX) 0.5 MG tablet TAKE 1 TABLET BY MOUTH DAILY AS NEEDED AND TAKE THREE TABLETS BY MOUTH EVERY NIGHT AT BEDTIME 120 tablet 1    Ascorbic Acid (VITAMIN C PO) Take 500 mg by mouth Daily.      atorvastatin (LIPITOR) 10 MG tablet Take 1 tablet by mouth Every Night. 90 tablet 1    azelastine (ASTELIN) 0.1 % nasal spray 2 sprays into the nostril(s) as directed by provider 2 (Two) Times a Day. Use in each nostril as directed 1 each 12    buPROPion XL (WELLBUTRIN XL) 150 MG 24 hr tablet Take 1 tablet by mouth Daily. 90 tablet 1    busPIRone (BUSPAR) 5 MG tablet Take 2 tablets by mouth 3 (Three) Times a Day. 180 tablet 5    calcium carb-cholecalciferol 600-800 MG-UNIT tablet Take 1 tablet by mouth Daily.      Calcium Polycarbophil (FIBER-CAPS PO) Take 1 tablet by mouth Daily.      Cyanocobalamin (VITAMIN B-12 PO) Take 5,000 mcg by mouth Daily.      desvenlafaxine (PRISTIQ) 50 MG 24 hr tablet Take 1 tablet by mouth Daily. 90 tablet 1    Diclofenac Sodium (VOLTAREN) 1 % gel gel Apply 4 g topically to the appropriate area as directed 4 (Four) Times a Day As Needed (joint pain). 350 g 5    estradiol (ESTRACE) 0.5 MG tablet Take 1 tablet by mouth Daily.      Flax Oil-Fish Oil-Borage Oil (FISH OIL-FLAX OIL-BORAGE OIL PO) Take 1 capsule by mouth Daily.      fluticasone (FLONASE) 50 MCG/ACT nasal spray 1 spray into the nostril(s) as directed by provider 2 (Two) Times a Day. 16 g 5    hydrOXYzine (ATARAX) 50 MG tablet Take 1 to 1 1/2 tabs every evening for sleep and anxiety 90 tablet 1    Phendimetrazine Tartrate 35 MG tablet Take 1 tablet by mouth 3 (Three) Times a Day. 90 each 2    topiramate  "(TOPAMAX) 25 MG tablet Take 3 tablets by mouth Every Night. 90 tablet 5     No current facility-administered medications on file prior to visit.      Allergies   Allergen Reactions    Other Itching and Rash     CHG          Review of Systems   Constitutional: Negative.    HENT: Negative.     Eyes: Negative.    Respiratory: Negative.     Cardiovascular: Negative.    Gastrointestinal: Negative.    Endocrine: Negative.    Genitourinary: Negative.    Musculoskeletal:  Positive for arthralgias.   Skin: Negative.    Allergic/Immunologic: Negative.    Neurological: Negative.    Hematological: Negative.    Psychiatric/Behavioral: Negative.          I reviewed the patient's chief complaint, history of present illness, review of systems, past medical history, surgical history, family history, social history, medications and allergy list.        Objective      Physical Exam  /64   Ht 162 cm (63.78\")   Wt 62.6 kg (138 lb)   BMI 23.85 kg/m²     Body mass index is 23.85 kg/m².    General  Mental Status - alert  General Appearance - cooperative, well groomed, not in acute distress  Orientation - Oriented X3  Build & Nutrition - well developed and well nourished  Posture - normal posture  Gait - normal gait       Ortho Exam  Musculoskeletal   Upper Extremity   Right Shoulder     Inspection and Palpation:     Medial border scapular tenderness-none    AC Joint Tenderness -none    Sensation is normal    Examination reveals no ecchymosis.        Strength and Tone:    Supraspinatus - 5/5    External Rotators-5/5    Infraspinatus - 5/5    Subscapularis -4 out of 5 without irritability    Deltoid - 5/5     Range of Motion      RightShoulder:    Internal Rotation: ROM - L4    External Rotation: AROM - 60 degrees    Elevation through flexion: AROM - 140 degrees        Impingement   Right shoulder    Guillen-Bhaskar impingement test negative    Neer impingement test negative     Functional Testing   Right shoulder    AC crossover " adduction test negative    Speeds test negative    Uppercut test negative    O'Briens test negative    Drop arm sign negative    Apprehension relocation negative    Tutu sign positive    Imaging/Studies Reviewed and Interpreted:  Imaging Results (Last 24 Hours)       Procedure Component Value Units Date/Time    XR Humerus Right [[id number]] Resulted: 03/18/25 1127     Updated: 03/18/25 1127    Narrative:      Right Humerus X-Ray    Indication: Pain    Views:  AP and Lateral views    Comparison: None    Findings:  No fracture  No bony lesion  Normal soft tissues  Moderate AC joint arthritis    Impression: Negative right humerus x-ray for acute bony abnormality                  Assessment    Assessment:  1. Pain of right upper extremity    2. Rupture long head biceps tendon, right, initial encounter    3. Injury of right shoulder, initial encounter    4. Rotator cuff syndrome of right shoulder        Plan:  Continue over-the-counter medication as needed for discomfort  Right shoulder injury with long of the biceps rupture and likely subscap rupture.  We talked to the patient about treatment options and alternatives moving forward.  We talked about the likelihood (50%) of concomitant rotator cuff tear in the face of long of the biceps rupture.  We have agreed to recheck her in 6 weeks to see how the shoulder is behaving and if there is any concerns, an MRI can be requested but hopefully we find that she is able to do her think she wants to do.  We have encouraged her to try to keep her shoulder strong as possible and do this in a way that avoids overhead lifting and heavy lifting away from her body.  Physician guided home exercise program will be given to her today.        Carlos Rogers MD  03/18/25  13:05 EDT      Dictated Utilizing Dragon Dictation.

## 2025-05-07 RX ORDER — PRAMIPEXOLE DIHYDROCHLORIDE 0.5 MG/1
TABLET ORAL
Qty: 120 TABLET | Refills: 1 | Status: SHIPPED | OUTPATIENT
Start: 2025-05-07

## 2025-06-06 ENCOUNTER — TRANSCRIBE ORDERS (OUTPATIENT)
Dept: ADMINISTRATIVE | Facility: HOSPITAL | Age: 70
End: 2025-06-06
Payer: COMMERCIAL

## 2025-06-06 DIAGNOSIS — Z12.31 VISIT FOR SCREENING MAMMOGRAM: Primary | ICD-10-CM

## 2025-06-27 RX ORDER — BUPROPION HYDROCHLORIDE 150 MG/1
150 TABLET ORAL DAILY
Qty: 90 TABLET | Refills: 1 | Status: SHIPPED | OUTPATIENT
Start: 2025-06-27

## 2025-07-07 LAB
NCCN CRITERIA FLAG: NORMAL
TYRER CUZICK SCORE: 5.9

## 2025-07-08 ENCOUNTER — HOSPITAL ENCOUNTER (OUTPATIENT)
Dept: MAMMOGRAPHY | Facility: HOSPITAL | Age: 70
Discharge: HOME | End: 2025-07-08
Admitting: OBSTETRICS & GYNECOLOGY
Payer: COMMERCIAL

## 2025-07-08 DIAGNOSIS — Z12.31 VISIT FOR SCREENING MAMMOGRAM: ICD-10-CM

## 2025-07-08 PROCEDURE — 77067 SCR MAMMO BI INCL CAD: CPT

## 2025-07-08 PROCEDURE — 77063 BREAST TOMOSYNTHESIS BI: CPT

## 2025-07-09 RX ORDER — PRAMIPEXOLE DIHYDROCHLORIDE 0.5 MG/1
TABLET ORAL
Qty: 120 TABLET | Refills: 1 | Status: SHIPPED | OUTPATIENT
Start: 2025-07-09

## 2025-07-10 PROCEDURE — 77063 BREAST TOMOSYNTHESIS BI: CPT | Performed by: RADIOLOGY

## 2025-07-10 PROCEDURE — 77067 SCR MAMMO BI INCL CAD: CPT | Performed by: RADIOLOGY

## 2025-07-14 ENCOUNTER — OFFICE VISIT (OUTPATIENT)
Dept: INTERNAL MEDICINE | Facility: CLINIC | Age: 70
End: 2025-07-14
Payer: COMMERCIAL

## 2025-07-14 VITALS
WEIGHT: 140 LBS | SYSTOLIC BLOOD PRESSURE: 128 MMHG | BODY MASS INDEX: 23.9 KG/M2 | HEIGHT: 64 IN | TEMPERATURE: 98.4 F | DIASTOLIC BLOOD PRESSURE: 74 MMHG | HEART RATE: 64 BPM | OXYGEN SATURATION: 98 %

## 2025-07-14 DIAGNOSIS — E78.00 HYPERCHOLESTEROLEMIA: ICD-10-CM

## 2025-07-14 DIAGNOSIS — G56.01 CARPAL TUNNEL SYNDROME OF RIGHT WRIST: Chronic | ICD-10-CM

## 2025-07-14 DIAGNOSIS — M19.042 PRIMARY OSTEOARTHRITIS OF BOTH HANDS: Chronic | ICD-10-CM

## 2025-07-14 DIAGNOSIS — R19.7 ACUTE DIARRHEA: Primary | Chronic | ICD-10-CM

## 2025-07-14 DIAGNOSIS — R73.09 ABNORMAL GLUCOSE: ICD-10-CM

## 2025-07-14 DIAGNOSIS — M19.041 PRIMARY OSTEOARTHRITIS OF BOTH HANDS: Chronic | ICD-10-CM

## 2025-07-14 DIAGNOSIS — E66.3 OVERWEIGHT WITH BODY MASS INDEX (BMI) OF 26 TO 26.9 IN ADULT: Chronic | ICD-10-CM

## 2025-07-14 DIAGNOSIS — M21.612 BUNION OF GREAT TOE OF LEFT FOOT: ICD-10-CM

## 2025-07-14 PROBLEM — R20.0 NUMBNESS AND TINGLING IN RIGHT HAND: Chronic | Status: RESOLVED | Noted: 2022-12-20 | Resolved: 2025-07-14

## 2025-07-14 PROBLEM — R20.2 NUMBNESS AND TINGLING IN RIGHT HAND: Chronic | Status: RESOLVED | Noted: 2022-12-20 | Resolved: 2025-07-14

## 2025-07-14 PROCEDURE — 99214 OFFICE O/P EST MOD 30 MIN: CPT | Performed by: INTERNAL MEDICINE

## 2025-07-14 RX ORDER — PHENDIMETRAZINE TARTRATE 35 MG/1
1 TABLET ORAL 3 TIMES DAILY
Qty: 90 EACH | Refills: 2 | Status: SHIPPED | OUTPATIENT
Start: 2025-07-14

## 2025-07-14 RX ORDER — AMPICILLIN TRIHYDRATE 250 MG
500 CAPSULE ORAL DAILY
COMMUNITY

## 2025-07-14 NOTE — PROGRESS NOTES
[facility]     Suze Baeza  1955   [id number]      Patient Care Team:  Parul Lomas MD as PCP - General (Internal Medicine)  Kendrick Andersen MD as Consulting Physician (Dermatology)  Heraclio Mari MD as Consulting Physician (Orthopedic Surgery)  Chio Dhaliwal MD as Consulting Physician (Obstetrics and Gynecology)  Ambreen Quintero MD as Consulting Physician (Hand Surgery)    Chief Complaint   Patient presents with    6 mo f/u    Hyperlipidemia        Patient is a 70 y.o. female.   History of Present Illness  The patient is here for a 6-month follow-up.    She reports experiencing frequent bowel movements, often loose or diarrheal, over the past 2 to 3 months. She experiences bloating but no abdominal pain or increased gas. Despite monitoring her diet, she has not identified any specific food triggers. She occasionally wakes up early in the morning to defecate, but this is not a regular occurrence. She reports no fever, chills, nausea, or blood in her stool. Her stools are typically brown, with occasional green coloration after consuming venison. She has tried eliminating salads and raw vegetables from her diet without improvement. She consumes dairy in moderation and has not noticed any correlation between dairy intake and her symptoms. She has not traveled recently and does not care for grandchildren. She has not experienced similar symptoms in the past and reports no fecal incontinence. Her bowel movements vary daily, sometimes being loose but not diarrheal. She has reduced her intake of sugar-free lemonade and other sugary foods. She has never had C. difficile infection and has not taken antibiotics in the past 3 months. She reports no weight loss. She can hear her stomach gurgling. She is fasting today and plans to return another day for blood work. She had a colonoscopy in 04/2021, which was normal.    She recently had a mammogram that showed dense tissue in  her left breast, necessitating additional views.    She has arthritis, with her hands being the most affected. She was previously diagnosed with carpal tunnel syndrome in her right hand by a hand specialist. Since then, her symptoms have worsened, with increased weakness and tingling in her right hand, particularly in the thumb and middle finger. She also experiences numbness in three fingers, making it difficult to handle small objects like earrings. She has received steroid injections and uses Voltaren gel for relief.    She has bunions on both feet, which cause pain when wearing shoes and walking. She has not consulted a podiatrist for this issue.    Diet: She monitors her diet and has reduced intake of sugar-free lemonade and sugary foods.    FAMILY HISTORY  Her mother had bunions on one foot.         CHRONIC CONDITIONS      Past Medical History:   Diagnosis Date    Allergic     Annual physical exam 09/10/2020    Anxiety     Cancer     Cataract 2/24    COVID-19 virus infection 01/16/2023    Degenerative tear of medial meniscus, right     Depression     Hot flashes     PROZAC     Low back pain     Neuropathy, lumbosacral (radicular)     Osteopenia     Years ago    Primary osteoarthritis of both knees     Spondylolisthesis, lumbar region     Torn meniscus     right medial    Urinary tract infection 11-23    Viral warts 02/25/2019    Visual impairment     Wears reading eyeglasses        Past Surgical History:   Procedure Laterality Date    BLADDER SURGERY      CATARACT EXTRACTION, BILATERAL Bilateral 2024    COLONOSCOPY      HYSTERECTOMY  07/10/2012    JOINT REPLACEMENT      Knee replacment    KNEE ARTHROSCOPY Right     OOPHORECTOMY Bilateral 07/10/2012    TONSILLECTOMY      TOTAL KNEE ARTHROPLASTY Bilateral 12/29/2020    Procedure: TOTAL KNEE ARTHROPLASTY BILATERAL;  Surgeon: Heraclio Mari MD;  Location: [facility];  Service: Orthopedics;  Laterality: Bilateral;       Family History   Problem Relation Age of  "Onset    Deep vein thrombosis Mother     Stroke Mother     Heart disease Mother 68    Heart attack Mother     Osteoarthritis Mother     Arthritis Mother     Heart disease Father 51        cause of death    Heart attack Father     Stroke Other     Cancer Maternal Aunt         bone    Diabetes Maternal Grandmother     Cancer Maternal Aunt         lymphoma    Cancer Maternal Aunt         brain    Breast cancer Neg Hx     Ovarian cancer Neg Hx        Social History     Socioeconomic History    Marital status:    Tobacco Use    Smoking status: Never    Smokeless tobacco: Never   Vaping Use    Vaping status: Never Used   Substance and Sexual Activity    Alcohol use: No    Drug use: No    Sexual activity: Yes     Partners: Male     Birth control/protection: None, Hysterectomy       Allergies   Allergen Reactions    Chlorhexidine Rash    Other Itching and Rash     CHG       Review of Systems:     Review of Systems    Vital Signs  Vitals:    07/14/25 1120   BP: 128/74   BP Location: Left arm   Patient Position: Sitting   Cuff Size: Adult   Pulse: 64   Temp: 98.4 °F (36.9 °C)   TempSrc: Infrared   SpO2: 98%   Weight: 63.5 kg (140 lb)   Height: 162 cm (63.78\")   PainSc: 0-No pain     Body mass index is 24.2 kg/m².  BMI is within normal parameters. No other follow-up for BMI required.          Current Outpatient Medications:     Ascorbic Acid (VITAMIN C PO), Take 500 mg by mouth Daily., Disp: , Rfl:     atorvastatin (LIPITOR) 10 MG tablet, Take 1 tablet by mouth Every Night., Disp: 90 tablet, Rfl: 1    azelastine (ASTELIN) 0.1 % nasal spray, 2 sprays into the nostril(s) as directed by provider 2 (Two) Times a Day. Use in each nostril as directed, Disp: 1 each, Rfl: 12    buPROPion XL (WELLBUTRIN XL) 150 MG 24 hr tablet, TAKE 1 TABLET BY MOUTH DAILY, Disp: 90 tablet, Rfl: 1    busPIRone (BUSPAR) 5 MG tablet, Take 2 tablets by mouth 3 (Three) Times a Day., Disp: 180 tablet, Rfl: 5    calcium carb-cholecalciferol 600-800 " MG-UNIT tablet, Take 1 tablet by mouth Daily., Disp: , Rfl:     Calcium Polycarbophil (FIBER-CAPS PO), Take 1 tablet by mouth Daily., Disp: , Rfl:     Cinnamon 500 MG capsule, Take 1 capsule by mouth Daily., Disp: , Rfl:     Cyanocobalamin (VITAMIN B-12 PO), Take 5,000 mcg by mouth Daily., Disp: , Rfl:     desvenlafaxine (PRISTIQ) 50 MG 24 hr tablet, Take 1 tablet by mouth Daily., Disp: 90 tablet, Rfl: 1    Diclofenac Sodium (VOLTAREN) 1 % gel gel, Apply 4 g topically to the appropriate area as directed 4 (Four) Times a Day As Needed (joint pain)., Disp: 350 g, Rfl: 5    estradiol (ESTRACE) 0.5 MG tablet, Take 1 tablet by mouth Daily., Disp: , Rfl:     fluticasone (FLONASE) 50 MCG/ACT nasal spray, 1 spray into the nostril(s) as directed by provider 2 (Two) Times a Day., Disp: 16 g, Rfl: 5    hydrOXYzine (ATARAX) 50 MG tablet, Take 1 to 1 1/2 tabs every evening for sleep and anxiety, Disp: 90 tablet, Rfl: 1    Misc Natural Products (TURMERIC, CURCUMIN, PO), Take 2,000 mg by mouth Daily., Disp: , Rfl:     Phendimetrazine Tartrate 35 MG tablet, Take 1 tablet by mouth 3 (Three) Times a Day., Disp: 90 each, Rfl: 2    pramipexole (MIRAPEX) 0.5 MG tablet, TAKE 1 TABLET BY MOUTH DAILY AS NEEDED AND TAKE 3 TABLETS BY MOUTH EVERY NIGHT AT BEDTIME, Disp: 120 tablet, Rfl: 1    topiramate (TOPAMAX) 25 MG tablet, Take 3 tablets by mouth Every Night., Disp: 90 tablet, Rfl: 5    Flax Oil-Fish Oil-Borage Oil (FISH OIL-FLAX OIL-BORAGE OIL PO), Take 1 capsule by mouth Daily. (Patient not taking: Reported on 7/14/2025), Disp: , Rfl:     Physical Exam:    Physical Exam  Vitals and nursing note reviewed.   Constitutional:       Appearance: She is well-developed.   HENT:      Head: Normocephalic.   Eyes:      Conjunctiva/sclera: Conjunctivae normal.      Pupils: Pupils are equal, round, and reactive to light.   Neck:      Thyroid: No thyromegaly.   Cardiovascular:      Rate and Rhythm: Normal rate and regular rhythm.      Heart sounds:  Normal heart sounds.   Pulmonary:      Effort: Pulmonary effort is normal.      Breath sounds: Normal breath sounds. No wheezing.   Abdominal:      General: Bowel sounds are increased.      Palpations: Abdomen is soft. There is no hepatomegaly or splenomegaly.      Tenderness: There is no abdominal tenderness. There is no right CVA tenderness or left CVA tenderness.   Musculoskeletal:         General: Normal range of motion.      Cervical back: Normal range of motion and neck supple.   Lymphadenopathy:      Cervical: No cervical adenopathy.   Skin:     General: Skin is warm and dry.   Neurological:      Mental Status: She is alert and oriented to person, place, and time.   Psychiatric:         Thought Content: Thought content normal.          ACE III MINI        Results Review:    I reviewed the patient's new clinical results.  Results  Imaging   - Colonoscopy: 04/2021, Normal       CMP:  Lab Results   Component Value Date    Glucose 77 03/18/2025    Glucose, UA Negative 03/18/2025    BUN 22 03/18/2025    BUN/Creatinine Ratio 28.9 (H) 03/18/2025    Creatinine 0.76 03/18/2025    Creatinine, Urine 30.4 03/18/2025    Ketones, UA Negative 03/18/2025    CO2 24.0 03/18/2025    Calcium 9.4 03/18/2025    Albumin 4.2 03/18/2025    AST (SGOT) 21 03/18/2025    ALT (SGPT) 21 03/18/2025     HbA1c:  Lab Results   Component Value Date    HGBA1C 5.70 (H) 03/18/2025    HGBA1C 5.70 (H) 08/12/2024     Microalbumin:  Lab Results   Component Value Date    MICROALBUR <1.2 03/18/2025     Lipid Panel  Lab Results   Component Value Date    CHOL 164 03/18/2025    TRIG 124 03/18/2025    HDL 50 03/18/2025    LDL 92 03/18/2025    AST 21 03/18/2025    ALT 21 03/18/2025       Medication Review: Medications reviewed and noted  Patient Instructions   Problem List Items Addressed This Visit          Cardiac and Vasculature    Hypercholesterolemia    Overview   Taking atorvastatin every evening.           Relevant Medications    atorvastatin  (LIPITOR) 10 MG tablet    Other Relevant Orders    Lipid Panel    Microalbumin / Creatinine Urine Ratio - Urine, Clean Catch    Urinalysis With Microscopic - Urine, Clean Catch    TSH       Endocrine and Metabolic    Abnormal glucose    Relevant Orders    Hemoglobin A1c       Gastrointestinal Abdominal     Acute diarrhea - Primary (Chronic)    Relevant Orders    Stool Culture, Yersinia Only - Stool, Per Rectum    Ova & Parasite Examination - Stool, Per Rectum    Celiac Comprehensive Panel    Giardia Antigen - Stool, Per Rectum    CBC & Differential    Comprehensive Metabolic Panel    Lipase    Amylase       Musculoskeletal and Injuries    Primary osteoarthritis of both hands (Chronic)    Bunion of great toe of left foot    Relevant Orders    Ambulatory Referral to Podiatry (Completed)       Neuro    Carpal tunnel syndrome of right wrist (Chronic)          Diagnosis Plan   1. Acute diarrhea  Stool Culture, Yersinia Only - Stool, Per Rectum    Ova & Parasite Examination - Stool, Per Rectum    Celiac Comprehensive Panel    Giardia Antigen - Stool, Per Rectum    CBC & Differential    Comprehensive Metabolic Panel    Lipase    Amylase      2. Primary osteoarthritis of both hands        3. Carpal tunnel syndrome of right wrist        4. Bunion of great toe of left foot  Ambulatory Referral to Podiatry      5. Hypercholesterolemia  Lipid Panel    Microalbumin / Creatinine Urine Ratio - Urine, Clean Catch    Urinalysis With Microscopic - Urine, Clean Catch    TSH      6. Abnormal glucose  Hemoglobin A1c        Assessment & Plan  Diarrhea.  - Reports frequent loose stools and  diarrhea for the past 2-3 months, with no associated abdominal pain, fever, chills, nausea, or blood in the stool.  She does admit bloating and increased gas.  - Physical exam reveals no abdominal tenderness; stool is  brown color without maroon or black stools.  - Differential diagnoses include irritable bowel syndrome and potential infections. Blood  tests will be conducted to check kidney function, liver function, blood cell counts, thyroid function, and pancreatic enzymes. Stool studies will be performed to rule out infections.  Also will check for celiac disease.  - Advised to avoid dairy products and sugars and fatty foods.  She will also avoid artificial sweeteners for a few  weeks to see if symptoms improve. Stool samples to be collected during episodes of loose stools and returned to the lab promptly.    Primary osteoarthritis of both hands.  Right hand carpal tunnel syndrome.  - Reports worsening symptoms in her right hand, including tingling in first 3 digits and mild weakness, primarily affecting the thumb and first two fingers.  - Physical exam shows atrophy in the thenar muscle of the right hand.  - She will follow up with her hand specialist, Dr. Quintero for further evaluation and potential surgical intervention.    Bunion great toe of the left foot.  - Reports pain and discomfort from a bunion on her foot, which worsens with shoe wear and walking.  - Physical exam confirms bunion with notable protrusion.  -Continue to wear good supportive shoes with a wide toebox.  - Referral to Dr. Lassiter, a podiatrist, will be made for further evaluation and management.    Hypercholesterolemia  Continue to decrease fats and carbohydrates in the diet.  Continue atorvastatin nightly.  Continue regular exercise.    Abnormal glucose  Continue to decrease sugars and snack foods and starchy foods in the diet.  Continue regular exercise.    Health maintenance.  - Had a mammogram recently, which showed dense tissue in her left breast.  - Scheduled for additional imaging to ensure there are no underlying issues.  - Advised to follow up with imaging center for further evaluation.         Plan of care reviewed with patient at the conclusion of today's visit. Education was provided regarding diagnosis, management, and any prescribed or recommended OTC medications. Patient  verbalizes understanding of and agreement with management plan.         07/14/25   11:24 EDT    Patient or patient representative verbalized consent for the use of Ambient Listening during the visit with  Parul Lomas MD for chart documentation. 7/14/2025  12:39 EDT

## 2025-07-14 NOTE — TELEPHONE ENCOUNTER
Rx Refill Note  Requested Prescriptions     Pending Prescriptions Disp Refills    Phendimetrazine Tartrate 35 MG tablet [Pharmacy Med Name: PHENDIMETRAZINE 35 MG TABLET]       Sig: TAKE 1 TABLET BY MOUTH 3 TIMES A DAY      Last office visit with prescribing clinician: 7/14/2025   Last telemedicine visit with prescribing clinician: Visit date not found   Next office visit with prescribing clinician: 1/19/2026                         Would you like a call back once the refill request has been completed: [] Yes [] No    If the office needs to give you a call back, can they leave a voicemail: [] Yes [] No    Terri Brown MA  07/14/25, 16:15 EDT

## 2025-07-14 NOTE — PATIENT INSTRUCTIONS
Problem List Items Addressed This Visit          Cardiac and Vasculature    Hypercholesterolemia    Overview   Taking atorvastatin every evening.           Relevant Medications    atorvastatin (LIPITOR) 10 MG tablet    Other Relevant Orders    Lipid Panel    Microalbumin / Creatinine Urine Ratio - Urine, Clean Catch    Urinalysis With Microscopic - Urine, Clean Catch    TSH       Endocrine and Metabolic    Abnormal glucose    Relevant Orders    Hemoglobin A1c       Gastrointestinal Abdominal     Acute diarrhea - Primary (Chronic)    Relevant Orders    Stool Culture, Yersinia Only - Stool, Per Rectum    Ova & Parasite Examination - Stool, Per Rectum    Celiac Comprehensive Panel    Giardia Antigen - Stool, Per Rectum    CBC & Differential    Comprehensive Metabolic Panel    Lipase    Amylase       Musculoskeletal and Injuries    Primary osteoarthritis of both hands (Chronic)    Bunion of great toe of left foot    Relevant Orders    Ambulatory Referral to Podiatry (Completed)       Neuro    Carpal tunnel syndrome of right wrist (Chronic)     Exercising to Stay Healthy  To become healthy and stay healthy, it is recommended that you do moderate-intensity and vigorous-intensity exercise. You can tell that you are exercising at a moderate intensity if your heart starts beating faster and you start breathing faster but can still hold a conversation. You can tell that you are exercising at a vigorous intensity if you are breathing much harder and faster and cannot hold a conversation while exercising.  How can exercise benefit me?  Exercising regularly is important. It has many health benefits, such as:  Improving overall fitness, flexibility, and endurance.  Increasing bone density.  Helping with weight control.  Decreasing body fat.  Increasing muscle strength and endurance.  Reducing stress and tension, anxiety, depression, or anger.  Improving overall health.  What guidelines should I follow while exercising?  Before  you start a new exercise program, talk with your health care provider.  Do not exercise so much that you hurt yourself, feel dizzy, or get very short of breath.  Wear comfortable clothes and wear shoes with good support.  Drink plenty of water while you exercise to prevent dehydration or heat stroke.  Work out until your breathing and your heartbeat get faster (moderate intensity).  How often should I exercise?  Choose an activity that you enjoy, and set realistic goals. Your health care provider can help you make an activity plan that is individually designed and works best for you.  Exercise regularly as told by your health care provider. This may include:  Doing strength training two times a week, such as:  Lifting weights.  Using resistance bands.  Push-ups.  Sit-ups.  Yoga.  Doing a certain intensity of exercise for a given amount of time. Choose from these options:  A total of 150 minutes of moderate-intensity exercise every week.  A total of 75 minutes of vigorous-intensity exercise every week.  A mix of moderate-intensity and vigorous-intensity exercise every week.  Children, pregnant women, people who have not exercised regularly, people who are overweight, and older adults may need to talk with a health care provider about what activities are safe to perform. If you have a medical condition, be sure to talk with your health care provider before you start a new exercise program.  What are some exercise ideas?  Moderate-intensity exercise ideas include:  Walking 1 mile (1.6 km) in about 15 minutes.  Biking.  Hiking.  Golfing.  Dancing.  Water aerobics.  Vigorous-intensity exercise ideas include:  Walking 4.5 miles (7.2 km) or more in about 1 hour.  Jogging or running 5 miles (8 km) in about 1 hour.  Biking 10 miles (16.1 km) or more in about 1 hour.  Lap swimming.  Roller-skating or in-line skating.  Cross-country skiing.  Vigorous competitive sports, such as football, basketball, and soccer.  Jumping  rope.  Aerobic dancing.  What are some everyday activities that can help me get exercise?  Yard work, such as:  Pushing a .  Raking and bagging leaves.  Washing your car.  Pushing a stroller.  Shoveling snow.  Gardening.  Washing windows or floors.  How can I be more active in my day-to-day activities?  Use stairs instead of an elevator.  Take a walk during your lunch break.  If you drive, park your car farther away from your work or school.  If you take public transportation, get off one stop early and walk the rest of the way.  Stand up or walk around during all of your indoor phone calls.  Get up, stretch, and walk around every 30 minutes throughout the day.  Enjoy exercise with a friend. Support to continue exercising will help you keep a regular routine of activity.  Where to find more information  You can find more information about exercising to stay healthy from:  [place] Department of Health and Human Services: www.hhs.gov  Centers for Disease Control and Prevention (CDC): www.cdc.gov  Summary  Exercising regularly is important. It will improve your overall fitness, flexibility, and endurance.  Regular exercise will also improve your overall health. It can help you control your weight, reduce stress, and improve your bone density.  Do not exercise so much that you hurt yourself, feel dizzy, or get very short of breath.  Before you start a new exercise program, talk with your health care provider.  This information is not intended to replace advice given to you by your health care provider. Make sure you discuss any questions you have with your health care provider.  Document Revised: 04/15/2022 Document Reviewed: 04/15/2022  [employer]vier Patient Education © 2023 [employer]

## 2025-07-16 ENCOUNTER — HOSPITAL ENCOUNTER (OUTPATIENT)
Dept: MAMMOGRAPHY | Facility: HOSPITAL | Age: 70
Discharge: HOME | End: 2025-07-16
Admitting: RADIOLOGY
Payer: COMMERCIAL

## 2025-07-16 DIAGNOSIS — R92.8 ABNORMAL MAMMOGRAM: ICD-10-CM

## 2025-07-16 PROCEDURE — G0279 TOMOSYNTHESIS, MAMMO: HCPCS

## 2025-07-16 PROCEDURE — 77065 DX MAMMO INCL CAD UNI: CPT

## 2025-07-16 PROCEDURE — 77065 DX MAMMO INCL CAD UNI: CPT | Performed by: RADIOLOGY

## 2025-07-16 PROCEDURE — 77061 BREAST TOMOSYNTHESIS UNI: CPT | Performed by: RADIOLOGY

## 2025-07-17 RX ORDER — TOPIRAMATE 25 MG/1
TABLET, FILM COATED ORAL
Qty: 90 TABLET | Refills: 5 | Status: SHIPPED | OUTPATIENT
Start: 2025-07-17

## 2025-08-04 RX ORDER — ATORVASTATIN CALCIUM 10 MG/1
10 TABLET, FILM COATED ORAL NIGHTLY
Qty: 30 TABLET | Refills: 5 | Status: SHIPPED | OUTPATIENT
Start: 2025-08-04

## 2025-08-28 ENCOUNTER — OFFICE VISIT (OUTPATIENT)
Dept: ORTHOPEDIC SURGERY | Facility: CLINIC | Age: 70
End: 2025-08-28
Payer: COMMERCIAL

## 2025-08-28 VITALS
BODY MASS INDEX: 24.75 KG/M2 | SYSTOLIC BLOOD PRESSURE: 134 MMHG | WEIGHT: 145 LBS | DIASTOLIC BLOOD PRESSURE: 72 MMHG | HEIGHT: 64 IN

## 2025-08-28 DIAGNOSIS — M75.101 ROTATOR CUFF SYNDROME OF RIGHT SHOULDER: ICD-10-CM

## 2025-08-28 DIAGNOSIS — S46.111D RUPTURE OF RIGHT LONG HEAD BICEPS TENDON, SUBSEQUENT ENCOUNTER: ICD-10-CM

## 2025-08-28 DIAGNOSIS — M79.601 PAIN OF RIGHT UPPER EXTREMITY: Primary | ICD-10-CM

## 2025-08-28 DIAGNOSIS — S49.91XD INJURY OF RIGHT SHOULDER, SUBSEQUENT ENCOUNTER: ICD-10-CM

## (undated) DEVICE — UNDERCAST PADDING: Brand: DEROYAL

## (undated) DEVICE — DRAPE,T,LIMB,BILATERAL,STERILE: Brand: MEDLINE

## (undated) DEVICE — PUMP PAIN AUTOFUSER AUTO SELCT NOBOLUS 1TO14ML/HR 550ML DISP

## (undated) DEVICE — GLV SURG PREMIERPRO MIC LTX PF SZ8.5 BRN

## (undated) DEVICE — BNDG ELAS ELITE V/CLOSE 6IN 5YD LF STRL

## (undated) DEVICE — PK KN TOTL 10

## (undated) DEVICE — DISPOSABLE TOURNIQUET CUFF SINGLE BLADDER, DUAL PORT AND QUICK CONNECT CONNECTOR: Brand: COLOR CUFF

## (undated) DEVICE — BNDG ELAS W/CLIP 6IN 10YD LF STRL

## (undated) DEVICE — STRYKER PERFORMANCE SERIES SAGITTAL BLADE: Brand: STRYKER PERFORMANCE SERIES

## (undated) DEVICE — APPL CHLORAPREP TINTED 26ML TEAL

## (undated) DEVICE — DRAPE,U/ SHT,SPLIT,PLAS,STERIL: Brand: MEDLINE

## (undated) DEVICE — CEMENT MIXING SYSTEM WITH MIS FEMORAL BREAKAWAY NOZZLE: Brand: REVOLUTION

## (undated) DEVICE — SPNG LAP PREWSH SFTPK 18X18IN STRL PK/5

## (undated) DEVICE — 3M™ IOBAN™ 2 ANTIMICROBIAL INCISE DRAPE 6650EZ: Brand: IOBAN™ 2

## (undated) DEVICE — NON RIMMED SPEED PIN 65MM STERILE

## (undated) DEVICE — ANTIBACTERIAL UNDYED BRAIDED (POLYGLACTIN 910), SYNTHETIC ABSORBABLE SUTURE: Brand: COATED VICRYL

## (undated) DEVICE — PAD,ABDOMINAL,8"X7.5",STERILE,LF,1/PK: Brand: MEDLINE

## (undated) DEVICE — STCKNT IMPERV 12IN STRL

## (undated) DEVICE — SYS CLS SKIN PREMIERPRO EXOFINFUSION 22CM

## (undated) DEVICE — RIMMED SPEED PIN 30MM STERILE

## (undated) DEVICE — NDL HYPO ECLPS SFTY 18G 1 1/2IN

## (undated) DEVICE — RIMMED SPEED PIN 45MM STERILE

## (undated) DEVICE — SYR LUERLOK 20CC BX/50

## (undated) DEVICE — CVR HNDL LIGHT RIGID

## (undated) DEVICE — SPNG GZ WOVN 4X4IN 12PLY 10/BX STRL

## (undated) DEVICE — Device

## (undated) DEVICE — GLV SURG SENSICARE W/ALOE PF LF 9 STRL

## (undated) DEVICE — 3M™ STERI-DRAPE™ U-DRAPE 1015: Brand: STERI-DRAPE™

## (undated) DEVICE — DRAPE,TOP,102X53,STERILE: Brand: MEDLINE